# Patient Record
Sex: FEMALE | Race: BLACK OR AFRICAN AMERICAN | Employment: UNEMPLOYED | RURAL
[De-identification: names, ages, dates, MRNs, and addresses within clinical notes are randomized per-mention and may not be internally consistent; named-entity substitution may affect disease eponyms.]

---

## 2017-01-04 ENCOUNTER — TELEPHONE (OUTPATIENT)
Dept: INTERNAL MEDICINE CLINIC | Age: 57
End: 2017-01-04

## 2017-01-04 NOTE — TELEPHONE ENCOUNTER
Patient would like a referral for a orthopaedic doctor. She has an appointment on Monday January 9, 2017.

## 2017-01-09 ENCOUNTER — OFFICE VISIT (OUTPATIENT)
Dept: INTERNAL MEDICINE CLINIC | Age: 57
End: 2017-01-09

## 2017-01-09 VITALS
SYSTOLIC BLOOD PRESSURE: 112 MMHG | OXYGEN SATURATION: 98 % | DIASTOLIC BLOOD PRESSURE: 71 MMHG | TEMPERATURE: 96.1 F | BODY MASS INDEX: 32.36 KG/M2 | HEART RATE: 69 BPM | RESPIRATION RATE: 16 BRPM | WEIGHT: 150 LBS | HEIGHT: 57 IN

## 2017-01-09 DIAGNOSIS — M25.552 PAIN OF LEFT HIP JOINT: Primary | ICD-10-CM

## 2017-01-09 DIAGNOSIS — K21.9 GASTROESOPHAGEAL REFLUX DISEASE WITHOUT ESOPHAGITIS: ICD-10-CM

## 2017-01-09 DIAGNOSIS — F31.30 BIPOLAR AFFECTIVE DISORDER, CURRENT EPISODE DEPRESSED, CURRENT EPISODE SEVERITY UNSPECIFIED (HCC): ICD-10-CM

## 2017-01-09 RX ORDER — GABAPENTIN 100 MG/1
100 CAPSULE ORAL 3 TIMES DAILY
Qty: 90 CAP | Refills: 5 | Status: SHIPPED | OUTPATIENT
Start: 2017-01-09 | End: 2017-03-07 | Stop reason: SDUPTHER

## 2017-01-09 NOTE — PROGRESS NOTES
Chief Complaint   Patient presents with    Leg Pain     L/leg     I have reviewed the patient's medical history in detail and updated the computerized patient record. Health Maintenance reviewed. 1. Have you been to the ER, urgent care clinic since your last visit? Hospitalized since your last visit?no    2. Have you seen or consulted any other health care providers outside of the [address] [place] [address] since your last visit? Include any pap smears or colon screening.  no

## 2017-01-09 NOTE — PROGRESS NOTES
HISTORY OF PRESENT ILLNESS  Allison Stoner is a 55 y.o. female. Leg Pain    The history is provided by the patient. This is a new problem. Episode onset: 6 weeks ago after fall. The problem occurs constantly. The problem has not changed since onset. The pain is present in the left upper leg and left hip. The pain is at a severity of 10/10. Associated symptoms include numbness. Treatments tried: taking percs still has a few. The treatment provided mild relief. There has been a history of trauma. Went and saw a physician at [facility] who did an x-ray of her hip, said the problem was in her back and recommended she go see a back doctor. He does not deal with back issues. Current Outpatient Prescriptions   Medication Sig Dispense Refill    sertraline (ZOLOFT) 50 mg tablet Take  by mouth daily.  cyanocobalamin (VITAMIN B-12) 1,000 mcg tablet Take 1 Tab by mouth daily. Indications: PREVENTION OF VITAMIN B12 DEFICIENCY 90 Tab 0    cholecalciferol (VITAMIN D3) 1,000 unit tablet Take 2 Tabs by mouth daily. Indications: VITAMIN D DEFICIENCY 180 Tab 3    fluticasone (FLONASE) 50 mcg/actuation nasal spray 2 Sprays by Both Nostrils route daily. 1 Bottle 5    esomeprazole (NEXIUM) 40 mg capsule Take 1 Cap by mouth daily. Indications: GASTROESOPHAGEAL REFLUX 90 Cap 0     Allergies   Allergen Reactions    Tylenol [Acetaminophen] Anaphylaxis and Hives    Benadryl [Diphenhydramine Hcl] Nausea and Vomiting    Ibuprofen Nausea and Vomiting     Social History     Social History    Marital status:      Spouse name: N/A    Number of children: N/A    Years of education: N/A     Occupational History    Not on file.      Social History Main Topics    Smoking status: Never Smoker    Smokeless tobacco: Never Used    Alcohol use No    Drug use: No    Sexual activity: Not on file     Other Topics Concern    Not on file     Social History Narrative     has brain damage       Review of Systems   Constitutional: Negative for fever. Gastrointestinal: Negative for heartburn. No incontinence   Genitourinary: Negative for dysuria and hematuria. No incontinence   Musculoskeletal: Positive for joint pain. Fever in leg but cooled down   Neurological: Positive for numbness. Psychiatric/Behavioral: Negative for depression and suicidal ideas. Physical Exam  Visit Vitals    /71 (BP 1 Location: Left arm, BP Patient Position: Sitting)    Pulse 69    Temp 96.1 °F (35.6 °C) (Oral)    Resp 16    Ht 4' 9\" (1.448 m)    Wt 150 lb (68 kg)    SpO2 98%    BMI 32.46 kg/m2     WD WN female NAD overweight  Heart RRR without murmers clicks or rubs  Lungs CTA  Abdo soft nontender  Ext no edema  Skin no rash  Back was examined, grossly normal, no lesions or rash. Saddle area, sensation present. Patellar reflexes 2+ equal bilaterally. Lower leg strength 5 out of 5 bilateral.      ASSESSMENT and PLAN  Encounter Diagnoses   Name Primary?  Pain of left hip joint Yes    Bipolar affective disorder, current episode depressed, current episode severity unspecified (HCC)     Gastroesophageal reflux disease without esophagitis      Orders Placed This Encounter    REFERRAL TO ORTHOPEDICS    gabapentin (NEURONTIN) 100 mg capsule     We will switch her from her current Percocet over to gabapentin. Consider sciatica. Discussed possible side affects, precautions, and drug interactions and possible benefits of the medication(s). We discussed this pain and the usage of controlled substances for acute relief. In general these medications are indicated for the acute symptom relief and not for chronic usage, allthough they are frequently used for chronic management  After a certain period of time they should be stopped to avoid dependence and/or addiction. I warned her about the dangers of addiction and other side affects including respiratory depression and death.  Discussed how this is a controlled substance and that the prescribing of it is should be monitored very carefully. Drug usage is also monitored by our practise and the [facility], since there has been a lot of abuse and diversion of controlled substances. In general we do not refill this medication over the phone. PLease ask for enough pills to get you to your next appointment and make sure you keep your appointments. Warned not to take other medications like alcohol, other benzodiazapines marijuana or other narcotics when on this medication. Follow-up Disposition:  Return in about 4 weeks (around 2/6/2017) for routine follow up.

## 2017-01-09 NOTE — MR AVS SNAPSHOT
Visit Information Date & Time Provider Department Dept. Phone Encounter #  
 1/9/2017  2:30 PM Tracy Luque MD [address] [place] [address]  [id number] Follow-up Instructions Return in about 4 weeks (around 2/6/2017) for routine follow up. Your Appointments 1/23/2017  1:30 PM  
ROUTINE CARE with Tracy Luque MD  
[facility] ([facility]) Appt Note: f/u on leg pt will pay towards balance 12/28/16 [address] [place] [phone]  
[phone]  
  
   
 [address] Upcoming Health Maintenance Date Due FOBT Q 1 YEAR AGE 50-75 12/3/2016 BREAST CANCER SCRN MAMMOGRAM 12/10/2017 PAP AKA CERVICAL CYTOLOGY 12/3/2018 DTaP/Tdap/Td series (2 - Td) 6/23/2021 Allergies as of 1/9/2017  Review Complete On: 1/9/2017 By: Tracy Luque MD  
  
 Severity Noted Reaction Type Reactions Tylenol [Acetaminophen] High 10/02/2015    Anaphylaxis, Hives Benadryl [Diphenhydramine Hcl] Medium 12/03/2015    Nausea and Vomiting Ibuprofen  10/12/2014    Nausea and Vomiting Current Immunizations  Reviewed on 10/2/2015 Name Date DTaP 6/23/2011 Hep B Vaccine 10/16/2016, 7/20/2016 Influenza Vaccine 10/13/2016, 9/9/2015, 9/30/2014, 9/13/2013, 3/28/2013, 11/14/2011, 1/13/2011, 1/13/2011, 12/17/2009, 12/17/2009, 1/8/2009, 1/8/2009, 11/8/2007, 11/8/2007 Influenza Vaccine (Quad) PF 9/24/2014 Pneumococcal Polysaccharide (PPSV-23) 1/8/2009 Tdap 6/23/2011 Not reviewed this visit You Were Diagnosed With   
  
 Codes Comments Pain of left hip joint    -  Primary ICD-10-CM: M25.552 ICD-9-CM: 719.45 Bipolar affective disorder, current episode depressed, current episode severity unspecified ([facility]     ICD-10-CM: F31.30 ICD-9-CM: 296.50 Gastroesophageal reflux disease without esophagitis     ICD-10-CM: K21.9 ICD-9-CM: 530.81   
  
 Vitals BP Pulse Temp Resp Height(growth percentile) Weight(growth percentile) 112/71 (BP 1 Location: Left arm, BP Patient Position: Sitting) 69 96.1 °F (35.6 °C) (Oral) 16 4' 9\" (1.448 m) 150 lb (68 kg) SpO2 BMI OB Status Smoking Status 98% 32.46 kg/m2 Hysterectomy Never Smoker BMI and BSA Data Body Mass Index Body Surface Area  
 32.46 kg/m 2 1.65 m 2 Preferred Pharmacy Pharmacy Name Phone 150 [facility], [address] [facility] [address] [address] [phone] Your Updated Medication List  
  
   
This list is accurate as of: 1/9/17  2:36 PM.  Always use your most recent med list.  
  
  
  
  
 cholecalciferol 1,000 unit tablet Commonly known as:  VITAMIN D3 Take 2 Tabs by mouth daily. Indications: VITAMIN D DEFICIENCY  
  
 cyanocobalamin 1,000 mcg tablet Commonly known as:  VITAMIN B-12 Take 1 Tab by mouth daily. Indications: PREVENTION OF VITAMIN B12 DEFICIENCY  
  
 esomeprazole 40 mg capsule Commonly known as:  Neyda Jeronimo Take 1 Cap by mouth daily. Indications: GASTROESOPHAGEAL REFLUX  
  
 fluticasone 50 mcg/actuation nasal spray Commonly known as:  Rose Mary Reges 2 Sprays by Both Nostrils route daily. gabapentin 100 mg capsule Commonly known as:  NEURONTIN Take 1 Cap by mouth three (3) times daily. Start 1 tablet daily, usually in the evening, every few days increase as tolerated, for hip and back pain. ZOLOFT 50 mg tablet Generic drug:  sertraline Take  by mouth daily. Prescriptions Printed Refills  
 gabapentin (NEURONTIN) 100 mg capsule 5 Sig: Take 1 Cap by mouth three (3) times daily. Start 1 tablet daily, usually in the evening, every few days increase as tolerated, for hip and back pain. Class: Print Route: Oral  
  
We Performed the Following REFERRAL TO ORTHOPEDICS [UZS280 Custom]  Comments:  
 Bienvenido Ceron 6 weeks ago while at [facility] for pneumonia, c/o severe hip and back pain since. Saw surgeon at [facility] said pain was from her back, is severe 10/10 pain. Follow-up Instructions Return in about 4 weeks (around 2/6/2017) for routine follow up. Referral Information Referral ID Referred By Referred To  
  
 [id number] Freda SOOD [facility] [address] [place], [address] Phone: [phone] Visits Status Start Date End Date 1 New Request 1/9/17 1/9/18 If your referral has a status of pending review or denied, additional information will be sent to support the outcome of this decision. Introducing [facility]! [place] introduces Acsis patient portal. Now you can access parts of your medical record, email your doctor's office, and request medication refills online. 1. In your internet browser, go to [email or web address] 2. Click on the First Time User? Click Here link in the Sign In box. You will see the New Member Sign Up page. 3. Enter your Acsis Access Code exactly as it appears below. You will not need to use this code after youve completed the sign-up process. If you do not sign up before the expiration date, you must request a new code. · Acsis Access Code: [email or web address] Expires: 1/26/2017  1:49 PM 
 
4. Enter the last four digits of your Social Security Number (xxxx) and Date of Birth (mm/dd/yyyy) as indicated and click Submit. You will be taken to the next sign-up page. 5. Create a Catamarant ID. This will be your Acsis login ID and cannot be changed, so think of one that is secure and easy to remember. 6. Create a Catamarant password. You can change your password at any time. 7. Enter your Password Reset Question and Answer. This can be used at a later time if you forget your password. 8. Enter your e-mail address. You will receive e-mail notification when new information is available in [address] 9. Click Sign Up. You can now view and download portions of your medical record. 10. Click the Download Summary menu link to download a portable copy of your medical information. If you have questions, please visit the Frequently Asked Questions section of the oBaz website. Remember, oBaz is NOT to be used for urgent needs. For medical emergencies, dial 911. Now available from your iPhone and Android! Please provide this summary of care documentation to your next provider. Your primary care clinician is listed as Stacey Webster. If you have any questions after today's visit, please call [phone].

## 2017-01-12 DIAGNOSIS — Z98.84 HISTORY OF GASTRIC BYPASS: ICD-10-CM

## 2017-01-12 DIAGNOSIS — D50.8 OTHER IRON DEFICIENCY ANEMIA: ICD-10-CM

## 2017-01-12 NOTE — TELEPHONE ENCOUNTER
Requested Prescriptions     Pending Prescriptions Disp Refills    cyanocobalamin (VITAMIN B-12) 1,000 mcg tablet 90 Tab 0     Sig: Take 1 Tab by mouth daily.  Indications: PREVENTION OF VITAMIN B12 DEFICIENCY

## 2017-01-13 RX ORDER — LANOLIN ALCOHOL/MO/W.PET/CERES
1000 CREAM (GRAM) TOPICAL DAILY
Qty: 90 TAB | Refills: 3 | Status: SHIPPED | OUTPATIENT
Start: 2017-01-13 | End: 2017-09-06 | Stop reason: SDUPTHER

## 2017-01-13 NOTE — TELEPHONE ENCOUNTER
Requested Prescriptions     Pending Prescriptions Disp Refills    cyanocobalamin (VITAMIN B-12) 1,000 mcg tablet 90 Tab 0     Sig: Take 1 Tab by mouth daily.  Indications: PREVENTION OF VITAMIN B12 DEFICIENCY     Last Office Visit:  09 January 2017  Last Filled:  24 August 2016  Changes Made to Medication on Last Visit:  None

## 2017-02-03 ENCOUNTER — HOSPITAL ENCOUNTER (OUTPATIENT)
Dept: MRI IMAGING | Age: 57
Discharge: HOME | End: 2017-02-03
Attending: ORTHOPAEDIC SURGERY
Payer: MEDICARE

## 2017-02-03 DIAGNOSIS — M54.16 LUMBAR RADICULOPATHY: ICD-10-CM

## 2017-02-03 PROCEDURE — 72148 MRI LUMBAR SPINE W/O DYE: CPT

## 2017-02-07 ENCOUNTER — TELEPHONE (OUTPATIENT)
Dept: INTERNAL MEDICINE CLINIC | Age: 57
End: 2017-02-07

## 2017-02-07 NOTE — TELEPHONE ENCOUNTER
Patient called saying that her blood sugar has been fluctuating up and down. Please call her at [phone]. Mrs. Sherly Allen does has an appointment on Friday at 10:30am.

## 2017-02-07 NOTE — TELEPHONE ENCOUNTER
Called patient - her blood sugar readings have been ranging all over the place from as low as 56 to as high as 209 over the past 2 weeks - states that when it gets really low like 59 to 70 she feels really dizzy - all sugar readings have been fasting - appointment was changed from 2/10/17 to tomorrow (2/8/17) so that she can discuss with Dr Dread Belcher these readings  Kendrick Rubalcava LPN  9/5/4981  1:27 PM

## 2017-02-08 ENCOUNTER — OFFICE VISIT (OUTPATIENT)
Dept: INTERNAL MEDICINE CLINIC | Age: 57
End: 2017-02-08

## 2017-02-08 VITALS
OXYGEN SATURATION: 97 % | HEIGHT: 57 IN | TEMPERATURE: 98.1 F | WEIGHT: 155 LBS | RESPIRATION RATE: 16 BRPM | SYSTOLIC BLOOD PRESSURE: 111 MMHG | BODY MASS INDEX: 33.44 KG/M2 | DIASTOLIC BLOOD PRESSURE: 65 MMHG | HEART RATE: 74 BPM

## 2017-02-08 DIAGNOSIS — M54.32 BACK PAIN WITH LEFT-SIDED SCIATICA: ICD-10-CM

## 2017-02-08 DIAGNOSIS — F31.30 BIPOLAR AFFECTIVE DISORDER, CURRENT EPISODE DEPRESSED, CURRENT EPISODE SEVERITY UNSPECIFIED (HCC): ICD-10-CM

## 2017-02-08 DIAGNOSIS — R42 DIZZINESS AND GIDDINESS: Primary | ICD-10-CM

## 2017-02-08 RX ORDER — MECLIZINE HYDROCHLORIDE 25 MG/1
25 TABLET ORAL
Qty: 30 TAB | Refills: 1 | Status: SHIPPED | OUTPATIENT
Start: 2017-02-08 | End: 2017-08-16 | Stop reason: SDUPTHER

## 2017-02-08 NOTE — PROGRESS NOTES
Chief Complaint   Patient presents with    Dizziness     pt stated \"I feel funny, weakness in knees, lightheadedness\", pt's  is a diabetic and has been checking her BS and it is from 68 to 106 to 209      I have reviewed the patient's medical history in detail and updated the computerized patient record. Health Maintenance reviewed. 1. Have you been to the ER, urgent care clinic since your last visit? Hospitalized since your last visit?no    2. Have you seen or consulted any other health care providers outside of the [address] [place] [address] since your last visit? Include any pap smears or colon screening.  no

## 2017-02-08 NOTE — PROGRESS NOTES
HISTORY OF PRESENT ILLNESS  Silvestre Nicholas is a 55 y.o. female. Dizziness    The history is provided by the patient. This is a recurrent problem. The current episode started more than 2 days ago (after MRI). The problem occurs daily (1x). There was no loss of consciousness. Associated with: sugar and pain. Associated symptoms include headaches, back pain and dizziness. Pertinent negatives include no fever, no bowel incontinence, no bladder incontinence, no focal weakness, no seizures, no slurred speech and no head injury. Associated symptoms comments: nervous. Treatments tried: eating or drinking something. The treatment provided mild relief. Her past medical history does not include no CVA, no DM or no syncope. No DM meds  Has seen , but not seen him after the MRI. Had MRI, gabapentin helps a lot but the pain still pretty bad  Seeing counselor, mood pretty good. Past Medical History   Diagnosis Date    Hypoglycemia 2014    Hypotension 2014    Syncope and collapse 7/24/15     RTH     Social History     Social History    Marital status:      Spouse name: N/A    Number of children: N/A    Years of education: N/A     Occupational History    Not on file. Social History Main Topics    Smoking status: Never Smoker    Smokeless tobacco: Never Used    Alcohol use No    Drug use: No    Sexual activity: No     Other Topics Concern    Not on file     Social History Narrative     has brain damage       Review of Systems   Constitutional: Negative for fever and weight loss. Gastrointestinal: Negative for bowel incontinence. Genitourinary: Negative for bladder incontinence. Musculoskeletal: Positive for back pain. Neurological: Positive for dizziness and headaches. Negative for focal weakness, seizures and syncope. Psychiatric/Behavioral: Negative for depression.        Physical Exam  Visit Vitals    /65 (BP 1 Location: Left arm, BP Patient Position: Sitting)    Pulse 74  Temp 98.1 °F (36.7 °C) (Oral)    Resp 16    Ht 4' 9\" (1.448 m)    Wt 155 lb (70.3 kg)    SpO2 97%    BMI 33.54 kg/m2     Well developed well nourished no acute distress. Pupils equal round react to light, extraocular muscles intact. Tympanic membranes within normal limits throat unremarkable  Cranial nerves II through XII are intact. Neck unremarkable  Heart regular rate and rhythm without clicks murmurs rubs  Lungs are clear to auscultation  Abdomen soft. Extremities, motor 5 out of 5 bilaterally, reflexes 2+ equal bilaterally. ASSESSMENT and PLAN  Encounter Diagnoses   Name Primary?  Dizziness and giddiness Yes    Back pain with left-sided sciatica     Bipolar affective disorder, current episode depressed, current episode severity unspecified ([address]      Orders Placed This Encounter    REFERRAL TO ORTHOPEDICS    meclizine (ANTIVERT) 25 mg tablet   No sugar related, stop check BS  Reviewed MRI  Disk tear mild L4-5 on the left  Meds are helping no inc of gabapentin since vertigo but may inc to avoid surgery. Follow-up Disposition:  Return in about 4 weeks (around 3/8/2017).

## 2017-02-08 NOTE — MR AVS SNAPSHOT
Visit Information Date & Time Provider Department Dept. Phone Encounter #  
 2/8/2017  2:30 PM Irvin Best MD [place] [address] [phone] [id number] Follow-up Instructions Return in about 4 weeks (around 3/8/2017). Upcoming Health Maintenance Date Due FOBT Q 1 YEAR AGE 50-75 12/3/2016 BREAST CANCER SCRN MAMMOGRAM 12/10/2017 PAP AKA CERVICAL CYTOLOGY 12/3/2018 DTaP/Tdap/Td series (2 - Td) 6/23/2021 Allergies as of 2/8/2017  Review Complete On: 2/8/2017 By: Irvin Best MD  
  
 Severity Noted Reaction Type Reactions Tylenol [Acetaminophen] High 10/02/2015    Anaphylaxis, Hives Benadryl [Diphenhydramine Hcl] Medium 12/03/2015    Nausea and Vomiting Ibuprofen  10/12/2014    Nausea and Vomiting Current Immunizations  Reviewed on 10/2/2015 Name Date DTaP 6/23/2011 Hep B Vaccine 10/16/2016, 7/20/2016 Influenza Vaccine 10/13/2016, 9/9/2015, 9/30/2014, 9/13/2013, 3/28/2013, 11/14/2011, 1/13/2011, 1/13/2011, 12/17/2009, 12/17/2009, 1/8/2009, 1/8/2009, 11/8/2007, 11/8/2007 Influenza Vaccine (Quad) PF 9/24/2014 Pneumococcal Polysaccharide (PPSV-23) 1/8/2009 Tdap 6/23/2011 Not reviewed this visit You Were Diagnosed With   
  
 Codes Comments Dizziness and giddiness    -  Primary ICD-10-CM: C72 ICD-9-CM: 780.4 Back pain with left-sided sciatica     ICD-10-CM: M54.32 
ICD-9-CM: 724.3 Bipolar affective disorder, current episode depressed, current episode severity unspecified ([facility]     ICD-10-CM: F31.30 ICD-9-CM: 296.50 Vitals BP Pulse Temp Resp Height(growth percentile) Weight(growth percentile) 111/65 (BP 1 Location: Left arm, BP Patient Position: Sitting) 74 98.1 °F (36.7 °C) (Oral) 16 4' 9\" (1.448 m) 155 lb (70.3 kg) SpO2 BMI OB Status Smoking Status 97% 33.54 kg/m2 Hysterectomy Never Smoker BMI and BSA Data  Body Mass Index Body Surface Area  
 33.54 kg/m 2 1.68 m 2  
  
  
 Preferred Pharmacy Pharmacy Name Phone 150 [facility], [address] [place] [address] [address] [place] [address] [phone] Your Updated Medication List  
  
   
This list is accurate as of: 2/8/17  3:14 PM.  Always use your most recent med list.  
  
  
  
  
 cholecalciferol 1,000 unit tablet Commonly known as:  VITAMIN D3 Take 2 Tabs by mouth daily. Indications: VITAMIN D DEFICIENCY  
  
 cyanocobalamin 1,000 mcg tablet Commonly known as:  VITAMIN B-12 Take 1 Tab by mouth daily. Indications: PREVENTION OF VITAMIN B12 DEFICIENCY  
  
 esomeprazole 40 mg capsule Commonly known as:  Cevallos Gaunt Take 1 Cap by mouth daily. Indications: GASTROESOPHAGEAL REFLUX  
  
 fluticasone 50 mcg/actuation nasal spray Commonly known as:  Guerrero Blizzard 2 Sprays by Both Nostrils route daily. gabapentin 100 mg capsule Commonly known as:  NEURONTIN Take 1 Cap by mouth three (3) times daily. Start 1 tablet daily, usually in the evening, every few days increase as tolerated, for hip and back pain. meclizine 25 mg tablet Commonly known as:  ANTIVERT Take 1 Tab by mouth three (3) times daily as needed for up to 10 days. For dizziness ZOLOFT 50 mg tablet Generic drug:  sertraline Take  by mouth daily. Prescriptions Sent to Pharmacy Refills  
 meclizine (ANTIVERT) 25 mg tablet 1 Sig: Take 1 Tab by mouth three (3) times daily as needed for up to 10 days. For dizziness Class: Normal  
 Pharmacy: 150 [facility], [address]  #: [phone] Route: Oral  
  
We Performed the Following REFERRAL TO ORTHOPEDICS [EHF226 Custom] Comments:  
 F/u sciatica and recent back MRI Follow-up Instructions Return in about 4 weeks (around 3/8/2017). Referral Information Referral ID Referred By Referred To  
  
 [id number] Emma Urena   
   [phone] [facility] [address] [place] [address], [facility] Phone: [phone] Visits Status Start Date End Date 1 New Request 2/8/17 2/8/18 If your referral has a status of pending review or denied, additional information will be sent to support the outcome of this decision. Introducing [facility]! [place] introduces Wurl patient portal. Now you can access parts of your medical record, email your doctor's office, and request medication refills online. 1. In your internet browser, go to [email or web address] 2. Click on the First Time User? Click Here link in the Sign In box. You will see the New Member Sign Up page. 3. Enter your Wurl Access Code exactly as it appears below. You will not need to use this code after youve completed the sign-up process. If you do not sign up before the expiration date, you must request a new code. · Wurl Access Code: [email or web address] Expires: 5/4/2017  9:58 AM 
 
4. Enter the last four digits of your Social Security Number (xxxx) and Date of Birth (mm/dd/yyyy) as indicated and click Submit. You will be taken to the next sign-up page. 5. Create a Wurl ID. This will be your Wurl login ID and cannot be changed, so think of one that is secure and easy to remember. 6. Create a Wurl password. You can change your password at any time. 7. Enter your Password Reset Question and Answer. This can be used at a later time if you forget your password. 8. Enter your e-mail address. You will receive e-mail notification when new information is available in [address] 9. Click Sign Up. You can now view and download portions of your medical record. 10. Click the Download Summary menu link to download a portable copy of your medical information. If you have questions, please visit the Frequently Asked Questions section of the Wurl website. Remember, Wurl is NOT to be used for urgent needs. For medical emergencies, dial 911. Now available from your iPhone and Android! Please provide this summary of care documentation to your next provider. Your primary care clinician is listed as Annie [place]. If you have any questions after today's visit, please call [phone].

## 2017-02-23 DIAGNOSIS — Z98.84 HISTORY OF GASTRIC BYPASS: ICD-10-CM

## 2017-02-23 DIAGNOSIS — D50.8 OTHER IRON DEFICIENCY ANEMIA: ICD-10-CM

## 2017-02-23 RX ORDER — LANOLIN ALCOHOL/MO/W.PET/CERES
1000 CREAM (GRAM) TOPICAL DAILY
Qty: 90 TAB | Refills: 3 | Status: CANCELLED | OUTPATIENT
Start: 2017-02-23

## 2017-02-23 NOTE — TELEPHONE ENCOUNTER
Requested Prescriptions     Pending Prescriptions Disp Refills    fluticasone (FLONASE) 50 mcg/actuation nasal spray 1 Bottle 5     Si Sprays by Both Nostrils route daily.        Last Office Visit:  2017  Last Filled:  2016 with 5 refills  Changes Made to Medication on Last Visit: None

## 2017-02-23 NOTE — TELEPHONE ENCOUNTER
Requested Prescriptions     Pending Prescriptions Disp Refills    fluticasone (FLONASE) 50 mcg/actuation nasal spray 1 Bottle 5     Si Sprays by Both Nostrils route daily.  cyanocobalamin (VITAMIN B-12) 1,000 mcg tablet 90 Tab 3     Sig: Take 1 Tab by mouth daily.  Indications: PREVENTION OF VITAMIN B12 DEFICIENCY

## 2017-02-24 RX ORDER — FLUTICASONE PROPIONATE 50 MCG
2 SPRAY, SUSPENSION (ML) NASAL DAILY
Qty: 1 BOTTLE | Refills: 5 | Status: SHIPPED | OUTPATIENT
Start: 2017-02-24 | End: 2018-05-04 | Stop reason: SDUPTHER

## 2017-03-06 DIAGNOSIS — G44.229 CHRONIC TENSION-TYPE HEADACHE, NOT INTRACTABLE: ICD-10-CM

## 2017-03-06 RX ORDER — TOPIRAMATE 100 MG/1
100 TABLET, FILM COATED ORAL DAILY
Qty: 30 TAB | Refills: 11 | Status: SHIPPED | OUTPATIENT
Start: 2017-03-06 | End: 2018-04-07 | Stop reason: SDUPTHER

## 2017-03-06 NOTE — TELEPHONE ENCOUNTER
Requested Prescriptions     Pending Prescriptions Disp Refills    topiramate (TOPAMAX) 100 mg tablet 30 Tab 3     Sig: Take 1 Tab by mouth daily.  Indications: MOOD     Last office visit 2/7/17  Last filled  NOT ON PROFILE  Changes made to medication on last visit

## 2017-03-06 NOTE — TELEPHONE ENCOUNTER
Requested Prescriptions     Pending Prescriptions Disp Refills    topiramate (TOPAMAX) 100 mg tablet 30 Tab 3     Sig: Take 1 Tab by mouth daily.  Indications: MOOD

## 2017-03-07 ENCOUNTER — OFFICE VISIT (OUTPATIENT)
Dept: INTERNAL MEDICINE CLINIC | Age: 57
End: 2017-03-07

## 2017-03-07 VITALS
RESPIRATION RATE: 16 BRPM | DIASTOLIC BLOOD PRESSURE: 86 MMHG | HEART RATE: 60 BPM | SYSTOLIC BLOOD PRESSURE: 131 MMHG | WEIGHT: 160 LBS | TEMPERATURE: 97.2 F | BODY MASS INDEX: 34.52 KG/M2 | OXYGEN SATURATION: 99 % | HEIGHT: 57 IN

## 2017-03-07 DIAGNOSIS — M54.42 ACUTE MIDLINE LOW BACK PAIN WITH LEFT-SIDED SCIATICA: Primary | ICD-10-CM

## 2017-03-07 DIAGNOSIS — N39.46 MIXED INCONTINENCE: ICD-10-CM

## 2017-03-07 DIAGNOSIS — S39.012D LOW BACK STRAIN, SUBSEQUENT ENCOUNTER: ICD-10-CM

## 2017-03-07 LAB
BILIRUB UR QL STRIP: NEGATIVE
GLUCOSE UR-MCNC: NEGATIVE MG/DL
KETONES P FAST UR STRIP-MCNC: NEGATIVE MG/DL
PH UR STRIP: 5.5 [PH] (ref 4.6–8)
PROT UR QL STRIP: NEGATIVE MG/DL
SP GR UR STRIP: 1.01 (ref 1–1.03)
UA UROBILINOGEN AMB POC: NORMAL (ref 0.2–1)
URINALYSIS CLARITY POC: NORMAL
URINALYSIS COLOR POC: YELLOW
URINE BLOOD POC: NORMAL
URINE LEUKOCYTES POC: NORMAL
URINE NITRITES POC: NEGATIVE

## 2017-03-07 RX ORDER — HYDROCODONE BITARTRATE AND ACETAMINOPHEN 5; 325 MG/1; MG/1
1 TABLET ORAL
Qty: 40 TAB | Refills: 0 | Status: SHIPPED | OUTPATIENT
Start: 2017-03-07 | End: 2017-03-29 | Stop reason: ALTCHOICE

## 2017-03-07 RX ORDER — GABAPENTIN 300 MG/1
300 CAPSULE ORAL 2 TIMES DAILY
Qty: 90 CAP | Refills: 5 | Status: SHIPPED | OUTPATIENT
Start: 2017-03-07 | End: 2017-03-29 | Stop reason: ALTCHOICE

## 2017-03-07 NOTE — PROGRESS NOTES
Chief Complaint   Patient presents with    Fall     L/leg give way and pt falls frequently     I have reviewed the patient's medical history in detail and updated the computerized patient record. Health Maintenance reviewed. 1. Have you been to the ER, urgent care clinic since your last visit? Hospitalized since your last visit?no    2. Have you seen or consulted any other health care providers outside of the [address] [place] [address] since your last visit? Include any pap smears or colon screening.  no

## 2017-03-07 NOTE — MR AVS SNAPSHOT
Visit Information Date & Time Provider Department Dept. Phone Encounter #  
 3/7/2017  3:50 PM Juan Carlos Snow  Jamie Alex [id number] Follow-up Instructions Return in about 3 weeks (around 3/28/2017) for routine follow up. Upcoming Health Maintenance Date Due FOBT Q 1 YEAR AGE 50-75 12/3/2016 BREAST CANCER SCRN MAMMOGRAM 12/10/2017 PAP AKA CERVICAL CYTOLOGY 12/3/2018 DTaP/Tdap/Td series (2 - Td) 6/23/2021 Allergies as of 3/7/2017  Review Complete On: 3/7/2017 By: Damian Signs, LPN Severity Noted Reaction Type Reactions Tylenol [Acetaminophen] High 10/02/2015    Anaphylaxis, Hives Benadryl [Diphenhydramine Hcl] Medium 12/03/2015    Nausea and Vomiting Ibuprofen  10/12/2014    Nausea and Vomiting Current Immunizations  Reviewed on 10/2/2015 Name Date DTaP 6/23/2011 Hep B Vaccine 10/16/2016, 7/20/2016 Influenza Vaccine 10/13/2016, 9/9/2015, 9/30/2014, 9/13/2013, 3/28/2013, 11/14/2011, 1/13/2011, 1/13/2011, 12/17/2009, 12/17/2009, 1/8/2009, 1/8/2009, 11/8/2007, 11/8/2007 Influenza Vaccine (Quad) PF 9/24/2014 Pneumococcal Polysaccharide (PPSV-23) 1/8/2009 Tdap 6/23/2011 Not reviewed this visit You Were Diagnosed With   
  
 Codes Comments Acute midline low back pain with left-sided sciatica    -  Primary ICD-10-CM: M54.42 
ICD-9-CM: 724.2, 724.3 Mixed incontinence     ICD-10-CM: N39.46 
ICD-9-CM: 788.33 Low back strain, subsequent encounter     ICD-10-CM: S39.012D ICD-9-CM: V58.89, 847.2 Vitals BP Pulse Temp Resp Height(growth percentile) Weight(growth percentile) 131/86 (BP 1 Location: Left arm, BP Patient Position: Sitting) 60 97.2 °F (36.2 °C) (Oral) 16 4' 9\" (1.448 m) 160 lb (72.6 kg) SpO2 BMI OB Status Smoking Status 99% 34.62 kg/m2 Hysterectomy Never Smoker BMI and BSA Data  Body Mass Index Body Surface Area  
 34.62 kg/m 2 1.71 m 2  
  
  
 Preferred Pharmacy Pharmacy Name Phone 150 [facility], [address] [phone] Your Updated Medication List  
  
   
This list is accurate as of: 3/7/17  5:18 PM.  Always use your most recent med list.  
  
  
  
  
 cholecalciferol 1,000 unit tablet Commonly known as:  VITAMIN D3 Take 2 Tabs by mouth daily. Indications: VITAMIN D DEFICIENCY  
  
 cyanocobalamin 1,000 mcg tablet Commonly known as:  VITAMIN B-12 Take 1 Tab by mouth daily. Indications: PREVENTION OF VITAMIN B12 DEFICIENCY  
  
 esomeprazole 40 mg capsule Commonly known as:  Krissy Dame Take 1 Cap by mouth daily. Indications: GASTROESOPHAGEAL REFLUX  
  
 fluticasone 50 mcg/actuation nasal spray Commonly known as:  Humera Fetch 2 Sprays by Both Nostrils route daily. gabapentin 300 mg capsule Commonly known as:  NEURONTIN Take 1 Cap by mouth two (2) times a day. Start 1 tablet daily, usually in the evening, every few days increase as tolerated, for hip and back pain. HYDROcodone-acetaminophen 5-325 mg per tablet Commonly known as:  Aurelio Dolphin Take 1 Tab by mouth every six (6) hours as needed for Pain. Max Daily Amount: 4 Tabs. Take only after physical therapy sessions  
  
 topiramate 100 mg tablet Commonly known as:  TOPAMAX Take 1 Tab by mouth daily. Indications: MOOD  
  
 ZOLOFT 50 mg tablet Generic drug:  sertraline Take  by mouth daily. Prescriptions Printed Refills  
 gabapentin (NEURONTIN) 300 mg capsule 5 Sig: Take 1 Cap by mouth two (2) times a day. Start 1 tablet daily, usually in the evening, every few days increase as tolerated, for hip and back pain. Class: Print Route: Oral  
 HYDROcodone-acetaminophen (NORCO) 5-325 mg per tablet 0 Sig: Take 1 Tab by mouth every six (6) hours as needed for Pain. Max Daily Amount: 4 Tabs. Take only after physical therapy sessions Class: Print Route: Oral  
  
We Performed the Following AMB POC URINALYSIS DIP STICK AUTO W/O MICRO [31706 CPT(R)] AMB SUPPLY ORDER [[id number] Custom] Comments:  
 Back pain some left leg pain 3 mo [facility] PT Call  CULTURE, URINE [id number] CPT(R)] REFERRAL TO UROLOGY [[id number] Custom] Comments:  
 Mixed urine incontinence Follow-up Instructions Return in about 3 weeks (around 3/28/2017) for routine follow up. Referral Information Referral ID Referred By Referred To  
  
 [id number] Chandler SOOD MD   
   [facility] [address] [place], [address] [place] [address] Phone: [phone] Fax: [phone] Visits Status Start Date End Date 1 New Request 3/7/17 3/7/18 If your referral has a status of pending review or denied, additional information will be sent to support the outcome of this decision. Introducing [facility] & HEALTH SERVICES! Kayode Cronin introduces [employer] patient portal. Now you can access parts of your medical record, email your doctor's office, and request medication refills online. 1. In your internet browser, go to [email or web address] 2. Click on the First Time User? Click Here link in the Sign In box. You will see the New Member Sign Up page. 3. Enter your [employer] Access Code exactly as it appears below. You will not need to use this code after youve completed the sign-up process. If you do not sign up before the expiration date, you must request a new code. · [employer] Access Code: [email or web address] Expires: 5/4/2017  9:58 AM 
 
4. Enter the last four digits of your Social Security Number (xxxx) and Date of Birth (mm/dd/yyyy) as indicated and click Submit. You will be taken to the next sign-up page. 5. Create a IMayGout ID. This will be your [employer] login ID and cannot be changed, so think of one that is secure and easy to remember. 6. Create a [employer] password. You can change your password at any time. 7. Enter your Password Reset Question and Answer. This can be used at a later time if you forget your password. 8. Enter your e-mail address. You will receive e-mail notification when new information is available in [address] 9. Click Sign Up. You can now view and download portions of your medical record. 10. Click the Download Summary menu link to download a portable copy of your medical information. If you have questions, please visit the Frequently Asked Questions section of the [employer] website. Remember, [employer] is NOT to be used for urgent needs. For medical emergencies, dial 911. Now available from your iPhone and Android! Please provide this summary of care documentation to your next provider. Your primary care clinician is listed as Brittny Moran. If you have any questions after today's visit, please call [phone].

## 2017-03-07 NOTE — PROGRESS NOTES
HISTORY OF PRESENT ILLNESS  Reubin Epley is a 55 y.o. female. Fall   The history is provided by the patient. The accident occurred 2 days ago. The fall occurred while standing. She fell from a height of 3 - 5 ft. She landed on hard floor. The point of impact was the left hip, left shoulder and left knee. She was ambulatory at the scene. There was no entrapment after the fall. Associated symptoms include extremity weakness. Pertinent negatives include no fever, no bowel incontinence, no hematuria and no loss of consciousness. The symptoms are aggravated by activity. has some back pain issues and is being followed by orthopedics. He recommended getting a steroid shot but she does not want that. In her experience they do not seem to help much. She has had an MRI which showed some disc disease at the L3-L4 level, annular tear. Past Medical History:   Diagnosis Date    Hypoglycemia 2014    Hypotension 2014    Syncope and collapse 7/24/15    R[facility]     Social History     Social History    Marital status:      Spouse name: N/A    Number of children: N/A    Years of education: N/A     Occupational History    Not on file. Social History Main Topics    Smoking status: Never Smoker    Smokeless tobacco: Never Used    Alcohol use No    Drug use: No    Sexual activity: No     Other Topics Concern    Not on file     Social History Narrative     has brain damage         Review of Systems   Constitutional: Negative for fever and weight loss. Gastrointestinal: Negative for bowel incontinence. Genitourinary: Negative for dysuria and hematuria. Having incontinence having trouble getting to the restroom and tends to wet herself. Musculoskeletal: Positive for back pain, extremity weakness, falls and joint pain. Neurological: Negative for loss of consciousness.        Physical Exam  Visit Vitals    /86 (BP 1 Location: Left arm, BP Patient Position: Sitting)    Pulse 60    Temp 97.2 °F (36.2 °C) (Oral)    Resp 16    Ht 4' 9\" (1.448 m)    Wt 160 lb (72.6 kg)    SpO2 99%    BMI 34.62 kg/m2       WDWN NAD, uncomfortable with movements  TM clear, throat wnl  Neck no adenopathy  Heart RRR no C/M/R  Lungs CTA  Abdo soft non tender  Ext No redness swelling or edema  Back was examined, grossly normal, no lesions or rash. Saddle area, sensation present. Patellar reflexes 2+ equal bilaterally. Lower leg strength 5 out of 5 bilateral.  Straight leg raises negative    ASSESSMENT and PLAN  Encounter Diagnoses   Name Primary?  Acute midline low back pain with left-sided sciatica Yes    Mixed incontinence     Low back strain, subsequent encounter      Orders Placed This Encounter    AMB SUPPLY ORDER    CULTURE, URINE    REFERRAL TO UROLOGY    AMB POC URINALYSIS DIP STICK AUTO W/O MICRO    gabapentin (NEURONTIN) 300 mg capsule    HYDROcodone-acetaminophen (NORCO) 5-325 mg per tablet       We discussed this pain and the usage of controlled substances for back pain relief. In general these medications are indicated for the acute symptom relief and not for chronic usage, allthough they are frequently used for chronic management  After a certain period of time they should be stopped to avoid dependence and/or addiction. I warned her about the dangers of addiction and other side affects including respiratory depression and death. Discussed how this is a controlled substance and that the prescribing of it is should be monitored very carefully. Drug usage is also monitored by our practise and the [facility], since there has been a lot of abuse and diversion of controlled substances. In general we do not refill this medication over the phone. PLease ask for enough pills to get you to your next appointment and make sure you keep your appointments. Warned not to take other medications like alcohol, other benzodiazapines marijuana or other narcotics when on this medication.     Treat acute pain with narcotics, chronic pain with gabapentin. We will get her into physical therapy to make her stronger so hopefully she will not fall again. We will get her in to see urology about her incontinence. Follow-up Disposition:  Return in about 3 weeks (around 3/28/2017) for routine follow up.

## 2017-03-09 LAB
BACTERIA UR CULT: ABNORMAL
BACTERIA UR CULT: ABNORMAL

## 2017-03-10 ENCOUNTER — TELEPHONE (OUTPATIENT)
Dept: INTERNAL MEDICINE CLINIC | Age: 57
End: 2017-03-10

## 2017-03-10 RX ORDER — NITROFURANTOIN 25; 75 MG/1; MG/1
100 CAPSULE ORAL 2 TIMES DAILY
Qty: 14 CAP | Refills: 0 | Status: SHIPPED | OUTPATIENT
Start: 2017-03-10 | End: 2017-03-17

## 2017-03-23 ENCOUNTER — TELEPHONE (OUTPATIENT)
Dept: INTERNAL MEDICINE CLINIC | Age: 57
End: 2017-03-23

## 2017-03-23 NOTE — TELEPHONE ENCOUNTER
Chief Complaint   Patient presents with    Back Pain     LEG PAIN     Informed patient that an appointment is required before a narcotic Rx can be prescribed.   Appointment scheduled for Wednesday, March 29, 2017 at 3:15 pm.

## 2017-03-27 ENCOUNTER — TELEPHONE (OUTPATIENT)
Dept: INTERNAL MEDICINE CLINIC | Age: 57
End: 2017-03-27

## 2017-03-27 NOTE — TELEPHONE ENCOUNTER
Patient called in reference to needing a stronger fluid pill. She said that she has fluid from her hip to her feet. Mrs. Tra Cummins said she can barely walk. Please call her at [phone].

## 2017-03-29 ENCOUNTER — OFFICE VISIT (OUTPATIENT)
Dept: INTERNAL MEDICINE CLINIC | Age: 57
End: 2017-03-29

## 2017-03-29 VITALS
SYSTOLIC BLOOD PRESSURE: 126 MMHG | HEART RATE: 63 BPM | RESPIRATION RATE: 16 BRPM | WEIGHT: 161 LBS | DIASTOLIC BLOOD PRESSURE: 82 MMHG | BODY MASS INDEX: 34.73 KG/M2 | OXYGEN SATURATION: 98 % | TEMPERATURE: 96.5 F | HEIGHT: 57 IN

## 2017-03-29 DIAGNOSIS — Z98.84 HISTORY OF GASTRIC BYPASS: Chronic | ICD-10-CM

## 2017-03-29 DIAGNOSIS — F31.30 BIPOLAR AFFECTIVE DISORDER, CURRENT EPISODE DEPRESSED, CURRENT EPISODE SEVERITY UNSPECIFIED (HCC): ICD-10-CM

## 2017-03-29 DIAGNOSIS — R60.0 BILATERAL LEG EDEMA: Primary | ICD-10-CM

## 2017-03-29 RX ORDER — POTASSIUM CHLORIDE 750 MG/1
10 TABLET, EXTENDED RELEASE ORAL DAILY
Qty: 30 TAB | Refills: 2 | Status: SHIPPED | OUTPATIENT
Start: 2017-03-29 | End: 2017-05-09 | Stop reason: SDUPTHER

## 2017-03-29 RX ORDER — FUROSEMIDE 20 MG/1
20 TABLET ORAL DAILY
Qty: 30 TAB | Refills: 2 | Status: SHIPPED | OUTPATIENT
Start: 2017-03-29 | End: 2017-05-09 | Stop reason: SDUPTHER

## 2017-03-29 NOTE — MR AVS SNAPSHOT
Visit Information Date & Time Provider Department Dept. Phone Encounter #  
 3/29/2017  3:15 PM Lanette Aviles MD [address] [place], [place] [address]eb Alex [id number] Follow-up Instructions Return in about 4 weeks (around 4/26/2017) for routine follow up. Your Appointments 4/11/2017  2:45 PM  
ROUTINE CARE with Lanette Aviles MD  
[facility] ([facility]) Appt Note: leg/back pain f/u smc3/23/17  
 [address] [place] [address]  
[phone]  
  
   
 [address] Upcoming Health Maintenance Date Due FOBT Q 1 YEAR AGE 50-75 12/3/2016 BREAST CANCER SCRN MAMMOGRAM 12/10/2017 PAP AKA CERVICAL CYTOLOGY 12/3/2018 DTaP/Tdap/Td series (2 - Td) 6/23/2021 Allergies as of 3/29/2017  Review Complete On: 3/8/2017 By: Lanette Aviles MD  
  
 Severity Noted Reaction Type Reactions Tylenol [Acetaminophen] High 10/02/2015    Anaphylaxis, Hives Benadryl [Diphenhydramine Hcl] Medium 12/03/2015    Nausea and Vomiting Ibuprofen  10/12/2014    Nausea and Vomiting Current Immunizations  Reviewed on 10/2/2015 Name Date DTaP 6/23/2011 Hep B Vaccine 10/16/2016, 7/20/2016 Influenza Vaccine 10/13/2016, 9/9/2015, 9/30/2014, 9/13/2013, 3/28/2013, 11/14/2011, 1/13/2011, 1/13/2011, 12/17/2009, 12/17/2009, 1/8/2009, 1/8/2009, 11/8/2007, 11/8/2007 Influenza Vaccine (Quad) PF 9/24/2014 Pneumococcal Polysaccharide (PPSV-23) 1/8/2009 Tdap 6/23/2011 Not reviewed this visit You Were Diagnosed With   
  
 Codes Comments Bilateral leg edema    -  Primary ICD-10-CM: R60.0 ICD-9-CM: 782.3 History of gastric bypass     ICD-10-CM: Z98.890 ICD-9-CM: V45.86 Vitals BP Pulse Temp Resp Height(growth percentile) Weight(growth percentile)  126/82 (BP 1 Location: Left arm, BP Patient Position: Sitting) 63 96.5 °F (35.8 °C) (Oral) 16 4' 9\" (1.448 m) 161 lb (73 kg) SpO2 BMI OB Status Smoking Status 98% 34.84 kg/m2 Hysterectomy Never Smoker BMI and BSA Data Body Mass Index Body Surface Area 34.84 kg/m 2 1.71 m 2 Preferred Pharmacy Pharmacy Name Phone 150 [facility], [address] [place] [address] [phone] Your Updated Medication List  
  
   
This list is accurate as of: 3/29/17  4:16 PM.  Always use your most recent med list.  
  
  
  
  
 cholecalciferol 1,000 unit tablet Commonly known as:  VITAMIN D3 Take 2 Tabs by mouth daily. Indications: VITAMIN D DEFICIENCY  
  
 cyanocobalamin 1,000 mcg tablet Commonly known as:  VITAMIN B-12 Take 1 Tab by mouth daily. Indications: PREVENTION OF VITAMIN B12 DEFICIENCY  
  
 esomeprazole 40 mg capsule Commonly known as:  Pretty Macleod Take 1 Cap by mouth daily. Indications: GASTROESOPHAGEAL REFLUX  
  
 fluticasone 50 mcg/actuation nasal spray Commonly known as:  Edman Shaver 2 Sprays by Both Nostrils route daily. furosemide 20 mg tablet Commonly known as:  LASIX Take 1 Tab by mouth daily. As needed  
  
 gabapentin 300 mg capsule Commonly known as:  NEURONTIN Take 1 Cap by mouth two (2) times a day. Start 1 tablet daily, usually in the evening, every few days increase as tolerated, for hip and back pain. potassium chloride 10 mEq tablet Commonly known as:  K-DUR, KLOR-CON Take 1 Tab by mouth daily. If takes lasix  
  
 topiramate 100 mg tablet Commonly known as:  TOPAMAX Take 1 Tab by mouth daily. Indications: MOOD  
  
 ZOLOFT 50 mg tablet Generic drug:  sertraline Take  by mouth daily. Prescriptions Sent to Pharmacy Refills  
 furosemide (LASIX) 20 mg tablet 2 Sig: Take 1 Tab by mouth daily. As needed Class: Normal  
 Pharmacy: 150 [facility], [address] Ph #: [phone]  Route: Oral  
 potassium chloride (K-DUR, KLOR-CON) 10 mEq tablet 2 Sig: Take 1 Tab by mouth daily. If takes lasix Class: Normal  
 Pharmacy: [address] [place] [address], [address] [place] [address] #: [phone] Route: Oral  
  
Follow-up Instructions Return in about 4 weeks (around 4/26/2017) for routine follow up. Introducing [facility] & HEALTH SERVICES! [employer] introduces [employer] patient portal. Now you can access parts of your medical record, email your doctor's office, and request medication refills online. 1. In your internet browser, go to [email or web address] 2. Click on the First Time User? Click Here link in the Sign In box. You will see the New Member Sign Up page. 3. Enter your [employer] Access Code exactly as it appears below. You will not need to use this code after youve completed the sign-up process. If you do not sign up before the expiration date, you must request a new code. · [employer] Access Code: [email or web address] Expires: 5/4/2017 10:58 AM 
 
4. Enter the last four digits of your Social Security Number (xxxx) and Date of Birth (mm/dd/yyyy) as indicated and click Submit. You will be taken to the next sign-up page. 5. Create a [employer] ID. This will be your [employer] login ID and cannot be changed, so think of one that is secure and easy to remember. 6. Create a [employer] password. You can change your password at any time. 7. Enter your Password Reset Question and Answer. This can be used at a later time if you forget your password. 8. Enter your e-mail address. You will receive e-mail notification when new information is available in [address] 9. Click Sign Up. You can now view and download portions of your medical record. 10. Click the Download Summary menu link to download a portable copy of your medical information.  
 
If you have questions, please visit the Frequently Asked Questions section of the [employer]bridge. Remember, [employer] is NOT to be used for urgent needs. For medical emergencies, dial 911. Now available from your iPhone and Android! Please provide this summary of care documentation to your next provider. Your primary care clinician is listed as Doris Drake. If you have any questions after today's visit, please call [phone].

## 2017-03-29 NOTE — PROGRESS NOTES
Chief Complaint   Patient presents with    Leg Swelling     L/R leg swelling     I have reviewed the patient's medical history in detail and updated the computerized patient record. Health Maintenance reviewed. 1. Have you been to the ER, urgent care clinic since your last visit? Hospitalized since your last visit?no    2. Have you seen or consulted any other health care providers outside of the [address] [place] [address] since your last visit? Include any pap smears or colon screening. No    Do you have an [address] in place in the event that you have a healthcare crisis that could impact your decision making as it pertains to your health?no    Would you like information about the [address]    Information given. yes

## 2017-03-29 NOTE — PATIENT INSTRUCTIONS
Leg and Ankle Edema: Care Instructions  Your Care Instructions  Swelling in the legs, ankles, and feet is called edema. It is common after you sit or stand for a while. Long plane flights or car rides often cause swelling in the legs and feet. You may also have swelling if you have to stand for long periods of time at your job. Problems with the veins in the legs (varicose veins) and changes in hormones can also cause swelling. Sometimes the swelling in the ankles and feet is caused by a more serious problem, such as heart failure, infection, blood clots, or liver or kidney disease. Follow-up care is a key part of your treatment and safety. Be sure to make and go to all appointments, and call your doctor if you are having problems. Its also a good idea to know your test results and keep a list of the medicines you take. How can you care for yourself at home? · If your doctor gave you medicine, take it as prescribed. Call your doctor if you think you are having a problem with your medicine. · Whenever you are resting, raise your legs up. Try to keep the swollen area higher than the level of your heart. · Take breaks from standing or sitting in one position. ¨ Walk around to increase the blood flow in your lower legs. ¨ Move your feet and ankles often while you stand, or tighten and relax your leg muscles. · Wear support stockings. Put them on in the morning, before swelling gets worse. · Eat a balanced diet. Lose weight if you need to. · Limit the amount of salt (sodium) in your diet. Salt holds fluid in the body and may increase swelling. When should you call for help? Call 911 anytime you think you may need emergency care. For example, call if:  · You have symptoms of a blood clot in your lung (called a pulmonary embolism). These may include:  ¨ Sudden chest pain. ¨ Trouble breathing. ¨ Coughing up blood.   Call your doctor now or seek immediate medical care if:  · You have signs of a blood clot, such as:  ¨ Pain in your calf, back of the knee, thigh, or groin. ¨ Redness and swelling in your leg or groin. · You have symptoms of infection, such as:  ¨ Increased pain, swelling, warmth, or redness. ¨ Red streaks or pus. ¨ A fever. Watch closely for changes in your health, and be sure to contact your doctor if:  · Your swelling is getting worse. · You have new or worsening pain in your legs. · You do not get better as expected. Where can you learn more? Go to http://freddy-viral.info/. Enter W194 in the search box to learn more about \"Leg and Ankle Edema: Care Instructions. \"  Current as of: May 27, 2016  Content Version: 11.2  © 8537-1175 [employer]. Care instructions adapted under license by [employer] (which disclaims liability or warranty for this information). If you have questions about a medical condition or this instruction, always ask your healthcare professional. [place] [address] any warranty or liability for your use of this information.

## 2017-03-30 RX ORDER — HYDROCODONE BITARTRATE AND ACETAMINOPHEN 5; 325 MG/1; MG/1
1 TABLET ORAL
Qty: 15 TAB | Refills: 0 | Status: SHIPPED | OUTPATIENT
Start: 2017-03-30 | End: 2017-05-09 | Stop reason: ALTCHOICE

## 2017-03-30 NOTE — PROGRESS NOTES
She was looked up in the drug database. Findings show evidence of drug abuse: no, but has been using a lot. Next day after the visit she comes in walks in requesting pain medications. We discussed this pain and the usage of controlled substances for leg pain relief. In general these medications are indicated for the acute symptom relief and not for chronic usage, allthough they are frequently used for chronic management  After a certain period of time they should be stopped to avoid dependence and/or addiction. I warned her about the dangers of addiction and other side affects including respiratory depression and death. Discussed how this is a controlled substance and that the prescribing of it is should be monitored very carefully. Drug usage is also monitored by our practise and the [facility], since there has been a lot of abuse and diversion of controlled substances. In general we do not refill this medication over the phone. PLease ask for enough pills to get you to your next appointment and make sure you keep your appointments. Warned not to take other medications like alcohol, other benzodiazapines marijuana or other narcotics when on this medication. I told her that I would okay 1 more prescription for narcotics but would not continue these unless a very clear indication for them. ICD-10-CM ICD-9-CM    1. Bilateral leg edema R60.0 782.3    2. History of gastric bypass Z98.890 V45.86    3. Bipolar affective disorder, current episode depressed, current episode severity unspecified ([facility] F31.30 296.50      Orders Placed This Encounter    furosemide (LASIX) 20 mg tablet     Sig: Take 1 Tab by mouth daily. As needed     Dispense:  30 Tab     Refill:  2    potassium chloride (K-DUR, KLOR-CON) 10 mEq tablet     Sig: Take 1 Tab by mouth daily.  If takes lasix     Dispense:  30 Tab     Refill:  2    HYDROcodone-acetaminophen (NORCO) 5-325 mg per tablet     Sig: Take 1 Tab by mouth every eight (8) hours as needed for Pain. Max Daily Amount: 3 Tabs. Dispense:  15 Tab     Refill:  0     See patient instructions, went over them personally with the patient. Emphasized compliance. Questions answered. Patient states that they understand the plan of action and will call if there are any issues or misunderstandings. Subjective:    Complains of leg swelling both legs for a week or so. It is uncomfortable. No travel or trauma to the legs. It is both legs not 1. No fever or redness. Mood's been okay. Not depressed. States no issues with narcotic abuse. Her back pain has improved she had a fall earlier this month. Past Medical History:   Diagnosis Date    Hypoglycemia 2014    Hypotension 2014    Syncope and collapse 7/24/15    RTH     Social History     Social History    Marital status:      Spouse name: N/A    Number of children: N/A    Years of education: N/A     Occupational History    Not on file. Social History Main Topics    Smoking status: Never Smoker    Smokeless tobacco: Never Used    Alcohol use No    Drug use: No    Sexual activity: No     Other Topics Concern    Not on file     Social History Narrative     has brain damage     Visit Vitals    /82 (BP 1 Location: Left arm, BP Patient Position: Sitting)    Pulse 63    Temp 96.5 °F (35.8 °C) (Oral)    Resp 16    Ht 4' 9\" (1.448 m)    Wt 161 lb (73 kg)    SpO2 98%    BMI 34.84 kg/m2     WD WN female NAD  Heart RRR without murmers clicks or rubs  Lungs CTA  Abdo soft nontender  Ext  1+ bilateral edema, no cords. Both legs feet neurovascularly intact. Follow-up Disposition:  Return in about 4 weeks (around 4/26/2017) for routine follow up.

## 2017-05-09 ENCOUNTER — OFFICE VISIT (OUTPATIENT)
Dept: INTERNAL MEDICINE CLINIC | Age: 57
End: 2017-05-09

## 2017-05-09 VITALS
HEIGHT: 57 IN | WEIGHT: 160 LBS | SYSTOLIC BLOOD PRESSURE: 100 MMHG | OXYGEN SATURATION: 97 % | TEMPERATURE: 96.3 F | DIASTOLIC BLOOD PRESSURE: 70 MMHG | HEART RATE: 74 BPM | RESPIRATION RATE: 16 BRPM | BODY MASS INDEX: 34.52 KG/M2

## 2017-05-09 DIAGNOSIS — F31.30 BIPOLAR AFFECTIVE DISORDER, CURRENT EPISODE DEPRESSED, CURRENT EPISODE SEVERITY UNSPECIFIED (HCC): ICD-10-CM

## 2017-05-09 DIAGNOSIS — K21.9 GASTROESOPHAGEAL REFLUX DISEASE WITHOUT ESOPHAGITIS: ICD-10-CM

## 2017-05-09 DIAGNOSIS — R60.0 BILATERAL LEG EDEMA: ICD-10-CM

## 2017-05-09 DIAGNOSIS — Z23 ENCOUNTER FOR IMMUNIZATION: ICD-10-CM

## 2017-05-09 DIAGNOSIS — F43.21 MOURNING: ICD-10-CM

## 2017-05-09 DIAGNOSIS — Z13.6 SCREENING FOR ISCHEMIC HEART DISEASE: ICD-10-CM

## 2017-05-09 DIAGNOSIS — Z13.39 SCREENING FOR ALCOHOLISM: ICD-10-CM

## 2017-05-09 DIAGNOSIS — Z13.31 SCREENING FOR DEPRESSION: ICD-10-CM

## 2017-05-09 DIAGNOSIS — Z98.84 HISTORY OF GASTRIC BYPASS: ICD-10-CM

## 2017-05-09 DIAGNOSIS — Z00.00 ROUTINE GENERAL MEDICAL EXAMINATION AT A HEALTH CARE FACILITY: Primary | ICD-10-CM

## 2017-05-09 RX ORDER — POTASSIUM CHLORIDE 750 MG/1
10 TABLET, EXTENDED RELEASE ORAL DAILY
Qty: 30 TAB | Refills: 5 | Status: SHIPPED | OUTPATIENT
Start: 2017-05-09 | End: 2017-11-10 | Stop reason: SDUPTHER

## 2017-05-09 RX ORDER — FUROSEMIDE 20 MG/1
20 TABLET ORAL DAILY
Qty: 30 TAB | Refills: 5 | Status: SHIPPED | OUTPATIENT
Start: 2017-05-09 | End: 2017-11-10 | Stop reason: SDUPTHER

## 2017-05-09 RX ORDER — ESOMEPRAZOLE MAGNESIUM 40 MG/1
40 CAPSULE, DELAYED RELEASE ORAL DAILY
Qty: 90 CAP | Refills: 3 | Status: SHIPPED | OUTPATIENT
Start: 2017-05-09 | End: 2017-08-16 | Stop reason: ALTCHOICE

## 2017-05-09 RX ORDER — MELATONIN
2000 DAILY
Qty: 180 TAB | Refills: 3 | Status: SHIPPED | OUTPATIENT
Start: 2017-05-09 | End: 2018-04-13 | Stop reason: SDUPTHER

## 2017-05-09 RX ORDER — MECLIZINE HCL 12.5 MG 12.5 MG/1
12.5 TABLET ORAL
Qty: 90 TAB | Refills: 5 | Status: SHIPPED | OUTPATIENT
Start: 2017-05-09 | End: 2017-06-08

## 2017-05-09 NOTE — LETTER
5/10/2017 2:04 PM 
 
Ms. Mike Ruvalcaba [address] [address] Dear Mike Ruvalcaba: 
 
Please find your most recent results below. Resulted Orders LIPID PANEL Result Value Ref Range Cholesterol, total 224 (H) 100 - 199 mg/dL Triglyceride 54 0 - 149 mg/dL HDL Cholesterol 64 >39 mg/dL VLDL, calculated 11 5 - 40 mg/dL LDL, calculated 149 (H) 0 - 99 mg/dL Narrative Performed at:  [address]  [id number] : Nessa Godinez MD, Phone:  [phone] METABOLIC PANEL, BASIC Result Value Ref Range Glucose 61 (L) 65 - 99 mg/dL BUN 17 6 - 24 mg/dL Creatinine 0.78 0.57 - 1.00 mg/dL GFR est non-AA 86 >59 mL/min/1.73 GFR est AA 99 >59 mL/min/1.73  
 BUN/Creatinine ratio 22 9 - 23 Sodium 140 134 - 144 mmol/L Potassium 4.4 3.5 - 5.2 mmol/L Chloride 104 96 - 106 mmol/L  
 CO2 20 18 - 29 mmol/L Calcium 8.4 (L) 8.7 - 10.2 mg/dL Narrative Performed at:  [address]  [id number] : Nessa Godinez MD, Phone:  [phone] CBC W/O DIFF Result Value Ref Range WBC 4.3 3.4 - 10.8 x10E3/uL  
 RBC 4.52 3.77 - 5.28 x10E6/uL HGB 13.4 11.1 - 15.9 g/dL HCT 41.5 34.0 - 46.6 % MCV 92 79 - 97 fL  
 MCH 29.6 26.6 - 33.0 pg  
 MCHC 32.3 31.5 - 35.7 g/dL  
 RDW 13.9 12.3 - 15.4 % PLATELET 538 439 - 002 x10E3/uL Narrative Performed at:  [address]  [id number] : Nessa Godinez MD, Phone:  [phone] CVD REPORT Result Value Ref Range INTERPRETATION Note Comment:  
   Supplement report is available. Narrative Performed at:  [address] [address] [facility] [place]  [id number] : Radhika Wen PhD, Phone:  [phone] RECOMMENDATIONS: 
The results of your most recent lab results are normal/ stable.   Please follow up as scheduled. Please call me if you have any questions: [phone] Sincerely, Angeline Louis MD

## 2017-05-09 NOTE — PROGRESS NOTES
This is a Subsequent Medicare Annual Wellness Visit providing Personalized Prevention Plan Services (PPPS) (Performed 12 months after initial AWV and PPPS )    I have reviewed the patient's medical history in detail and updated the computerized patient record. History     Here for Medicare wellness exam.  Feels sad was close to her brother who recently passed. Some dizziness complaints they come and go. Past Medical History:   Diagnosis Date    Hypoglycemia     Hypotension     Syncope and collapse 7/24/15    RTH      Past Surgical History:   Procedure Laterality Date    HX ABDOMINAL LAPAROSCOPY      HX CARPAL TUNNEL RELEASE Bilateral more than 10 years ago    HX  SECTION  [id number]    HX COLONOSCOPY  2016    HX GASTRIC BYPASS  more 10 years ago    x2    HX HYSTERECTOMY      HX TONSIL AND ADENOIDECTOMY  more than 10 years ago     Current Outpatient Prescriptions   Medication Sig Dispense Refill    furosemide (LASIX) 20 mg tablet Take 1 Tab by mouth daily. As needed 30 Tab 2    potassium chloride (K-DUR, KLOR-CON) 10 mEq tablet Take 1 Tab by mouth daily. If takes lasix 30 Tab 2    topiramate (TOPAMAX) 100 mg tablet Take 1 Tab by mouth daily. Indications: MOOD 30 Tab 11    fluticasone (FLONASE) 50 mcg/actuation nasal spray 2 Sprays by Both Nostrils route daily. 1 Bottle 5    cyanocobalamin (VITAMIN B-12) 1,000 mcg tablet Take 1 Tab by mouth daily. Indications: PREVENTION OF VITAMIN B12 DEFICIENCY 90 Tab 3    sertraline (ZOLOFT) 50 mg tablet Take  by mouth daily.  cholecalciferol (VITAMIN D3) 1,000 unit tablet Take 2 Tabs by mouth daily. Indications: VITAMIN D DEFICIENCY 180 Tab 3    esomeprazole (NEXIUM) 40 mg capsule Take 1 Cap by mouth daily.  Indications: GASTROESOPHAGEAL REFLUX 90 Cap 0     Allergies   Allergen Reactions    Tylenol [Acetaminophen] Anaphylaxis and Hives    Benadryl [Diphenhydramine Hcl] Nausea and Vomiting    Ibuprofen Nausea and Vomiting Family History   Problem Relation Age of Onset    Stroke Mother     Cancer Father     Diabetes Brother     Cancer Maternal Aunt     Cancer Maternal Grandmother     Cancer Brother     Kidney Disease Brother      Social History   Substance Use Topics    Smoking status: Never Smoker    Smokeless tobacco: Never Used    Alcohol use No     Patient Active Problem List   Diagnosis Code    Bilateral leg edema R60.0    History of gastric bypass Z98.890    Migraine aura without headache G43. 109    Bipolar disorder with current episode depressed ([facility] F31.30    Gastroesophageal reflux disease without esophagitis K21.9       Depression Risk Factor Screening:     PHQ over the last two weeks 2/8/2017   Little interest or pleasure in doing things Several days   Feeling down, depressed or hopeless Several days   Total Score PHQ 2 2    mourns her brother  Alcohol Risk Factor Screening: On any occasion during the past 3 months, have you had more than 3 drinks containing alcohol? No    Do you average more than 7 drinks per week? No      Functional Ability and Level of Safety:     Hearing Loss   mild    Activities of Daily Living   Self-care. Requires assistance with: no ADLs    Fall Risk     Fall Risk Assessment, last 12 mths 2/8/2017   Able to walk? Yes   Fall in past 12 months? Yes   Fall with injury? Yes   Number of falls in past 12 months 1   Fall Risk Score 2     Abuse Screen   Patient is not abused    Review of Systems   Pertinent items are noted in HPI. Physical Examination     Evaluation of Cognitive Function:  Mood/affect:  sad  Appearance: age appropriate  Family member/caregiver input: NA    Visit Vitals    /70 (BP 1 Location: Left arm, BP Patient Position: Sitting)    Pulse 74    Temp 96.3 °F (35.7 °C) (Oral)    Resp 16    Ht 4' 9\" (1.448 m)    Wt 160 lb (72.6 kg)    SpO2 97%    BMI 34.62 kg/m2     Well developed well nourished no acute distress.   Pupils equal round react to light, extraocular muscles intact. Tympanic membranes within normal limits throat unremarkable  Cranial nerves II through XII are intact. Neck unremarkable  Heart regular rate and rhythm without clicks murmurs rubs  Lungs are clear to auscultation  Abdomen soft. Extremities, motor 5 out of 5 bilaterally, reflexes 2+ equal bilaterally. Patient Care Team:  Julienne Corbin MD as PCP - General (Family Practice)  Edwin Hilario MD as Physician (Cardiology)    Advice/Referrals/Counseling   Education and counseling provided:  Hepatitis B Vaccine  Cardiovascular screening blood test      Assessment/Plan     Encounter Diagnoses   Name Primary?  Routine general medical examination at a health care facility Yes    Gastroesophageal reflux disease without esophagitis     Bilateral leg edema     Mourning     Bipolar affective disorder, current episode depressed, current episode severity unspecified ([facility]     Screening for alcoholism     Screening for depression     Screening for ischemic heart disease     History of gastric bypass     Encounter for immunization      Orders Placed This Encounter    Depression Screen Annual    Hepatitis B vaccine, adult dosage, IM    Lipid Panel ([id number])    METABOLIC PANEL, BASIC    CBC W/O DIFF    CBC W/O DIFF    CVD REPORT    furosemide (LASIX) 20 mg tablet    potassium chloride (KLOR-CON) 10 mEq tablet    esomeprazole (NEXIUM) 40 mg capsule    cholecalciferol (VITAMIN D3) 1,000 unit tablet    meclizine (ANTIVERT) 12.5 mg tablet   . Probably some labyrinthitis. Follow-up Disposition:  Return in about 3 months (around 8/9/2017) for routine follow up.

## 2017-05-09 NOTE — PATIENT INSTRUCTIONS

## 2017-05-10 ENCOUNTER — TELEPHONE (OUTPATIENT)
Dept: INTERNAL MEDICINE CLINIC | Age: 57
End: 2017-05-10

## 2017-05-10 LAB
BUN SERPL-MCNC: 17 MG/DL (ref 6–24)
BUN/CREAT SERPL: 22 (ref 9–23)
CALCIUM SERPL-MCNC: 8.4 MG/DL (ref 8.7–10.2)
CHLORIDE SERPL-SCNC: 104 MMOL/L (ref 96–106)
CHOLEST SERPL-MCNC: 224 MG/DL (ref 100–199)
CO2 SERPL-SCNC: 20 MMOL/L (ref 18–29)
CREAT SERPL-MCNC: 0.78 MG/DL (ref 0.57–1)
ERYTHROCYTE [DISTWIDTH] IN BLOOD BY AUTOMATED COUNT: 13.9 % (ref 12.3–15.4)
GLUCOSE SERPL-MCNC: 61 MG/DL (ref 65–99)
HCT VFR BLD AUTO: 41.5 % (ref 34–46.6)
HDLC SERPL-MCNC: 64 MG/DL
HGB BLD-MCNC: 13.4 G/DL (ref 11.1–15.9)
INTERPRETATION, 910389: NORMAL
LDLC SERPL CALC-MCNC: 149 MG/DL (ref 0–99)
MCH RBC QN AUTO: 29.6 PG (ref 26.6–33)
MCHC RBC AUTO-ENTMCNC: 32.3 G/DL (ref 31.5–35.7)
MCV RBC AUTO: 92 FL (ref 79–97)
PLATELET # BLD AUTO: 267 X10E3/UL (ref 150–379)
POTASSIUM SERPL-SCNC: 4.4 MMOL/L (ref 3.5–5.2)
RBC # BLD AUTO: 4.52 X10E6/UL (ref 3.77–5.28)
SODIUM SERPL-SCNC: 140 MMOL/L (ref 134–144)
TRIGL SERPL-MCNC: 54 MG/DL (ref 0–149)
VLDLC SERPL CALC-MCNC: 11 MG/DL (ref 5–40)
WBC # BLD AUTO: 4.3 X10E3/UL (ref 3.4–10.8)

## 2017-05-10 NOTE — TELEPHONE ENCOUNTER
5/10/17 @ 9:24 AM PA initiated with [employer] for Esomeprazole Magnesium 40 mg capsules,PA # [id number]. Approval given effective 5/10/17 thru 12/31/17,test claim done. [address] called spoke with pharmacist Lesley Palma state claim went thru with copay $3.30

## 2017-05-10 NOTE — TELEPHONE ENCOUNTER
Received notification from [employer] of approval of drug coverage for Nexium - covered from 12/30/16 to 12/31/17 - Susana So LPN  0/69/4384  11:87 AM

## 2017-05-24 ENCOUNTER — OFFICE VISIT (OUTPATIENT)
Dept: INTERNAL MEDICINE CLINIC | Age: 57
End: 2017-05-24

## 2017-05-24 VITALS
WEIGHT: 162 LBS | HEIGHT: 57 IN | RESPIRATION RATE: 16 BRPM | SYSTOLIC BLOOD PRESSURE: 107 MMHG | TEMPERATURE: 97.5 F | BODY MASS INDEX: 34.95 KG/M2 | HEART RATE: 64 BPM | DIASTOLIC BLOOD PRESSURE: 67 MMHG | OXYGEN SATURATION: 98 %

## 2017-05-24 DIAGNOSIS — Z98.84 HISTORY OF GASTRIC BYPASS: Chronic | ICD-10-CM

## 2017-05-24 DIAGNOSIS — E16.2 HYPOGLYCEMIA: ICD-10-CM

## 2017-05-24 DIAGNOSIS — K21.00 GASTROESOPHAGEAL REFLUX DISEASE WITH ESOPHAGITIS: ICD-10-CM

## 2017-05-24 DIAGNOSIS — R13.10 DYSPHAGIA, UNSPECIFIED TYPE: Primary | ICD-10-CM

## 2017-05-24 LAB — GLUCOSE POC: 85 MG/DL

## 2017-05-24 NOTE — MR AVS SNAPSHOT
Visit Information Date & Time Provider Department Dept. Phone Encounter #  
 5/24/2017 10:45 AM Janice Richardson MD [address] [place], [place] [address]eb Alex [id number] Follow-up Instructions Return in about 4 weeks (around 6/21/2017) for routine follow up. Your Appointments 8/9/2017 11:00 AM  
ROUTINE CARE with Janice Richardson MD  
[facility] ([facility]) Appt Note: f/u on medication $0 cp ls 5/9/17  
 [address] [place] [address] Road. [address] [place] [address] [place] [id number]  
[phone]  
  
   
 [address] [place] [address] Road [address] Upcoming Health Maintenance Date Due INFLUENZA AGE 9 TO ADULT 8/1/2017 PAP AKA CERVICAL CYTOLOGY 12/3/2018 BREAST CANCER SCRN MAMMOGRAM 3/8/2019 DTaP/Tdap/Td series (2 - Td) 6/23/2021 COLONOSCOPY 8/3/2026 Allergies as of 5/24/2017  Review Complete On: 5/24/2017 By: Aaron [place] LPN Severity Noted Reaction Type Reactions Tylenol [Acetaminophen] High 10/02/2015    Anaphylaxis, Hives Benadryl [Diphenhydramine Hcl] Medium 12/03/2015    Nausea and Vomiting Ibuprofen  10/12/2014    Nausea and Vomiting Current Immunizations  Reviewed on 10/2/2015 Name Date DTaP 6/23/2011 Hep B Vaccine 10/16/2016, 7/20/2016 Hep B Vaccine (Adult) 5/9/2017 Influenza Vaccine 10/13/2016, 9/9/2015, 9/30/2014, 9/13/2013, 3/28/2013, 11/14/2011, 1/13/2011, 1/13/2011, 12/17/2009, 12/17/2009, 1/8/2009, 1/8/2009, 11/8/2007, 11/8/2007 Influenza Vaccine (Quad) PF 9/24/2014 Pneumococcal Polysaccharide (PPSV-23) 1/8/2009 Tdap 6/23/2011 Not reviewed this visit You Were Diagnosed With   
  
 Codes Comments Dysphagia, unspecified type    -  Primary ICD-10-CM: R13.10 ICD-9-CM: 787.20 Hypoglycemia     ICD-10-CM: E16.2 ICD-9-CM: 251.2 History of gastric bypass     ICD-10-CM: Z98.890 ICD-9-CM: V45.86   
 Gastroesophageal reflux disease with esophagitis     ICD-10-CM: K21.0 ICD-9-CM: 530.11 Vitals BP Pulse Temp Resp Height(growth percentile) Weight(growth percentile) 107/67 (BP 1 Location: Left arm, BP Patient Position: Sitting) 64 97.5 °F (36.4 °C) (Oral) 16 4' 9\" (1.448 m) 162 lb (73.5 kg) SpO2 BMI OB Status Smoking Status 98% 35.06 kg/m2 Hysterectomy Never Smoker BMI and BSA Data Body Mass Index Body Surface Area 35.06 kg/m 2 1.72 m 2 Preferred Pharmacy Pharmacy Name Phone [address] [facility], [address] [phone] Your Updated Medication List  
  
   
This list is accurate as of: 5/24/17 12:06 PM.  Always use your most recent med list.  
  
  
  
  
 cholecalciferol 1,000 unit tablet Commonly known as:  VITAMIN D3 Take 2 Tabs by mouth daily. Indications: VITAMIN D DEFICIENCY  
  
 cyanocobalamin 1,000 mcg tablet Commonly known as:  VITAMIN B-12 Take 1 Tab by mouth daily. Indications: PREVENTION OF VITAMIN B12 DEFICIENCY  
  
 esomeprazole 40 mg capsule Commonly known as:  Dottie Tex Take 1 Cap by mouth daily. Indications: gastroesophageal reflux disease  
  
 fluticasone 50 mcg/actuation nasal spray Commonly known as:  Marce Rad 2 Sprays by Both Nostrils route daily. furosemide 20 mg tablet Commonly known as:  LASIX Take 1 Tab by mouth daily. As needed  
  
 meclizine 12.5 mg tablet Commonly known as:  ANTIVERT Take 1 Tab by mouth three (3) times daily as needed for up to 30 days. potassium chloride 10 mEq tablet Commonly known as:  KLOR-CON Take 1 Tab by mouth daily. If takes lasix  
  
 topiramate 100 mg tablet Commonly known as:  TOPAMAX Take 1 Tab by mouth daily. Indications: MOOD  
  
 ZOLOFT 50 mg tablet Generic drug:  sertraline Take  by mouth daily. We Performed the Following AMB POC GLUCOSE BLOOD, BY GLUCOSE MONITORING DEVICE [23338 CPT(R)] [facility] SUPPLY ORDER [[id number] Custom] Comments:  
 Dr Simeon Connell [facility] Bariatric surgery, some hypoglycemia, recent dysphagia has had bariatric surgery. Call [phone] Follow-up Instructions Return in about 4 weeks (around 6/21/2017) for routine follow up. Introducing [facility]! Salome Chantal introduces [employer] patient portal. Now you can access parts of your medical record, email your doctor's office, and request medication refills online. 1. In your internet browser, go to [email or web address] 2. Click on the First Time User? Click Here link in the Sign In box. You will see the New Member Sign Up page. 3. Enter your [employer] Access Code exactly as it appears below. You will not need to use this code after youve completed the sign-up process. If you do not sign up before the expiration date, you must request a new code. · [employer] Access Code: [email or web address] Expires: 8/7/2017  2:41 PM 
 
4. Enter the last four digits of your Social Security Number (xxxx) and Date of Birth (mm/dd/yyyy) as indicated and click Submit. You will be taken to the next sign-up page. 5. Create a [employer]el ID. This will be your [employer] login ID and cannot be changed, so think of one that is secure and easy to remember. 6. Create a [employer]el password. You can change your password at any time. 7. Enter your Password Reset Question and Answer. This can be used at a later time if you forget your password. 8. Enter your e-mail address. You will receive e-mail notification when new information is available in [address] 9. Click Sign Up. You can now view and download portions of your medical record. 10. Click the Download Summary menu link to download a portable copy of your medical information. If you have questions, please visit the Frequently Asked Questions section of the [employer] website. Remember, [employer] is NOT to be used for urgent needs. For medical emergencies, dial 911. Now available from your iPhone and Android! Please provide this summary of care documentation to your next provider. Your primary care clinician is listed as Laverna Lennox. If you have any questions after today's visit, please call [phone].

## 2017-05-24 NOTE — PROGRESS NOTES
HISTORY OF PRESENT ILLNESS  Micah Henley is a 55 y.o. female. Dysphagia   The history is provided by the patient. This is a new problem. Episode onset: 1 week. The problem occurs hourly (josselyn if swallows). The problem has been gradually improving. Pertinent negatives include no chest pain and no abdominal pain. Exacerbated by: swallowing. Nothing relieves the symptoms. Treatments tried: sucarafate. The treatment provided no relief. Went to ER 2 days ago had workup was neg. They wanted her to do a barium swallow and or see surgeon. Past Surgical History:   Procedure Laterality Date    HX ABDOMINAL LAPAROSCOPY      HX CARPAL TUNNEL RELEASE Bilateral more than 10 years ago    HX  SECTION  [id number]    HX COLONOSCOPY  2016    HX GASTRIC BYPASS  1997    x2    HX HYSTERECTOMY      HX TONSIL AND ADENOIDECTOMY  more than 10 years ago       Review of Systems   Constitutional: Negative for weight loss. Cardiovascular: Negative for chest pain. Gastrointestinal: Negative for abdominal pain. Neurological: Positive for dizziness. Physical Exam  Visit Vitals    /67 (BP 1 Location: Left arm, BP Patient Position: Sitting)    Pulse 64    Temp 97.5 °F (36.4 °C) (Oral)    Resp 16    Ht 4' 9\" (1.448 m)    Wt 162 lb (73.5 kg)    SpO2 98%    BMI 35.06 kg/m2       WDWN NAD  TM clear, throat wnl  Neck no adenopathy  Heart RRR no C/M/R  Lungs CTA  Abdo soft non tender  Ext No redness swelling or edema  BS repeat 85  ASSESSMENT and PLAN  Encounter Diagnoses   Name Primary?  Dysphagia, unspecified type Yes    Hypoglycemia     History of gastric bypass     Gastroesophageal reflux disease with esophagitis      Orders Placed This Encounter    AMB SUPPLY ORDER    AMB POC GLUCOSE BLOOD, BY GLUCOSE MONITORING DEVICE     Will have her see the gastric bypass surgeons at [facility]. Follow-up Disposition:  Return in about 4 weeks (around 2017) for routine follow up.

## 2017-05-24 NOTE — PROGRESS NOTES
Chief Complaint   Patient presents with    Dysphagia     RT ER - when swallowing pain in upper chest     I have reviewed the patient's medical history in detail and updated the computerized patient record. Health Maintenance reviewed. 1. Have you been to the ER, urgent care clinic since your last visit? Hospitalized since your last visit? yes    2. Have you seen or consulted any other health care providers outside of the [address] [place] [address] since your last visit? Include any pap smears or colon screening. [facility] ER    Do you have an [address] in place in the event that you have a healthcare crisis that could impact your decision making as it pertains to your health?no    Would you like information about the [address] [place] [address] given. no

## 2017-06-21 ENCOUNTER — TELEPHONE (OUTPATIENT)
Dept: INTERNAL MEDICINE CLINIC | Age: 57
End: 2017-06-21

## 2017-06-21 NOTE — TELEPHONE ENCOUNTER
Patient missed her appt and rescheduled it for Tuesday. She would like Dr. Dread Belcher to check her medication for refills.

## 2017-06-27 ENCOUNTER — OFFICE VISIT (OUTPATIENT)
Dept: INTERNAL MEDICINE CLINIC | Age: 57
End: 2017-06-27

## 2017-06-27 VITALS
SYSTOLIC BLOOD PRESSURE: 121 MMHG | OXYGEN SATURATION: 99 % | RESPIRATION RATE: 16 BRPM | WEIGHT: 175 LBS | HEART RATE: 87 BPM | BODY MASS INDEX: 37.76 KG/M2 | DIASTOLIC BLOOD PRESSURE: 83 MMHG | HEIGHT: 57 IN | TEMPERATURE: 96.3 F

## 2017-06-27 DIAGNOSIS — N64.4 BREAST PAIN: Primary | ICD-10-CM

## 2017-06-27 DIAGNOSIS — F31.31 BIPOLAR AFFECTIVE DISORDER, CURRENTLY DEPRESSED, MILD (HCC): ICD-10-CM

## 2017-06-27 DIAGNOSIS — G89.29 UPPER BACK PAIN, CHRONIC: ICD-10-CM

## 2017-06-27 DIAGNOSIS — K21.9 GASTROESOPHAGEAL REFLUX DISEASE WITHOUT ESOPHAGITIS: ICD-10-CM

## 2017-06-27 DIAGNOSIS — M54.9 UPPER BACK PAIN, CHRONIC: ICD-10-CM

## 2017-06-27 PROBLEM — K21.00 GASTROESOPHAGEAL REFLUX DISEASE WITH ESOPHAGITIS: Status: RESOLVED | Noted: 2017-05-24 | Resolved: 2017-06-27

## 2017-06-27 RX ORDER — NAPROXEN 500 MG/1
500 TABLET ORAL 2 TIMES DAILY WITH MEALS
Qty: 60 TAB | Refills: 0 | Status: SHIPPED | OUTPATIENT
Start: 2017-06-27 | End: 2017-09-12 | Stop reason: SDUPTHER

## 2017-06-27 NOTE — MR AVS SNAPSHOT
Visit Information Date & Time Provider Department Dept. Phone Encounter #  
 6/27/2017 11:00 AM Javon Emerson MD [place] [address] [phone] [id number] Follow-up Instructions Return in about 3 months (around 9/27/2017). Your Appointments 8/9/2017 11:00 AM  
ROUTINE CARE with Javon Emerson MD  
[facility] ([facility]) Appt Note: f/u on medication $0 cp [facility] 5/9/17  
 [address] [place] [address]. [address] Maykel Cobbers [address]  
[phone]  
  
   
 [address] [place] [address] [address] Upcoming Health Maintenance Date Due INFLUENZA AGE 9 TO ADULT 8/1/2017 MEDICARE YEARLY EXAM 5/10/2018 PAP AKA CERVICAL CYTOLOGY 12/3/2018 BREAST CANCER SCRN MAMMOGRAM 3/8/2019 DTaP/Tdap/Td series (2 - Td) 6/23/2021 COLONOSCOPY 8/3/2026 Allergies as of 6/27/2017  Review Complete On: 6/27/2017 By: Keny Rubio LPN Severity Noted Reaction Type Reactions Tylenol [Acetaminophen] High 10/02/2015    Anaphylaxis, Hives Benadryl [Diphenhydramine Hcl] Medium 12/03/2015    Nausea and Vomiting Ibuprofen  10/12/2014    Nausea and Vomiting Current Immunizations  Reviewed on 10/2/2015 Name Date DTaP 6/23/2011 Hep B Vaccine 10/16/2016, 7/20/2016 Hep B Vaccine (Adult) 5/9/2017 Influenza Vaccine 10/13/2016, 9/9/2015, 9/30/2014, 9/13/2013, 3/28/2013, 11/14/2011, 1/13/2011, 1/13/2011, 12/17/2009, 12/17/2009, 1/8/2009, 1/8/2009, 11/8/2007, 11/8/2007 Influenza Vaccine (Quad) PF 9/24/2014 Pneumococcal Polysaccharide (PPSV-23) 1/8/2009 Tdap 6/23/2011 Not reviewed this visit You Were Diagnosed With   
  
 Codes Comments Breast pain    -  Primary ICD-10-CM: N64.4 ICD-9-CM: 611.71 Gastroesophageal reflux disease without esophagitis     ICD-10-CM: K21.9 ICD-9-CM: 530.81  Bipolar affective disorder, currently depressed, mild (HCC)     ICD-10-CM: F31.31 
ICD-9-CM: 296.51   
 Vitals BP Pulse Temp Resp Height(growth percentile) Weight(growth percentile) 121/83 (BP 1 Location: Left arm, BP Patient Position: Sitting) 87 96.3 °F (35.7 °C) (Oral) 16 4' 9\" (1.448 m) 175 lb (79.4 kg) SpO2 BMI OB Status Smoking Status 99% 37.87 kg/m2 Hysterectomy Never Smoker BMI and BSA Data Body Mass Index Body Surface Area  
 37.87 kg/m 2 1.79 m 2 Preferred Pharmacy Pharmacy Name Phone [address] [facility] [address], [address] [facility] [address] [facility] [address] [phone] Your Updated Medication List  
  
   
This list is accurate as of: 6/27/17 12:17 PM.  Always use your most recent med list.  
  
  
  
  
 cholecalciferol 1,000 unit tablet Commonly known as:  VITAMIN D3 Take 2 Tabs by mouth daily. Indications: VITAMIN D DEFICIENCY  
  
 cyanocobalamin 1,000 mcg tablet Commonly known as:  VITAMIN B-12 Take 1 Tab by mouth daily. Indications: PREVENTION OF VITAMIN B12 DEFICIENCY  
  
 esomeprazole 40 mg capsule Commonly known as:  Juline Aisha Take 1 Cap by mouth daily. Indications: gastroesophageal reflux disease  
  
 fluticasone 50 mcg/actuation nasal spray Commonly known as:  Carilyn Mckusick 2 Sprays by Both Nostrils route daily. furosemide 20 mg tablet Commonly known as:  LASIX Take 1 Tab by mouth daily. As needed  
  
 naproxen 500 mg tablet Commonly known as:  NAPROSYN Take 1 Tab by mouth two (2) times daily (with meals). As needed  
  
 potassium chloride 10 mEq tablet Commonly known as:  KLOR-CON Take 1 Tab by mouth daily. If takes lasix  
  
 topiramate 100 mg tablet Commonly known as:  TOPAMAX Take 1 Tab by mouth daily. Indications: MOOD  
  
 ZOLOFT 50 mg tablet Generic drug:  sertraline Take  by mouth daily. Prescriptions Sent to Pharmacy Refills  
 naproxen (NAPROSYN) 500 mg tablet 0 Sig: Take 1 Tab by mouth two (2) times daily (with meals). As needed  Class: Normal  
 Pharmacy: 150 [facility], [address] [place] [address] #: [phone] Route: Oral  
  
We Performed the Following REFERRAL TO GENERAL SURGERY [REF27 Custom] Comments:  
 Please evaluate patient for interested in breast reduction. Follow-up Instructions Return in about 3 months (around 9/27/2017). Referral Information Referral ID Referred By Referred To  
  
 [id number] Maria Ines SOOD MD   
   [address] [place] [address] [address] [place] [address], [address] Phone: [phone] Fax: [phone] Visits Status Start Date End Date 1 New Request 6/27/17 6/27/18 If your referral has a status of pending review or denied, additional information will be sent to support the outcome of this decision. Introducing [facility]! Glenny Arguelles introduces [employer] patient portal. Now you can access parts of your medical record, email your doctor's office, and request medication refills online. 1. In your internet browser, go to [email or web address] 2. Click on the First Time User? Click Here link in the Sign In box. You will see the New Member Sign Up page. 3. Enter your [employer] Access Code exactly as it appears below. You will not need to use this code after youve completed the sign-up process. If you do not sign up before the expiration date, you must request a new code. · [employer] Access Code: [email or web address] Expires: 8/7/2017  2:41 PM 
 
4. Enter the last four digits of your Social Security Number (xxxx) and Date of Birth (mm/dd/yyyy) as indicated and click Submit. You will be taken to the next sign-up page. 5. Create a [employer] ID. This will be your [employer] login ID and cannot be changed, so think of one that is secure and easy to remember. 6. Create a [employer] password. You can change your password at any time. 7. Enter your Password Reset Question and Answer.  This can be used at a later time if you forget your password. 8. Enter your e-mail address. You will receive e-mail notification when new information is available in [address] 9. Click Sign Up. You can now view and download portions of your medical record. 10. Click the Download Summary menu link to download a portable copy of your medical information. If you have questions, please visit the Frequently Asked Questions section of the [employer] website. Remember, [employer] is NOT to be used for urgent needs. For medical emergencies, dial 911. Now available from your iPhone and Android! Please provide this summary of care documentation to your next provider. Your primary care clinician is listed as Popeye Denis. If you have any questions after today's visit, please call [phone].

## 2017-06-27 NOTE — PROGRESS NOTES
HISTORY OF PRESENT ILLNESS  Nan Perez is a 55 y.o. female. Sore Throat    The history is provided by the patient. This is a new problem. The problem has been gradually improving. Associated symptoms include trouble swallowing. Pertinent negatives include no shortness of breath. Breast pain   This is a new problem. Episode onset: x 1 month. The problem occurs constantly (this week). The problem has been gradually worsening. Pertinent negatives include no abdominal pain and no shortness of breath. No injury but did fall when she was surprised by a copperhead snake. No other trauma  [facility] wants to do a upper endoscopy since had gastric bypass. Having upper back pain and is interested in getting a breast reduction. Depressed since brother passed but that's getting better. No sicidal.  No masses or DC on breast.    Social History     Social History    Marital status:      Spouse name: N/A    Number of children: 2    Years of education: N/A     Occupational History    retired      Social History Main Topics    Smoking status: Never Smoker    Smokeless tobacco: Never Used    Alcohol use No    Drug use: No    Sexual activity: No     Other Topics Concern    Not on file     Social History Narrative     has brain damage, lives with him         Review of Systems   HENT: Positive for sore throat and trouble swallowing. Respiratory: Negative for shortness of breath. Gastrointestinal: Positive for heartburn. Negative for abdominal pain and blood in stool. Genitourinary:        No masses   Musculoskeletal: Positive for back pain. Psychiatric/Behavioral: Positive for depression.        Physical Exam  Visit Vitals    /83 (BP 1 Location: Left arm, BP Patient Position: Sitting)    Pulse 87    Temp 96.3 °F (35.7 °C) (Oral)    Resp 16    Ht 4' 9\" (1.448 m)    Wt 175 lb (79.4 kg)    SpO2 99%    BMI 37.87 kg/m2     WD WN female NAD  Heart RRR without murmers clicks or rubs  Lungs CTA  Abdo soft nontender  Ext no edema  Breast large but otherwise nl, no adenopathy    ASSESSMENT and PLAN  Encounter Diagnoses   Name Primary?  Breast pain Yes    Gastroesophageal reflux disease without esophagitis     Bipolar affective disorder, currently depressed, mild (HCC)     Upper back pain, chronic      Orders Placed This Encounter    REFERRAL TO GENERAL SURGERY    naproxen (NAPROSYN) 500 mg tablet   ? May benefit from breast reduction  Disc self limited nature of breast pain. Discussed possible side affects, precautions, and drug interactions and possible benefits of the medication(s). Follow-up Disposition:  Return in about 3 months (around 9/27/2017).

## 2017-06-27 NOTE — PROGRESS NOTES
Chief Complaint   Patient presents with    Sore Throat     follow up    Breast pain     R/breast pain     I have reviewed the patient's medical history in detail and updated the computerized patient record. Health Maintenance reviewed. 1. Have you been to the ER, urgent care clinic since your last visit? Hospitalized since your last visit?no    2. Have you seen or consulted any other health care providers outside of the [address] [place] [address] since your last visit? Include any pap smears or colon screening. No    Do you have an [address] in place in the event that you have a healthcare crisis that could impact your decision making as it pertains to your health?no    Would you like information about the [address] [place] [address] given. no

## 2017-08-16 ENCOUNTER — DOCUMENTATION ONLY (OUTPATIENT)
Dept: INTERNAL MEDICINE CLINIC | Age: 57
End: 2017-08-16

## 2017-08-16 RX ORDER — MECLIZINE HYDROCHLORIDE 25 MG/1
25 TABLET ORAL
Qty: 30 TAB | Refills: 1 | Status: SHIPPED | OUTPATIENT
Start: 2017-08-16 | End: 2017-10-10 | Stop reason: SDUPTHER

## 2017-08-16 RX ORDER — OMEPRAZOLE 40 MG/1
40 CAPSULE, DELAYED RELEASE ORAL DAILY
Qty: 30 CAP | Refills: 5 | Status: SHIPPED | OUTPATIENT
Start: 2017-08-16 | End: 2017-11-10 | Stop reason: SDUPTHER

## 2017-08-16 NOTE — PROGRESS NOTES
Received a letter from [address] [place] [address] regarding Drug Therapy change - patient currently takes Esomeprazole (Nexium) 40mg - they are requesting an alternative to this of either [id number] daily or Pantoprazole 40mg daily #30 X 5 refills to replace the Nexium D/T [employer] preferred drug list  Read ERICA Fry  4/58/1982  2:14 PM  [address] [place] [address] phone [phone] and Fax [phone]  Read ERICA Fry  5/19/0450  3:90 PM

## 2017-08-16 NOTE — TELEPHONE ENCOUNTER
Requested Prescriptions     Pending Prescriptions Disp Refills    meclizine (ANTIVERT) 25 mg tablet 30 Tab 1     Sig: Take 1 Tab by mouth three (3) times daily as needed for up to 10 days.  For dizziness

## 2017-08-22 ENCOUNTER — TELEPHONE (OUTPATIENT)
Dept: INTERNAL MEDICINE CLINIC | Age: 57
End: 2017-08-22

## 2017-08-22 NOTE — TELEPHONE ENCOUNTER
----- Message from [employer] sent at 8/22/2017 12:50 PM EDT -----  Regarding: /Telephone  Pt lost her appointment information for her appointment scheduled for 8/28/17 at another practice , and is requesting to get the appointment information. Best Contact number [phone].

## 2017-09-06 DIAGNOSIS — Z98.84 HISTORY OF GASTRIC BYPASS: ICD-10-CM

## 2017-09-06 NOTE — TELEPHONE ENCOUNTER
Last office visit:  6/27/17  Last filled:  Vit B12 1/13/17 #90 X 3 refills  No changes  Follow up in 2 weeks with Dr Karen Sanders

## 2017-09-07 RX ORDER — ZINC GLUCONATE 50 MG
TABLET ORAL
Qty: 90 TAB | Refills: 1 | Status: SHIPPED | OUTPATIENT
Start: 2017-09-07 | End: 2017-11-10 | Stop reason: SDUPTHER

## 2017-09-12 ENCOUNTER — OFFICE VISIT (OUTPATIENT)
Dept: INTERNAL MEDICINE CLINIC | Age: 57
End: 2017-09-12

## 2017-09-12 VITALS
HEIGHT: 57 IN | HEART RATE: 80 BPM | WEIGHT: 180 LBS | RESPIRATION RATE: 20 BRPM | DIASTOLIC BLOOD PRESSURE: 85 MMHG | OXYGEN SATURATION: 99 % | TEMPERATURE: 100 F | BODY MASS INDEX: 38.83 KG/M2 | SYSTOLIC BLOOD PRESSURE: 109 MMHG

## 2017-09-12 DIAGNOSIS — J45.901 ASTHMA EXACERBATION: ICD-10-CM

## 2017-09-12 DIAGNOSIS — J18.9 PNEUMONIA, UNSPECIFIED ORGANISM: Primary | ICD-10-CM

## 2017-09-12 DIAGNOSIS — G44.229 CHRONIC TENSION-TYPE HEADACHE, NOT INTRACTABLE: ICD-10-CM

## 2017-09-12 RX ORDER — PREDNISONE 10 MG/1
TABLET ORAL
Qty: 20 TAB | Refills: 0 | Status: SHIPPED | OUTPATIENT
Start: 2017-09-12 | End: 2017-11-10 | Stop reason: ALTCHOICE

## 2017-09-12 RX ORDER — CEFUROXIME AXETIL 500 MG/1
500 TABLET ORAL 2 TIMES DAILY
Qty: 20 TAB | Refills: 0 | Status: SHIPPED | OUTPATIENT
Start: 2017-09-12 | End: 2017-09-22

## 2017-09-12 RX ORDER — NAPROXEN 500 MG/1
500 TABLET ORAL 2 TIMES DAILY WITH MEALS
Qty: 60 TAB | Refills: 0 | Status: SHIPPED | OUTPATIENT
Start: 2017-09-12 | End: 2017-11-10 | Stop reason: ALTCHOICE

## 2017-09-12 RX ORDER — CODEINE PHOSPHATE AND GUAIFENESIN 10; 100 MG/5ML; MG/5ML
5 SOLUTION ORAL
Qty: 120 ML | Refills: 0 | Status: SHIPPED | OUTPATIENT
Start: 2017-09-12 | End: 2017-09-17

## 2017-09-12 NOTE — PROGRESS NOTES
HISTORY OF PRESENT ILLNESS  Simi Salazar is a 55 y.o. female. Cold Symptoms   The history is provided by the patient. This is a new problem. The current episode started 2 days ago. The problem occurs constantly. The problem has not changed since onset. The cough is non-productive. There has been no fever. Associated symptoms include chest pain, chills, headaches, rhinorrhea, sore throat, myalgias, shortness of breath and wheezing. Pertinent negatives include no nausea and no vomiting. She has tried nothing for the symptoms. She is not a smoker. Her past medical history is significant for asthma. Her past medical history does not include COPD or CHF. Current Outpatient Prescriptions   Medication Sig Dispense Refill    VITAMIN B-12 1,000 mcg tablet TAKE 1 TABLET BY MOUTH DAILY FOR VITAMIN B12 DEFICIENCY 90 Tab 1    omeprazole (PRILOSEC) 40 mg capsule Take 1 Cap by mouth daily. 30 Cap 5    naproxen (NAPROSYN) 500 mg tablet Take 1 Tab by mouth two (2) times daily (with meals). As needed 60 Tab 0    furosemide (LASIX) 20 mg tablet Take 1 Tab by mouth daily. As needed 30 Tab 5    potassium chloride (KLOR-CON) 10 mEq tablet Take 1 Tab by mouth daily. If takes lasix 30 Tab 5    cholecalciferol (VITAMIN D3) 1,000 unit tablet Take 2 Tabs by mouth daily. Indications: VITAMIN D DEFICIENCY 180 Tab 3    topiramate (TOPAMAX) 100 mg tablet Take 1 Tab by mouth daily. Indications: MOOD 30 Tab 11    fluticasone (FLONASE) 50 mcg/actuation nasal spray 2 Sprays by Both Nostrils route daily. 1 Bottle 5    sertraline (ZOLOFT) 50 mg tablet Take  by mouth daily.        Allergies   Allergen Reactions    Tylenol [Acetaminophen] Anaphylaxis and Hives    Benadryl [Diphenhydramine Hcl] Nausea and Vomiting    Ibuprofen Nausea and Vomiting     Social History     Social History    Marital status:      Spouse name: N/A    Number of children: 2    Years of education: N/A     Occupational History    retired      Social History Main Topics    Smoking status: Never Smoker    Smokeless tobacco: Never Used    Alcohol use No    Drug use: No    Sexual activity: No     Other Topics Concern    Not on file     Social History Narrative     has brain damage, lives with him       Review of Systems   Constitutional: Positive for chills. HENT: Positive for rhinorrhea and sore throat. Respiratory: Positive for shortness of breath and wheezing. Cardiovascular: Positive for chest pain. Gastrointestinal: Negative for nausea and vomiting. Musculoskeletal: Positive for myalgias. Neurological: Positive for headaches. Physical Exam  Visit Vitals    /85 (BP 1 Location: Left arm, BP Patient Position: Sitting)    Pulse 80    Temp 100 °F (37.8 °C) (Oral)    Resp 20    Ht 4' 9\" (1.448 m)    Wt 180 lb (81.6 kg)    SpO2 99%    BMI 38.95 kg/m2       WDWN crying in pain mod discomfort shivering crying  TM clear, throat wnl  Neck no adenopathy  Heart RRR no C/M/R  Lungs Bi wheezes and rhonchi  Abdo soft non tender  Ext No redness swelling or edema    ASSESSMENT and PLAN  Encounter Diagnoses   Name Primary?  Pneumonia, unspecified organism Yes    Asthma exacerbation     Chronic tension-type headache, not intractable      Orders Placed This Encounter    predniSONE (DELTASONE) 10 mg tablet    cefUROXime (CEFTIN) 500 mg tablet    guaiFENesin-codeine (ROBITUSSIN AC) 100-10 mg/5 mL solution    naproxen (NAPROSYN) 500 mg tablet     Acute Diagnoses Addressed Today     Pneumonia, unspecified organism    -  Primary    Asthma exacerbation            Relevant Medications        predniSONE (DELTASONE) 10 mg tablet    Chronic tension-type headache, not intractable            Follow-up Disposition:  Return in about 3 days (around 9/15/2017) for routine follow up.

## 2017-09-12 NOTE — MR AVS SNAPSHOT
Visit Information Date & Time Provider Department Dept. Phone Encounter #  
 9/12/2017  2:30 PM Fabiano Crespo MD [place] [address] [phone] [id number] Follow-up Instructions Return in about 3 days (around 9/15/2017) for routine follow up. Your Appointments 9/25/2017 11:00 AM  
ROUTINE CARE with Fabiano Crespo MD  
[facility] ([facility]) Appt Note: f/u on medication $0 cp lsh 5/9/17; f/u on medication $0 cp lsh 5/9/17  
 [address] [place] [address] Road. [address] [place]iv [place] [address]  
[phone]  
  
   
 [address] Upcoming Health Maintenance Date Due INFLUENZA AGE 9 TO ADULT 8/1/2017 MEDICARE YEARLY EXAM 5/10/2018 PAP AKA CERVICAL CYTOLOGY 12/3/2018 BREAST CANCER SCRN MAMMOGRAM 3/8/2019 DTaP/Tdap/Td series (2 - Td) 6/23/2021 COLONOSCOPY 8/3/2026 Allergies as of 9/12/2017  Review Complete On: 9/12/2017 By: Debra Mosquera LPN Severity Noted Reaction Type Reactions Tylenol [Acetaminophen] High 10/02/2015    Anaphylaxis, Hives Benadryl [Diphenhydramine Hcl] Medium 12/03/2015    Nausea and Vomiting Ibuprofen  10/12/2014    Nausea and Vomiting Current Immunizations  Reviewed on 10/2/2015 Name Date DTaP 6/23/2011 Hep B Vaccine 10/16/2016, 7/20/2016 Hep B Vaccine (Adult) 5/9/2017 Influenza Vaccine 10/13/2016, 9/9/2015, 9/30/2014, 9/13/2013, 3/28/2013, 11/14/2011, 1/13/2011, 1/13/2011, 12/17/2009, 12/17/2009, 1/8/2009, 1/8/2009, 11/8/2007, 11/8/2007 Influenza Vaccine (Quad) PF 9/24/2014 Pneumococcal Polysaccharide (PPSV-23) 1/8/2009 Tdap 6/23/2011 Not reviewed this visit You Were Diagnosed With   
  
 Codes Comments Pneumonia, unspecified organism    -  Primary ICD-10-CM: J18.9 ICD-9-CM: 457 Asthma exacerbation     ICD-10-CM: Q42.241 ICD-9-CM: 407.25   
 Chronic tension-type headache, not intractable     ICD-10-CM: N30.305 ICD-9-CM: 339.12 Vitals BP Pulse Temp Resp Height(growth percentile) Weight(growth percentile) 109/85 (BP 1 Location: Left arm, BP Patient Position: Sitting) 80 100 °F (37.8 °C) (Oral) 20 4' 9\" (1.448 m) 180 lb (81.6 kg) SpO2 BMI OB Status Smoking Status 99% 38.95 kg/m2 Hysterectomy Never Smoker BMI and BSA Data Body Mass Index Body Surface Area 38.95 kg/m 2 1.81 m 2 Preferred Pharmacy Pharmacy Name Phone [facility], [address] [facility] [address] [phone] Your Updated Medication List  
  
   
This list is accurate as of: 9/12/17  2:30 PM.  Always use your most recent med list.  
  
  
  
  
 cefUROXime 500 mg tablet Commonly known as:  CEFTIN Take 1 Tab by mouth two (2) times a day for 10 days. cholecalciferol 1,000 unit tablet Commonly known as:  VITAMIN D3 Take 2 Tabs by mouth daily. Indications: VITAMIN D DEFICIENCY  
  
 fluticasone 50 mcg/actuation nasal spray Commonly known as:  Monrtell Karissa 2 Sprays by Both Nostrils route daily. furosemide 20 mg tablet Commonly known as:  LASIX Take 1 Tab by mouth daily. As needed  
  
 guaiFENesin-codeine 100-10 mg/5 mL solution Commonly known as:  ROBITUSSIN AC Take 5 mL by mouth three (3) times daily as needed for Cough for up to 5 days. Max Daily Amount: 15 mL.  
  
 naproxen 500 mg tablet Commonly known as:  NAPROSYN Take 1 Tab by mouth two (2) times daily (with meals). As needed  
  
 omeprazole 40 mg capsule Commonly known as:  PRILOSEC Take 1 Cap by mouth daily. potassium chloride 10 mEq tablet Commonly known as:  KLOR-CON Take 1 Tab by mouth daily. If takes lasix  
  
 predniSONE 10 mg tablet Commonly known as:  DELTASONE  
4 tabs for 2 day, 3 tabs for 2 days, 2 tabs for 2 days, 1tab for 2 days  
  
 topiramate 100 mg tablet Commonly known as:  TOPAMAX Take 1 Tab by mouth daily. Indications: MOOD  
  
 VITAMIN B-12 1,000 mcg tablet Generic drug:  cyanocobalamin TAKE 1 TABLET BY MOUTH DAILY FOR VITAMIN B12 DEFICIENCY  
  
 ZOLOFT 50 mg tablet Generic drug:  sertraline Take  by mouth daily. Prescriptions Printed Refills  
 guaiFENesin-codeine (ROBITUSSIN AC) 100-10 mg/5 mL solution 0 Sig: Take 5 mL by mouth three (3) times daily as needed for Cough for up to 5 days. Max Daily Amount: 15 mL. Class: Print Route: Oral  
  
Prescriptions Sent to Pharmacy Refills  
 predniSONE (DELTASONE) 10 mg tablet 0 Si tabs for 2 day, 3 tabs for 2 days, 2 tabs for 2 days, 1tab for 2 days Class: Normal  
 Pharmacy: [address] [place] [address] #: [phone]  
 cefUROXime (CEFTIN) 500 mg tablet 0 Sig: Take 1 Tab by mouth two (2) times a day for 10 days. Class: Normal  
 Pharmacy: [address] [place] [address] #: [phone] Route: Oral  
 naproxen (NAPROSYN) 500 mg tablet 0 Sig: Take 1 Tab by mouth two (2) times daily (with meals). As needed Class: Normal  
 Pharmacy: [address] [place] [address] #: [phone] Route: Oral  
  
Follow-up Instructions Return in about 3 days (around 9/15/2017) for routine follow up. Introducing [facility]! [facility] introduces [employer] patient portal. Now you can access parts of your medical record, email your doctor's office, and request medication refills online. 1. In your internet browser, go to [email or web address] 2. Click on the First Time User? Click Here link in the Sign In box. You will see the New Member Sign Up page. 3. Enter your [employer] Access Code exactly as it appears below. You will not need to use this code after youve completed the sign-up process. If you do not sign up before the expiration date, you must request a new code. · [employer] Access Code: [email or web address] Expires: 12/11/2017  1:58 PM 
 
4. Enter the last four digits of your Social Security Number (xxxx) and Date of Birth (mm/dd/yyyy) as indicated and click Submit. You will be taken to the next sign-up page. 5. Create a [employer] ID. This will be your [employer] login ID and cannot be changed, so think of one that is secure and easy to remember. 6. Create a [employer] password. You can change your password at any time. 7. Enter your Password Reset Question and Answer. This can be used at a later time if you forget your password. 8. Enter your e-mail address. You will receive e-mail notification when new information is available in [address] 9. Click Sign Up. You can now view and download portions of your medical record. 10. Click the Download Summary menu link to download a portable copy of your medical information. If you have questions, please visit the Frequently Asked Questions section of the [employer] website. Remember, [employer] is NOT to be used for urgent needs. For medical emergencies, dial 911. Now available from your iPhone and Android! Please provide this summary of care documentation to your next provider. Your primary care clinician is listed as Donis Magaña. If you have any questions after today's visit, please call [phone].

## 2017-09-12 NOTE — PROGRESS NOTES
Chief Complaint   Patient presents with    Cold Symptoms     wheezing, prod cough thick sputum, chills, throat sore, headache, crying, weak, nose running     I have reviewed the patient's medical history in detail and updated the computerized patient record. Health Maintenance reviewed. 1. Have you been to the ER, urgent care clinic since your last visit? Hospitalized since your last visit?no    2. Have you seen or consulted any other health care providers outside of the [address] [place] [address] since your last visit? Include any pap smears or colon screening.  No    Encouraged pt to discuss pt's wishes with spouse/partner/family and bring them in the next appt to follow thru with the Advanced Directive

## 2017-09-15 ENCOUNTER — OFFICE VISIT (OUTPATIENT)
Dept: INTERNAL MEDICINE CLINIC | Age: 57
End: 2017-09-15

## 2017-09-15 ENCOUNTER — TELEPHONE (OUTPATIENT)
Dept: INTERNAL MEDICINE CLINIC | Age: 57
End: 2017-09-15

## 2017-09-15 VITALS
SYSTOLIC BLOOD PRESSURE: 105 MMHG | TEMPERATURE: 95.7 F | WEIGHT: 180 LBS | RESPIRATION RATE: 18 BRPM | HEIGHT: 57 IN | HEART RATE: 77 BPM | OXYGEN SATURATION: 97 % | DIASTOLIC BLOOD PRESSURE: 71 MMHG | BODY MASS INDEX: 38.83 KG/M2

## 2017-09-15 DIAGNOSIS — J45.901 ASTHMA EXACERBATION: ICD-10-CM

## 2017-09-15 DIAGNOSIS — J18.9 PNEUMONIA, UNSPECIFIED ORGANISM: Primary | ICD-10-CM

## 2017-09-15 RX ORDER — NEBULIZER AND COMPRESSOR
EACH MISCELLANEOUS
Qty: 1 EACH | Refills: 0 | Status: SHIPPED | OUTPATIENT
Start: 2017-09-15

## 2017-09-15 RX ORDER — ALBUTEROL SULFATE 0.83 MG/ML
2.5 SOLUTION RESPIRATORY (INHALATION)
Qty: 100 EACH | Refills: 1 | Status: SHIPPED | OUTPATIENT
Start: 2017-09-15 | End: 2020-02-25 | Stop reason: SDUPTHER

## 2017-09-15 NOTE — MR AVS SNAPSHOT
Visit Information Date & Time Provider Department Dept. Phone Encounter #  
 9/15/2017  9:15 AM Ana Chang MD [address] [place] [address]  [id number] Follow-up Instructions Return in about 4 weeks (around 10/13/2017) for routine follow up. Your Appointments 9/25/2017 11:00 AM  
ROUTINE CARE with Ana Chang MD  
[facility] ([facility]) Appt Note: f/u on medication $0 cp lsh 5/9/17; f/u on medication $0 cp ls 5/9/17  
 [address] [place] [id number]  
[phone]  
  
   
 [address] Upcoming Health Maintenance Date Due INFLUENZA AGE 9 TO ADULT 8/1/2017 MEDICARE YEARLY EXAM 5/10/2018 PAP AKA CERVICAL CYTOLOGY 12/3/2018 BREAST CANCER SCRN MAMMOGRAM 3/8/2019 DTaP/Tdap/Td series (2 - Td) 6/23/2021 COLONOSCOPY 8/3/2026 Allergies as of 9/15/2017  Review Complete On: 9/12/2017 By: Trang Squires LPN Severity Noted Reaction Type Reactions Tylenol [Acetaminophen] High 10/02/2015    Anaphylaxis, Hives Benadryl [Diphenhydramine Hcl] Medium 12/03/2015    Nausea and Vomiting Ibuprofen  10/12/2014    Nausea and Vomiting Current Immunizations  Reviewed on 10/2/2015 Name Date DTaP 6/23/2011 Hep B Vaccine 10/16/2016, 7/20/2016 Hep B Vaccine (Adult) 5/9/2017 Influenza Vaccine 10/13/2016, 9/9/2015, 9/30/2014, 9/13/2013, 3/28/2013, 11/14/2011, 1/13/2011, 1/13/2011, 12/17/2009, 12/17/2009, 1/8/2009, 1/8/2009, 11/8/2007, 11/8/2007 Influenza Vaccine (Quad) PF 9/24/2014 Pneumococcal Polysaccharide (PPSV-23) 1/8/2009 Tdap 6/23/2011 Not reviewed this visit You Were Diagnosed With   
  
 Codes Comments Pneumonia, unspecified organism    -  Primary ICD-10-CM: J18.9 ICD-9-CM: 731 Asthma exacerbation     ICD-10-CM: U25.182 ICD-9-CM: 312.36 Vitals BP Pulse Temp Resp Height(growth percentile) Weight(growth percentile) 105/71 (BP 1 Location: Left arm, BP Patient Position: Sitting) 77 95.7 °F (35.4 °C) (Oral) 18 4' 9\" (1.448 m) 180 lb (81.6 kg) SpO2 BMI OB Status Smoking Status 97% 38.95 kg/m2 Hysterectomy Never Smoker BMI and BSA Data Body Mass Index Body Surface Area 38.95 kg/m 2 1.81 m 2 Preferred Pharmacy Pharmacy Name Phone 150 [facility], [address] 15[address] [facility] [phone] Your Updated Medication List  
  
   
This list is accurate as of: 9/15/17  9:42 AM.  Always use your most recent med list.  
  
  
  
  
 albuterol 2.5 mg /3 mL (0.083 %) nebulizer solution Commonly known as:  PROVENTIL VENTOLIN  
3 mL by Nebulization route every four (4) hours as needed for Wheezing. cefUROXime 500 mg tablet Commonly known as:  CEFTIN Take 1 Tab by mouth two (2) times a day for 10 days. cholecalciferol 1,000 unit tablet Commonly known as:  VITAMIN D3 Take 2 Tabs by mouth daily. Indications: VITAMIN D DEFICIENCY  
  
 fluticasone 50 mcg/actuation nasal spray Commonly known as:  [place] 2 Sprays by Both Nostrils route daily. furosemide 20 mg tablet Commonly known as:  LASIX Take 1 Tab by mouth daily. As needed  
  
 guaiFENesin-codeine 100-10 mg/5 mL solution Commonly known as:  ROBITUSSIN AC Take 5 mL by mouth three (3) times daily as needed for Cough for up to 5 days. Max Daily Amount: 15 mL.  
  
 naproxen 500 mg tablet Commonly known as:  NAPROSYN Take 1 Tab by mouth two (2) times daily (with meals). As needed Nebulizer & Compressor machine Use as directed  
  
 omeprazole 40 mg capsule Commonly known as:  PRILOSEC Take 1 Cap by mouth daily. potassium chloride 10 mEq tablet Commonly known as:  KLOR-CON Take 1 Tab by mouth daily. If takes lasix  
  
 predniSONE 10 mg tablet Commonly known as:  Ethan Hanson  
 4 tabs for 2 day, 3 tabs for 2 days, 2 tabs for 2 days, 1tab for 2 days  
  
 topiramate 100 mg tablet Commonly known as:  TOPAMAX Take 1 Tab by mouth daily. Indications: MOOD  
  
 VITAMIN B-12 1,000 mcg tablet Generic drug:  cyanocobalamin TAKE 1 TABLET BY MOUTH DAILY FOR VITAMIN B12 DEFICIENCY  
  
 ZOLOFT 50 mg tablet Generic drug:  sertraline Take  by mouth daily. Prescriptions Printed Refills Nebulizer & Compressor machine 0 Sig: Use as directed Class: Print Prescriptions Sent to Pharmacy Refills  
 albuterol (PROVENTIL VENTOLIN) 2.5 mg /3 mL (0.083 %) nebulizer solution 1 Sig: 3 mL by Nebulization route every four (4) hours as needed for Wheezing. Class: Normal  
 Pharmacy: [address] [place] [address], [address] [place] [address] #: [phone] Route: Nebulization Follow-up Instructions Return in about 4 weeks (around 10/13/2017) for routine follow up. Introducing [facility]! [employer] introduces DeliverCareRx patient portal. Now you can access parts of your medical record, email your doctor's office, and request medication refills online. 1. In your internet browser, go to [email or web address] 2. Click on the First Time User? Click Here link in the Sign In box. You will see the New Member Sign Up page. 3. Enter your DeliverCareRx Access Code exactly as it appears below. You will not need to use this code after youve completed the sign-up process. If you do not sign up before the expiration date, you must request a new code. · Del[employer] Access Code: [email or web address] Expires: 12/11/2017  1:58 PM 
 
4. Enter the last four digits of your Social Security Number (xxxx) and Date of Birth (mm/dd/yyyy) as indicated and click Submit. You will be taken to the next sign-up page. 5. Create a DeliverCareRx ID. This will be your DeliverCareRx login ID and cannot be changed, so think of one that is secure and easy to remember. 6. Create a RingTu password. You can change your password at any time. 7. Enter your Password Reset Question and Answer. This can be used at a later time if you forget your password. 8. Enter your e-mail address. You will receive e-mail notification when new information is available in [address] 9. Click Sign Up. You can now view and download portions of your medical record. 10. Click the Download Summary menu link to download a portable copy of your medical information. If you have questions, please visit the Frequently Asked Questions section of the RingTu website. Remember, RingTu is NOT to be used for urgent needs. For medical emergencies, dial 911. Now available from your iPhone and Android! Please provide this summary of care documentation to your next provider. Your primary care clinician is listed as Gaby Sanabria. If you have any questions after today's visit, please call [phone].

## 2017-09-15 NOTE — PROGRESS NOTES
Chief Complaint   Patient presents with    Cough     follow up     I have reviewed the patient's medical history in detail and updated the computerized patient record. Health Maintenance reviewed. 1. Have you been to the ER, urgent care clinic since your last visit? Hospitalized since your last visit?no    2. Have you seen or consulted any other health care providers outside of the [address] [place] [address] since your last visit? Include any pap smears or colon screening.  No    Encouraged pt to discuss pt's wishes with spouse/partner/family and bring them in the next appt to follow thru with the Advanced Directive

## 2017-09-15 NOTE — PROGRESS NOTES
Subjective:   Ray Bullard is a 55 y.o. female who complains of congestion, dry cough, headache, fever and chills for 7 days days, gradually improving since that time. She denies a history of vomiting. Evaluation to date: 3 days ago Dx with pneumonia. Treatment to date: antibiotics -ceftin. Began taking 3 days ago. .  Patient does not smoke cigarettes. Relevant PMH: Asthma. Still whezzing quite a lot and she wants Neb machine. Allergies   Allergen Reactions    Tylenol [Acetaminophen] Anaphylaxis and Hives    Benadryl [Diphenhydramine Hcl] Nausea and Vomiting    Ibuprofen Nausea and Vomiting         Patient Active Problem List    Diagnosis Date Noted    Gastroesophageal reflux disease without esophagitis 08/24/2016    Bipolar disorder with current episode depressed ([facility] [address] 02/01/2016    Migraine aura without headache 10/24/2014    Bilateral leg edema 10/12/2014    History of gastric bypass 10/12/2014        Review of Systems  Pertinent items are noted in HPI. Objective:     Visit Vitals    /71 (BP 1 Location: Left arm, BP Patient Position: Sitting)    Pulse 77    Temp 95.7 °F (35.4 °C) (Oral)    Resp 18    Ht 4' 9\" (1.448 m)    Wt 180 lb (81.6 kg)    SpO2 97%    BMI 38.95 kg/m2     General:  fatigued, cooperative, no distress   Eyes: conjunctivae/corneas clear. PERRL, EOM's intact. Fundi benign   Ears: normal TM's and external ear canals AU   Sinuses: Normal paranasal sinuses without tenderness   Mouth:  Lips, mucosa, and tongue normal. Teeth and gums normal   Neck: supple, symmetrical, trachea midline and no adenopathy. Heart: S1 and S2 normal, no murmurs noted. Lungs: wheezes R anterior, L anterior   Abdomen: soft, non-tender. Bowel sounds normal. No masses,  no organomegaly      Assessment/Plan:   asthma and pneumonia  Antibiotics per orders. Follow up with PCP in 1 month or PRN.    Finish steroids and cough med  OK machine and albuterol    Encounter Diagnoses   Name Primary?  Pneumonia, unspecified organism Yes    Asthma exacerbation      Orders Placed This Encounter    Nebulizer & Compressor machine    albuterol (PROVENTIL VENTOLIN) 2.5 mg /3 mL (0.083 %) nebulizer solution   . ICD-10-CM ICD-9-CM    1. Pneumonia, unspecified organism J18.9 486    2. Asthma exacerbation J45.901 493.92      Orders Placed This Encounter    Nebulizer & Compressor machine     Sig: Use as directed     Dispense:  1 Each     Refill:  0     J45.901 asthma    albuterol (PROVENTIL VENTOLIN) 2.5 mg /3 mL (0.083 %) nebulizer solution     Sig: 3 mL by Nebulization route every four (4) hours as needed for Wheezing.      Dispense:  100 Each     Refill:  1     Follow-up Disposition: Not on File

## 2017-10-10 RX ORDER — MECLIZINE HCL 25 MG/1
TABLET, CHEWABLE ORAL
Qty: 30 TAB | Refills: 3 | Status: SHIPPED | OUTPATIENT
Start: 2017-10-10 | End: 2017-11-10 | Stop reason: SDUPTHER

## 2017-10-10 NOTE — TELEPHONE ENCOUNTER
Last office visit:  9/15/17  Last filled :  Meclizine 8/16/07, stopped on 8/26/17 #90 X 1 refill  No changes  Follow up in 6 days

## 2017-11-10 ENCOUNTER — OFFICE VISIT (OUTPATIENT)
Dept: INTERNAL MEDICINE CLINIC | Age: 57
End: 2017-11-10

## 2017-11-10 VITALS
DIASTOLIC BLOOD PRESSURE: 57 MMHG | WEIGHT: 180 LBS | BODY MASS INDEX: 38.83 KG/M2 | TEMPERATURE: 97.8 F | HEIGHT: 57 IN | SYSTOLIC BLOOD PRESSURE: 105 MMHG | OXYGEN SATURATION: 98 % | RESPIRATION RATE: 18 BRPM | HEART RATE: 94 BPM

## 2017-11-10 DIAGNOSIS — F31.30 BIPOLAR AFFECTIVE DISORDER, CURRENT EPISODE DEPRESSED, CURRENT EPISODE SEVERITY UNSPECIFIED (HCC): ICD-10-CM

## 2017-11-10 DIAGNOSIS — Z98.84 HISTORY OF GASTRIC BYPASS: ICD-10-CM

## 2017-11-10 DIAGNOSIS — R10.9 RIGHT FLANK PAIN: Primary | ICD-10-CM

## 2017-11-10 DIAGNOSIS — M19.041 ARTHRITIS OF RIGHT HAND: ICD-10-CM

## 2017-11-10 LAB
BILIRUB UR QL STRIP: NEGATIVE
GLUCOSE UR-MCNC: NEGATIVE MG/DL
KETONES P FAST UR STRIP-MCNC: NEGATIVE MG/DL
PH UR STRIP: 6 [PH] (ref 4.6–8)
PROT UR QL STRIP: NEGATIVE
SP GR UR STRIP: 1.02 (ref 1–1.03)
UA UROBILINOGEN AMB POC: NORMAL (ref 0.2–1)
URINALYSIS CLARITY POC: NORMAL
URINALYSIS COLOR POC: NORMAL
URINE BLOOD POC: NEGATIVE
URINE LEUKOCYTES POC: NORMAL
URINE NITRITES POC: NEGATIVE

## 2017-11-10 RX ORDER — MECLIZINE HCL 25MG 25 MG/1
TABLET, CHEWABLE ORAL
Qty: 30 TAB | Refills: 3 | Status: SHIPPED | OUTPATIENT
Start: 2017-11-10 | End: 2017-12-22 | Stop reason: SDUPTHER

## 2017-11-10 RX ORDER — POTASSIUM CHLORIDE 750 MG/1
10 TABLET, EXTENDED RELEASE ORAL DAILY
Qty: 90 TAB | Refills: 3 | Status: SHIPPED | OUTPATIENT
Start: 2017-11-10 | End: 2018-12-26 | Stop reason: SDUPTHER

## 2017-11-10 RX ORDER — FUROSEMIDE 20 MG/1
20 TABLET ORAL DAILY
Qty: 90 TAB | Refills: 3 | Status: SHIPPED | OUTPATIENT
Start: 2017-11-10 | End: 2018-09-13 | Stop reason: SDUPTHER

## 2017-11-10 RX ORDER — OMEPRAZOLE 40 MG/1
40 CAPSULE, DELAYED RELEASE ORAL DAILY
Qty: 90 CAP | Refills: 3 | Status: SHIPPED | OUTPATIENT
Start: 2017-11-10 | End: 2018-08-03 | Stop reason: ALTCHOICE

## 2017-11-10 RX ORDER — TRAMADOL HYDROCHLORIDE 50 MG/1
50 TABLET ORAL
Qty: 12 TAB | Refills: 0 | Status: SHIPPED | OUTPATIENT
Start: 2017-11-10 | End: 2017-12-22 | Stop reason: SINTOL

## 2017-11-10 RX ORDER — LANOLIN ALCOHOL/MO/W.PET/CERES
CREAM (GRAM) TOPICAL
Qty: 90 TAB | Refills: 1 | Status: SHIPPED | OUTPATIENT
Start: 2017-11-10 | End: 2018-04-13 | Stop reason: SDUPTHER

## 2017-11-10 NOTE — PROGRESS NOTES
HISTORY OF PRESENT ILLNESS  Zuri Thrasher is a 55 y.o. female. Hand Pain    The history is provided by the patient. This is a new problem. The current episode started more than 1 week ago. The problem occurs hourly. The problem has not changed since onset. The pain is present in the right hand. The quality of the pain is described as aching. The pain is moderate. Pertinent negatives include no numbness and no tingling. She has tried nothing for the symptoms. There has been no history of extremity trauma. Side Pain   This is a new problem. Episode onset: 1 week. Pertinent negatives include no abdominal pain. Associated symptoms comments: Right side pain into the hip. Min HB watches diet    Allergies   Allergen Reactions    Tylenol [Acetaminophen] Anaphylaxis and Hives    Benadryl [Diphenhydramine Hcl] Nausea and Vomiting    Ibuprofen Nausea and Vomiting     Social History     Social History    Marital status:      Spouse name: N/A    Number of children: 2    Years of education: N/A     Occupational History    retired      Social History Main Topics    Smoking status: Never Smoker    Smokeless tobacco: Never Used    Alcohol use No    Drug use: No    Sexual activity: No     Other Topics Concern    Not on file     Social History Narrative     has brain damage, lives with him         Review of Systems   Constitutional: Negative for fever and weight loss. Respiratory: Positive for cough. Not bad   Gastrointestinal: Negative for abdominal pain, nausea and vomiting. Genitourinary: Negative for dysuria and hematuria. Musculoskeletal: Negative for falls. Neurological: Negative for tingling and numbness. Psychiatric/Behavioral: Positive for depression. Negative for suicidal ideas.         Sees her psych       Physical Exam  Visit Vitals    /57 (BP 1 Location: Left arm, BP Patient Position: Sitting)    Pulse 94    Temp 97.8 °F (36.6 °C) (Oral)    Resp 18    Ht 4' 9\" (1.448 m)    Wt 180 lb (81.6 kg)    SpO2 98%    BMI 38.95 kg/m2     WD WN female NAD  Heart RRR without murmers clicks or rubs  Lungs CTA  Abdo soft nontender  Ext no edema, right hand mildly tender now swelling or redness    ASSESSMENT and PLAN  Encounter Diagnoses   Name Primary?  Right flank pain Yes    Arthritis of right hand     Bipolar affective disorder, current episode depressed, current episode severity unspecified ([facility] [address]     History of gastric bypass      Orders Placed This Encounter    AMB SUPPLY ORDER    AMB POC URINALYSIS DIP STICK AUTO W/O MICRO    traMADol (ULTRAM) 50 mg tablet    cyanocobalamin (VITAMIN B-12) 1,000 mcg tablet    furosemide (LASIX) 20 mg tablet    potassium chloride (KLOR-CON) 10 mEq tablet    meclizine (TRAVEL SICKNESS, MECLIZINE,) 25 mg chewable tablet    omeprazole (PRILOSEC) 40 mg capsule   splint for hand. Current Outpatient Prescriptions   Medication Sig Dispense Refill    traMADol (ULTRAM) 50 mg tablet Take 1 Tab by mouth every six (6) hours as needed for Pain. Max Daily Amount: 200 mg. 12 Tab 0    cyanocobalamin (VITAMIN B-12) 1,000 mcg tablet TAKE 1 TABLET BY MOUTH DAILY FOR VITAMIN B12 DEFICIENCY 90 Tab 1    furosemide (LASIX) 20 mg tablet Take 1 Tab by mouth daily. As needed 90 Tab 3    potassium chloride (KLOR-CON) 10 mEq tablet Take 1 Tab by mouth daily. If takes lasix 90 Tab 3    meclizine (TRAVEL SICKNESS, MECLIZINE,) 25 mg chewable tablet TAKE 1 TABLET BY MOUTH THREE TIMES DAILY AS NEEDED FOR UP TO 10 DAYS FOR DIZZINESS 30 Tab 3    omeprazole (PRILOSEC) 40 mg capsule Take 1 Cap by mouth daily. 90 Cap 3    Nebulizer & Compressor machine Use as directed 1 Each 0    albuterol (PROVENTIL VENTOLIN) 2.5 mg /3 mL (0.083 %) nebulizer solution 3 mL by Nebulization route every four (4) hours as needed for Wheezing. 100 Each 1    cholecalciferol (VITAMIN D3) 1,000 unit tablet Take 2 Tabs by mouth daily.  Indications: VITAMIN D DEFICIENCY 180 Tab 3    topiramate (TOPAMAX) 100 mg tablet Take 1 Tab by mouth daily. Indications: MOOD 30 Tab 11    fluticasone (FLONASE) 50 mcg/actuation nasal spray 2 Sprays by Both Nostrils route daily. 1 Bottle 5    sertraline (ZOLOFT) 50 mg tablet Take  by mouth daily. We discussed this pain and the usage of controlled substances for arthritis relief. In general these medications are indicated for the acute symptom relief and not for chronic usage, allthough they are frequently used for chronic management  After a certain period of time they should be stopped to avoid dependence and/or addiction. I warned her about the dangers of addiction and other side affects including respiratory depression and death. Discussed how this is a controlled substance and that the prescribing of it is should be monitored very carefully. Drug usage is also monitored by our practise and the [facility], since there has been a lot of abuse and diversion of controlled substances. In general we do not refill this medication over the phone. PLease ask for enough pills to get you to your next appointment and make sure you keep your appointments. Warned not to take other medications like alcohol, other benzodiazapines marijuana or other narcotics when on this medication. Follow-up Disposition:  Return in about 6 weeks (around 12/22/2017) for routine follow up. Chronic Conditions Addressed Today     1. History of gastric bypass     Relevant Medications     cyanocobalamin (VITAMIN B-12) 1,000 mcg tablet    2.  Bipolar disorder with current episode depressed (HCC)     Relevant Medications     traMADol (ULTRAM) 50 mg tablet      Acute Diagnoses Addressed Today     Right flank pain    -  Primary        Relevant Orders        AMB POC URINALYSIS DIP STICK AUTO W/O MICRO (Completed)    Arthritis of right hand            Relevant Orders        AMB SUPPLY ORDER

## 2017-11-10 NOTE — PROGRESS NOTES
Chief Complaint   Patient presents with    Hand Pain     R/hand pain    Side Pain     R/side pain     I have reviewed the patient's medical history in detail and updated the computerized patient record. Health Maintenance reviewed. 1. Have you been to the ER, urgent care clinic since your last visit? Hospitalized since your last visit?no    2. Have you seen or consulted any other health care providers outside of the [address] [place] [address] since your last visit? Include any pap smears or colon screening.  No    Encouraged pt to discuss pt's wishes with spouse/partner/family and bring them in the next appt to follow thru with the Advanced Directive

## 2017-11-10 NOTE — MR AVS SNAPSHOT
Visit Information Date & Time Provider Department Dept. Phone Encounter #  
 11/10/2017  1:15 PM Vinay Finley MD [address] [facility] [phone] Follow-up Instructions Return in about 6 weeks (around 12/22/2017) for routine follow up. Upcoming Health Maintenance Date Due Influenza Age 5 to Adult 8/1/2017 MEDICARE YEARLY EXAM 5/10/2018 PAP AKA CERVICAL CYTOLOGY 12/3/2018 BREAST CANCER SCRN MAMMOGRAM 3/8/2019 DTaP/Tdap/Td series (2 - Td) 6/23/2021 COLONOSCOPY 8/3/2026 Allergies as of 11/10/2017  Review Complete On: 11/10/2017 By: Chavez Lanza LPN Severity Noted Reaction Type Reactions Tylenol [Acetaminophen] High 10/02/2015    Anaphylaxis, Hives Benadryl [Diphenhydramine Hcl] Medium 12/03/2015    Nausea and Vomiting Ibuprofen  10/12/2014    Nausea and Vomiting Current Immunizations  Reviewed on 10/2/2015 Name Date DTaP 6/23/2011 Hep B Vaccine 10/16/2016, 7/20/2016 Hep B Vaccine (Adult) 5/9/2017 Influenza Vaccine 10/13/2016, 9/9/2015, 9/30/2014, 9/13/2013, 3/28/2013, 11/14/2011, 1/13/2011, 1/13/2011, 12/17/2009, 12/17/2009, 1/8/2009, 1/8/2009, 11/8/2007, 11/8/2007 Influenza Vaccine (Quad) PF 9/24/2014 Pneumococcal Polysaccharide (PPSV-23) 1/8/2009 Tdap 6/23/2011 Not reviewed this visit You Were Diagnosed With   
  
 Codes Comments Right flank pain    -  Primary ICD-10-CM: R10.9 ICD-9-CM: 789.09 Arthritis of right hand     ICD-10-CM: M19.041 ICD-9-CM: 716.94 Bipolar affective disorder, current episode depressed, [facility] episode severity unspecified [facility]     ICD-10-CM: F31.30 ICD-9-CM: 296.50 History of gastric bypass     ICD-10-CM: Z98.890 ICD-9-CM: V45.86 Vitals BP Pulse Temp Resp Height(growth percentile) Weight(growth percentile) 105/57 (BP 1 Location: Left arm, BP Patient Position: Sitting) 94 97.8 °F (36.6 °C) (Oral) 18 4' 9\" (1.448 m) 180 lb (81.6 kg) SpO2 BMI OB Status Smoking Status 98% 38.95 kg/m2 Hysterectomy Never Smoker BMI and BSA Data Body Mass Index Body Surface Area 38.95 kg/m 2 1.81 m 2 Preferred Pharmacy Pharmacy Name Phone [address], [address] [phone] Your Updated Medication List  
  
   
This list is accurate as of: 11/10/17  1:44 PM.  Always use your most recent med list.  
  
  
  
  
 albuterol 2.5 mg /3 mL (0.083 %) nebulizer solution Commonly known as:  PROVENTIL VENTOLIN  
3 mL by Nebulization route every four (4) hours as needed for Wheezing. cholecalciferol 1,000 unit tablet Commonly known as:  VITAMIN D3 Take 2 Tabs by mouth daily. Indications: VITAMIN D DEFICIENCY  
  
 cyanocobalamin 1,000 mcg tablet Commonly known as:  VITAMIN B-12 TAKE 1 TABLET BY MOUTH DAILY FOR VITAMIN B12 DEFICIENCY  
  
 fluticasone 50 mcg/actuation nasal spray Commonly known as:  Cesar Sacks 2 Sprays by Both Nostrils route daily. furosemide 20 mg tablet Commonly known as:  LASIX Take 1 Tab by mouth daily. As needed  
  
 meclizine 25 mg chewable tablet Commonly known as:  TRAVEL SICKNESS (MECLIZINE) TAKE 1 TABLET BY MOUTH THREE TIMES DAILY AS NEEDED FOR UP TO 10 DAYS FOR DIZZINESS Nebulizer & Compressor machine Use as directed  
  
 omeprazole 40 mg capsule Commonly known as:  PRILOSEC Take 1 Cap by mouth daily. potassium chloride 10 mEq tablet Commonly known as:  KLOR-CON Take 1 Tab by mouth daily. If takes lasix  
  
 topiramate 100 mg tablet Commonly known as:  TOPAMAX Take 1 Tab by mouth daily. Indications: MOOD  
  
 traMADol 50 mg tablet Commonly known as:  ULTRAM  
Take 1 Tab by mouth every six (6) hours as needed for Pain. Max Daily Amount: 200 mg. ZOLOFT 50 mg tablet Generic drug:  sertraline Take  by mouth daily. Prescriptions Printed  Refills  
 traMADol (ULTRAM) 50 mg tablet 0  
 Sig: Take 1 Tab by mouth every six (6) hours as needed for Pain. Max Daily Amount: 200 mg. Class: Print Route: Oral  
  
Prescriptions Sent to Pharmacy Refills  
 cyanocobalamin (VITAMIN B-12) 1,000 mcg tablet 1 Sig: TAKE 1 TABLET BY MOUTH DAILY FOR VITAMIN B12 DEFICIENCY Class: Normal  
 Pharmacy: [address] [place] [address] Ph #: [phone]  
 furosemide (LASIX) 20 mg tablet 3 Sig: Take 1 Tab by mouth daily. As needed Class: Normal  
 Pharmacy: [address] [place] [address] Ph #: [phone] Route: Oral  
 potassium chloride (KLOR-CON) 10 mEq tablet 3 Sig: Take 1 Tab by mouth daily. If takes lasix Class: Normal  
 Pharmacy: [address] [place] [address] Ph #: [phone] Route: Oral  
 meclizine (TRAVEL SICKNESS, MECLIZINE,) 25 mg chewable tablet 3 Sig: TAKE 1 TABLET BY MOUTH THREE TIMES DAILY AS NEEDED FOR UP TO 10 DAYS FOR DIZZINESS Class: Normal  
 Pharmacy: [address] [place] [address] Ph #: [phone]  
 omeprazole (PRILOSEC) 40 mg capsule 3 Sig: Take 1 Cap by mouth daily. Class: Normal  
 Pharmacy: [address] [place] [address] Ph #: [phone] Route: Oral  
  
We Performed the Following AMB POC URINALYSIS DIP STICK AUTO W/O MICRO [56699 CPT(R)] AMB SUPPLY ORDER [[id number] Custom] Comments:  
 Right ulnar gutter hand wrist splint Follow-up Instructions Return in about 6 weeks (around 12/22/2017) for routine follow up. Introducing [facility]! [employer] introduces [employer] patient portal. Now you can access parts of your medical record, email your doctor's office, and request medication refills online. 1. In your internet browser, go to [email or web address]. [email or web address] 2. Click on the First Time User? Click Here link in the Sign In box.  You will see the New Member Sign Up page. 3. Enter your Starvine Access Code exactly as it appears below. You will not need to use this code after youve completed the sign-up process. If you do not sign up before the expiration date, you must request a new code. · Starvine Access Code: [email or web address] Expires: 12/11/2017 12:58 PM 
 
4. Enter the last four digits of your Social Security Number (xxxx) and Date of Birth (mm/dd/yyyy) as indicated and click Submit. You will be taken to the next sign-up page. 5. Create a Starvine ID. This will be your Starvine login ID and cannot be changed, so think of one that is secure and easy to remember. 6. Create a Starvine password. You can change your password at any time. 7. Enter your Password Reset Question and Answer. This can be used at a later time if you forget your password. 8. Enter your e-mail address. You will receive e-mail notification when new information is available in [address] 9. Click Sign Up. You can now view and download portions of your medical record. 10. Click the Download Summary menu link to download a portable copy of your medical information. If you have questions, please visit the Frequently Asked Questions section of the Starvine website. Remember, Starvine is NOT to be used for urgent needs. For medical emergencies, dial 911. Now available from your iPhone and Android! Please provide this summary of care documentation to your next provider. Your primary care clinician is listed as Halle Stewart. If you have any questions after today's visit, please call [phone].

## 2017-12-22 ENCOUNTER — OFFICE VISIT (OUTPATIENT)
Dept: INTERNAL MEDICINE CLINIC | Age: 57
End: 2017-12-22

## 2017-12-22 VITALS
WEIGHT: 183 LBS | HEIGHT: 57 IN | DIASTOLIC BLOOD PRESSURE: 76 MMHG | SYSTOLIC BLOOD PRESSURE: 133 MMHG | RESPIRATION RATE: 16 BRPM | BODY MASS INDEX: 39.48 KG/M2 | HEART RATE: 50 BPM | TEMPERATURE: 96 F | OXYGEN SATURATION: 98 %

## 2017-12-22 DIAGNOSIS — F31.9 BIPOLAR 1 DISORDER (HCC): ICD-10-CM

## 2017-12-22 DIAGNOSIS — R42 DIZZINESS: ICD-10-CM

## 2017-12-22 DIAGNOSIS — Z23 ENCOUNTER FOR IMMUNIZATION: ICD-10-CM

## 2017-12-22 DIAGNOSIS — H60.333 ACUTE SWIMMER'S EAR OF BOTH SIDES: Primary | ICD-10-CM

## 2017-12-22 RX ORDER — TRAZODONE HYDROCHLORIDE 50 MG/1
50 TABLET ORAL
COMMUNITY
End: 2022-02-18 | Stop reason: SDUPTHER

## 2017-12-22 RX ORDER — MECLIZINE HCL 25MG 25 MG/1
TABLET, CHEWABLE ORAL
Qty: 30 TAB | Refills: 3 | Status: SHIPPED | OUTPATIENT
Start: 2017-12-22 | End: 2018-04-09 | Stop reason: SDUPTHER

## 2017-12-22 RX ORDER — HYDROCORTISONE AND ACETIC ACID 20.75; 10.375 MG/ML; MG/ML
1 SOLUTION AURICULAR (OTIC) 3 TIMES DAILY
Qty: 1 BOTTLE | Refills: 0 | Status: SHIPPED | OUTPATIENT
Start: 2017-12-22 | End: 2017-12-29

## 2017-12-22 NOTE — PROGRESS NOTES
HISTORY OF PRESENT ILLNESS  Jelena Barrera is a 55 y.o. female. Ear Pain   The history is provided by the patient. This is a new problem. Episode onset: 3 days. The problem occurs hourly. The problem has not changed since onset. Pertinent negatives include no shortness of breath. Associated symptoms comments: Right ear worse left one hurts as well. Treatments tried: ear drops. The treatment provided no relief. Her psychiatrist put her on trazodone to help with sleep, recently her mood has not been so good. Not suicidal.  No water exposure no URI Sx or fever. Wants meclizine for her dizziness. Has the usu back pain issues. Past Surgical History:   Procedure Laterality Date    HX ABDOMINAL LAPAROSCOPY      HX CARPAL TUNNEL RELEASE Bilateral more than 10 years ago    HX  SECTION  3980,7526    HX COLONOSCOPY  2016    HX GASTRIC BYPASS  1997    x2    HX HYSTERECTOMY  1985    HX TONSIL AND ADENOIDECTOMY  more than 10 years ago       Allergies   Allergen Reactions    Tylenol [Acetaminophen] Anaphylaxis and Hives    Benadryl [Diphenhydramine Hcl] Nausea and Vomiting    Ibuprofen Nausea and Vomiting     Social History     Social History    Marital status:      Spouse name: N/A    Number of children: 2    Years of education: N/A     Occupational History    retired      Social History Main Topics    Smoking status: Never Smoker    Smokeless tobacco: Never Used    Alcohol use No    Drug use: No    Sexual activity: No     Other Topics Concern    Not on file     Social History Narrative     has brain damage, lives with him         Review of Systems   Constitutional: Negative for fever. Respiratory: Negative for cough and shortness of breath. Musculoskeletal: Positive for back pain. Negative for falls. Neurological: Positive for dizziness. Negative for focal weakness and loss of consciousness.        Physical Exam  Visit Vitals    /76 (BP 1 Location: Left arm, BP Patient Position: Sitting)    Pulse (!) 50    Temp 96 °F (35.6 °C) (Tympanic)    Resp 16    Ht 4' 9\" (1.448 m)    Wt 183 lb (83 kg)    SpO2 98%    BMI 39.6 kg/m2       WDWN NAD  TM clear, external pinna painful to palp, canal looks OK  throat wnl  Neck no adenopathy  Heart RRR no C/M/R  Lungs CTA  Abdo soft non tender  Ext No redness swelling or edema    ASSESSMENT and PLAN  Encounter Diagnoses   Name Primary?  Acute swimmer's ear of both sides Yes    Dizziness     Encounter for immunization     Bipolar 1 disorder ([facility] [address]      Orders Placed This Encounter    Influenza virus vaccine (QUADRIVALENT PRES FREE SYRINGE) IM (80199)    traZODone (DESYREL) 50 mg tablet    meclizine (TRAVEL SICKNESS, MECLIZINE,) 25 mg chewable tablet    hydrocortisone-acetic acid (VOSOL-HC) otic solution     offerred chiropractor referral, she'll think about it. Poss trigeminal neuralgia? OK meclizine for dizziness. Follow-up Disposition:  Return in about 3 months (around 3/22/2018) for routine follow up.   Or prn

## 2017-12-22 NOTE — PATIENT INSTRUCTIONS

## 2017-12-22 NOTE — MR AVS SNAPSHOT
Visit Information Date & Time Provider Department Dept. Phone Encounter #  
 12/22/2017  1:00 PM [place] [address] [phone] [id number] Follow-up Instructions Return in about 3 months (around 3/22/2018) for routine follow up. Upcoming Health Maintenance Date Due Influenza Age 5 to Adult 8/1/2017 MEDICARE YEARLY EXAM 5/10/2018 PAP AKA CERVICAL CYTOLOGY 12/3/2018 DTaP/Tdap/Td series (2 - Tdap) 6/23/2021 COLONOSCOPY 8/3/2026 Allergies as of 12/22/2017  Review Complete On: 11/10/2017 By: Yousif Drake LPN Severity Noted Reaction Type Reactions Tylenol [Acetaminophen] High 10/02/2015    Anaphylaxis, Hives Benadryl [Diphenhydramine Hcl] Medium 12/03/2015    Nausea and Vomiting Ibuprofen  10/12/2014    Nausea and Vomiting Current Immunizations  Reviewed on 10/2/2015 Name Date DTaP 6/23/2011 Hep B Vaccine 10/16/2016, 7/20/2016 Hep B Vaccine (Adult) 5/9/2017 Influenza Vaccine 10/13/2016, 9/9/2015, 9/30/2014, 9/13/2013, 3/28/2013, 11/14/2011, 1/13/2011, 1/13/2011, 12/17/2009, 12/17/2009, 1/8/2009, 1/8/2009, 11/8/2007, 11/8/2007 Influenza Vaccine (Quad) PF  Incomplete, 9/24/2014 Pneumococcal Polysaccharide (PPSV-23) 1/8/2009 Tdap 6/23/2011 Not reviewed this visit You Were Diagnosed With   
  
 Codes Comments Acute swimmer's ear of both sides    -  Primary ICD-10-CM: K22.237 ICD-9-CM: 380.12 Dizziness     ICD-10-CM: B91 ICD-9-CM: 780.4 Encounter for immunization     ICD-10-CM: R46 ICD-9-CM: V03.89 Bipolar 1 disorder (HCC)     ICD-10-CM: F31.9 ICD-9-CM: 296.7 Vitals BP Pulse Temp Resp Height(growth percentile) Weight(growth percentile) 133/76 (BP 1 Location: Left arm, BP Patient Position: Sitting) (!) 50 96 °F (35.6 °C) (Tympanic) 16 4' 9\" (1.448 m) 183 lb (83 kg) SpO2 BMI OB Status Smoking Status 98% 39.6 kg/m2 Hysterectomy Never Smoker BMI and BSA Data Body Mass Index Body Surface Area  
 39.6 kg/m 2 1.83 m 2 Preferred Pharmacy Pharmacy Name Phone 150 [facility], [address] [phone] Your Updated Medication List  
  
   
This list is accurate as of: 12/22/17  1:24 PM.  Always use your most recent med list.  
  
  
  
  
 albuterol 2.5 mg /3 mL (0.083 %) nebulizer solution Commonly known as:  PROVENTIL VENTOLIN  
3 mL by Nebulization route every four (4) hours as needed for Wheezing. cholecalciferol 1,000 unit tablet Commonly known as:  VITAMIN D3 Take 2 Tabs by mouth daily. Indications: VITAMIN D DEFICIENCY  
  
 cyanocobalamin 1,000 mcg tablet Commonly known as:  VITAMIN B-12 TAKE 1 TABLET BY MOUTH DAILY FOR VITAMIN B12 DEFICIENCY  
  
 fluticasone 50 mcg/actuation nasal spray Commonly known as:  Snyder Peel 2 Sprays by Both Nostrils route daily. furosemide 20 mg tablet Commonly known as:  LASIX Take 1 Tab by mouth daily. As needed  
  
 hydrocortisone-acetic acid otic solution Commonly known as:  VOSOL-HC Administer 1 Drop into each ear three (3) times daily for 7 days. meclizine 25 mg chewable tablet Commonly known as:  TRAVEL SICKNESS (MECLIZINE) TAKE 1 TABLET BY MOUTH THREE TIMES DAILY AS NEEDED FOR UP TO 10 DAYS FOR DIZZINESS Nebulizer & Compressor machine Use as directed  
  
 omeprazole 40 mg capsule Commonly known as:  PRILOSEC Take 1 Cap by mouth daily. potassium chloride 10 mEq tablet Commonly known as:  KLOR-CON Take 1 Tab by mouth daily. If takes lasix  
  
 topiramate 100 mg tablet Commonly known as:  TOPAMAX Take 1 Tab by mouth daily. Indications: MOOD  
  
 traZODone 50 mg tablet Commonly known as:  Dany Awkward Take  by mouth nightly. ZOLOFT 50 mg tablet Generic drug:  sertraline Take  by mouth daily. Prescriptions Sent to Pharmacy Refills meclizine (TRAVEL SICKNESS, MECLIZINE,) 25 mg chewable tablet 3 Sig: TAKE 1 TABLET BY MOUTH THREE TIMES DAILY AS NEEDED FOR UP TO 10 DAYS FOR DIZZINESS Class: Normal  
 Pharmacy: [address] [place] [address] Ph #: [phone]  
 hydrocortisone-acetic acid (VOSOL-HC) otic solution 0 Sig: Administer 1 Drop into each ear three (3) times daily for 7 days. Class: Normal  
 Pharmacy: [address] [place] [address], [address] [facility] [address] Ph #: [phone] Route: Both Ears We Performed the Following INFLUENZA VIRUS VAC QUAD,SPLIT,PRESV FREE SYRINGE IM [id number] CPT(R)] Follow-up Instructions Return in about 3 months (around 3/22/2018) for routine follow up. Introducing [facility]! Geri Leos introduces [employer] patient portal. Now you can access parts of your medical record, email your doctor's office, and request medication refills online. 1. In your internet browser, go to [email or web address] 2. Click on the First Time User? Click Here link in the Sign In box. You will see the New Member Sign Up page. 3. Enter your [employer] Access Code exactly as it appears below. You will not need to use this code after youve completed the sign-up process. If you do not sign up before the expiration date, you must request a new code. · [employer] Access Code: [email or web address] Expires: 3/22/2018 12:32 PM 
 
4. Enter the last four digits of your Social Security Number (xxxx) and Date of Birth (mm/dd/yyyy) as indicated and click Submit. You will be taken to the next sign-up page. 5. Create a lensgent ID. This will be your [employer] login ID and cannot be changed, so think of one that is secure and easy to remember. 6. Create a [employer] password. You can change your password at any time. 7. Enter your Password Reset Question and Answer. This can be used at a later time if you forget your password. 8. Enter your e-mail address. You will receive e-mail notification when new information is available in [address] 9. Click Sign Up. You can now view and download portions of your medical record. 10. Click the Download Summary menu link to download a portable copy of your medical information. If you have questions, please visit the Frequently Asked Questions section of the [employer] website. Remember, [employer] is NOT to be used for urgent needs. For medical emergencies, dial 911. Now available from your iPhone and Android! Please provide this summary of care documentation to your next provider. Your primary care clinician is listed as Nitish Tirado. If you have any questions after today's visit, please call [phone].

## 2018-01-17 ENCOUNTER — OFFICE VISIT (OUTPATIENT)
Dept: INTERNAL MEDICINE CLINIC | Age: 58
End: 2018-01-17

## 2018-01-17 VITALS
BODY MASS INDEX: 39.48 KG/M2 | HEIGHT: 57 IN | TEMPERATURE: 98 F | SYSTOLIC BLOOD PRESSURE: 121 MMHG | WEIGHT: 183 LBS | RESPIRATION RATE: 16 BRPM | DIASTOLIC BLOOD PRESSURE: 66 MMHG | OXYGEN SATURATION: 98 % | HEART RATE: 54 BPM

## 2018-01-17 DIAGNOSIS — K21.9 GASTROESOPHAGEAL REFLUX DISEASE WITHOUT ESOPHAGITIS: ICD-10-CM

## 2018-01-17 DIAGNOSIS — Z12.31 SCREENING MAMMOGRAM, ENCOUNTER FOR: ICD-10-CM

## 2018-01-17 DIAGNOSIS — M54.5 BILATERAL LOW BACK PAIN, UNSPECIFIED CHRONICITY, WITH SCIATICA PRESENCE UNSPECIFIED: Primary | ICD-10-CM

## 2018-01-17 RX ORDER — CYCLOBENZAPRINE HCL 5 MG
5 TABLET ORAL
Qty: 60 TAB | Refills: 0 | Status: SHIPPED | OUTPATIENT
Start: 2018-01-17 | End: 2018-08-13 | Stop reason: ALTCHOICE

## 2018-01-17 NOTE — PROGRESS NOTES
Chief Complaint   Patient presents with   [address]ley Lozano [address]     fell two weeks ago     I have reviewed the patient's medical history in detail and updated the computerized patient record. Health Maintenance reviewed. 1. Have you been to the ER, urgent care clinic since your last visit? Hospitalized since your last visit? no    2. Have you seen or consulted any other health care providers outside of the [address] [place] [address] [place] since your last visit? Include any pap smears or colon screening. No    Encouraged pt to discuss pt's wishes with spouse/partner/family and bring them in the next appt to follow thru with the Advanced Directive    Fall Risk Assessment, last 12 mths 1/17/2018   Able to walk? Yes   Fall in past 12 months? Yes   Fall with injury? Yes   Number of falls in past 12 months 4   Fall Risk Score 5       PHQ over the last two weeks 1/17/2018   Little interest or pleasure in doing things Several days   Feeling down, depressed or hopeless Several days   Total Score PHQ 2 2       Abuse Screening Questionnaire 1/17/2018   Do you ever feel afraid of your partner? N   Are you in a relationship with someone who physically or mentally threatens you? N   Is it safe for you to go home?  Y       ADL Assessment 1/17/2018   Feeding yourself No Help Needed   Getting from bed to chair No Help Needed   Getting dressed No Help Needed   Bathing or showering No Help Needed   Walk across the room (includes cane/walker) No Help Needed   Using the telphone No Help Needed   Taking your medications No Help Needed   Preparing meals No Help Needed   Managing money (expenses/bills) No Help Needed   Moderately strenuous housework (laundry) No Help Needed   Shopping for personal items (toiletries/medicines) No Help Needed   Shopping for groceries No Help Needed   Driving No Help Needed   Climbing a flight of stairs No Help Needed   Getting to places beyond walking distances No Help Needed

## 2018-01-17 NOTE — PROGRESS NOTES
HISTORY OF PRESENT Santhosh Yoon is a 65 y.o. female. LOW BACK PAIN   The history is provided by the patient. This is a new problem. Episode onset: 4 months. The problem occurs daily. The problem has been gradually worsening. Pertinent negatives include no chest pain, no abdominal pain and no shortness of breath. Exacerbated by: slipped and fall last week. Complains of some right breast pain. No trauma no nodules palpated. She wants a mammogram.  Heartburn doing well, no bleeding or tarry black stools. Patient Active Problem List   Diagnosis Code    Bilateral leg edema R60.0    History of gastric bypass Z98.890    Migraine aura without headache G43. 109    [facility] episode [facility] F31.30    Gastroesophageal reflux disease without esophagitis K21.9     Social History     Social History    Marital status:      Spouse name: N/A    Number of children: 2    Years of education: N/A     Occupational History    retired      Social History Main Topics    Smoking status: Never Smoker    Smokeless tobacco: Never Used    Alcohol use No    Drug use: No    Sexual activity: No     Other Topics Concern    Not on file     Social History Narrative     has brain damage, lives with him         Review of Systems   Constitutional: Negative for fever and weight loss. Respiratory: Negative for shortness of breath. Cardiovascular: Negative for chest pain. Gastrointestinal: Negative for abdominal pain. Genitourinary:        No incontinence       Physical Exam  Visit Vitals    /66 (BP 1 Location: Left arm, BP Patient Position: Sitting)    Pulse (!) 54    Temp 98 °F (36.7 °C) (Oral)    Resp 16    Ht 4' 9\" (1.448 m)    Wt 183 lb (83 kg)    SpO2 98%    BMI 39.6 kg/m2     WD WN female NAD  Heart RRR without murmers clicks or rubs  Lungs CTA  Abdo soft nontender  Ext no edema  Back was examined, grossly normal, no lesions or rash.   Saddle area, sensation present. Patellar reflexes 2+ equal bilaterally. Lower leg strength 5 out of 5 bilateral.  Straight leg raises negative      ASSESSMENT and PLAN  Encounter Diagnoses   Name Primary?  Screening mammogram, encounter for Yes    Bilateral low back pain, unspecified chronicity, with sciatica presence unspecified     Gastroesophageal reflux disease without esophagitis      Orders Placed This Encounter    DONTRELL MAMMO BI SCREENING INCL CAD    XR SPINE LUMB 2 OR 3 V    cyclobenzaprine (FLEXERIL) 5 mg tablet     We discussed a screening exam mammogram and the  the pros and cons of having the test done. The patient made a decision to do the test: yes, ordered. Discussed the nature of back pain. Discussed how this is in general a 'red flag' diagnosis and the general limited and temporary nature of this problem as well as its re-occurrence. Discussed further work-up and treatment options. Discused narcotics and back pain: yes and not indicated. Current Outpatient Prescriptions   Medication Sig Dispense Refill    cyclobenzaprine (FLEXERIL) 5 mg tablet Take 1 Tab by mouth three (3) times daily as needed for Muscle Spasm(s). 60 Tab 0    traZODone (DESYREL) 50 mg tablet Take  by mouth nightly.  meclizine (TRAVEL SICKNESS, MECLIZINE,) 25 mg chewable tablet TAKE 1 TABLET BY MOUTH THREE TIMES DAILY AS NEEDED FOR UP TO 10 DAYS FOR DIZZINESS 30 Tab 3    cyanocobalamin (VITAMIN B-12) 1,000 mcg tablet TAKE 1 TABLET BY MOUTH DAILY FOR VITAMIN B12 DEFICIENCY 90 Tab 1    furosemide (LASIX) 20 mg tablet Take 1 Tab by mouth daily. As needed 90 Tab 3    potassium chloride (KLOR-CON) 10 mEq tablet Take 1 Tab by mouth daily. If takes lasix 90 Tab 3    omeprazole (PRILOSEC) 40 mg capsule Take 1 Cap by mouth daily.  90 Cap 3    Nebulizer & Compressor machine Use as directed 1 Each 0    albuterol (PROVENTIL VENTOLIN) 2.5 mg /3 mL (0.083 %) nebulizer solution 3 mL by Nebulization route every four (4) hours as needed for Wheezing. 100 Each 1    cholecalciferol (VITAMIN D3) 1,000 unit tablet Take 2 Tabs by mouth daily. Indications: VITAMIN D DEFICIENCY 180 Tab 3    topiramate (TOPAMAX) 100 mg tablet Take 1 Tab by mouth daily. Indications: MOOD 30 Tab 11    fluticasone (FLONASE) 50 mcg/actuation nasal spray 2 Sprays by Both Nostrils route daily. 1 Bottle 5    sertraline (ZOLOFT) 50 mg tablet Take  by mouth daily. GERD stable, depression stable    Follow-up Disposition:  Return in about 2 months (around 3/17/2018) for routine follow up. Chronic Conditions Addressed Today     1.  Gastroesophageal reflux disease without esophagitis      Acute Diagnoses Addressed Today     Bilateral low back pain, unspecified chronicity, with sciatica presence unspecified    -  Primary        Relevant Orders        XR SPINE LUMB 2 OR 3 V    Screening mammogram, encounter for            Relevant Orders        DONTRELL MAMMO BI SCREENING INCL CAD

## 2018-01-17 NOTE — MR AVS SNAPSHOT
[address] [place] [address]. [address] [facility] 
[phone] Patient: Leena Shaver MRN: [id number] :1960 Visit Information Date & Time Provider Department Dept. Phone Encounter #  
 2018  1:30 PM Gerson Carrillo MD [facility] [phone] [id number] Follow-up Instructions Return in about 2 months (around 3/17/2018) for routine follow up. Upcoming Health Maintenance Date Due  
 MEDICARE YEARLY EXAM 5/10/2018 PAP AKA CERVICAL CYTOLOGY 12/3/2018 BREAST CANCER SCRN MAMMOGRAM 3/8/2019 DTaP/Tdap/Td series (2 - Tdap) 2021 COLONOSCOPY 8/3/2026 Allergies as of 2018  Review Complete On: 2018 By: Gerson Carrillo MD  
  
 Severity Noted Reaction Type Reactions Tylenol [Acetaminophen] High 10/02/2015    Anaphylaxis, Hives Benadryl [Diphenhydramine Hcl] Medium 2015    Nausea and Vomiting Ibuprofen  10/12/2014    Nausea and Vomiting Tramadol  2018    Anxiety Current Immunizations  Reviewed on 10/2/2015 Name Date DTaP 2011 Hep B Vaccine 10/16/2016, 2016 Hep B Vaccine (Adult) 2017 Influenza Vaccine 10/13/2016, 2015, 2014, 2013, 3/28/2013, 2011, 2011, 2011, 2009, 2009, 2009, 2009, 2007, 2007 Influenza Vaccine (Quad) PF 2017, 2014 Pneumococcal Polysaccharide (PPSV-23) 2009 Tdap 2011 Not reviewed this visit You Were Diagnosed With   
  
 Codes Comments Screening mammogram, encounter for    -  Primary ICD-10-CM: Z12.31 
ICD-9-CM: V76.12 Bilateral low back pain, unspecified chronicity, with sciatica presence unspecified     ICD-10-CM: M54.5 ICD-9-CM: 724.2 Gastroesophageal reflux disease without esophagitis     ICD-10-CM: K21.9 ICD-9-CM: 530.81 Vitals BP Pulse Temp Resp Height(growth percentile) Weight(growth percentile) 121/66 (BP 1 Location: Left arm, BP Patient Position: Sitting) (!) 54 98 °F (36.7 °C) (Oral) 16 4' 9\" (1.448 m) 183 lb (83 kg) SpO2 BMI OB Status Smoking Status 98% 39.6 kg/m2 Hysterectomy Never Smoker BMI and BSA Data Body Mass Index Body Surface Area  
 39.6 kg/m 2 1.83 m 2 Preferred Pharmacy Pharmacy Name Phone 150 [facility], [address] [facility] [address] [facility] [phone] Your Updated Medication List  
  
   
This list is accurate as of: 1/17/18  2:42 PM.  Always use your most recent med list.  
  
  
  
  
 albuterol 2.5 mg /3 mL (0.083 %) nebulizer solution Commonly known as:  PROVENTIL VENTOLIN  
3 mL by Nebulization route every four (4) hours as needed for Wheezing. cholecalciferol 1,000 unit tablet Commonly known as:  VITAMIN D3 Take 2 Tabs by mouth daily. Indications: VITAMIN D DEFICIENCY  
  
 cyanocobalamin 1,000 mcg tablet Commonly known as:  VITAMIN B-12 TAKE 1 TABLET BY MOUTH DAILY FOR VITAMIN B12 DEFICIENCY  
  
 cyclobenzaprine 5 mg tablet Commonly known as:  FLEXERIL Take 1 Tab by mouth three (3) times daily as needed for Muscle Spasm(s). fluticasone 50 mcg/actuation nasal spray Commonly known as:  Marsa Albe 2 Sprays by Both Nostrils route daily. furosemide 20 mg tablet Commonly known as:  LASIX Take 1 Tab by mouth daily. As needed  
  
 meclizine 25 mg chewable tablet Commonly known as:  TRAVEL SICKNESS (MECLIZINE) TAKE 1 TABLET BY MOUTH THREE TIMES DAILY AS NEEDED FOR UP TO 10 DAYS FOR DIZZINESS Nebulizer & Compressor machine Use as directed  
  
 omeprazole 40 mg capsule Commonly known as:  PRILOSEC Take 1 Cap by mouth daily. potassium chloride 10 mEq tablet Commonly known as:  KLOR-CON Take 1 Tab by mouth daily. If takes lasix  
  
 topiramate 100 mg tablet Commonly known as:  TOPAMAX Take 1 Tab by mouth daily. Indications: MOOD  
  
 traZODone 50 mg tablet Commonly known as:  Arun [place] Take  by mouth nightly. ZOLOFT 50 mg tablet Generic drug:  sertraline Take  by mouth daily. Prescriptions Sent to Pharmacy Refills  
 cyclobenzaprine (FLEXERIL) 5 mg tablet 0 Sig: Take 1 Tab by mouth three (3) times daily as needed for Muscle Spasm(s). Class: Normal  
 Pharmacy: 150 [facility], [address] [place] [address] #: [phone] Route: Oral  
  
Follow-up Instructions Return in about 2 months (around 3/17/2018) for routine follow up. To-Do List   
 01/17/2018 Imaging:  DONTRELL MAMMO BI SCREENING INCL CAD   
  
 01/17/2018 Imaging:  XR SPINE LUMB 2 OR 3 V Introducing [facility]! [facility] introduces [employer] patient portal. Now you can access parts of your medical record, email your doctor's office, and request medication refills online. 1. In your internet browser, go to [email or web address] 2. Click on the First Time User? Click Here link in the Sign In box. You will see the New Member Sign Up page. 3. Enter your [employer] Access Code exactly as it appears below. You will not need to use this code after youve completed the sign-up process. If you do not sign up before the expiration date, you must request a new code. · [employer] Access Code: [email or web address] Expires: 3/22/2018 12:32 PM 
 
4. Enter the last four digits of your Social Security Number (xxxx) and Date of Birth (mm/dd/yyyy) as indicated and click Submit. You will be taken to the next sign-up page. 5. Create a [employer] ID. This will be your [employer] login ID and cannot be changed, so think of one that is secure and easy to remember. 6. Create a [employer] password. You can change your password at any time. 7. Enter your Password Reset Question and Answer. This can be used at a later time if you forget your password. 8. Enter your e-mail address. You will receive e-mail notification when new information is available in [address] 9. Click Sign Up. You can now view and download portions of your medical record. 10. Click the Download Summary menu link to download a portable copy of your medical information. If you have questions, please visit the Frequently Asked Questions section of the [employer] website. Remember, [employer] is NOT to be used for urgent needs. For medical emergencies, dial 911. Now available from your iPhone and Android! Please provide this summary of care documentation to your next provider. Your primary care clinician is listed as Sharyle Fujisawa. If you have any questions after today's visit, please call [phone].

## 2018-02-06 ENCOUNTER — OFFICE VISIT (OUTPATIENT)
Dept: INTERNAL MEDICINE CLINIC | Age: 58
End: 2018-02-06

## 2018-02-06 VITALS
TEMPERATURE: 98.9 F | WEIGHT: 185 LBS | OXYGEN SATURATION: 99 % | RESPIRATION RATE: 20 BRPM | HEART RATE: 67 BPM | DIASTOLIC BLOOD PRESSURE: 85 MMHG | SYSTOLIC BLOOD PRESSURE: 125 MMHG | BODY MASS INDEX: 39.91 KG/M2 | HEIGHT: 57 IN

## 2018-02-06 DIAGNOSIS — M54.5 BILATERAL LOW BACK PAIN, UNSPECIFIED CHRONICITY, WITH SCIATICA PRESENCE UNSPECIFIED: ICD-10-CM

## 2018-02-06 DIAGNOSIS — J45.41 MODERATE PERSISTENT ASTHMA WITH ACUTE EXACERBATION: ICD-10-CM

## 2018-02-06 DIAGNOSIS — J11.1 INFLUENZA: Primary | ICD-10-CM

## 2018-02-06 PROBLEM — E66.01 OBESITY, MORBID (HCC): Status: ACTIVE | Noted: 2018-02-06

## 2018-02-06 RX ORDER — PREDNISONE 20 MG/1
40 TABLET ORAL
Qty: 10 TAB | Refills: 0 | Status: SHIPPED | OUTPATIENT
Start: 2018-02-06 | End: 2018-02-11

## 2018-02-06 RX ORDER — CODEINE PHOSPHATE AND GUAIFENESIN 10; 100 MG/5ML; MG/5ML
5 SOLUTION ORAL
Qty: 120 ML | Refills: 0 | Status: SHIPPED | OUTPATIENT
Start: 2018-02-06 | End: 2018-02-11

## 2018-02-06 NOTE — MR AVS SNAPSHOT
[address] 3673 [facility] 
[phone] Patient: Ambar Fischer MRN: [id number] :1960 Visit Information Date & Time Provider Department Dept. Phone Encounter #  
 2018  1:45 PM Yolanda Kelly MD [facility] [phone] Follow-up Instructions Return in about 3 days (around 2018). Your Appointments 2018 11:15 AM  
[facility] [address] [place] [address]) Appt Note: f/u on test $0 cp $0 pb [id number] [facility]. [address] [place] [id number]  
[phone]  
  
   
 [address] [place] [address] [address] Upcoming Health Maintenance Date Due  
 MEDICARE YEARLY EXAM 5/10/2018 PAP AKA CERVICAL CYTOLOGY 12/3/2018 BREAST CANCER SCRN MAMMOGRAM 3/8/2019 DTaP/Tdap/Td series (2 - Tdap) 2021 COLONOSCOPY 8/3/2026 Allergies as of 2018  Review Complete On: 2018 By: Yolanda Kelly MD  
  
 Severity Noted Reaction Type Reactions Tylenol [Acetaminophen] High 10/02/2015    Anaphylaxis, Hives Benadryl [Diphenhydramine Hcl] Medium 2015    Nausea and Vomiting Ibuprofen  10/12/2014    Nausea and Vomiting Tramadol  2018    Anxiety Current Immunizations  Reviewed on 10/2/2015 Name Date DTaP 2011 Hep B Vaccine 10/16/2016, 2016 Hep B Vaccine (Adult) 2017 Influenza Vaccine 10/13/2016, 2015, 2014, 2013, 3/28/2013, 2011, 2011, 2011, 2009, 2009, 2009, 2009, 2007, 2007 Influenza Vaccine (Quad) PF 2017, 2014 Pneumococcal Polysaccharide (PPSV-23) 2009 Tdap 2011 Not reviewed this visit You Were Diagnosed With   
  
 Codes Comments Influenza    -  Primary ICD-10-CM: J11.1 ICD-9-CM: 060.4 Moderate persistent asthma with acute exacerbation     ICD-10-CM: J45.41 
ICD-9-CM: 493.92 Bilateral low back pain, unspecified chronicity, with sciatica presence unspecified     ICD-10-CM: M54.5 ICD-9-CM: 724.2 Vitals BP Pulse Temp Resp Height(growth percentile) Weight(growth percentile) 125/85 (BP 1 Location: Left arm, BP Patient Position: Sitting) 67 98.9 °F (37.2 °C) (Oral) 20 4' 9\" (1.448 m) 185 lb (83.9 kg) SpO2 BMI OB Status Smoking Status 99% 40.03 kg/m2 Hysterectomy Never Smoker Vitals History BMI and BSA Data Body Mass Index Body Surface Area 40.03 kg/m 2 1.84 m 2 Preferred Pharmacy Pharmacy Name Phone 150 [facility], [address] [place] [address] Road [phone] Your Updated Medication List  
  
   
This list is accurate as of: 2/6/18  2:44 PM.  Always use your most recent med list.  
  
  
  
  
 albuterol 2.5 mg /3 mL (0.083 %) nebulizer solution Commonly known as:  PROVENTIL VENTOLIN  
3 mL by Nebulization route every four (4) hours as needed for Wheezing. cholecalciferol 1,000 unit tablet Commonly known as:  VITAMIN D3 Take 2 Tabs by mouth daily. Indications: VITAMIN D DEFICIENCY  
  
 cyanocobalamin 1,000 mcg tablet Commonly known as:  VITAMIN B-12 TAKE 1 TABLET BY MOUTH DAILY FOR VITAMIN B12 DEFICIENCY  
  
 cyclobenzaprine 5 mg tablet Commonly known as:  FLEXERIL Take 1 Tab by mouth three (3) times daily as needed for Muscle Spasm(s). fluticasone 50 mcg/actuation nasal spray Commonly known as:  Marlaine Romance 2 Sprays by Both Nostrils route daily. furosemide 20 mg tablet Commonly known as:  LASIX Take 1 Tab by mouth daily. As needed  
  
 guaiFENesin-codeine 100-10 mg/5 mL solution Commonly known as:  ROBITUSSIN AC Take 5 mL by mouth three (3) times daily as needed for Cough for up to 5 days. Max Daily Amount: 15 mL. meclizine 25 mg chewable tablet Commonly known as:  TRAVEL SICKNESS (MECLIZINE) TAKE 1 TABLET BY MOUTH THREE TIMES DAILY AS NEEDED FOR UP TO 10 DAYS FOR DIZZINESS Nebulizer & Compressor machine Use as directed  
  
 omeprazole 40 mg capsule Commonly known as:  PRILOSEC Take 1 Cap by mouth daily. potassium chloride 10 mEq tablet Commonly known as:  KLOR-CON Take 1 Tab by mouth daily. If takes lasix  
  
 predniSONE 20 mg tablet Commonly known as:  Amanda Delaney Take 2 Tabs by mouth daily (with breakfast) for 5 days. topiramate 100 mg tablet Commonly known as:  TOPAMAX Take 1 Tab by mouth daily. Indications: MOOD  
  
 traZODone 50 mg tablet Commonly known as:  Curly Bristle Take  by mouth nightly. ZOLOFT 50 mg tablet Generic drug:  sertraline Take  by mouth daily. Prescriptions Printed Refills  
 guaiFENesin-codeine (ROBITUSSIN AC) 100-10 mg/5 mL solution 0 Sig: Take 5 mL by mouth three (3) times daily as needed for Cough for up to 5 days. Max Daily Amount: 15 mL. Class: Print Route: Oral  
  
Prescriptions Sent to Pharmacy Refills  
 predniSONE (DELTASONE) 20 mg tablet 0 Sig: Take 2 Tabs by mouth daily (with breakfast) for 5 days. Class: Normal  
 Pharmacy: [address] [place] [address], [address] [place] [address] #: [phone] Route: Oral  
  
Follow-up Instructions Return in about 3 days (around 2/9/2018). To-Do List   
 03/14/2018 12:30 PM  
  Appointment with [address] [place] [address] at [facility] ([phone]) EXAM INSTRUCTIONS Do not wear deodorant, lotion, or powders to your appointment. GENERAL INSTRUCTIONS - Bring a list of all medications you are currently taking, including over the counter medications. - Only patients will be allowed in the exam room. This includes children. - Children under the age of 15 may not be left unattended.  - Nursing Home Facility patients must have an armband and be accompanied by a caregiver or family member. - If you have a hand-written prescription for this exam, you must bring it with you on the day of your exam. - Bring all relevant prior images from any facility outside of [facility] with you on the day of your exam.  Failure to provide images may delay reading by Radiologist.  If you have questions or need to reschedule or cancel your appointment, call [phone]. Patient Instructions Honey or agave nectar is known to help a cough, 1 teaspoon every 4 hours as needed for cough. Medicines like Mucinex Theraflu or Coricidin may help. Cold symptoms get better on there own without antibiotics. Over the counter cold medications should not be used for children. Acetaminophen (Tylenol) can help with the pain. You can take 2 every 8 hours or up to 6 extra strength (500mg)  Tylenol per day. Aleve or Advil can also be tried. Introducing \[facility]\"" & HEALTH SERVICES! [facility] introduces [employer] patient portal. Now you can access parts of your medical record, email your doctor's office, and request medication refills online. 1. In your internet browser, go to [email or web address] 2. Click on the First Time User? Click Here link in the Sign In box. You will see the New Member Sign Up page. 3. Enter your [employer] Access Code exactly as it appears below. You will not need to use this code after youve completed the sign-up process. If you do not sign up before the expiration date, you must request a new code. · [employer] Access Code: [email or web address] Expires: 3/22/2018 12:32 PM 
 
4. Enter the last four digits of your Social Security Number (xxxx) and Date of Birth (mm/dd/yyyy) as indicated and click Submit. You will be taken to the next sign-up page. 5. Create a [employer] ID. This will be your [employer] login ID and cannot be changed, so think of one that is secure and easy to remember. 6. Create a V[employer]Angel password. You can change your password at any time. 7. Enter your Password Reset Question and Answer. This can be used at a later time if you forget your password. 8. Enter your e-mail address. You will receive e-mail notification when new information is available in [address] 9. Click Sign Up. You can now view and download portions of your medical record. 10. Click the Download Summary menu link to download a portable copy of your medical information. If you have questions, please visit the Frequently Asked Questions section of the [employer] website. Remember, [employer] is NOT to be used for urgent needs. For medical emergencies, dial 911. Now available from your iPhone and Android! Please provide this summary of care documentation to your next provider. Your primary care clinician is listed as Dorota Coleman. If you have any questions after today's visit, please call [phone].

## 2018-02-06 NOTE — PROGRESS NOTES
Subjective:   Verona Herbert is a 65 y.o. female who complains of congestion, sore throat, cough described as dry, myalgias, headache, bilateral sinus pain, fever and chills for 3 days, gradually worsening since that time. She denies a history of chest pain. Seeing surgeon for poss breast reduction they did a taping but she broke out. Evaluation to date: none. Treatment to date: none. Patient does not smoke cigarettes. Relevant PMH: Asthma. Allergies   Allergen Reactions    Tylenol [Acetaminophen] Anaphylaxis and Hives    Benadryl [Diphenhydramine Hcl] Nausea and Vomiting    Ibuprofen Nausea and Vomiting    Tramadol Anxiety         Patient Active Problem List    Diagnosis Date Noted    Obesity, morbid ([facility] 02/06/2018    Gastroesophageal reflux disease without esophagitis 08/24/2016    Bipolar disorder with current episode depressed ([facility] 02/01/2016    Migraine aura without headache 10/24/2014    Bilateral leg edema 10/12/2014    History of gastric bypass 10/12/2014     Current Outpatient Prescriptions   Medication Sig Dispense Refill    cyclobenzaprine (FLEXERIL) 5 mg tablet Take 1 Tab by mouth three (3) times daily as needed for Muscle Spasm(s). 60 Tab 0    traZODone (DESYREL) 50 mg tablet Take  by mouth nightly.  meclizine (TRAVEL SICKNESS, MECLIZINE,) 25 mg chewable tablet TAKE 1 TABLET BY MOUTH THREE TIMES DAILY AS NEEDED FOR UP TO 10 DAYS FOR DIZZINESS 30 Tab 3    cyanocobalamin (VITAMIN B-12) 1,000 mcg tablet TAKE 1 TABLET BY MOUTH DAILY FOR VITAMIN B12 DEFICIENCY 90 Tab 1    furosemide (LASIX) 20 mg tablet Take 1 Tab by mouth daily. As needed 90 Tab 3    potassium chloride (KLOR-CON) 10 mEq tablet Take 1 Tab by mouth daily. If takes lasix 90 Tab 3    omeprazole (PRILOSEC) 40 mg capsule Take 1 Cap by mouth daily.  90 Cap 3    Nebulizer & Compressor machine Use as directed 1 Each 0    albuterol (PROVENTIL VENTOLIN) 2.5 mg /3 mL (0.083 %) nebulizer solution 3 mL by Nebulization route every four (4) hours as needed for Wheezing. 100 Each 1    cholecalciferol (VITAMIN D3) 1,000 unit tablet Take 2 Tabs by mouth daily. Indications: VITAMIN D DEFICIENCY 180 Tab 3    topiramate (TOPAMAX) 100 mg tablet Take 1 Tab by mouth daily. Indications: MOOD 30 Tab 11    fluticasone (FLONASE) 50 mcg/actuation nasal spray 2 Sprays by Both Nostrils route daily. 1 Bottle 5    sertraline (ZOLOFT) 50 mg tablet Take  by mouth daily. Allergies   Allergen Reactions    Tylenol [Acetaminophen] Anaphylaxis and Hives    Benadryl [Diphenhydramine Hcl] Nausea and Vomiting    Ibuprofen Nausea and Vomiting    Tramadol Anxiety        Review of Systems  Pertinent items are noted in HPI. Objective:     Visit Vitals    /85 (BP 1 Location: Left arm, BP Patient Position: Sitting)    Pulse 67    Temp 98.9 °F (37.2 °C) (Oral)    Resp 20    Ht 4' 9\" (1.448 m)    Wt 185 lb (83.9 kg)    SpO2 99%    BMI 40.03 kg/m2     General:  fatigued, cooperative, mild distress, moaning   Eyes: negative   Ears: normal TM's and external ear canals AU   Sinuses: Normal paranasal sinuses without tenderness   Mouth:  Lips, mucosa, and tongue normal. Teeth and gums normal   Neck: supple, symmetrical, trachea midline and no adenopathy. Heart: S1 and S2 normal, no murmurs noted. Lungs: bilaterally, wheezes R anterior, L anterior   Abdomen: soft, non-tender. Bowel sounds normal. No masses,  no organomegaly      Assessment/Plan:   influenza and asthma  Suggested symptomatic OTC remedies. RTC in 3 days or PRN. Discussed diagnosis and treatment of viral URIs. Discussed the importance of avoiding unnecessary antibiotic therapy. Encounter Diagnoses   Name Primary?     Influenza Yes    Moderate persistent asthma with acute exacerbation      Orders Placed This Encounter    guaiFENesin-codeine (ROBITUSSIN AC) 100-10 mg/5 mL solution     Sig: Take 5 mL by mouth three (3) times daily as needed for Cough for up to 5 days. Max Daily Amount: 15 mL. Dispense:  120 mL     Refill:  0    predniSONE (DELTASONE) 20 mg tablet     Sig: Take 2 Tabs by mouth daily (with breakfast) for 5 days. Dispense:  10 Tab     Refill:  0       See patient instructions, went over them personally with the patient. Emphasized compliance. Questions answered. Patient states that they understand the plan of action and will call if there are any issues or misunderstandings. Follow-up Disposition:  Return in about 3 days (around 2/9/2018).

## 2018-02-06 NOTE — PROGRESS NOTES
Chief Complaint   Patient presents with    Cold Symptoms     nonprod cough, fever, chills, throat sore, nose running and stuffy, ear pain, eyes watering,      I have reviewed the patient's medical history in detail and updated the computerized patient record. Health Maintenance reviewed. 1. Have you been to the ER, urgent care clinic since your last visit? Hospitalized since your last visit?no    2. Have you seen or consulted any other health care providers outside of the [address] [place] [address] [facility] since your last visit? Include any pap smears or colon screening. No    Encouraged pt to discuss pt's wishes with spouse/partner/family and bring them in the next appt to follow thru with the Advanced Directive    Fall Risk Assessment, last 12 mths 1/17/2018   Able to walk? Yes   Fall in past 12 months? Yes   Fall with injury? Yes   Number of falls in past 12 months 4   Fall Risk Score 5       PHQ over the last two weeks 1/17/2018   Little interest or pleasure in doing things Several days   Feeling down, depressed or hopeless Several days   Total Score PHQ 2 2       Abuse Screening Questionnaire 1/17/2018   Do you ever feel afraid of your partner? N   Are you in a relationship with someone who physically or mentally threatens you? N   Is it safe for you to go home?  Y       ADL Assessment 1/17/2018   Feeding yourself No Help Needed   Getting from bed to chair No Help Needed   Getting dressed No Help Needed   Bathing or showering No Help Needed   Walk across the room (includes cane/walker) No Help Needed   Using the telphone No Help Needed   Taking your medications No Help Needed   Preparing meals No Help Needed   Managing money (expenses/bills) No Help Needed   Moderately strenuous housework (laundry) No Help Needed   Shopping for personal items (toiletries/medicines) No Help Needed   Shopping for groceries No Help Needed   Driving No Help Needed   Climbing a flight of stairs No Help Needed   Getting to places beyond walking distances No Help Needed

## 2018-02-09 ENCOUNTER — OFFICE VISIT (OUTPATIENT)
Dept: INTERNAL MEDICINE CLINIC | Age: 58
End: 2018-02-09

## 2018-02-09 VITALS
SYSTOLIC BLOOD PRESSURE: 132 MMHG | OXYGEN SATURATION: 98 % | DIASTOLIC BLOOD PRESSURE: 88 MMHG | WEIGHT: 188 LBS | RESPIRATION RATE: 24 BRPM | TEMPERATURE: 97.9 F | BODY MASS INDEX: 40.56 KG/M2 | HEIGHT: 57 IN | HEART RATE: 60 BPM

## 2018-02-09 DIAGNOSIS — J45.21 EXACERBATION OF INTERMITTENT ASTHMA, UNSPECIFIED ASTHMA SEVERITY: ICD-10-CM

## 2018-02-09 DIAGNOSIS — J09.X2 INFLUENZA A (H5N1): Primary | ICD-10-CM

## 2018-02-09 NOTE — PROGRESS NOTES
Chief Complaint   Patient presents with    Wheezing     nonprod cough, wheezing, SOB, nose stuffy, R/ear pain     I have reviewed the patient's medical history in detail and updated the computerized patient record. Health Maintenance reviewed. 1. Have you been to the ER, urgent care clinic since your last visit? Hospitalized since your last visit?no    2. Have you seen or consulted any other health care providers outside of the [address] [place] [address] [facility] since your last visit? Include any pap smears or colon screening. No    Encouraged pt to discuss pt's wishes with spouse/partner/family and bring them in the next appt to follow thru with the Advanced Directive    Fall Risk Assessment, last 12 mths 1/17/2018   Able to walk? Yes   Fall in past 12 months? Yes   Fall with injury? Yes   Number of falls in past 12 months 4   Fall Risk Score 5       PHQ over the last two weeks 1/17/2018   Little interest or pleasure in doing things Several days   Feeling down, depressed or hopeless Several days   Total Score PHQ 2 2       Abuse Screening Questionnaire 1/17/2018   Do you ever feel afraid of your partner? N   Are you in a relationship with someone who physically or mentally threatens you? N   Is it safe for you to go home?  Y       ADL Assessment 1/17/2018   Feeding yourself No Help Needed   Getting from bed to chair No Help Needed   Getting dressed No Help Needed   Bathing or showering No Help Needed   Walk across the room (includes cane/walker) No Help Needed   Using the telphone No Help Needed   Taking your medications No Help Needed   Preparing meals No Help Needed   Managing money (expenses/bills) No Help Needed   Moderately strenuous housework (laundry) No Help Needed   Shopping for personal items (toiletries/medicines) No Help Needed   Shopping for groceries No Help Needed   Driving No Help Needed   Climbing a flight of stairs No Help Needed   Getting to places beyond walking distances No Help Needed

## 2018-02-09 NOTE — PROGRESS NOTES
Subjective:   Deepak Henderson is a 65 y.o. female who complains of congestion, dry cough, myalgias and fever for 7 days, gradually improving since that time. She denies a history of chest pain and vomiting. Evaluation to date: seen previously and thought to have a viral URI  Went to [place] ER 2 days ago. Gave her an IV and Rx for steroids. Lab tests there showed influenza A  Treatment to date: steroids inhaler. Patient does not smoke cigarettes. Relevant PMH: Asthma. Allergies   Allergen Reactions    Tylenol [Acetaminophen] Anaphylaxis and Hives    Benadryl [Diphenhydramine Hcl] Nausea and Vomiting    Ibuprofen Nausea and Vomiting    Tramadol Anxiety         Patient Active Problem List    Diagnosis Date Noted    Obesity, morbid ([facility] 02/06/2018    Gastroesophageal reflux disease without esophagitis 08/24/2016    Bipolar disorder with current episode depressed ([facility] 02/01/2016    Migraine aura without headache 10/24/2014    Bilateral leg edema 10/12/2014    History of gastric bypass 10/12/2014     Current Outpatient Prescriptions   Medication Sig Dispense Refill    guaiFENesin-codeine (ROBITUSSIN AC) 100-10 mg/5 mL solution Take 5 mL by mouth three (3) times daily as needed for Cough for up to 5 days. Max Daily Amount: 15 mL. 120 mL 0    predniSONE (DELTASONE) 20 mg tablet Take 2 Tabs by mouth daily (with breakfast) for 5 days. 10 Tab 0    cyclobenzaprine (FLEXERIL) 5 mg tablet Take 1 Tab by mouth three (3) times daily as needed for Muscle Spasm(s). 60 Tab 0    traZODone (DESYREL) 50 mg tablet Take  by mouth nightly.  meclizine (TRAVEL SICKNESS, MECLIZINE,) 25 mg chewable tablet TAKE 1 TABLET BY MOUTH THREE TIMES DAILY AS NEEDED FOR UP TO 10 DAYS FOR DIZZINESS 30 Tab 3    cyanocobalamin (VITAMIN B-12) 1,000 mcg tablet TAKE 1 TABLET BY MOUTH DAILY FOR VITAMIN B12 DEFICIENCY 90 Tab 1    furosemide (LASIX) 20 mg tablet Take 1 Tab by mouth daily.  As needed 90 Tab 3    potassium chloride (KLOR-CON) 10 mEq tablet Take 1 Tab by mouth daily. If takes lasix 90 Tab 3    omeprazole (PRILOSEC) 40 mg capsule Take 1 Cap by mouth daily. 90 Cap 3    Nebulizer & Compressor machine Use as directed 1 Each 0    albuterol (PROVENTIL VENTOLIN) 2.5 mg /3 mL (0.083 %) nebulizer solution 3 mL by Nebulization route every four (4) hours as needed for Wheezing. 100 Each 1    cholecalciferol (VITAMIN D3) 1,000 unit tablet Take 2 Tabs by mouth daily. Indications: VITAMIN D DEFICIENCY 180 Tab 3    topiramate (TOPAMAX) 100 mg tablet Take 1 Tab by mouth daily. Indications: MOOD 30 Tab 11    fluticasone (FLONASE) 50 mcg/actuation nasal spray 2 Sprays by Both Nostrils route daily. 1 Bottle 5    sertraline (ZOLOFT) 50 mg tablet Take  by mouth daily. Allergies   Allergen Reactions    Tylenol [Acetaminophen] Anaphylaxis and Hives    Benadryl [Diphenhydramine Hcl] Nausea and Vomiting    Ibuprofen Nausea and Vomiting    Tramadol Anxiety        Review of Systems  Pertinent items are noted in HPI. Objective:     Visit Vitals    /88 (BP 1 Location: Left arm, BP Patient Position: Sitting)    Pulse 60    Temp 97.9 °F (36.6 °C) (Oral)    Resp 24    Ht 4' 9\" (1.448 m)    Wt 188 lb (85.3 kg)    SpO2 98%    BMI 40.68 kg/m2     General:  alert, cooperative, no distress, looking better than Mon. Eyes: negative   Ears: normal TM's and external ear canals AU   Sinuses: tenderness over bilateral maxillary   Mouth:  Lips, mucosa, and tongue normal. Teeth and gums normal   Neck: supple, symmetrical, trachea midline and no adenopathy. Heart: S1 and S2 normal, no murmurs noted. Lungs: wheezes R anterior, L anterior   Abdomen: soft, non-tender. Bowel sounds normal. No masses,  no organomegaly      Assessment/Plan:   influenza  Suggested symptomatic OTC remedies. RTC prn. Discussed diagnosis and treatment of viral URIs. Discussed the importance of avoiding unnecessary antibiotic therapy.       ICD-10-CM ICD-9-CM    1. Influenza A (H5N1) J09. X2 488.02    2. Exacerbation of intermittent asthma, unspecified asthma severity J45.21 493.92      Continue current meds. Follow-up Disposition:  Return if symptoms worsen or fail to improve.

## 2018-02-09 NOTE — MR AVS SNAPSHOT
[address] 1432 [facility] 
[phone] Patient: Maryam Smith MRN: [id number] :1960 Visit Information Date & Time Provider Department Dept. Phone Encounter #  
 2018  8:45 AM Bladimir Garrido MD [facility] Jamie Alex [id number] Follow-up Instructions Return if symptoms worsen or fail to improve. Your Appointments 2018 11:15 AM  
ACUTE CARE with MD Lisa Guerrerolatoni [address] [place] [address]) Appt Note: f/u on test $0 cp $0 pb 18 [address] [place] [phone]  
[phone]  
  
   
 [address] [place] [address] [address] Upcoming Health Maintenance Date Due  
 MEDICARE YEARLY EXAM 5/10/2018 PAP AKA CERVICAL CYTOLOGY 12/3/2018 BREAST CANCER SCRN MAMMOGRAM 3/8/2019 DTaP/Tdap/Td series (2 - Tdap) 2021 COLONOSCOPY 8/3/2026 Allergies as of 2018  Review Complete On: 2018 By: Alana Velez LPN Severity Noted Reaction Type Reactions Tylenol [Acetaminophen] High 10/02/2015    Anaphylaxis, Hives Benadryl [Diphenhydramine Hcl] Medium 2015    Nausea and Vomiting Ibuprofen  10/12/2014    Nausea and Vomiting Tramadol  2018    Anxiety Current Immunizations  Reviewed on 10/2/2015 Name Date DTaP 2011 Hep B Vaccine 10/16/2016, 2016 Hep B Vaccine (Adult) 2017 Influenza Vaccine 10/13/2016, 2015, 2014, 2013, 3/28/2013, 2011, 2011, 2011, 2009, 2009, 2009, 2009, 2007, 2007 Influenza Vaccine (Quad) PF 2017, 2014 Pneumococcal Polysaccharide (PPSV-23) 2009 Tdap 2011 Not reviewed this visit You Were Diagnosed With   
  
 Codes Comments Influenza A (H5N1)    -  Primary ICD-10-CM: J09. X2 
ICD-9-CM: 488.02   
 Exacerbation of intermittent asthma, unspecified asthma severity     ICD-10-CM: J45.21 ICD-9-CM: 072.03 Vitals BP Pulse Temp Resp Height(growth percentile) Weight(growth percentile) 132/88 (BP 1 Location: Left arm, BP Patient Position: Sitting) 60 97.9 °F (36.6 °C) (Oral) 24 4' 9\" (1.448 m) 188 lb (85.3 kg) SpO2 BMI OB Status Smoking Status 98% 40.68 kg/m2 Hysterectomy Never Smoker Vitals History BMI and BSA Data Body Mass Index Body Surface Area  
 40.68 kg/m 2 1.85 m 2 Preferred Pharmacy Pharmacy Name Phone [address], [address] [phone] Your Updated Medication List  
  
   
This list is accurate as of: 2/9/18  9:52 AM.  Always use your most recent med list.  
  
  
  
  
 albuterol 2.5 mg /3 mL (0.083 %) nebulizer solution Commonly known as:  PROVENTIL VENTOLIN  
3 mL by Nebulization route every four (4) hours as needed for Wheezing. cholecalciferol 1,000 unit tablet Commonly known as:  VITAMIN D3 Take 2 Tabs by mouth daily. Indications: VITAMIN D DEFICIENCY  
  
 cyanocobalamin 1,000 mcg tablet Commonly known as:  VITAMIN B-12 TAKE 1 TABLET BY MOUTH DAILY FOR VITAMIN B12 DEFICIENCY  
  
 cyclobenzaprine 5 mg tablet Commonly known as:  FLEXERIL Take 1 Tab by mouth three (3) times daily as needed for Muscle Spasm(s). fluticasone 50 mcg/actuation nasal spray Commonly known as:  Jeanette Medin 2 Sprays by Both Nostrils route daily. furosemide 20 mg tablet Commonly known as:  LASIX Take 1 Tab by mouth daily. As needed  
  
 guaiFENesin-codeine 100-10 mg/5 mL solution Commonly known as:  ROBITUSSIN AC Take 5 mL by mouth three (3) times daily as needed for Cough for up to 5 days. Max Daily Amount: 15 mL. meclizine 25 mg chewable tablet Commonly known as:  TRAVEL SICKNESS (MECLIZINE) TAKE 1 TABLET BY MOUTH THREE TIMES DAILY AS NEEDED FOR UP TO 10 DAYS FOR DIZZINESS Nebulizer & Compressor machine Use as directed  
  
 omeprazole 40 mg capsule Commonly known as:  PRILOSEC Take 1 Cap by mouth daily. potassium chloride 10 mEq tablet Commonly known as:  KLOR-CON Take 1 Tab by mouth daily. If takes lasix  
  
 predniSONE 20 mg tablet Commonly known as:  Amanda Delaney Take 2 Tabs by mouth daily (with breakfast) for 5 days. topiramate 100 mg tablet Commonly known as:  TOPAMAX Take 1 Tab by mouth daily. Indications: MOOD  
  
 traZODone 50 mg tablet Commonly known as:  Curly Bristle Take  by mouth nightly. ZOLOFT 50 mg tablet Generic drug:  sertraline Take  by mouth daily. Follow-up Instructions Return if symptoms worsen or fail to improve. To-Do List   
 03/14/2018 12:30 PM  
  Appointment with [address] [place] [address] at [facility] ([phone]) EXAM INSTRUCTIONS Do not wear deodorant, lotion, or powders to your appointment. GENERAL INSTRUCTIONS - Bring a list of all medications you are currently taking, including over the counter medications. - Only patients will be allowed in the exam room. This includes children. - Children under the age of 15 may not be left unattended. - [address] [place] [address] patients must have an armband and be accompanied by a caregiver or family member. - If you have a hand-written prescription for this exam, you must bring it with you on the day of your exam. - Bring all relevant prior images from any facility outside of [facility] with you on the day of your exam.  Failure to provide images may delay reading by Radiologist.  If you have questions or need to reschedule or cancel your appointment, call [phone]. Introducing [facility]! [facility] introduces [employer] patient portal. Now you can access parts of your medical record, email your doctor's office, and request medication refills online.    
 
1. In your internet browser, go to [email or web address] 2. Click on the First Time User? Click Here link in the Sign In box. You will see the New Member Sign Up page. 3. Enter your [employer] Access Code exactly as it appears below. You will not need to use this code after youve completed the sign-up process. If you do not sign up before the expiration date, you must request a new code. · [employer] Access Code: [email or web address] Expires: 3/22/2018 12:32 PM 
 
4. Enter the last four digits of your Social Security Number (xxxx) and Date of Birth (mm/dd/yyyy) as indicated and click Submit. You will be taken to the next sign-up page. 5. Create a [employer] ID. This will be your [employer] login ID and cannot be changed, so think of one that is secure and easy to remember. 6. Create a [employer] password. You can change your password at any time. 7. Enter your Password Reset Question and Answer. This can be used at a later time if you forget your password. 8. Enter your e-mail address. You will receive e-mail notification when new information is available in [address] 9. Click Sign Up. You can now view and download portions of your medical record. 10. Click the Download Summary menu link to download a portable copy of your medical information. If you have questions, please visit the Frequently Asked Questions section of the [employer] website. Remember, [employer] is NOT to be used for urgent needs. For medical emergencies, dial 911. Now available from your iPhone and Android! Please provide this summary of care documentation to your next provider. Your primary care clinician is listed as Yadira Walker. If you have any questions after today's visit, please call [phone].

## 2018-02-16 ENCOUNTER — OFFICE VISIT (OUTPATIENT)
Dept: INTERNAL MEDICINE CLINIC | Age: 58
End: 2018-02-16

## 2018-02-16 VITALS
TEMPERATURE: 98.3 F | HEART RATE: 89 BPM | DIASTOLIC BLOOD PRESSURE: 86 MMHG | BODY MASS INDEX: 40.34 KG/M2 | WEIGHT: 187 LBS | RESPIRATION RATE: 16 BRPM | HEIGHT: 57 IN | OXYGEN SATURATION: 99 % | SYSTOLIC BLOOD PRESSURE: 132 MMHG

## 2018-02-16 DIAGNOSIS — J01.00 ACUTE MAXILLARY SINUSITIS, RECURRENCE NOT SPECIFIED: Primary | ICD-10-CM

## 2018-02-16 DIAGNOSIS — M17.11 ARTHRITIS OF KNEE, RIGHT: ICD-10-CM

## 2018-02-16 DIAGNOSIS — K21.9 GASTROESOPHAGEAL REFLUX DISEASE WITHOUT ESOPHAGITIS: ICD-10-CM

## 2018-02-16 RX ORDER — AMOXICILLIN 500 MG/1
500 TABLET, FILM COATED ORAL 3 TIMES DAILY
Qty: 21 TAB | Refills: 0 | Status: SHIPPED | OUTPATIENT
Start: 2018-02-16 | End: 2018-02-23

## 2018-02-16 RX ORDER — DICLOFENAC SODIUM 10 MG/G
GEL TOPICAL 4 TIMES DAILY
Qty: 1 EACH | Refills: 0 | Status: SHIPPED | OUTPATIENT
Start: 2018-02-16 | End: 2018-08-13 | Stop reason: ALTCHOICE

## 2018-02-16 NOTE — PROGRESS NOTES
Chief Complaint   Patient presents with    Cold Symptoms     nonprod cough, SOB, nose stuffy    Knee Pain     R/knee pain     I have reviewed the patient's medical history in detail and updated the computerized patient record. Health Maintenance reviewed. 1. Have you been to the ER, urgent care clinic since your last visit? Hospitalized since your last visit?no    2. Have you seen or consulted any other health care providers outside of the [address] [place] [address] [facility] since your last visit? Include any pap smears or colon screening. No    Encouraged pt to discuss pt's wishes with spouse/partner/family and bring them in the next appt to follow thru with the Advanced Directive    Fall Risk Assessment, last 12 mths 1/17/2018   Able to walk? Yes   Fall in past 12 months? Yes   Fall with injury? Yes   Number of falls in past 12 months 4   Fall Risk Score 5       PHQ over the last two weeks 1/17/2018   Little interest or pleasure in doing things Several days   Feeling down, depressed or hopeless Several days   Total Score PHQ 2 2       Abuse Screening Questionnaire 1/17/2018   Do you ever feel afraid of your partner? N   Are you in a relationship with someone who physically or mentally threatens you? N   Is it safe for you to go home?  Y       ADL Assessment 1/17/2018   Feeding yourself No Help Needed   Getting from bed to chair No Help Needed   Getting dressed No Help Needed   Bathing or showering No Help Needed   Walk across the room (includes cane/walker) No Help Needed   Using the telphone No Help Needed   Taking your medications No Help Needed   Preparing meals No Help Needed   Managing money (expenses/bills) No Help Needed   Moderately strenuous housework (laundry) No Help Needed   Shopping for personal items (toiletries/medicines) No Help Needed   Shopping for groceries No Help Needed   Driving No Help Needed   Climbing a flight of stairs No Help Needed   Getting to places beyond walking distances No Help Needed

## 2018-02-16 NOTE — MR AVS SNAPSHOT
[address] [place] [address] 
[phone] Patient: Irene Robbins MRN: [id number] :1960 Visit Information Date & Time Provider Department Dept. Phone Encounter #  
 2018  3:00 PM MD Carolina Dumas  Follow-up Instructions Return in about 6 weeks (around 3/30/2018) for routine follow up. Your Appointments 2018 11:15 AM  
ACUTE CARE with MD Carolina Dumas [address] [facility] Appt Note: f/u on test $0 cp $0 pb 18 [address] [place] [id number]  
[phone]  
  
   
 [address] [place] [address] [address] Upcoming Health Maintenance Date Due  
 MEDICARE YEARLY EXAM 5/10/2018 PAP AKA CERVICAL CYTOLOGY 12/3/2018 BREAST CANCER SCRN MAMMOGRAM 3/8/2019 DTaP/Tdap/Td series (2 - Tdap) 2021 COLONOSCOPY 8/3/2026 Allergies as of 2018  Review Complete On: 2018 By: Donna Aviles LPN Severity Noted Reaction Type Reactions Tylenol [Acetaminophen] High 10/02/2015    Anaphylaxis, Hives Benadryl [Diphenhydramine Hcl] Medium 2015    Nausea and Vomiting Ibuprofen  10/12/2014    Nausea and Vomiting Tramadol  2018    Anxiety Current Immunizations  Reviewed on 10/2/2015 Name Date DTaP 2011 Hep B Vaccine 10/16/2016, 2016 Hep B Vaccine (Adult) 2017 Influenza Vaccine 10/13/2016, 2015, 2014, 2013, 3/28/2013, 2011, 2011, 2011, 2009, 2009, 2009, 2009, 2007, 2007 Influenza Vaccine (Quad) PF 2017, 2014 Pneumococcal Polysaccharide (PPSV-23) 2009 Tdap 2011 Not reviewed this visit You Were Diagnosed With   
  
 Codes Comments Acute maxillary sinusitis, recurrence not specified    -  Primary ICD-10-CM: J01.00 ICD-9-CM: 461.0 Arthritis of knee, right     ICD-10-CM: M17.11 ICD-9-CM: 716.96 Gastroesophageal reflux disease without esophagitis     ICD-10-CM: K21.9 ICD-9-CM: 530.81 Vitals BP Pulse Temp Resp Height(growth percentile) Weight(growth percentile) 132/86 (BP 1 Location: Left arm, BP Patient Position: Sitting) 89 98.3 °F (36.8 °C) (Oral) 16 4' 9\" (1.448 m) 187 lb (84.8 kg) SpO2 BMI OB Status Smoking Status 99% 40.47 kg/m2 Hysterectomy Never Smoker BMI and BSA Data Body Mass Index Body Surface Area 40.47 kg/m 2 1.85 m 2 Preferred Pharmacy Pharmacy Name Phone 150 [facility], [address] [phone] Your Updated Medication List  
  
   
This list is accurate as of: 2/16/18  3:07 PM.  Always use your most recent med list.  
  
  
  
  
 albuterol 2.5 mg /3 mL (0.083 %) nebulizer solution Commonly known as:  PROVENTIL VENTOLIN  
3 mL by Nebulization route every four (4) hours as needed for Wheezing. amoxicillin 500 mg Tab Take 500 mg by mouth three (3) times daily for 7 days. cholecalciferol 1,000 unit tablet Commonly known as:  VITAMIN D3 Take 2 Tabs by mouth daily. Indications: VITAMIN D DEFICIENCY  
  
 cyanocobalamin 1,000 mcg tablet Commonly known as:  VITAMIN B-12 TAKE 1 TABLET BY MOUTH DAILY FOR VITAMIN B12 DEFICIENCY  
  
 cyclobenzaprine 5 mg tablet Commonly known as:  FLEXERIL Take 1 Tab by mouth three (3) times daily as needed for Muscle Spasm(s). diclofenac 1 % Gel Commonly known as:  VOLTAREN Apply  to affected area four (4) times daily. fluticasone 50 mcg/actuation nasal spray Commonly known as:  Memory Lust 2 Sprays by Both Nostrils route daily. furosemide 20 mg tablet Commonly known as:  LASIX Take 1 Tab by mouth daily. As needed meclizine 25 mg chewable tablet Commonly known as:  TRAVEL SICKNESS (MECLIZINE) TAKE 1 TABLET BY MOUTH THREE TIMES DAILY AS NEEDED FOR UP TO 10 DAYS FOR DIZZINESS Nebulizer & Compressor machine Use as directed  
  
 omeprazole 40 mg capsule Commonly known as:  PRILOSEC Take 1 Cap by mouth daily. potassium chloride 10 mEq tablet Commonly known as:  KLOR-CON Take 1 Tab by mouth daily. If takes lasix  
  
 topiramate 100 mg tablet Commonly known as:  TOPAMAX Take 1 Tab by mouth daily. Indications: MOOD  
  
 traZODone 50 mg tablet Commonly known as:  Lunette Grate Take  by mouth nightly. ZOLOFT 50 mg tablet Generic drug:  sertraline Take  by mouth daily. Prescriptions Sent to Pharmacy Refills  
 amoxicillin 500 mg tab 0 Sig: Take 500 mg by mouth three (3) times daily for 7 days. Class: Normal  
 Pharmacy: [address] [place] [address] #: [phone] Route: Oral  
 diclofenac (VOLTAREN) 1 % gel 0 Sig: Apply  to affected area four (4) times daily. Class: Normal  
 Pharmacy: [address] [place] [address] #: [phone] Route: Topical  
  
Follow-up Instructions Return in about 6 weeks (around 3/30/2018) for routine follow up. To-Do List   
 03/14/2018 12:30 PM  
  Appointment with [address] [place] [address] at [address] [place] [address] ([phone]) EXAM INSTRUCTIONS Do not wear deodorant, lotion, or powders to your appointment. GENERAL INSTRUCTIONS - Bring a list of all medications you are currently taking, including over the counter medications. - Only patients will be allowed in the exam room. This includes children. - Children under the age of 15 may not be left unattended. - [facility] patients must have an armband and be accompanied by a caregiver or family member.  - If you have a hand-written prescription for this exam, you must bring it with you on the day of your exam. - Bring all relevant prior images from any facility outside of [facility] with you on the day of your exam.  Failure to provide images may delay reading by Radiologist.  If you have questions or need to reschedule or cancel your appointment, call [phone]. Introducing [address]! [facility] introduces [employer] patient portal. Now you can access parts of your medical record, email your doctor's office, and request medication refills online. 1. In your internet browser, go to [email or web address] 2. Click on the First Time User? Click Here link in the Sign In box. You will see the New Member Sign Up page. 3. Enter your [employer] Access Code exactly as it appears below. You will not need to use this code after youve completed the sign-up process. If you do not sign up before the expiration date, you must request a new code. · [employer] Access Code: [email or web address] Expires: 3/22/2018 12:32 PM 
 
4. Enter the last four digits of your Social Security Number (xxxx) and Date of Birth (mm/dd/yyyy) as indicated and click Submit. You will be taken to the next sign-up page. 5. Create a [employer] ID. This will be your [employer] login ID and cannot be changed, so think of one that is secure and easy to remember. 6. Create a [employer] password. You can change your password at any time. 7. Enter your Password Reset Question and Answer. This can be used at a later time if you forget your password. 8. Enter your e-mail address. You will receive e-mail notification when new information is available in [address] 9. Click Sign Up. You can now view and download portions of your medical record. 10. Click the Download Summary menu link to download a portable copy of your medical information. If you have questions, please visit the Frequently Asked Questions section of the [employer] website.  Remember, [employer] is NOT to be used for urgent needs. For medical emergencies, dial 911. Now available from your iPhone and Android! Please provide this summary of care documentation to your next provider. Your primary care clinician is listed as Joyce Martinez. If you have any questions after today's visit, please call [phone].

## 2018-04-09 ENCOUNTER — OFFICE VISIT (OUTPATIENT)
Dept: INTERNAL MEDICINE CLINIC | Age: 58
End: 2018-04-09

## 2018-04-09 VITALS
SYSTOLIC BLOOD PRESSURE: 133 MMHG | HEIGHT: 57 IN | OXYGEN SATURATION: 97 % | WEIGHT: 185 LBS | TEMPERATURE: 98.3 F | BODY MASS INDEX: 39.91 KG/M2 | RESPIRATION RATE: 16 BRPM | HEART RATE: 68 BPM | DIASTOLIC BLOOD PRESSURE: 81 MMHG

## 2018-04-09 DIAGNOSIS — K59.00 CONSTIPATION, UNSPECIFIED CONSTIPATION TYPE: ICD-10-CM

## 2018-04-09 DIAGNOSIS — R42 DIZZINESS: ICD-10-CM

## 2018-04-09 DIAGNOSIS — R10.84 GENERALIZED ABDOMINAL PAIN: ICD-10-CM

## 2018-04-09 DIAGNOSIS — J30.1 SEASONAL ALLERGIC RHINITIS DUE TO POLLEN: Primary | ICD-10-CM

## 2018-04-09 RX ORDER — POLYETHYLENE GLYCOL 3350 17 G/17G
17 POWDER, FOR SOLUTION ORAL DAILY
Qty: 50 PACKET | Refills: 5 | Status: SHIPPED | OUTPATIENT
Start: 2018-04-09 | End: 2019-05-18 | Stop reason: SDUPTHER

## 2018-04-09 RX ORDER — MECLIZINE HCL 25MG 25 MG/1
TABLET, CHEWABLE ORAL
Qty: 60 TAB | Refills: 2 | Status: SHIPPED | OUTPATIENT
Start: 2018-04-09 | End: 2018-06-09 | Stop reason: SDUPTHER

## 2018-04-09 NOTE — PROGRESS NOTES
Subjective:   Jarad Green is a 65 y.o. female who complains of sneezing, myalgias and headache for 1 -2 days, stable since that time. She denies a history of fevers, shortness of breath and wheezing. Reports some constipation lately and requests a refill of her MiraLAX which helps greatly. Lately has been to the emergency room, studies there seem to show partial small bowel obstruction. She is off gastroenterology referred her to the bypass surgeons. They have her set her up for some sort of definitive test find out what is going on with her abdominal pain and constipation. Evaluation to date: Been to the emergency room. Treatment to date: none. Patient does not smoke cigarettes. Relevant PMH: gastric bypass  . Labs from today were reviewed  no, labs done previously were reviewed  yes, Labs done in ER were reviewed  yes, Additional labs are ordered  no,      Allergies   Allergen Reactions    Tylenol [Acetaminophen] Anaphylaxis and Hives    Benadryl [Diphenhydramine Hcl] Nausea and Vomiting    Ibuprofen Nausea and Vomiting    Tramadol Anxiety         Current Outpatient Prescriptions   Medication Sig Dispense Refill    polyethylene glycol (MIRALAX) 17 gram packet Take 1 Packet by mouth daily. 50 Packet 5    meclizine (TRAVEL SICKNESS, MECLIZINE,) 25 mg chewable tablet TAKE 1 TABLET BY MOUTH THREE TIMES DAILY AS NEEDED FOR UP TO 10 DAYS FOR DIZZINESS 60 Tab 2    topiramate (TOPAMAX) 100 mg tablet TAKE 1 TABLET BY MOUTH DAILY. INDICATIONS: MOOD 30 Tab 5    diclofenac (VOLTAREN) 1 % gel Apply  to affected area four (4) times daily. 1 Each 0    cyclobenzaprine (FLEXERIL) 5 mg tablet Take 1 Tab by mouth three (3) times daily as needed for Muscle Spasm(s). 60 Tab 0    traZODone (DESYREL) 50 mg tablet Take  by mouth nightly.       cyanocobalamin (VITAMIN B-12) 1,000 mcg tablet TAKE 1 TABLET BY MOUTH DAILY FOR VITAMIN B12 DEFICIENCY 90 Tab 1    furosemide (LASIX) 20 mg tablet Take 1 Tab by mouth daily. As needed 90 Tab 3    potassium chloride (KLOR-CON) 10 mEq tablet Take 1 Tab by mouth daily. If takes lasix 90 Tab 3    omeprazole (PRILOSEC) 40 mg capsule Take 1 Cap by mouth daily. 90 Cap 3    Nebulizer & Compressor machine Use as directed 1 Each 0    albuterol (PROVENTIL VENTOLIN) 2.5 mg /3 mL (0.083 %) nebulizer solution 3 mL by Nebulization route every four (4) hours as needed for Wheezing. 100 Each 1    cholecalciferol (VITAMIN D3) 1,000 unit tablet Take 2 Tabs by mouth daily. Indications: VITAMIN D DEFICIENCY 180 Tab 3    fluticasone (FLONASE) 50 mcg/actuation nasal spray 2 Sprays by Both Nostrils route daily. 1 Bottle 5    sertraline (ZOLOFT) 50 mg tablet Take  by mouth daily. Allergies   Allergen Reactions    Tylenol [Acetaminophen] Anaphylaxis and Hives    Benadryl [Diphenhydramine Hcl] Nausea and Vomiting    Ibuprofen Nausea and Vomiting    Tramadol Anxiety     Social History   Substance Use Topics    Smoking status: Never Smoker    Smokeless tobacco: Never Used    Alcohol use No        Review of Systems  Pertinent items are noted in HPI. Objective:     Visit Vitals    /81 (BP 1 Location: Left arm, BP Patient Position: Sitting)    Pulse 68    Temp 98.3 °F (36.8 °C) (Oral)    Resp 16    Ht 4' 9\" (1.448 m)    Wt 185 lb (83.9 kg)    SpO2 97%    BMI 40.03 kg/m2     General:  alert, cooperative, no distress   Eyes: negative   Ears: normal TM's and external ear canals AU   Sinuses: Normal paranasal sinuses without tenderness   Mouth:  Lips, mucosa, and tongue normal. Teeth and gums normal   Neck: supple, symmetrical, trachea midline and no adenopathy. Heart: S1 and S2 normal, no murmurs noted. Lungs: clear to auscultation bilaterally   Abdomen: soft, non-tender. Bowel sounds normal. No masses,  no organomegaly      Assessment/Plan:   viral upper respiratory illness and allergic rhinitis  Suggested symptomatic OTC remedies. RTC prn.   Discussed diagnosis and treatment of viral URIs. Discussed the importance of avoiding unnecessary antibiotic therapy. Encounter Diagnoses   Name Primary?  Seasonal allergic rhinitis due to pollen Yes    Constipation, unspecified constipation type     Dizziness     Generalized abdominal pain      Orders Placed This Encounter    polyethylene glycol (MIRALAX) 17 gram packet    meclizine (TRAVEL SICKNESS, MECLIZINE,) 25 mg chewable tablet   . Discussed possible side affects, precautions, and drug interactions and possible benefits of the medication(s). She will follow-up with the surgeons to figure out what is going on with her stomach. Follow-up Disposition:  Return in about 3 months (around 7/9/2018) for routine follow up.

## 2018-04-09 NOTE — MR AVS SNAPSHOT
[address] 2223 [facility] 
[phone] Patient: Ritika Watt MRN: [id number] :[id number] Visit Information Date & Time Provider Department Dept. Phone Encounter #  
 2018  1:45 PM Ivette Carnes MD [facility] [id number] Follow-up Instructions Return in about 3 months (around 2018) for routine follow up. Your Appointments 2018 11:15 AM  
ACUTE CARE with MD Carolina Delacruz [address] [place] [address]) Appt Note: f/u on test $0 cp $0 pb 18 [address] Box [facility] [place] [address]  
[phone]  
  
   
 [address] [place] [address] [address] Upcoming Health Maintenance Date Due  
 MEDICARE YEARLY EXAM 5/10/2018 PAP AKA CERVICAL CYTOLOGY 12/3/2018 BREAST CANCER SCRN MAMMOGRAM 3/8/2019 DTaP/Tdap/Td series (2 - Tdap) 2021 COLONOSCOPY 8/3/2026 Allergies as of 2018  Review Complete On: 2018 By: Gillian Hendricks LPN Severity Noted Reaction Type Reactions Tylenol [Acetaminophen] High 10/02/2015    Anaphylaxis, Hives Benadryl [Diphenhydramine Hcl] Medium 2015    Nausea and Vomiting Ibuprofen  10/12/2014    Nausea and Vomiting Tramadol  2018    Anxiety Current Immunizations  Reviewed on 10/2/2015 Name Date DTaP 2011 Hep B Vaccine 10/16/2016, 2016 Hep B Vaccine (Adult) 2017 Influenza Vaccine 10/13/2016, 2015, 2014, 2013, 3/28/2013, 2011, 2011, 2011, 2009, 2009, 2009, 2009, 2007, 2007 Influenza Vaccine (Quad) PF 2017, 2014 Pneumococcal Polysaccharide (PPSV-23) 2009 Tdap 2011 Not reviewed this visit You Were Diagnosed With   
  
 Codes Comments Seasonal allergic rhinitis due to pollen    -  Primary ICD-10-CM: J30.1 ICD-9-CM: 477.0 Constipation, unspecified constipation type     ICD-10-CM: K59.00 ICD-9-CM: 564.00 Dizziness     ICD-10-CM: H17 ICD-9-CM: 780.4 Generalized abdominal pain     ICD-10-CM: R10.84 ICD-9-CM: 789.07 Vitals BP Pulse Temp Resp Height(growth percentile) Weight(growth percentile) 133/81 (BP 1 Location: Left arm, BP Patient Position: Sitting) 68 98.3 °F (36.8 °C) (Oral) 16 4' 9\" (1.448 m) 185 lb (83.9 kg) SpO2 BMI OB Status Smoking Status 97% 40.03 kg/m2 Hysterectomy Never Smoker BMI and BSA Data Body Mass Index Body Surface Area 40.03 kg/m 2 1.84 m 2 Preferred Pharmacy Pharmacy Name Phone [address], [address] [facility] [address] [facility] [phone] Your Updated Medication List  
  
   
This list is accurate as of 4/9/18  2:34 PM.  Always use your most recent med list.  
  
  
  
  
 albuterol 2.5 mg /3 mL (0.083 %) nebulizer solution Commonly known as:  PROVENTIL VENTOLIN  
3 mL by Nebulization route every four (4) hours as needed for Wheezing. cholecalciferol 1,000 unit tablet Commonly known as:  VITAMIN D3 Take 2 Tabs by mouth daily. Indications: VITAMIN D DEFICIENCY  
  
 cyanocobalamin 1,000 mcg tablet Commonly known as:  VITAMIN B-12 TAKE 1 TABLET BY MOUTH DAILY FOR VITAMIN B12 DEFICIENCY  
  
 cyclobenzaprine 5 mg tablet Commonly known as:  FLEXERIL Take 1 Tab by mouth three (3) times daily as needed for Muscle Spasm(s). diclofenac 1 % Gel Commonly known as:  VOLTAREN Apply  to affected area four (4) times daily. fluticasone 50 mcg/actuation nasal spray Commonly known as:  Curtistine Paci 2 Sprays by Both Nostrils route daily. furosemide 20 mg tablet Commonly known as:  LASIX Take 1 Tab by mouth daily. As needed  
  
 meclizine 25 mg chewable tablet Commonly known as:  TRAVEL SICKNESS (MECLIZINE) TAKE 1 TABLET BY MOUTH THREE TIMES DAILY AS NEEDED FOR UP TO 10 DAYS FOR DIZZINESS Nebulizer & Compressor machine Use as directed  
  
 omeprazole 40 mg capsule Commonly known as:  PRILOSEC Take 1 Cap by mouth daily. polyethylene glycol 17 gram packet Commonly known as:  Marcellina Dull Take 1 Packet by mouth daily. potassium chloride 10 mEq tablet Commonly known as:  KLOR-CON Take 1 Tab by mouth daily. If takes lasix  
  
 topiramate 100 mg tablet Commonly known as:  TOPAMAX TAKE 1 TABLET BY MOUTH DAILY. INDICATIONS: MOOD  
  
 traZODone 50 mg tablet Commonly known as:  Kiko Ort Take  by mouth nightly. ZOLOFT 50 mg tablet Generic drug:  sertraline Take  by mouth daily. Prescriptions Sent to Pharmacy Refills  
 polyethylene glycol (MIRALAX) 17 gram packet 5 Sig: Take 1 Packet by mouth daily. Class: Normal  
 Pharmacy: [address] [place] [address] #: [phone] Route: Oral  
 meclizine (TRAVEL SICKNESS, MECLIZINE,) 25 mg chewable tablet 2 Sig: TAKE 1 TABLET BY MOUTH THREE TIMES DAILY AS NEEDED FOR UP TO 10 DAYS FOR DIZZINESS Class: Normal  
 Pharmacy: [address] [place] [address] #: [phone] Follow-up Instructions Return in about 3 months (around 7/9/2018) for routine follow up. To-Do List   
 04/23/2018 2:30 PM  
  Appointment with [address] [place] [address] 1 at [address] [place] [address] ([phone]) EXAM INSTRUCTIONS Do not wear deodorant, lotion, or powders to your appointment. GENERAL INSTRUCTIONS - Bring a list of all medications you are currently taking, including over the counter medications. - Only patients will be allowed in the exam room. This includes children. - Children under the age of 15 may not be left unattended.  - [facility] patients must have an armband and be accompanied by a caregiver or family member. - If you have a hand-written prescription for this exam, you must bring it with you on the day of your exam. - Bring all relevant prior images from any facility outside of [employer] with you on the day of your exam.  Failure to provide images may delay reading by Radiologist.  If you have questions or need to reschedule or cancel your appointment, call [phone]. Introducing [facility] SERVICES! [employer] introduces [employer] patient portal. Now you can access parts of your medical record, email your doctor's office, and request medication refills online. 1. In your internet browser, go to https://[employer]. [employer] 2. Click on the First Time User? Click Here link in the Sign In box. You will see the New Member Sign Up page. 3. Enter your [employer] Access Code exactly as it appears below. You will not need to use this code after youve completed the sign-up process. If you do not sign up before the expiration date, you must request a new code. · [employer] Access Code: [email or web address] Expires: 7/8/2018 12:52 PM 
 
4. Enter the last four digits of your Social Security Number (xxxx) and Date of Birth (mm/dd/yyyy) as indicated and click Submit. You will be taken to the next sign-up page. 5. Create a [employer] ID. This will be your [employer] login ID and cannot be changed, so think of one that is secure and easy to remember. 6. Create a [employer] password. You can change your password at any time. 7. Enter your Password Reset Question and Answer. This can be used at a later time if you forget your password. 8. Enter your e-mail address. You will receive e-mail notification when new information is available in [address] 9. Click Sign Up. You can now view and download portions of your medical record. 10. Click the Download Summary menu link to download a portable copy of your medical information.  
 
If you have questions, please visit the Frequently Asked Questions section of the [employer]. Remember, [employer] is NOT to be used for urgent needs. For medical emergencies, dial 911. Now available from your iPhone and Android! Please provide this summary of care documentation to your next provider. Your primary care clinician is listed as Clarence Perera. If you have any questions after today's visit, please call [phone].

## 2018-04-09 NOTE — PROGRESS NOTES
Chief Complaint   Patient presents with    Cold Symptoms     sneezing, nose running, throat sore, headaches, chills     I have reviewed the patient's medical history in detail and updated the computerized patient record. Health Maintenance reviewed    1. Have you been to the ER, urgent care clinic since your last visit? Hospitalized since your last visit?no    2. Have you seen or consulted any other health care providers outside of the [facility] since your last visit? Include any pap smears or colon screening. No    Encouraged pt to discuss pt's wishes with spouse/partner/family and bring them in the next appt to follow thru with the Advanced Directive    Fall Risk Assessment, last 12 mths 1/17/2018   Able to walk? Yes   Fall in past 12 months? Yes   Fall with injury? Yes   Number of falls in past 12 months 4   Fall Risk Score 5       PHQ over the last two weeks 1/17/2018   Little interest or pleasure in doing things Several days   Feeling down, depressed or hopeless Several days   Total Score PHQ 2 2       Abuse Screening Questionnaire 1/17/2018   Do you ever feel afraid of your partner? N   Are you in a relationship with someone who physically or mentally threatens you? N   Is it safe for you to go home?  Y       ADL Assessment 1/17/2018   Feeding yourself No Help Needed   Getting from bed to chair No Help Needed   Getting dressed No Help Needed   Bathing or showering No Help Needed   Walk across the room (includes cane/walker) No Help Needed   Using the telphone No Help Needed   Taking your medications No Help Needed   Preparing meals No Help Needed   Managing money (expenses/bills) No Help Needed   Moderately strenuous housework (laundry) No Help Needed   Shopping for personal items (toiletries/medicines) No Help Needed   Shopping for groceries No Help Needed   Driving No Help Needed   Climbing a flight of stairs No Help Needed   Getting to places beyond walking distances No Help Needed

## 2018-04-10 ENCOUNTER — TELEPHONE (OUTPATIENT)
Dept: INTERNAL MEDICINE CLINIC | Age: 58
End: 2018-04-10

## 2018-04-10 NOTE — TELEPHONE ENCOUNTER
Tried to call patient, but received recording which states that the number has been reached in error and that the number may be changed, disconnected, or no longer in service.

## 2018-04-10 NOTE — TELEPHONE ENCOUNTER
Pt sttd that there was an issue with her medication stated that she needed a call back b/c her vitamin needed to be increased and the pharmacy is not ffilling it bc it hasn't been called in and that she also needs her multi vitamins,citrus vitamin and b12.the patient stated that medicare is also requiring a code in order to approve her for her breast reduction. .she stated that she would like call back asap

## 2018-04-13 ENCOUNTER — TELEPHONE (OUTPATIENT)
Dept: INTERNAL MEDICINE CLINIC | Age: 58
End: 2018-04-13

## 2018-04-13 DIAGNOSIS — N62 BREAST PROMINENCE: ICD-10-CM

## 2018-04-13 DIAGNOSIS — M54.9 CHRONIC UPPER BACK PAIN: Primary | ICD-10-CM

## 2018-04-13 DIAGNOSIS — G89.29 CHRONIC UPPER BACK PAIN: Primary | ICD-10-CM

## 2018-04-13 DIAGNOSIS — Z98.84 HISTORY OF GASTRIC BYPASS: ICD-10-CM

## 2018-04-13 NOTE — TELEPHONE ENCOUNTER
Pt was calling back in reference to her surgery for her breast reduction. She said that they told her that the doctors office should be handling and not her. She also mentioned needing her vitamin d and b12. A code is needed for her breast reduction. Please call her at [phone].

## 2018-04-13 NOTE — TELEPHONE ENCOUNTER
Requested Prescriptions     Pending Prescriptions Disp Refills    cholecalciferol (VITAMIN D3) 1,000 unit tablet 180 Tab 3     Sig: Take 1 Tab by mouth daily. Indications: Vitamin D Deficiency    cyanocobalamin (VITAMIN B-12) 1,000 mcg tablet 90 Tab 1     Sig: TAKE 1 TABLET BY MOUTH DAILY FOR VITAMIN B12 DEFICIENCY     Future Appointments:  6/11/2018  10:15 AM   Jana Garcia MD                Last Appointment With Me:  4/9/2018   Last Filled:    Changes Made to Medication on Last Visit:    Chief Complaint   Patient presents with    Breast Problem     BREAST REDUCTION SURGERY     Patient is requesting a breast reduction surgery and she states that her insurance is requiring a ICD code. Patient has an upcoming appointment for a mammogram on April 23, 2018.

## 2018-04-16 ENCOUNTER — TELEPHONE (OUTPATIENT)
Dept: INTERNAL MEDICINE CLINIC | Age: 58
End: 2018-04-16

## 2018-04-16 RX ORDER — LANOLIN ALCOHOL/MO/W.PET/CERES
CREAM (GRAM) TOPICAL
Qty: 90 TAB | Refills: 1 | Status: SHIPPED | OUTPATIENT
Start: 2018-04-16 | End: 2019-05-22 | Stop reason: SDUPTHER

## 2018-04-16 RX ORDER — MELATONIN
1000 DAILY
Qty: 180 TAB | Refills: 1 | Status: SHIPPED | OUTPATIENT
Start: 2018-04-16 | End: 2019-04-24 | Stop reason: SDUPTHER

## 2018-04-16 NOTE — TELEPHONE ENCOUNTER
Patient called in c/o that her blood sugar dropped very low yesterday, down to 46, and she passed out - stated that she hasnt been eating very well lately bc of decreased appetite - checked blood sugar today and its back up to 89 - no dizziness or \"passing out\" today - patient was  Unable to get into office today - appt was given for tomorrow 4/17/18 for patient to be evaluated by Dr Aleksey Denis, [facility]  9/74/5724  3:13 PM

## 2018-04-17 ENCOUNTER — OFFICE VISIT (OUTPATIENT)
Dept: INTERNAL MEDICINE CLINIC | Age: 58
End: 2018-04-17

## 2018-04-17 VITALS
RESPIRATION RATE: 16 BRPM | SYSTOLIC BLOOD PRESSURE: 109 MMHG | HEART RATE: 61 BPM | OXYGEN SATURATION: 98 % | DIASTOLIC BLOOD PRESSURE: 67 MMHG | HEIGHT: 57 IN | WEIGHT: 180 LBS | TEMPERATURE: 96.4 F | BODY MASS INDEX: 38.83 KG/M2

## 2018-04-17 DIAGNOSIS — R51.9 ACUTE NONINTRACTABLE HEADACHE, UNSPECIFIED HEADACHE TYPE: ICD-10-CM

## 2018-04-17 DIAGNOSIS — R55 SYNCOPE, UNSPECIFIED SYNCOPE TYPE: Primary | ICD-10-CM

## 2018-04-17 DIAGNOSIS — E16.2 HYPOGLYCEMIA: ICD-10-CM

## 2018-04-17 DIAGNOSIS — Z98.84 HISTORY OF GASTRIC BYPASS: Chronic | ICD-10-CM

## 2018-04-17 LAB — GLUCOSE POC: 63 MG/DL

## 2018-04-17 RX ORDER — MAGNESIUM SULFATE 100 %
15 CRYSTALS MISCELLANEOUS AS NEEDED
Qty: 30 TAB | Refills: 5 | Status: SHIPPED | OUTPATIENT
Start: 2018-04-17 | End: 2019-07-29 | Stop reason: ALTCHOICE

## 2018-04-17 RX ORDER — BUTALBITAL, ACETAMINOPHEN AND CAFFEINE 300; 40; 50 MG/1; MG/1; MG/1
1 CAPSULE ORAL
Qty: 10 CAP | Refills: 0 | Status: SHIPPED | OUTPATIENT
Start: 2018-04-17 | End: 2018-07-06 | Stop reason: SDUPTHER

## 2018-04-17 NOTE — PROGRESS NOTES
Chief Complaint   Patient presents with    Low Blood Sugar     BS down to 46 the past Sunday and pt passed out     I have reviewed the patient's medical history in detail and updated the computerized patient record. Health Maintenance reviewed. 1. Have you been to the ER, urgent care clinic since your last visit? Hospitalized since your last visit?no    2. Have you seen or consulted any other health care providers outside of the [address] [place] [address] [facility] since your last visit? Include any pap smears or colon screening. No    Encouraged pt to discuss pt's wishes with spouse/partner/family and bring them in the next appt to follow thru with the Advanced Directive    Fall Risk Assessment, last 12 mths 1/17/2018   Able to walk? Yes   Fall in past 12 months? Yes   Fall with injury? Yes   Number of falls in past 12 months 4   Fall Risk Score 5       PHQ over the last two weeks 1/17/2018   Little interest or pleasure in doing things Several days   Feeling down, depressed or hopeless Several days   Total Score PHQ 2 2       Abuse Screening Questionnaire 1/17/2018   Do you ever feel afraid of your partner? N   Are you in a relationship with someone who physically or mentally threatens you? N   Is it safe for you to go home?  Y       ADL Assessment 1/17/2018   Feeding yourself No Help Needed   Getting from bed to chair No Help Needed   Getting dressed No Help Needed   Bathing or showering No Help Needed   Walk across the room (includes cane/walker) No Help Needed   Using the telphone No Help Needed   Taking your medications No Help Needed   Preparing meals No Help Needed   Managing money (expenses/bills) No Help Needed   Moderately strenuous housework (laundry) No Help Needed   Shopping for personal items (toiletries/medicines) No Help Needed   Shopping for groceries No Help Needed   Driving No Help Needed   Climbing a flight of stairs No Help Needed   Getting to places beyond walking distances No Help Needed

## 2018-04-17 NOTE — PROGRESS NOTES
HISTORY OF PRESENT Yaritza Dial is a 65 y.o. female. Loss of Consciousness    The history is provided by the patient. This is a recurrent problem. Episode onset: 2 months. Episode frequency: occa. She lost consciousness for a period of less than one minute. Associated symptoms include visual change, confusion, headaches and light-headedness. Pertinent negatives include no abdominal pain and no anal bleeding. Treatments tried: called rescue squal checked her BS was 46 she was given glucose pills and jelly. The treatment provided mild (came up to 59 then went home and ate some more.) relief. Seeing surgeons for her stomach issues having procedure fri. Was just sitting in [place] when she passed out. She was given sweet soda and rescue squad called. Not on Dm meds. Did not go to the ER. Ate something sweet and felt better. Waiting for her breast redux referral  Having HA from the episode, wants relief. No focal weakness.     Labs from today were reviewed  yes, labs done previously were reviewed  yes, Labs done in ER were reviewed  no, Additional labs are ordered  no,  Low BS     Past Surgical History:   Procedure Laterality Date    HX ABDOMINAL LAPAROSCOPY      HX CARPAL TUNNEL RELEASE Bilateral more than 10 years ago    HX  SECTION  8218,1074    HX COLONOSCOPY  2016    HX GASTRIC BYPASS  1997    x2    HX HYSTERECTOMY  1985    HX TONSIL AND ADENOIDECTOMY  more than 10 years ago     Social History     Social History    Marital status:      Spouse name: N/A    Number of children: 2    Years of education: N/A     Occupational History    retired      Social History Main Topics    Smoking status: Never Smoker    Smokeless tobacco: Never Used    Alcohol use No    Drug use: No    Sexual activity: No     Other Topics Concern    Not on file     Social History Narrative     has brain damage, lives with him         Review of Systems   Gastrointestinal: Negative for abdominal pain and anal bleeding. Neurological: Positive for loss of consciousness, light-headedness and headaches. Psychiatric/Behavioral: Positive for confusion. Physical Exam  Visit Vitals    /67 (BP 1 Location: Left arm, BP Patient Position: Sitting)    Pulse 61    Temp 96.4 °F (35.8 °C) (Oral)    Resp 16    Ht 4' 9\" (1.448 m)    Wt 180 lb (81.6 kg)    SpO2 98%    BMI 38.95 kg/m2     Well developed well nourished no acute distress. Overweight  Pupils equal round react to light, extraocular muscles intact. Tympanic membranes within normal limits throat unremarkable  Cranial nerves II through XII are intact. Neck unremarkable  Heart regular rate and rhythm without clicks murmurs rubs  Lungs are clear to auscultation  Abdomen soft. Extremities, motor 5 out of 5 bilaterally, reflexes 2+ equal bilaterally. ASSESSMENT and PLAN  Encounter Diagnoses   Name Primary?  Syncope, unspecified syncope type Yes    Hypoglycemia     History of gastric bypass     Acute nonintractable headache, unspecified headache type      Orders Placed This Encounter    [address] Endocrinology ref [facility]    AMB POC GLUCOSE BLOOD, BY GLUCOSE MONITORING DEVICE    butalbital-acetaminophen-caff (FIORICET) -40 mg per capsule    glucose 4 gram chewable tablet     Pt given glucose tabs to take if it occurs again, likely complication from her surgery, will send to endocrine for further eval.  Acerbose said to be affective for this. Will let endocrine decide what to do or if further eval required. Discussed possible side affects, precautions, and drug interactions and possible benefits of the medication(s). Temp fioricet. Nurse working on breast redux referral.  Follow-up Disposition:  Return in about 5 weeks (around 5/22/2018) for routine follow up.

## 2018-04-17 NOTE — MR AVS SNAPSHOT
[address] 6895 [facility] 
[phone] Patient: Jigar Cooper MRN: [id number] :1960 Visit Information Date & Time Provider Department Dept. Phone Encounter #  
 2018  4:00 PM MD Kasi Brown Dr [id number] Follow-up Instructions Return in about 6 weeks (around 2018) for routine follow up. Your Appointments 2018 10:15 AM  
ACUTE CARE with MD Carolina Brown [address] [place] [address]) Appt Note: f/u on test $0 cp $0 pb 18 lsh; f/u on test $0 cp $0 pb 18 lsh  
 117 [facility]. [address] [place] [facility] [address]  
[phone]  
  
   
 [address] [place] [address] [address] Upcoming Health Maintenance Date Due  
 MEDICARE YEARLY EXAM 5/10/2018 PAP AKA CERVICAL CYTOLOGY 12/3/2018 BREAST CANCER SCRN MAMMOGRAM 3/8/2019 DTaP/Tdap/Td series (2 - Tdap) 2021 COLONOSCOPY 8/3/2026 Allergies as of 2018  Review Complete On: 2018 By: Judi Fox LPN Severity Noted Reaction Type Reactions Tylenol [Acetaminophen] High 10/02/2015    Anaphylaxis, Hives Benadryl [Diphenhydramine Hcl] Medium 2015    Nausea and Vomiting Ibuprofen  10/12/2014    Nausea and Vomiting Tramadol  2018    Anxiety Current Immunizations  Reviewed on 10/2/2015 Name Date DTaP 2011 Hep B Vaccine 10/16/2016, 2016 Hep B Vaccine (Adult) 2017 Influenza Vaccine 10/13/2016, 2015, 2014, 2013, 3/28/2013, 2011, 2011, 2011, 2009, 2009, 2009, 2009, 2007, 2007 Influenza Vaccine (Quad) PF 2017, 2014 Pneumococcal Polysaccharide (PPSV-23) 2009 Tdap 2011 Not reviewed this visit You Were Diagnosed With   
  
 Codes Comments Syncope, unspecified syncope type    -  Primary ICD-10-CM: R55 
ICD-9-CM: 780.2 Hypoglycemia     ICD-10-CM: E16.2 ICD-9-CM: 251.2 History of gastric bypass     ICD-10-CM: Z98.84 ICD-9-CM: V45.86 Acute nonintractable headache, unspecified headache type     ICD-10-CM: R51 ICD-9-CM: 372. 0 Vitals BP Pulse Temp Resp Height(growth percentile) Weight(growth percentile) 109/67 (BP 1 Location: Left arm, BP Patient Position: Sitting) 61 96.4 °F (35.8 °C) (Oral) 16 4' 9\" (1.448 m) 180 lb (81.6 kg) SpO2 BMI OB Status Smoking Status 98% 38.95 kg/m2 Hysterectomy Never Smoker BMI and BSA Data Body Mass Index Body Surface Area 38.95 kg/m 2 1.81 m 2 Preferred Pharmacy Pharmacy Name Phone [address] [facility], [address] [phone] Your Updated Medication List  
  
   
This list is accurate as of 4/17/18  4:26 PM.  Always use your most recent med list.  
  
  
  
  
 albuterol 2.5 mg /3 mL (0.083 %) nebulizer solution Commonly known as:  PROVENTIL VENTOLIN  
3 mL by Nebulization route every four (4) hours as needed for Wheezing. butalbital-acetaminophen-caff -40 mg per capsule Commonly known as:  [employer] Take 1 Cap by mouth every four (4) hours as needed for Pain. cholecalciferol 1,000 unit tablet Commonly known as:  VITAMIN D3 Take 1 Tab by mouth daily. Indications: Vitamin D Deficiency  
  
 cyanocobalamin 1,000 mcg tablet Commonly known as:  VITAMIN B-12 TAKE 1 TABLET BY MOUTH DAILY FOR VITAMIN B12 DEFICIENCY  
  
 cyclobenzaprine 5 mg tablet Commonly known as:  FLEXERIL Take 1 Tab by mouth three (3) times daily as needed for Muscle Spasm(s). diclofenac 1 % Gel Commonly known as:  VOLTAREN Apply  to affected area four (4) times daily. fluticasone 50 mcg/actuation nasal spray Commonly known as:  Genie Narciso 2 Sprays by Both Nostrils route daily. furosemide 20 mg tablet Commonly known as:  LASIX Take 1 Tab by mouth daily. As needed  
  
 glucose 4 gram chewable tablet Take 4 Tabs by mouth as needed. meclizine 25 mg chewable tablet Commonly known as:  TRAVEL SICKNESS (MECLIZINE) TAKE 1 TABLET BY MOUTH THREE TIMES DAILY AS NEEDED FOR UP TO 10 DAYS FOR DIZZINESS Nebulizer & Compressor machine Use as directed  
  
 omeprazole 40 mg capsule Commonly known as:  PRILOSEC Take 1 Cap by mouth daily. polyethylene glycol 17 gram packet Commonly known as:  Errol Spaniel Take 1 Packet by mouth daily. potassium chloride 10 mEq tablet Commonly known as:  KLOR-CON Take 1 Tab by mouth daily. If takes lasix  
  
 topiramate 100 mg tablet Commonly known as:  TOPAMAX TAKE 1 TABLET BY MOUTH DAILY. INDICATIONS: MOOD  
  
 traZODone 50 mg tablet Commonly known as:  Wilton Venkata Take  by mouth nightly. ZOLOFT 50 mg tablet Generic drug:  sertraline Take  by mouth daily. Prescriptions Sent to Pharmacy Refills  
 butalbital-acetaminophen-caff (FIORICET) -40 mg per capsule 0 Sig: Take 1 Cap by mouth every four (4) hours as needed for Pain. Class: Normal  
 Pharmacy: [address] [place] [address] Ph #: [phone] Route: Oral  
 glucose 4 gram chewable tablet 5 Sig: Take 4 Tabs by mouth as needed. Class: Normal  
 Pharmacy: [address] [place] [address] Ph #: [phone] Route: Oral  
  
We Performed the Following AMB POC GLUCOSE BLOOD, BY GLUCOSE MONITORING DEVICE [26781 CPT(R)] REFERRAL TO ENDOCRINOLOGY [RSH19 Custom] Follow-up Instructions Return in about 6 weeks (around 5/29/2018) for routine follow up. To-Do List   
 04/23/2018 2:30 PM  
  Appointment with [address] [place] [address] 1 at 7 [facility] ([phone]) EXAM INSTRUCTIONS Do not wear deodorant, lotion, or powders to your appointment. GENERAL INSTRUCTIONS - Bring a list of all medications you are currently taking, including over the counter medications. - Only patients will be allowed in the exam room. This includes children. - Children under the age of 15 may not be left unattended. - [address] [place] [address] patients must have an armband and be accompanied by a caregiver or family member. - If you have a hand-written prescription for this exam, you must bring it with you on the day of your exam. - Bring all relevant prior images from any facility outside of [facility] with you on the day of your exam.  Failure to provide images may delay reading by Radiologist.  If you have questions or need to reschedule or cancel your appointment, call [phone]. Referral Information Referral ID Referred By Referred To  
  
 [id number] Kathy SOOD MD   
   [facility] Hortencia Gould Phone: [phone] Fax: [phone] Visits Status Start Date End Date 1 New Request 4/17/18 4/17/19 If your referral has a status of pending review or denied, additional information will be sent to support the outcome of this decision. Introducing [facility] & HE[facility]! [facility] introduces [employer] patient portal. Now you can access parts of your medical record, email your doctor's office, and request medication refills online. 1. In your internet browser, go to [email or web address] 2. Click on the First Time User? Click Here link in the Sign In box. You will see the New Member Sign Up page. 3. Enter your [employer] Access Code exactly as it appears below. You will not need to use this code after youve completed the sign-up process. If you do not sign up before the expiration date, you must request a new code. · [employer] Access Code: [email or web address] Expires: 7/8/2018 12:52 PM 
 
 4. Enter the last four digits of your Social Security Number (xxxx) and Date of Birth (mm/dd/yyyy) as indicated and click Submit. You will be taken to the next sign-up page. 5. Create a Nouvola ID. This will be your [employer] login ID and cannot be changed, so think of one that is secure and easy to remember. 6. Create a N[employer] password. You can change your password at any time. 7. Enter your Password Reset Question and Answer. This can be used at a later time if you forget your password. 8. Enter your e-mail address. You will receive e-mail notification when new information is available in [address] 9. Click Sign Up. You can now view and download portions of your medical record. 10. Click the Download Summary menu link to download a portable copy of your medical information. If you have questions, please visit the Frequently Asked Questions section of the [employer] website. Remember, [employer] is NOT to be used for urgent needs. For medical emergencies, dial 911. Now available from your iPhone and Android! Please provide this summary of care documentation to your next provider. Your primary care clinician is listed as Kathy Menendez. If you have any questions after today's visit, please call [phone].

## 2018-04-23 ENCOUNTER — HOSPITAL ENCOUNTER (OUTPATIENT)
Dept: MAMMOGRAPHY | Age: 58
Discharge: HOME | End: 2018-04-23
Payer: MEDICARE

## 2018-04-23 DIAGNOSIS — Z12.31 SCREENING MAMMOGRAM, ENCOUNTER FOR: ICD-10-CM

## 2018-04-23 PROCEDURE — 77067 SCR MAMMO BI INCL CAD: CPT

## 2018-04-27 ENCOUNTER — TELEPHONE (OUTPATIENT)
Dept: INTERNAL MEDICINE CLINIC | Age: 58
End: 2018-04-27

## 2018-04-27 NOTE — TELEPHONE ENCOUNTER
Pt would like a call back in ref to her vitamin d stated that she is supposed to taker 5000 instead of 1000.  She wants a call back as soon as possible b/c her script needs to be re written

## 2018-05-04 ENCOUNTER — OFFICE VISIT (OUTPATIENT)
Dept: INTERNAL MEDICINE CLINIC | Age: 58
End: 2018-05-04

## 2018-05-04 VITALS
DIASTOLIC BLOOD PRESSURE: 76 MMHG | BODY MASS INDEX: 37.97 KG/M2 | HEART RATE: 48 BPM | HEIGHT: 57 IN | OXYGEN SATURATION: 99 % | RESPIRATION RATE: 16 BRPM | TEMPERATURE: 96.9 F | SYSTOLIC BLOOD PRESSURE: 113 MMHG | WEIGHT: 176 LBS

## 2018-05-04 DIAGNOSIS — J30.1 SEASONAL ALLERGIC RHINITIS DUE TO POLLEN: ICD-10-CM

## 2018-05-04 DIAGNOSIS — Z98.84 HISTORY OF GASTRIC BYPASS: Chronic | ICD-10-CM

## 2018-05-04 DIAGNOSIS — E16.2 HYPOGLYCEMIA: Primary | ICD-10-CM

## 2018-05-04 RX ORDER — FLUTICASONE PROPIONATE 50 MCG
2 SPRAY, SUSPENSION (ML) NASAL DAILY
Qty: 1 BOTTLE | Refills: 5 | Status: SHIPPED | OUTPATIENT
Start: 2018-05-04

## 2018-05-04 NOTE — PROGRESS NOTES
Chief Complaint   Patient presents with    Diabetes     follow up     Health Maintenance reviewed. I have reviewed the patient's medical history in detail and updated the computerized patient record. 1. Have you been to the ER, urgent care clinic since your last visit? Hospitalized since your last visit?no    2. Have you seen or consulted any other health care providers outside of the [facility] since your last visit? Include any pap smears or colon screening. No    Encouraged pt to discuss pt's wishes with spouse/partner/family and bring them in the next appt to follow thru with the Advanced Directive    Fall Risk Assessment, last 12 mths 1/17/2018   Able to walk? Yes   Fall in past 12 months? Yes   Fall with injury? Yes   Number of falls in past 12 months 4   Fall Risk Score 5       PHQ over the last two weeks 1/17/2018   Little interest or pleasure in doing things Several days   Feeling down, depressed or hopeless Several days   Total Score PHQ 2 2       Abuse Screening Questionnaire 1/17/2018   Do you ever feel afraid of your partner? N   Are you in a relationship with someone who physically or mentally threatens you? N   Is it safe for you to go home?  Y       ADL Assessment 1/17/2018   Feeding yourself No Help Needed   Getting from bed to chair No Help Needed   Getting dressed No Help Needed   Bathing or showering No Help Needed   Walk across the room (includes cane/walker) No Help Needed   Using the telphone No Help Needed   Taking your medications No Help Needed   Preparing meals No Help Needed   Managing money (expenses/bills) No Help Needed   Moderately strenuous housework (laundry) No Help Needed   Shopping for personal items (toiletries/medicines) No Help Needed   Shopping for groceries No Help Needed   Driving No Help Needed   Climbing a flight of stairs No Help Needed   Getting to places beyond walking distances No Help Needed

## 2018-05-04 NOTE — PROGRESS NOTES
HISTORY OF PRESENT Sheridan Modi is a 65 y.o. female. Dizziness    The history is provided by the patient. This is a recurrent problem. The current episode started more than 1 week ago (2-3 weeks aago). The problem has been gradually improving (no further re-occurence ). She lost consciousness for a period of less than one minute. The problem is associated with nothing. Associated symptoms include dizziness. Pertinent negatives include no chest pain and no focal weakness. Associated symptoms comments: Can feel sugar drop last 1 was 10 days ago. Treatments tried: took a sugar pill. The treatment provided moderate relief. Has Appt to see endocrine June 16th      Review of Systems   Cardiovascular: Negative for chest pain. Neurological: Positive for dizziness. Negative for focal weakness and loss of consciousness. Physical Exam  Visit Vitals    /76 (BP 1 Location: Left arm, BP Patient Position: Sitting)    Pulse (!) 48    Temp 96.9 °F (36.1 °C) (Oral)    Resp 16    Ht 4' 9\" (1.448 m)    Wt 176 lb (79.8 kg)    SpO2 99%    BMI 38.09 kg/m2     WD WN female NAD has the sniffles  Heart RRR without murmers clicks or rubs  Lungs CTA  Abdo soft nontender  Ext no edema    ASSESSMENT and PLAN  Encounter Diagnoses   Name Primary?  Hypoglycemia Yes    Seasonal allergic rhinitis due to pollen     History of gastric bypass      Orders Placed This Encounter    fluticasone (FLONASE) 50 mcg/actuation nasal spray     Cont suagr pill when needed, f/u with endocrine. Follow-up Disposition:  Return in about 2 months (around 7/4/2018) for routine follow up.

## 2018-05-04 NOTE — MR AVS SNAPSHOT
[address] 6337 [facility] 
[phone] Patient: Jigar Cooper MRN: [id number] :1960 Visit Information Date & Time Provider Department Dept. Phone Encounter #  
 2018  1:15 PM Latosha Gutierrez  [facility] [phone] Follow-up Instructions Return in about 2 months (around 2018) for routine follow up. Your Appointments 2018 10:15 AM  
ACUTE CARE with MD Carolina Brown [address] [place] [address]) Appt Note: f/u on test $0 cp $0 pb 18 ls; f/u on test $0 cp $0 pb 18 [facility]  
 117 [facility]. [address] [place] [address]  
[phone]  
  
   
 [address] [place] [address] [address]  
  
    
 6/15/2018  3:00 PM  
New Patient with Tammy Shelby MD  
[facility] Endocr[facility]) Appt Note: New Patient, \"Low Sugar Reading\" cl18; Referred By: [phone] \"Low Sugar Readings\" pt scheduled; r/s cl18  
 3660 [facility] [address]  
[phone]  
  
   
 [address] [address] Upcoming Health Maintenance Date Due  
 MEDICARE YEARLY EXAM 5/10/2018 Influenza Age 5 to Adult 2018 PAP AKA CERVICAL CYTOLOGY 12/3/2018 BREAST CANCER SCRN MAMMOGRAM 3/8/2019 DTaP/Tdap/Td series (2 - Tdap) 2021 COLONOSCOPY 8/3/2026 Allergies as of 2018  Review Complete On: 2018 By: Latosha Gutierrez MD  
  
 Severity Noted Reaction Type Reactions Tylenol [Acetaminophen] High 10/02/2015    Anaphylaxis, Hives Benadryl [Diphenhydramine Hcl] Medium 2015    Nausea and Vomiting Ibuprofen  10/12/2014    Nausea and Vomiting Tramadol  2018    Anxiety Current Immunizations  Reviewed on 10/2/2015 Name Date DTaP 2011 Hep B Vaccine 10/16/2016, 2016 Hep B Vaccine (Adult) 5/9/2017 Influenza Vaccine 10/13/2016, 9/9/2015, 9/30/2014, 9/13/2013, 3/28/2013, 11/14/2011, 1/13/2011, 1/13/2011, 12/17/2009, 12/17/2009, 1/8/2009, 1/8/2009, 11/8/2007, 11/8/2007 Influenza Vaccine (Quad) PF 12/22/2017, 9/24/2014 Pneumococcal Polysaccharide (PPSV-23) 1/8/2009 Tdap 6/23/2011 Not reviewed this visit You Were Diagnosed With   
  
 Codes Comments Hypoglycemia    -  Primary ICD-10-CM: E16.2 ICD-9-CM: 251.2 Seasonal allergic rhinitis due to pollen     ICD-10-CM: J30.1 ICD-9-CM: 477.0 History of gastric bypass     ICD-10-CM: Z98.84 ICD-9-CM: V45.86 Vitals BP Pulse Temp Resp Height(growth percentile) Weight(growth percentile) 113/76 (BP 1 Location: Left arm, BP Patient Position: Sitting) (!) 48 96.9 °F (36.1 °C) (Oral) 16 4' 9\" (1.448 m) 176 lb (79.8 kg) SpO2 BMI OB Status Smoking Status 99% 38.09 kg/m2 Hysterectomy Never Smoker BMI and BSA Data Body Mass Index Body Surface Area 38.09 kg/m 2 1.79 m 2 Preferred Pharmacy Pharmacy Name Phone 150 [facility], [address] [place] [address] Road [phone] Your Updated Medication List  
  
   
This list is accurate as of 5/4/18  2:09 PM.  Always use your most recent med list.  
  
  
  
  
 albuterol 2.5 mg /3 mL (0.083 %) nebulizer solution Commonly known as:  PROVENTIL VENTOLIN  
3 mL by Nebulization route every four (4) hours as needed for Wheezing. butalbital-acetaminophen-caff -40 mg per capsule Commonly known as:  [employer] Take 1 Cap by mouth every four (4) hours as needed for Pain. cholecalciferol 1,000 unit tablet Commonly known as:  VITAMIN D3 Take 1 Tab by mouth daily. Indications: Vitamin D Deficiency  
  
 cyanocobalamin 1,000 mcg tablet Commonly known as:  VITAMIN B-12 TAKE 1 TABLET BY MOUTH DAILY FOR VITAMIN B12 DEFICIENCY  
  
 cyclobenzaprine 5 mg tablet Commonly known as:  FLEXERIL  
 Take 1 Tab by mouth three (3) times daily as needed for Muscle Spasm(s). diclofenac 1 % Gel Commonly known as:  VOLTAREN Apply  to affected area four (4) times daily. fluticasone 50 mcg/actuation nasal spray Commonly known as:  Brian El 2 Sprays by Both Nostrils route daily. furosemide 20 mg tablet Commonly known as:  LASIX Take 1 Tab by mouth daily. As needed  
  
 glucose 4 gram chewable tablet Take 4 Tabs by mouth as needed. meclizine 25 mg chewable tablet Commonly known as:  TRAVEL SICKNESS (MECLIZINE) TAKE 1 TABLET BY MOUTH THREE TIMES DAILY AS NEEDED FOR UP TO 10 DAYS FOR DIZZINESS Nebulizer & Compressor machine Use as directed  
  
 omeprazole 40 mg capsule Commonly known as:  PRILOSEC Take 1 Cap by mouth daily. polyethylene glycol 17 gram packet Commonly known as:  Marlane Blinks Take 1 Packet by mouth daily. potassium chloride 10 mEq tablet Commonly known as:  KLOR-CON Take 1 Tab by mouth daily. If takes lasix  
  
 topiramate 100 mg tablet Commonly known as:  TOPAMAX TAKE 1 TABLET BY MOUTH DAILY. INDICATIONS: MOOD  
  
 traZODone 50 mg tablet Commonly known as:  Darreld Wapella Take  by mouth nightly. ZOLOFT 50 mg tablet Generic drug:  sertraline Take  by mouth daily. Prescriptions Sent to Pharmacy Refills  
 fluticasone (FLONASE) 50 mcg/actuation nasal spray 5 Si Sprays by Both Nostrils route daily. Class: Normal  
 Pharmacy: [address] [place] [address] #: [phone] Route: Both Nostrils Follow-up Instructions Return in about 2 months (around 2018) for routine follow up. Introducing [facility]! [employer] introduces [employer] patient portal. Now you can access parts of your medical record, email your doctor's office, and request medication refills online. 1. In your internet browser, go to [email or web address] 2. Click on the First Time User? Click Here link in the Sign In box. You will see the New Member Sign Up page. 3. Enter your [employer] Access Code exactly as it appears below. You will not need to use this code after youve completed the sign-up process. If you do not sign up before the expiration date, you must request a new code. · [employer]Now Access Code: [email or web address] Expires: 7/8/2018 12:52 PM 
 
4. Enter the last four digits of your Social Security Number (xxxx) and Date of Birth (mm/dd/yyyy) as indicated and click Submit. You will be taken to the next sign-up page. 5. Create a [employer]Now ID. This will be your [employer]Now login ID and cannot be changed, so think of one that is secure and easy to remember. 6. Create a [employer]Now password. You can change your password at any time. 7. Enter your Password Reset Question and Answer. This can be used at a later time if you forget your password. 8. Enter your e-mail address. You will receive e-mail notification when new information is available in [address] 9. Click Sign Up. You can now view and download portions of your medical record. 10. Click the Download Summary menu link to download a portable copy of your medical information. If you have questions, please visit the Frequently Asked Questions section of the [employer] website. Remember, [employer]Now is NOT to be used for urgent needs. For medical emergencies, dial 911. Now available from your iPhone and Android! Please provide this summary of care documentation to your next provider. Your primary care clinician is listed as Kentrell Perkins. If you have any questions after today's visit, please call [phone].

## 2018-05-17 ENCOUNTER — TELEPHONE (OUTPATIENT)
Dept: INTERNAL MEDICINE CLINIC | Age: 58
End: 2018-05-17

## 2018-05-17 NOTE — TELEPHONE ENCOUNTER
Pt would like to speak with Jefferson Tomas. She said Jefferson Tomas helped her out the last time she was here. Please call her at [phone].

## 2018-05-18 NOTE — TELEPHONE ENCOUNTER
Spoke with Tasha Louis (Dr. Clifford Lacey) and she informed me that the patient was seen 10.16.2017 in regards to breast reduction surgery. It was explained that there is no authorization to get with her insurance for breast reduction and also the patient has a balance due. Tried to explain to the patient that the success of her getting her breast reduction surgery is like rolling dice with her insurance. Tried to explain to her if the criteria is met; it will be paid and if there is one minor thing, the cost will have to be covered out of pocket. Patient states that she will try to call her insurance company.

## 2018-05-21 ENCOUNTER — TELEPHONE (OUTPATIENT)
Dept: INTERNAL MEDICINE CLINIC | Age: 58
End: 2018-05-21

## 2018-05-21 NOTE — TELEPHONE ENCOUNTER
----- Message from Lamberto Cordial sent at 5/18/2018 10:45 AM EDT -----  Regarding: Dr. Pablo Weldon   Pt is requesting a call back regarding her Rx and pt was initially returning a missed call.     Best contact number [phone]

## 2018-05-23 NOTE — TELEPHONE ENCOUNTER
Returned pt's call and her Vit D should be 5000 units instead of 1000. She has an appt soon with Dr. Divine Almanza and will talk to him about this.

## 2018-06-10 RX ORDER — MECLIZINE HCL 25 MG/1
TABLET, CHEWABLE ORAL
Qty: 60 TAB | Refills: 2 | Status: SHIPPED | OUTPATIENT
Start: 2018-06-10 | End: 2018-08-13 | Stop reason: SDUPTHER

## 2018-07-06 ENCOUNTER — OFFICE VISIT (OUTPATIENT)
Dept: INTERNAL MEDICINE CLINIC | Age: 58
End: 2018-07-06

## 2018-07-06 VITALS
OXYGEN SATURATION: 96 % | RESPIRATION RATE: 18 BRPM | SYSTOLIC BLOOD PRESSURE: 105 MMHG | HEART RATE: 60 BPM | TEMPERATURE: 98.1 F | DIASTOLIC BLOOD PRESSURE: 71 MMHG | WEIGHT: 175 LBS | HEIGHT: 57 IN | BODY MASS INDEX: 37.76 KG/M2

## 2018-07-06 DIAGNOSIS — F31.31 BIPOLAR AFFECTIVE DISORDER, CURRENTLY DEPRESSED, MILD (HCC): ICD-10-CM

## 2018-07-06 DIAGNOSIS — H60.312 CHRONIC DIFFUSE OTITIS EXTERNA OF LEFT EAR: ICD-10-CM

## 2018-07-06 DIAGNOSIS — E16.2 HYPOGLYCEMIA: ICD-10-CM

## 2018-07-06 DIAGNOSIS — R10.9 SIDE PAIN: ICD-10-CM

## 2018-07-06 DIAGNOSIS — G43.109 MIGRAINE AURA WITHOUT HEADACHE: ICD-10-CM

## 2018-07-06 DIAGNOSIS — Z13.39 SCREENING FOR ALCOHOLISM: ICD-10-CM

## 2018-07-06 DIAGNOSIS — Z00.00 MEDICARE ANNUAL WELLNESS VISIT, SUBSEQUENT: Primary | ICD-10-CM

## 2018-07-06 LAB
BILIRUB UR QL STRIP: NORMAL
GLUCOSE UR-MCNC: NEGATIVE MG/DL
KETONES P FAST UR STRIP-MCNC: NORMAL MG/DL
PH UR STRIP: 5.5 [PH] (ref 4.6–8)
PROT UR QL STRIP: NORMAL
SP GR UR STRIP: 1.02 (ref 1–1.03)
UA UROBILINOGEN AMB POC: NORMAL (ref 0.2–1)
URINALYSIS CLARITY POC: NORMAL
URINALYSIS COLOR POC: NORMAL
URINE BLOOD POC: NEGATIVE
URINE LEUKOCYTES POC: NORMAL
URINE NITRITES POC: NEGATIVE

## 2018-07-06 RX ORDER — ACETIC ACID 20.65 MG/ML
4 SOLUTION AURICULAR (OTIC) 3 TIMES DAILY
Qty: 15 ML | Refills: 1 | Status: SHIPPED | OUTPATIENT
Start: 2018-07-06 | End: 2018-07-13

## 2018-07-06 RX ORDER — BUTALBITAL, ACETAMINOPHEN AND CAFFEINE 300; 40; 50 MG/1; MG/1; MG/1
1 CAPSULE ORAL
Qty: 10 CAP | Refills: 1 | Status: SHIPPED | OUTPATIENT
Start: 2018-07-06 | End: 2018-09-19 | Stop reason: SDUPTHER

## 2018-07-06 NOTE — PROGRESS NOTES
Chief Complaint   Patient presents with    Abdominal Pain     X 1 WEEK; RIGHT SIDE WITH RADIATION TO LOWER RIGHT BACK     1. Have you been to the ER, urgent care clinic since your last visit? Hospitalized since your last visit? No    2. Have you seen or consulted any other health care providers outside of the [address] [place] [address] since your last visit? Include any pap smears or colon screening. No     Health Maintenance Due   Topic Date Due    MEDICARE YEARLY EXAM  05/10/2018     Do you have an [address] in place in the event that you have a healthcare crisis that could impact your decision making as it pertains to your health? NO    Would you like information about Advance Care Planning? NO    Information given.  NO

## 2018-07-06 NOTE — PATIENT INSTRUCTIONS
Medicare Wellness Visit, Female    The best way to live healthy is to have a lifestyle where you eat a well-balanced diet, exercise regularly, limit alcohol use, and quit all forms of tobacco/nicotine, if applicable. Regular preventive services are another way to keep healthy. Preventive services (vaccines, screening tests, monitoring & exams) can help personalize your care plan, which helps you manage your own care. Screening tests can find health problems at the earliest stages, when they are easiest to treat. [address] [facility] follows the current, evidence-based guidelines published by the [facility] Keegan Harrison ([facility]) when recommending preventive services for our patients. Because we follow these guidelines, sometimes recommendations change over time as research supports it. (For example, mammograms used to be recommended annually. Even though Medicare will still pay for an annual mammogram, the newer guidelines recommend a mammogram every two years for women of average risk.)    Of course, you and your provider may decide to screen more often for some diseases, based on your risk and co-morbidities (chronic disease you are already diagnosed with). Preventive services for you include:    - Medicare offers their members a free annual wellness visit, which is time for you and your primary care provider to discuss and plan for your preventive service needs. Take advantage of this benefit every year!    -All people over age 72 should receive the recommended pneumonia vaccines. Current USPSTF guidelines recommend a series of two vaccines for the best pneumonia protection.     -All adults should have a yearly flu vaccine and a tetanus vaccine every 10 years. All adults age 61 years should receive a shingles vaccine once in their lifetime.      -A bone mass density test is recommended when a woman turns 65 to screen for osteoporosis.  This test is only recommended once as a screening. Some providers will use this same test as a disease monitoring tool if you already have osteoporosis. -All adults age 38-68 years who are overweight should have a diabetes screening test once every three years.     -Other screening tests & preventive services for persons with diabetes include: an eye exam to screen for diabetic retinopathy, a kidney function test, a foot exam, and stricter control over your cholesterol.     -Cardiovascular screening for adults with routine risk involves an electrocardiogram (ECG) at intervals determined by the provider.     -Colorectal cancer screenings should be done for adults age 54-65 years with normal risk. There are a number of acceptable methods of screening for this type of cancer. Each test has its own benefits and drawbacks. Discuss with your provider what is most appropriate for you during your annual wellness visit. The different tests include: colonoscopy (considered the best screening method), a fecal occult blood test, a fecal DNA test, and sigmoidoscopy. -Breast cancer screenings are recommended every other year for women of normal risk age 54-69 years.     -Cervical cancer screenings for women over age 72 are only recommended with certain risk factors.     -All adults born between [facility] should be screened once for Hepatitis C.      Here is a list of your current Health Maintenance items (your personalized list of preventive services) with a due date:  Health Maintenance Due   Topic Date Due    Annual Well Visit  05/10/2018

## 2018-07-06 NOTE — PROGRESS NOTES
HISTORY OF PRESENT Rosey Petaluma is a 65 y.o. female. Abdominal Pain   The history is provided by the patient. This is a new problem. Episode onset: 5 days ago. The problem occurs daily. The problem has been gradually improving. Associated symptoms include abdominal pain. Pertinent negatives include no chest pain. She has tried rest for the symptoms. Not seen endocrine yet about her hypos, she had to reschdule, dinks juice and uses sugar pill if drops. No recent LOC. Past Surgical History:   Procedure Laterality Date    HX ABDOMINAL LAPAROSCOPY      HX CARPAL TUNNEL RELEASE Bilateral more than 10 years ago    HX  SECTION  5763,7065    HX COLONOSCOPY  2016    HX GASTRIC BYPASS  1997    x2    HX HYSTERECTOMY  1985    HX TONSIL AND ADENOIDECTOMY  more than 10 years ago       Current Outpatient Prescriptions   Medication Sig Dispense Refill    TRAVEL SICKNESS, MECLIZINE, 25 mg chewable tablet TAKE 1 TABLET BY MOUTH THREE TIMES DAILY AS NEEDED FOR UP TO 10 DAYS FOR DIZZINESS 60 Tab 2    fluticasone (FLONASE) 50 mcg/actuation nasal spray 2 Sprays by Both Nostrils route daily. 1 Bottle 5    glucose 4 gram chewable tablet Take 4 Tabs by mouth as needed. 30 Tab 5    cholecalciferol (VITAMIN D3) 1,000 unit tablet Take 1 Tab by mouth daily. Indications: Vitamin D Deficiency 180 Tab 1    cyanocobalamin (VITAMIN B-12) 1,000 mcg tablet TAKE 1 TABLET BY MOUTH DAILY FOR VITAMIN B12 DEFICIENCY 90 Tab 1    polyethylene glycol (MIRALAX) 17 gram packet Take 1 Packet by mouth daily. 50 Packet 5    topiramate (TOPAMAX) 100 mg tablet TAKE 1 TABLET BY MOUTH DAILY. INDICATIONS: MOOD 30 Tab 5    diclofenac (VOLTAREN) 1 % gel Apply  to affected area four (4) times daily. 1 Each 0    cyclobenzaprine (FLEXERIL) 5 mg tablet Take 1 Tab by mouth three (3) times daily as needed for Muscle Spasm(s). 60 Tab 0    traZODone (DESYREL) 50 mg tablet Take  by mouth nightly.       furosemide (LASIX) 20 mg tablet Take 1 Tab by mouth daily. As needed 90 Tab 3    omeprazole (PRILOSEC) 40 mg capsule Take 1 Cap by mouth daily. 90 Cap 3    Nebulizer & Compressor machine Use as directed 1 Each 0    albuterol (PROVENTIL VENTOLIN) 2.5 mg /3 mL (0.083 %) nebulizer solution 3 mL by Nebulization route every four (4) hours as needed for Wheezing. 100 Each 1    sertraline (ZOLOFT) 50 mg tablet Take  by mouth daily.  butalbital-acetaminophen-caff (FIORICET) -40 mg per capsule Take 1 Cap by mouth every four (4) hours as needed for Pain. 10 Cap 0    potassium chloride (KLOR-CON) 10 mEq tablet Take 1 Tab by mouth daily. If takes lasix 90 Tab 3     Allergies   Allergen Reactions    Tylenol [Acetaminophen] Anaphylaxis and Hives    Benadryl [Diphenhydramine Hcl] Nausea and Vomiting    Ibuprofen Nausea and Vomiting    Sulfamethoxazole-Trimethoprim Nausea Only    Tramadol Anxiety       Review of Systems   Cardiovascular: Negative for chest pain. Gastrointestinal: Positive for abdominal pain. Negative for blood in stool and constipation. Genitourinary: Negative for dysuria. L ear bothers her uses drops, needs rf of HA med    Physical Exam  Visit Vitals    /71 (BP 1 Location: Right arm, BP Patient Position: Sitting)    Pulse 60    Temp 98.1 °F (36.7 °C) (Oral)    Resp 18    Ht 4' 9\" (1.448 m)    Wt 175 lb (79.4 kg)    SpO2 96%    BMI 37.87 kg/m2     WD WN female NAD  Heart RRR without murmers clicks or rubs  Lungs CTA  Abdo soft nontender  Ext no edema      ASSESSMENT and PLAN  Encounter Diagnoses   Name Primary?  Side pain Yes    Medicare annual wellness visit, subsequent     Screening for alcoholism      Orders Placed This Encounter    AMB POC URINALYSIS DIP STICK AUTO W/O MICRO   No cause for flank pain found it's improving, call me if returns.       This is the Subsequent Medicare Annual Wellness Exam, performed 12 months or more after the Initial AWV or the last Subsequent AWV    I have reviewed the patient's medical history in detail and updated the computerized patient record. History     Past Medical History:   Diagnosis Date    Hypoglycemia     Hypotension     Syncope and collapse 7/24/15    [facility]TH      Past Surgical History:   Procedure Laterality Date    HX ABDOMINAL LAPAROSCOPY      HX CARPAL TUNNEL RELEASE Bilateral more than 10 years ago    HX  SECTION  9583,0066    HX COLONOSCOPY  2016    HX GASTRIC BYPASS  1997    x2    HX HYSTERECTOMY  1985    HX TONSIL AND ADENOIDECTOMY  more than 10 years ago     Current Outpatient Prescriptions   Medication Sig Dispense Refill    TRAVEL SICKNESS, MECLIZINE, 25 mg chewable tablet TAKE 1 TABLET BY MOUTH THREE TIMES DAILY AS NEEDED FOR UP TO 10 DAYS FOR DIZZINESS 60 Tab 2    fluticasone (FLONASE) 50 mcg/actuation nasal spray 2 Sprays by Both Nostrils route daily. 1 Bottle 5    glucose 4 gram chewable tablet Take 4 Tabs by mouth as needed. 30 Tab 5    cholecalciferol (VITAMIN D3) 1,000 unit tablet Take 1 Tab by mouth daily. Indications: Vitamin D Deficiency 180 Tab 1    cyanocobalamin (VITAMIN B-12) 1,000 mcg tablet TAKE 1 TABLET BY MOUTH DAILY FOR VITAMIN B12 DEFICIENCY 90 Tab 1    polyethylene glycol (MIRALAX) 17 gram packet Take 1 Packet by mouth daily. 50 Packet 5    topiramate (TOPAMAX) 100 mg tablet TAKE 1 TABLET BY MOUTH DAILY. INDICATIONS: MOOD 30 Tab 5    diclofenac (VOLTAREN) 1 % gel Apply  to affected area four (4) times daily. 1 Each 0    cyclobenzaprine (FLEXERIL) 5 mg tablet Take 1 Tab by mouth three (3) times daily as needed for Muscle Spasm(s). 60 Tab 0    traZODone (DESYREL) 50 mg tablet Take  by mouth nightly.  furosemide (LASIX) 20 mg tablet Take 1 Tab by mouth daily. As needed 90 Tab 3    omeprazole (PRILOSEC) 40 mg capsule Take 1 Cap by mouth daily.  90 Cap 3    Nebulizer & Compressor machine Use as directed 1 Each 0    albuterol (PROVENTIL VENTOLIN) 2.5 mg /3 mL (0.083 %) nebulizer solution 3 mL by Nebulization route every four (4) hours as needed for Wheezing. 100 Each 1    sertraline (ZOLOFT) 50 mg tablet Take  by mouth daily.  butalbital-acetaminophen-caff (FIORICET) -40 mg per capsule Take 1 Cap by mouth every four (4) hours as needed for Pain. 10 Cap 0    potassium chloride (KLOR-CON) 10 mEq tablet Take 1 Tab by mouth daily. If takes lasix 90 Tab 3     Allergies   Allergen Reactions    Tylenol [Acetaminophen] Anaphylaxis and Hives    Benadryl [Diphenhydramine Hcl] Nausea and Vomiting    Ibuprofen Nausea and Vomiting    Sulfamethoxazole-Trimethoprim Nausea Only    Tramadol Anxiety     Family History   Problem Relation Age of Onset    Stroke Mother     Cancer Father     Diabetes Brother     Cancer Maternal Aunt     Cancer Maternal Grandmother     Cancer Brother     Kidney Disease Brother      Social History   Substance Use Topics    Smoking status: Never Smoker    Smokeless tobacco: Never Used    Alcohol use No     Patient Active Problem List   Diagnosis Code    Bilateral leg edema R60.0    History of gastric bypass Z98.84    Migraine aura without headache G43. 109    Bipolar disorder with current episode depressed ([facility] [address] F31.30    Gastroesophageal reflux disease without esophagitis K21.9    Obesity, morbid ([facility] [address] E66.01       Depression Risk Factor Screening:     PHQ over the last two weeks 1/17/2018   Little interest or pleasure in doing things Several days   Feeling down, depressed or hopeless Several days   Total Score PHQ 2 2     Alcohol Risk Factor Screening: You do not drink alcohol or very rarely. Functional Ability and Level of Safety:   Hearing Loss  Hearing is good. Activities of Daily Living  The home contains: no safety equipment. Patient does total self care    Fall Risk  Fall Risk Assessment, last 12 mths 1/17/2018   Able to walk? Yes   Fall in past 12 months? Yes   Fall with injury?  Yes   Number of falls in past 12 months 4   Fall Risk Score 5 Abuse Screen  Patient is not abused    Cognitive Screening   Evaluation of Cognitive Function:  Has your family/caregiver stated any concerns about your memory: no  Normal    Patient Care Team   Patient Care Team:  Elizabeth Wyatt MD as PCP - General (Family Practice)  Kari Keyes MD as Physician (Cardiology)   Pam Ceja psych    Assessment/Plan   Education and counseling provided:  Are appropriate based on today's review and evaluation      ICD-10-CM ICD-9-CM    1. Medicare annual wellness visit, subsequent Z00.00 V70.0 AMB POC URINALYSIS DIP STICK AUTO W/O MICRO   2. Screening for alcoholism Z13.89 V79.1 [place] ANNUAL ALCOHOL SCREEN 15 MIN      AMB POC URINALYSIS DIP STICK AUTO W/O MICRO   3. Side pain R10.9 789.00 AMB POC URINALYSIS DIP STICK AUTO W/O MICRO   4. Migraine aura without headache G43.109 346.00    5. Chronic diffuse otitis externa of left ear H60.312 380.23    6. Hypoglycemia E16.2 251.2    7. Bipolar affective disorder, currently depressed, mild ([facility] [address] F31.31 296.51          Health Maintenance Due   Topic Date Due    MEDICARE YEARLY EXAM  05/10/2018     Follow-up Disposition:  Return in about 4 months (around 11/6/2018) for routine follow up. We discussed a screening exam STI testing and the  the pros and cons of having the test done. The patient made a decision to do the test: yes and declines    Follow-up Disposition:  Return in about 4 months (around 11/6/2018) for routine follow up.

## 2018-07-06 NOTE — MR AVS SNAPSHOT
[address] [place] [address]. [address] 150[address] [facility] 
[phone] Patient: Reubin Epley MRN: [id number] :1960 Visit Information Date & Time Provider Department Dept. Phone Encounter #  
 2018  1:15 PM Minna Blizzard,  Jamie Alex [id number] Follow-up Instructions Return in about 4 months (around 2018) for routine follow up. Your Appointments 8/15/2018  3:00 PM  
New Patient with Selvin Alfred MD  
[facility]) Appt Note: New Patient, \"Low Sugar Reading\" cl18; Referred By: [phone] \"Low Sugar Readings\" pt scheduled; r/s cl18; [employer] Cancellation [address] [place] [address] [address] [place] [address]  
[phone]  
  
   
 [address] [place] [id number] Upcoming Health Maintenance Date Due  
 MEDICARE YEARLY EXAM 5/10/2018 Influenza Age 5 to Adult 2018 PAP AKA CERVICAL CYTOLOGY 12/3/2018 BREAST CANCER SCRN MAMMOGRAM 2020 DTaP/Tdap/Td series (2 - Tdap) 2021 COLONOSCOPY 8/3/2026 Allergies as of 2018  Review Complete On: 2018 By: Minna Blizzard, MD  
  
 Severity Noted Reaction Type Reactions Tylenol [Acetaminophen] High 10/02/2015    Anaphylaxis, Hives Benadryl [Diphenhydramine Hcl] Medium 2015    Nausea and Vomiting Ibuprofen  10/12/2014    Nausea and Vomiting  
 Sulfamethoxazole-trimethoprim    Nausea Only Tramadol  2018    Anxiety Current Immunizations  Reviewed on 10/2/2015 Name Date DTaP 2011 Hep B Vaccine 10/16/2016, 2016 Hep B Vaccine (Adult) 2017 Influenza Vaccine 10/13/2016, 2015, 2014, 2013, 3/28/2013, 2011, 2011, 2011, 2009, 2009, 2009, 2009, 2007, 2007 Influenza Vaccine (Quad) PF 2017, 2014 Pneumococcal Polysaccharide (PPSV-23) 1/8/2009 Tdap 6/23/2011 Not reviewed this visit You Were Diagnosed With   
  
 Codes Comments Side pain    -  Primary ICD-10-CM: R10.9 ICD-9-CM: 789.00 Medicare annual wellness visit, subsequent     ICD-10-CM: Z00.00 ICD-9-CM: V70.0 Screening for alcoholism     ICD-10-CM: Z13.89 ICD-9-CM: V79.1 Migraine aura without headache     ICD-10-CM: G43.109 ICD-9-CM: 346.00 Chronic diffuse otitis externa of left ear     ICD-10-CM: H60.312 ICD-9-CM: 380.23 Hypoglycemia     ICD-10-CM: E16.2 ICD-9-CM: 251.2 Bipolar affective disorder, currently depressed, mild (HCC)     ICD-10-CM: F31.31 
ICD-9-CM: 296.51 Vitals BP Pulse Temp Resp Height(growth percentile) Weight(growth percentile) 105/71 (BP 1 Location: Right arm, BP Patient Position: Sitting) 60 98.1 °F (36.7 °C) (Oral) 18 4' 9\" (1.448 m) 175 lb (79.4 kg) SpO2 BMI OB Status Smoking Status 96% 37.87 kg/m2 Hysterectomy Never Smoker Vitals History BMI and BSA Data Body Mass Index Body Surface Area  
 37.87 kg/m 2 1.79 m 2 Preferred Pharmacy Pharmacy Name Phone 150 [facility], [address] [place] [address] [phone] Your Updated Medication List  
  
   
This list is accurate as of 7/6/18  1:50 PM.  Always use your most recent med list.  
  
  
  
  
 acetic acid 2 % otic solution Commonly known as:  Morales Likes Administer 4 Drops in left ear three (3) times daily for 7 days. albuterol 2.5 mg /3 mL (0.083 %) nebulizer solution Commonly known as:  PROVENTIL VENTOLIN  
3 mL by Nebulization route every four (4) hours as needed for Wheezing. butalbital-acetaminophen-caff -40 mg per capsule Commonly known as:  [employer] Take 1 Cap by mouth every four (4) hours as needed for Pain. cholecalciferol 1,000 unit tablet Commonly known as:  VITAMIN D3  
 Take 1 Tab by mouth daily. Indications: Vitamin D Deficiency  
  
 cyanocobalamin 1,000 mcg tablet Commonly known as:  VITAMIN B-12 TAKE 1 TABLET BY MOUTH DAILY FOR VITAMIN B12 DEFICIENCY  
  
 cyclobenzaprine 5 mg tablet Commonly known as:  FLEXERIL Take 1 Tab by mouth three (3) times daily as needed for Muscle Spasm(s). diclofenac 1 % Gel Commonly known as:  VOLTAREN Apply  to affected area four (4) times daily. fluticasone 50 mcg/actuation nasal spray Commonly known as:  Joseph Merles 2 Sprays by Both Nostrils route daily. furosemide 20 mg tablet Commonly known as:  LASIX Take 1 Tab by mouth daily. As needed  
  
 glucose 4 gram chewable tablet Take 4 Tabs by mouth as needed. Nebulizer & Compressor machine Use as directed  
  
 omeprazole 40 mg capsule Commonly known as:  PRILOSEC Take 1 Cap by mouth daily. polyethylene glycol 17 gram packet Commonly known as:  Tonna Paty Take 1 Packet by mouth daily. potassium chloride 10 mEq tablet Commonly known as:  KLOR-CON Take 1 Tab by mouth daily. If takes lasix  
  
 topiramate 100 mg tablet Commonly known as:  TOPAMAX TAKE 1 TABLET BY MOUTH DAILY. INDICATIONS: MOOD  
  
 TRAVEL SICKNESS (MECLIZINE) 25 mg chewable tablet Generic drug:  meclizine TAKE 1 TABLET BY MOUTH THREE TIMES DAILY AS NEEDED FOR UP TO 10 DAYS FOR DIZZINESS  
  
 traZODone 50 mg tablet Commonly known as:  Cornelious Haymaker Take  by mouth nightly. ZOLOFT 50 mg tablet Generic drug:  sertraline Take  by mouth daily. Prescriptions Sent to Pharmacy Refills  
 butalbital-acetaminophen-caff (FIORICET) -40 mg per capsule 1 Sig: Take 1 Cap by mouth every four (4) hours as needed for Pain. Class: Normal  
 Pharmacy: 150 [facility], 100 [facility] #: [phone]  Route: Oral  
 acetic acid (VOSOL) 2 % otic solution 1  
 Sig: Administer 4 Drops in left ear three (3) times daily for 7 days. Class: Normal  
 Pharmacy: 150 [facility], 100 [facility] #: [phone] Route: Left Ear We Performed the Following AMB POC URINALYSIS DIP STICK AUTO W/O MICRO [40244 CPT(R)] [place] ANNUAL ALCOHOL SCREEN 15 MIN [id number] [facility]] Follow-up Instructions Return in about 4 months (around 11/6/2018) for routine follow up. Patient Instructions Medicare Wellness Visit, Female The best way to live healthy is to have a lifestyle where you eat a well-balanced diet, exercise regularly, limit alcohol use, and quit all forms of tobacco/nicotine, if applicable. Regular preventive services are another way to keep healthy. Preventive services (vaccines, screening tests, monitoring & exams) can help personalize your care plan, which helps you manage your own care. Screening tests can find health problems at the earliest stages, when they are easiest to treat. [facility] follows the current, evidence-based guidelines published by the [facility] Keegan Terry ([facility]) when recommending preventive services for our patients. Because we follow these guidelines, sometimes recommendations change over time as research supports it. (For example, mammograms used to be recommended annually. Even though Medicare will still pay for an annual mammogram, the newer guidelines recommend a mammogram every two years for women of average risk.) Of course, you and your provider may decide to screen more often for some diseases, based on your risk and co-morbidities (chronic disease you are already diagnosed with). Preventive services for you include: - Medicare offers their members a free annual wellness visit, which is time for you and your primary care provider to discuss and plan for your preventive service needs. Take advantage of this benefit every year! -All people over age 72 should receive the recommended pneumonia vaccines. Current USPSTF guidelines recommend a series of two vaccines for the best pneumonia protection.  
 
-All adults should have a yearly flu vaccine and a tetanus vaccine every 10 years. All adults age 61 years should receive a shingles vaccine once in their lifetime.   
 
-A bone mass density test is recommended when a woman turns 65 to screen for osteoporosis. This test is only recommended once as a screening. Some providers will use this same test as a disease monitoring tool if you already have osteoporosis. -All adults age 38-68 years who are overweight should have a diabetes screening test once every three years.  
 
-Other screening tests & preventive services for persons with diabetes include: an eye exam to screen for diabetic retinopathy, a kidney function test, a foot exam, and stricter control over your cholesterol.  
 
-Cardiovascular screening for adults with routine risk involves an electrocardiogram (ECG) at intervals determined by the provider.  
 
-Colorectal cancer screenings should be done for adults age 54-65 years with normal risk. There are a number of acceptable methods of screening for this type of cancer. Each test has its own benefits and drawbacks. Discuss with your provider what is most appropriate for you during your annual wellness visit. The different tests include: colonoscopy (considered the best screening method), a fecal occult blood test, a fecal DNA test, and sigmoidoscopy. -Breast cancer screenings are recommended every other year for women of normal risk age 54-69 years.  
 
-Cervical cancer screenings for women over age 72 are only recommended with certain risk factors.  
 
-All adults born between [facility] should be screened once for Hepatitis C.   
 
Here is a list of your current Health Maintenance items (your personalized list of preventive services) with a due date: 
Health Maintenance Due  
 Topic Date Due  
 Annual Well Visit  05/10/2018 Introducing [facility]! Estrella Sanabria introduces hoohbe patient portal. Now you can access parts of your medical record, email your doctor's office, and request medication refills online. 1. In your internet browser, go to [email or web address] 2. Click on the First Time User? Click Here link in the Sign In box. You will see the New Member Sign Up page. 3. Enter your hoohbe Access Code exactly as it appears below. You will not need to use this code after youve completed the sign-up process. If you do not sign up before the expiration date, you must request a new code. · hoohbe Access Code: [email or web address] Expires: 7/8/2018 12:52 PM 
 
4. Enter the last four digits of your Social Security Number (xxxx) and Date of Birth (mm/dd/yyyy) as indicated and click Submit. You will be taken to the next sign-up page. 5. Create a hoohbe ID. This will be your hoohbe login ID and cannot be changed, so think of one that is secure and easy to remember. 6. Create a hoohbe password. You can change your password at any time. 7. Enter your Password Reset Question and Answer. This can be used at a later time if you forget your password. 8. Enter your e-mail address. You will receive e-mail notification when new information is available in [address] 9. Click Sign Up. You can now view and download portions of your medical record. 10. Click the Download Summary menu link to download a portable copy of your medical information. If you have questions, please visit the Frequently Asked Questions section of the hoohbe website. Remember, hoohbe is NOT to be used for urgent needs. For medical emergencies, dial 911. Now available from your iPhone and Android! Please provide this summary of care documentation to your next provider. Your primary care clinician is listed as Genevieve Coppola.  If you have any questions after today's visit, please call [phone].

## 2018-07-31 ENCOUNTER — PATIENT OUTREACH (OUTPATIENT)
Dept: INTERNAL MEDICINE CLINIC | Age: 58
End: 2018-07-31

## 2018-07-31 RX ORDER — SUCRALFATE 1 G/10ML
SUSPENSION ORAL 4 TIMES DAILY
COMMUNITY
End: 2018-08-13 | Stop reason: ALTCHOICE

## 2018-07-31 RX ORDER — PANTOPRAZOLE SODIUM 40 MG/1
40 TABLET, DELAYED RELEASE ORAL DAILY
COMMUNITY
End: 2018-08-03 | Stop reason: ALTCHOICE

## 2018-07-31 NOTE — PROGRESS NOTES
Hospital Discharge Follow-Up      Date/Time:  2018 9:44 AM    Patient was admitted to [facility] on 18 and discharged on 18 for Dx. ABD Pain. The physician discharge summary was available at the time of outreach. Patient was contacted within 2 business days of discharge. C/o abd pain N-V, @ [facility]BC 18, CT ? Internal hernia    Top Challenges reviewed with the provider   ABD Pain ? Ulcers  Hypoglycemia episodes (Pt.does not have a monitoring device)     Method of communication with provider :chart routing    Inpatient RRAT score: 5  Was this a readmission? no   Patient stated reason for the readmission: n/a    Nurse Navigator (NN) contacted the patient by telephone to perform post hospital discharge assessment. Verified name and  with patient as identifiers. Provided introduction to self, and explanation of the Nurse Navigator role. Reviewed discharge instructions and red flags with patient who verbalized understanding. Patient given an opportunity to ask questions and does not have any further questions or concerns at this time. The patient agrees to contact the PCP office for questions related to their healthcare. NN provided contact information for future reference. Disease Specific:   N/A    Summary of patient's top problems:  1. ABD Pain, c/o pain after eating, abd pain with movement, pt reports this is long on going problems for years. 2. Hypoglycemia, pt.had several episodes, pt.reports it comes on without warning, sometime she get very sleepy, and drowsy, reports FBS drops low 46 check by rescue squad and was provided tx. 3. PCP relationship, pt.has canceled several appts with PCP and specialist over the years, pt.do not drive depends on her  for transportation.     Home Health orders at discharge: [address]: n/a  Date of initial visit: [facility] ordered/company: none  Durable Medical Equipment received: cane, walker, nebulizer machine    Barriers to care? depression, PCP relationship, transportation    Advance Care Planning:   Does patient have an Advance Directive:  will discuss next outreach     Medication(s):   New Medications at Discharge:   Pantoprazole 40 mg by mouth 2 time daily   Carafate 3 times a day and at bedtime    Medication reconciliation was performed with patient, who verbalizes understanding of administration of home medications. There were no barriers to obtaining medications identified at this time. Referral to Pharm D needed: no     Current Outpatient Prescriptions   Medication Sig    butalbital-acetaminophen-caff (FIORICET) -40 mg per capsule Take 1 Cap by mouth every four (4) hours as needed for Pain.  TRAVEL SICKNESS, MECLIZINE, 25 mg chewable tablet TAKE 1 TABLET BY MOUTH THREE TIMES DAILY AS NEEDED FOR UP TO 10 DAYS FOR DIZZINESS    fluticasone (FLONASE) 50 mcg/actuation nasal spray 2 Sprays by Both Nostrils route daily.  glucose 4 gram chewable tablet Take 4 Tabs by mouth as needed.  cholecalciferol (VITAMIN D3) 1,000 unit tablet Take 1 Tab by mouth daily. Indications: Vitamin D Deficiency    cyanocobalamin (VITAMIN B-12) 1,000 mcg tablet TAKE 1 TABLET BY MOUTH DAILY FOR VITAMIN B12 DEFICIENCY    polyethylene glycol (MIRALAX) 17 gram packet Take 1 Packet by mouth daily.  topiramate (TOPAMAX) 100 mg tablet TAKE 1 TABLET BY MOUTH DAILY. INDICATIONS: MOOD    diclofenac (VOLTAREN) 1 % gel Apply  to affected area four (4) times daily.  cyclobenzaprine (FLEXERIL) 5 mg tablet Take 1 Tab by mouth three (3) times daily as needed for Muscle Spasm(s).  traZODone (DESYREL) 50 mg tablet Take  by mouth nightly.  furosemide (LASIX) 20 mg tablet Take 1 Tab by mouth daily. As needed    potassium chloride (KLOR-CON) 10 mEq tablet Take 1 Tab by mouth daily. If takes lasix    omeprazole (PRILOSEC) 40 mg capsule Take 1 Cap by mouth daily.     Nebulizer & Compressor machine Use as directed   Bellatrix.Andra albuterol (PROVENTIL VENTOLIN) 2.5 mg /3 mL (0.083 %) nebulizer solution 3 mL by Nebulization route every four (4) hours as needed for Wheezing.  sertraline (ZOLOFT) 50 mg tablet Take  by mouth daily. No current facility-administered medications for this visit. There are no discontinued medications. [facility] follow up appointment(s):  (PCP) Dr. Davidson Charles 8/3/18 @ 2 pm    Non-[facility] follow up appointment(s): FOLLOW UP DO PATIENCE  IN 2-4 WEEKS, OFFICE WILL CALL YOU WITH AN APPT. [facility]:  out of service area (pt.reports she would use the service if it was available in this area.)    Goals      Establish PCP relationships and regularly scheduled appointments. 7/31 NN reminded pt.,follow-up care is a key part of your treatment and safety. Be sure to make and go to all appointments, and call your doctor if you are having problems. It's also good to keep an updated  list of your  medicines you take, to bring to f/u appointments. Pt. Verbalizes understanding, reports she will bring in updated list.NN will f/u with pt.in 1 week. -[address]           Knowledge and adherence of prescribed medication (ie. action, side effects, missed dose, etc.).            7/31 Pt.reports she have not filled her prescription for carafate solution, reports she has the Protonix and is taking them as prescribed. Pt.reports she is feeling better, no c/o abd pain, N-V this am.NN reviewed medications list with pt.reports taking all other medications as directed. NN encouraged to call with any questions or concerns. NN will f/u with pt.in 1 week. -              Supportive resources in place to maintain patient in the community (ie. Home Health, DME equipment, refer to, medication assistant plan, etc.)            7/31 Patient reports her  provides transportation to her appointments, NN reminded pt.of several missed and no show appointment in the past. NN offered to provide pt. information on public transportation service, pt.declined; reports she is not interested. -    Pt.reports FBS;  having several episodes of low blood sugar readings, has referral to see (Diabetes and Endocrinologist), [phone] Cris Cm MD, reports miss appt. June, reports next scheduled appt. 8/15/18 @ 3 pm.Pt.reports she will attend scheduled appt. NN encourage pt.to monitor FBS at least daily and record readings to take to her appt. with specialist or PCP. NN reviewed s/s Hypoglycemia, pt reports she has her glucose chewable tablets to take as needed. Pt.reports she do not have glucose monitoring device at home. NN will provide pt.with information on Relion Glucose Monitor at [employer] 9.00, Test strips 5.00, Lancets 1.48. NN attempt to call pt.back, unable to reach. NN will f/u with pt. in 3 days. -[address]

## 2018-08-03 ENCOUNTER — OFFICE VISIT (OUTPATIENT)
Dept: INTERNAL MEDICINE CLINIC | Age: 58
End: 2018-08-03

## 2018-08-03 VITALS
RESPIRATION RATE: 20 BRPM | BODY MASS INDEX: 38.4 KG/M2 | SYSTOLIC BLOOD PRESSURE: 113 MMHG | HEIGHT: 57 IN | HEART RATE: 55 BPM | DIASTOLIC BLOOD PRESSURE: 73 MMHG | TEMPERATURE: 97.5 F | OXYGEN SATURATION: 96 % | WEIGHT: 178 LBS

## 2018-08-03 DIAGNOSIS — K21.9 GASTROESOPHAGEAL REFLUX DISEASE WITHOUT ESOPHAGITIS: ICD-10-CM

## 2018-08-03 DIAGNOSIS — R10.33 PERIUMBILICAL ABDOMINAL PAIN: Primary | ICD-10-CM

## 2018-08-03 DIAGNOSIS — F31.31 BIPOLAR AFFECTIVE DISORDER, CURRENTLY DEPRESSED, MILD (HCC): ICD-10-CM

## 2018-08-03 RX ORDER — PANTOPRAZOLE SODIUM 40 MG/1
40 TABLET, DELAYED RELEASE ORAL DAILY
COMMUNITY
End: 2018-10-24 | Stop reason: ALTCHOICE

## 2018-08-03 NOTE — PROGRESS NOTES
HISTORY OF PRESENT Antonio Diaz is a 65 y.o. female. Abdominal Pain   The history is provided by the patient. This is a new problem. The current episode started more than 1 week ago. The problem occurs constantly. The problem has been gradually improving. Associated symptoms include abdominal pain. Associated symptoms comments: genralized lower and paraumbilical. Treatments tried: went to [place] then Tx to [facility]. Improvement on treatment: admitted at [facility] spent 6 days there, DC earlier this week. There was some concern about her hernia, possibly related to her previous gastric bypass surgery. [facility] is going to do endoscopy, upper. Past Surgical History:   Procedure Laterality Date    HX ABDOMINAL LAPAROSCOPY      HX CARPAL TUNNEL RELEASE Bilateral more than 10 years ago    HX  SECTION  [id number]    HX COLONOSCOPY  2016    HX GASTRIC BYPASS  1997    x2    HX HYSTERECTOMY      HX TONSIL AND ADENOIDECTOMY  more than 10 years ago       Review of Systems   Constitutional: Positive for fever. Gastrointestinal: Positive for abdominal pain, nausea and vomiting. Negative for blood in stool, diarrhea and melena. Genitourinary: Negative for dysuria and hematuria. Psychiatric/Behavioral: Negative for depression. Current Outpatient Prescriptions   Medication Sig Dispense Refill    pantoprazole (PROTONIX) 40 mg tablet Take 40 mg by mouth daily.  sucralfate (CARAFATE) 100 mg/mL suspension Take  by mouth four (4) times daily.  butalbital-acetaminophen-caff (FIORICET) -40 mg per capsule Take 1 Cap by mouth every four (4) hours as needed for Pain. 10 Cap 1    TRAVEL SICKNESS, MECLIZINE, 25 mg chewable tablet TAKE 1 TABLET BY MOUTH THREE TIMES DAILY AS NEEDED FOR UP TO 10 DAYS FOR DIZZINESS 60 Tab 2    fluticasone (FLONASE) 50 mcg/actuation nasal spray 2 Sprays by Both Nostrils route daily. 1 Bottle 5    glucose 4 gram chewable tablet Take 4 Tabs by mouth as needed. 30 Tab 5    cholecalciferol (VITAMIN D3) 1,000 unit tablet Take 1 Tab by mouth daily. Indications: Vitamin D Deficiency 180 Tab 1    cyanocobalamin (VITAMIN B-12) 1,000 mcg tablet TAKE 1 TABLET BY MOUTH DAILY FOR VITAMIN B12 DEFICIENCY 90 Tab 1    polyethylene glycol (MIRALAX) 17 gram packet Take 1 Packet by mouth daily. 50 Packet 5    topiramate (TOPAMAX) 100 mg tablet TAKE 1 TABLET BY MOUTH DAILY. INDICATIONS: MOOD 30 Tab 5    diclofenac (VOLTAREN) 1 % gel Apply  to affected area four (4) times daily. 1 Each 0    cyclobenzaprine (FLEXERIL) 5 mg tablet Take 1 Tab by mouth three (3) times daily as needed for Muscle Spasm(s). 60 Tab 0    traZODone (DESYREL) 50 mg tablet Take  by mouth nightly.  furosemide (LASIX) 20 mg tablet Take 1 Tab by mouth daily. As needed 90 Tab 3    potassium chloride (KLOR-CON) 10 mEq tablet Take 1 Tab by mouth daily. If takes lasix 90 Tab 3    Nebulizer & Compressor machine Use as directed 1 Each 0    albuterol (PROVENTIL VENTOLIN) 2.5 mg /3 mL (0.083 %) nebulizer solution 3 mL by Nebulization route every four (4) hours as needed for Wheezing. 100 Each 1    sertraline (ZOLOFT) 50 mg tablet Take  by mouth daily.        Social History     Social History    Marital status:      Spouse name: N/A    Number of children: 2    Years of education: N/A     Occupational History    retired      Social History Main Topics    Smoking status: Never Smoker    Smokeless tobacco: Never Used    Alcohol use No    Drug use: No    Sexual activity: No     Other Topics Concern    Not on file     Social History Narrative     has brain damage, lives with him       Physical Exam  Visit Vitals    /73 (BP 1 Location: Right arm, BP Patient Position: Sitting)    Pulse (!) 55    Temp 97.5 °F (36.4 °C) (Oral)    Resp 20    Ht 4' 9\" (1.448 m)    Wt 178 lb (80.7 kg)    SpO2 96%    BMI 38.52 kg/m2     WD WN female NAD  Heart RRR without murmers clicks or rubs  Lungs CTA  Abdo soft nontender, no guarding no rebound, bowel sounds present. Ext no edema    ASSESSMENT and PLAN  Encounter Diagnoses   Name Primary?  Periumbilical abdominal pain Yes    Gastroesophageal reflux disease without esophagitis     Bipolar affective disorder, currently depressed, mild (HCC)      Orders Placed This Encounter    pantoprazole (PROTONIX) 40 mg tablet     Follow-up hospitalization, unclear cause of her pain. Follow-up with gastroenterology. Follow-up Disposition:  Return if symptoms worsen or fail to improve.

## 2018-08-03 NOTE — PROGRESS NOTES
Chief Complaint   Patient presents with    Transitions Of Care     1. Have you been to the ER, urgent care clinic since your last visit? Hospitalized since your last visit? Yes Where: [facility] Reason for visit: Abdominal Pain    2. Have you seen or consulted any other health care providers outside of the [facility] since your last visit? Include any pap smears or colon screening.  No     Health Maintenance Due   Topic Date Due    Influenza Age 5 to Adult  08/01/2018

## 2018-08-03 NOTE — MR AVS SNAPSHOT
[address] 1433 [facility] 
[phone] Patient: Gela Durham MRN: [id number] :1960 Visit Information Date & Time Provider Department Dept. Phone Encounter #  
 8/3/2018  2:00 PM MD Carolina Francisco [phone] Follow-up Instructions Return if symptoms worsen or fail to improve. Your Appointments 8/15/2018  3:00 PM  
New Patient with Felix Romero MD  
Care Diabetes & Endocrinology [facility] Appt Note: New Patient, \"Low Sugar Reading\" cl18; Referred By: [phone] \"Low Sugar Readings\" pt scheduled; r/s cl18; Phytel Cancellation [address] [place] [address] [address] [place] [id number]  
[phone]  
  
   
 [address] [place] [address]  
  
    
 10/5/2018  1:45 PM  
ACUTE CARE with MD Carolina Francisco 380 [facility] Appt Note: f/u on medication $0 cp ls 18  
 [address] [place] [address]. [address] Rian Skiff [address]  
[phone]  
  
   
 [address] [place] [address] [address] Upcoming Health Maintenance Date Due Influenza Age 5 to Adult 2018 PAP AKA CERVICAL CYTOLOGY 12/3/2018 MEDICARE YEARLY EXAM 2019 BREAST CANCER SCRN MAMMOGRAM 2020 DTaP/Tdap/Td series (2 - Tdap) 2021 COLONOSCOPY 8/3/2026 Allergies as of 8/3/2018  Review Complete On: 8/3/2018 By: Brianna Owusu MD  
  
 Severity Noted Reaction Type Reactions Tylenol [Acetaminophen] High 10/02/2015    Anaphylaxis, Hives Benadryl [Diphenhydramine Hcl] Medium 2015    Nausea and Vomiting Ibuprofen  10/12/2014    Nausea and Vomiting  
 Sulfamethoxazole-trimethoprim    Nausea Only Tramadol  2018    Anxiety Current Immunizations  Reviewed on 10/2/2015 Name Date DTaP 2011 Hep B Vaccine 10/16/2016, 7/20/2016 Hep B Vaccine (Adult) 5/9/2017 Influenza Vaccine 10/13/2016, 9/9/2015, 9/30/2014, 9/13/2013, 3/28/2013, 11/14/2011, 1/13/2011, 1/13/2011, 12/17/2009, 12/17/2009, 1/8/2009, 1/8/2009, 11/8/2007, 11/8/2007 Influenza Vaccine (Quad) PF 12/22/2017, 9/24/2014 Pneumococcal Polysaccharide (PPSV-23) 1/8/2009 Tdap 6/23/2011 Not reviewed this visit You Were Diagnosed With   
  
 Codes Comments Periumbilical abdominal pain    -  Primary ICD-10-CM: R10.33 ICD-9-CM: 789.05 Gastroesophageal reflux disease without esophagitis     ICD-10-CM: K21.9 ICD-9-CM: 530.81 Bipolar affective disorder, currently depressed, mild (HCC)     ICD-10-CM: F31.31 
ICD-9-CM: 296.51 Vitals BP Pulse Temp Resp Height(growth percentile) Weight(growth percentile) 113/73 (BP 1 Location: Right arm, BP Patient Position: Sitting) (!) 55 97.5 °F (36.4 °C) (Oral) 20 4' 9\" (1.448 m) 178 lb (80.7 kg) SpO2 BMI OB Status Smoking Status 96% 38.52 kg/m2 Hysterectomy Never Smoker BMI and BSA Data Body Mass Index Body Surface Area 38.52 kg/m 2 1.8 m 2 Preferred Pharmacy Pharmacy Name Phone 150 [facility], [address] [place], [place] [address] [address] [phone] Your Updated Medication List  
  
   
This list is accurate as of 8/3/18  2:42 PM.  Always use your most recent med list.  
  
  
  
  
 albuterol 2.5 mg /3 mL (0.083 %) nebulizer solution Commonly known as:  PROVENTIL VENTOLIN  
3 mL by Nebulization route every four (4) hours as needed for Wheezing. butalbital-acetaminophen-caff -40 mg per capsule Commonly known as:  [employer] Take 1 Cap by mouth every four (4) hours as needed for Pain. CARAFATE 100 mg/mL suspension Generic drug:  sucralfate Take  by mouth four (4) times daily. cholecalciferol 1,000 unit tablet Commonly known as:  VITAMIN D3  
 Take 1 Tab by mouth daily. Indications: Vitamin D Deficiency  
  
 cyanocobalamin 1,000 mcg tablet Commonly known as:  VITAMIN B-12 TAKE 1 TABLET BY MOUTH DAILY FOR VITAMIN B12 DEFICIENCY  
  
 cyclobenzaprine 5 mg tablet Commonly known as:  FLEXERIL Take 1 Tab by mouth three (3) times daily as needed for Muscle Spasm(s). diclofenac 1 % Gel Commonly known as:  VOLTAREN Apply  to affected area four (4) times daily. fluticasone 50 mcg/actuation nasal spray Commonly known as:  Ami Jumper 2 Sprays by Both Nostrils route daily. furosemide 20 mg tablet Commonly known as:  LASIX Take 1 Tab by mouth daily. As needed  
  
 glucose 4 gram chewable tablet Take 4 Tabs by mouth as needed. Nebulizer & Compressor machine Use as directed  
  
 omeprazole 40 mg capsule Commonly known as:  PRILOSEC Take 1 Cap by mouth daily. polyethylene glycol 17 gram packet Commonly known as:  Sheria Bud Take 1 Packet by mouth daily. potassium chloride 10 mEq tablet Commonly known as:  KLOR-CON Take 1 Tab by mouth daily. If takes lasix PROTONIX 40 mg tablet Generic drug:  pantoprazole Take 40 mg by mouth daily. topiramate 100 mg tablet Commonly known as:  TOPAMAX TAKE 1 TABLET BY MOUTH DAILY. INDICATIONS: MOOD  
  
 TRAVEL SICKNESS (MECLIZINE) 25 mg chewable tablet Generic drug:  meclizine TAKE 1 TABLET BY MOUTH THREE TIMES DAILY AS NEEDED FOR UP TO 10 DAYS FOR DIZZINESS  
  
 traZODone 50 mg tablet Commonly known as:  Kathie Kar Take  by mouth nightly. ZOLOFT 50 mg tablet Generic drug:  sertraline Take  by mouth daily. Follow-up Instructions Return if symptoms worsen or fail to improve. Introducing [facility]! Sidney Morales introduces [employer] patient portal. Now you can access parts of your medical record, email your doctor's office, and request medication refills online.    
 
1. In your internet browser, go to [email or web address] 2. Click on the First Time User? Click Here link in the Sign In box. You will see the New Member Sign Up page. 3. Enter your [employer] Access Code exactly as it appears below. You will not need to use this code after youve completed the sign-up process. If you do not sign up before the expiration date, you must request a new code. · [employer] Access Code: [email or web address] Expires: 11/1/2018  2:10 PM 
 
4. Enter the last four digits of your Social Security Number (xxxx) and Date of Birth (mm/dd/yyyy) as indicated and click Submit. You will be taken to the next sign-up page. 5. Create a s0ckett ID. This will be your [employer] login ID and cannot be changed, so think of one that is secure and easy to remember. 6. Create a [employer] password. You can change your password at any time. 7. Enter your Password Reset Question and Answer. This can be used at a later time if you forget your password. 8. Enter your e-mail address. You will receive e-mail notification when new information is available in [address] 9. Click Sign Up. You can now view and download portions of your medical record. 10. Click the Download Summary menu link to download a portable copy of your medical information. If you have questions, please visit the Frequently Asked Questions section of the [employer] website. Remember, [employer] is NOT to be used for urgent needs. For medical emergencies, dial 911. Now available from your iPhone and Android! Please provide this summary of care documentation to your next provider. Your primary care clinician is listed as Suzette Mustafa. If you have any questions after today's visit, please call [phone].

## 2018-08-12 ENCOUNTER — TELEPHONE (OUTPATIENT)
Dept: INTERNAL MEDICINE CLINIC | Age: 58
End: 2018-08-12

## 2018-08-13 ENCOUNTER — PATIENT OUTREACH (OUTPATIENT)
Dept: INTERNAL MEDICINE CLINIC | Age: 58
End: 2018-08-13

## 2018-08-13 ENCOUNTER — OFFICE VISIT (OUTPATIENT)
Dept: INTERNAL MEDICINE CLINIC | Age: 58
End: 2018-08-13

## 2018-08-13 VITALS
SYSTOLIC BLOOD PRESSURE: 118 MMHG | HEART RATE: 60 BPM | BODY MASS INDEX: 37.97 KG/M2 | OXYGEN SATURATION: 98 % | RESPIRATION RATE: 16 BRPM | DIASTOLIC BLOOD PRESSURE: 65 MMHG | WEIGHT: 176 LBS | TEMPERATURE: 96.9 F | HEIGHT: 57 IN

## 2018-08-13 DIAGNOSIS — G43.109 MIGRAINE AURA WITHOUT HEADACHE: ICD-10-CM

## 2018-08-13 DIAGNOSIS — K21.9 GASTROESOPHAGEAL REFLUX DISEASE WITHOUT ESOPHAGITIS: ICD-10-CM

## 2018-08-13 DIAGNOSIS — H52.10 MYOPIA, UNSPECIFIED LATERALITY: ICD-10-CM

## 2018-08-13 DIAGNOSIS — F31.32 BIPOLAR AFFECTIVE DISORDER, CURRENTLY DEPRESSED, MODERATE (HCC): ICD-10-CM

## 2018-08-13 DIAGNOSIS — M17.11 ARTHRITIS OF RIGHT KNEE: Primary | ICD-10-CM

## 2018-08-13 DIAGNOSIS — E66.01 OBESITY, MORBID (HCC): ICD-10-CM

## 2018-08-13 RX ORDER — MECLIZINE HCL 25MG 25 MG/1
TABLET, CHEWABLE ORAL
Qty: 60 TAB | Refills: 2 | Status: SHIPPED | OUTPATIENT
Start: 2018-08-13 | End: 2018-11-26 | Stop reason: SDUPTHER

## 2018-08-13 RX ORDER — DICLOFENAC SODIUM 50 MG/1
50 TABLET, DELAYED RELEASE ORAL 2 TIMES DAILY
Qty: 60 TAB | Refills: 5 | Status: SHIPPED | OUTPATIENT
Start: 2018-08-13 | End: 2018-10-19 | Stop reason: ALTCHOICE

## 2018-08-13 NOTE — MR AVS SNAPSHOT
[address] 1438 [facility] 
[phone] Patient: Herlinda Bhat MRN: [id number] :[id number] Visit Information Date & Time Provider Department Dept. Phone Encounter #  
 2018  3:00 PM Amador Driscoll MD [facility]  Follow-up Instructions Return in about 4 weeks (around 9/10/2018) for routine follow up. Your Appointments 8/15/2018  3:00 PM  
New Patient with Rin Bae MD  
[facility]) Appt Note: New Patient, \"Low Sugar Reading\" cl18; Referred By: [phone] \"Low Sugar Readings\" pt scheduled; r/s cl18; Phytel Cancellation [address] [place] [address]  
[phone]  
  
   
 [address] [place] [address]  
  
    
 10/5/2018  1:45 PM  
ACUTE CARE with Amador Driscoll MD  
[place] [address] [address] [place] [address]) Appt Note: f/u on medication $0 cp ls 18  
 [address] [place] [address]. [address] [place] [id number]  
[phone]  
  
   
 [address] [place] [address] [address] Upcoming Health Maintenance Date Due Influenza Age 5 to Adult 2018 PAP AKA CERVICAL CYTOLOGY 12/3/2018 MEDICARE YEARLY EXAM 2019 BREAST CANCER SCRN MAMMOGRAM 2020 DTaP/Tdap/Td series (2 - Tdap) 2021 COLONOSCOPY 8/3/2026 Allergies as of 2018  Review Complete On: 2018 By: Amador Driscoll MD  
  
 Severity Noted Reaction Type Reactions Tylenol [Acetaminophen] High 10/02/2015    Anaphylaxis, Hives Benadryl [Diphenhydramine Hcl] Medium 2015    Nausea and Vomiting Ibuprofen  10/12/2014    Nausea and Vomiting  
 Sulfamethoxazole-trimethoprim    Nausea Only Tramadol  2018    Anxiety Current Immunizations  Reviewed on 10/2/2015 Name Date DTaP 2011 Hep B Vaccine 10/16/2016, 7/20/2016 Hep B Vaccine (Adult) 5/9/2017 Influenza Vaccine 10/13/2016, 9/9/2015, 9/30/2014, 9/13/2013, 3/28/2013, 11/14/2011, 1/13/2011, 1/13/2011, 12/17/2009, 12/17/2009, 1/8/2009, 1/8/2009, 11/8/2007, 11/8/2007 Influenza Vaccine (Quad) PF 12/22/2017, 9/24/2014 Pneumococcal Polysaccharide (PPSV-23) 1/8/2009 Tdap 6/23/2011 Not reviewed this visit You Were Diagnosed With   
  
 Codes Comments Arthritis of right knee    -  Primary ICD-10-CM: M17.11 ICD-9-CM: 716.96 Bipolar affective disorder, currently depressed, moderate ([facility]     ICD-10-CM: F31.32 
ICD-9-CM: 296.52 Obesity, morbid ([facility]     ICD-10-CM: E66.01 
ICD-9-CM: 278.01 Migraine aura without headache     ICD-10-CM: G43.109 ICD-9-CM: 346.00 Gastroesophageal reflux disease without esophagitis     ICD-10-CM: K21.9 ICD-9-CM: 530.81 Myopia, unspecified laterality     ICD-10-CM: H52.10 ICD-9-CM: 367.1 Vitals BP Pulse Temp Resp Height(growth percentile) Weight(growth percentile)  
 118/65 (BP 1 Location: Left arm, BP Patient Position: Sitting) 60 96.9 °F (36.1 °C) (Oral) 16 4' 9\" (1.448 m) 176 lb (79.8 kg) SpO2 BMI OB Status Smoking Status 98% 38.09 kg/m2 Hysterectomy Never Smoker Vitals History BMI and BSA Data Body Mass Index Body Surface Area 38.09 kg/m 2 1.79 m 2 Preferred Pharmacy Pharmacy Name Phone [facility], [address] [facility] [address] [phone] Your Updated Medication List  
  
   
This list is accurate as of 8/13/18  4:23 PM.  Always use your most recent med list.  
  
  
  
  
 albuterol 2.5 mg /3 mL (0.083 %) nebulizer solution Commonly known as:  PROVENTIL VENTOLIN  
3 mL by Nebulization route every four (4) hours as needed for Wheezing. butalbital-acetaminophen-caff -40 mg per capsule Commonly known as:  [employer] Take 1 Cap by mouth every four (4) hours as needed for Pain. cholecalciferol 1,000 unit tablet Commonly known as:  VITAMIN D3 Take 1 Tab by mouth daily. Indications: Vitamin D Deficiency  
  
 cyanocobalamin 1,000 mcg tablet Commonly known as:  VITAMIN B-12 TAKE 1 TABLET BY MOUTH DAILY FOR VITAMIN B12 DEFICIENCY  
  
 diclofenac EC 50 mg EC tablet Commonly known as:  VOLTAREN Take 1 Tab by mouth two (2) times a day. fluticasone 50 mcg/actuation nasal spray Commonly known as:  Caffie Euler 2 Sprays by Both Nostrils route daily. furosemide 20 mg tablet Commonly known as:  LASIX Take 1 Tab by mouth daily. As needed  
  
 glucose 4 gram chewable tablet Take 4 Tabs by mouth as needed. meclizine 25 mg chewable tablet Commonly known as:  TRAVEL SICKNESS (MECLIZINE) TAKE 1 TABLET BY MOUTH THREE TIMES DAILY AS NEEDED FOR UP TO 10 DAYS FOR DIZZINESS Nebulizer & Compressor machine Use as directed  
  
 polyethylene glycol 17 gram packet Commonly known as:  Ronne Stephanie Take 1 Packet by mouth daily. potassium chloride 10 mEq tablet Commonly known as:  KLOR-CON Take 1 Tab by mouth daily. If takes lasix PROTONIX 40 mg tablet Generic drug:  pantoprazole Take 40 mg by mouth daily. topiramate 100 mg tablet Commonly known as:  TOPAMAX TAKE 1 TABLET BY MOUTH DAILY. INDICATIONS: MOOD  
  
 traZODone 50 mg tablet Commonly known as:  Eli Baker Take  by mouth nightly. ZOLOFT 50 mg tablet Generic drug:  sertraline Take  by mouth daily. Prescriptions Sent to Pharmacy Refills  
 meclizine (TRAVEL SICKNESS, MECLIZINE,) 25 mg chewable tablet 2 Sig: TAKE 1 TABLET BY MOUTH THREE TIMES DAILY AS NEEDED FOR UP TO 10 DAYS FOR DIZZINESS Class: Normal  
 Pharmacy: [address] [place] [address]  Ph #: [phone]  
 diclofenac EC (VOLTAREN) 50 mg EC tablet 5 Sig: Take 1 Tab by mouth two (2) times a day.   
 Class: Normal  
 Pharmacy: [address] [place] [address], [address] [place] [address] #: [phone] Route: Oral  
  
We Performed the Following REFERRAL TO OPTOMETRY [id number] Custom] Follow-up Instructions Return in about 4 weeks (around 9/10/2018) for routine follow up. Referral Information Referral ID Referred By Referred To  
  
 [id number] Kurt SOOD, [address] [facility], [address] [place] [address] Dr Phone: [phone] Fax: [phone] Visits Status Start Date End Date 1 New Request 8/13/18 8/13/19 If your referral has a status of pending review or denied, additional information will be sent to support the outcome of this decision. Patient Instructions Stop voltaren cream use other cream below. Stop diclofenac pills for N/V or abdo pain. For knee arthritis or nerve pains can try OTC capcasin cream (Zostrix). It's made out of chili peppers and is hot. It may burn your skin at first. You must use 3 -4 times a day to help. If it causes too much skin irritation stop it. Wash hands after using. DO NOT CONTACT EYES. Introducing [facility]! [employer] introduces [email or web address] patient portal. Now you can access parts of your medical record, email your doctor's office, and request medication refills online. 1. In your internet browser, go to [email or web address] 2. Click on the First Time User? Click Here link in the Sign In box. You will see the New Member Sign Up page. 3. Enter your [email or web address] Access Code exactly as it appears below. You will not need to use this code after youve completed the sign-up process. If you do not sign up before the expiration date, you must request a new code. · [email or web address] Access Code: [email or web address] Expires: 11/1/2018  2:10 PM 
 
4. Enter the last four digits of your Social Security Number (xxxx) and Date of Birth (mm/dd/yyyy) as indicated and click Submit.  You will be taken to the next sign-up page. 5. Create a MPV ID. This will be your MPV login ID and cannot be changed, so think of one that is secure and easy to remember. 6. Create a MPV password. You can change your password at any time. 7. Enter your Password Reset Question and Answer. This can be used at a later time if you forget your password. 8. Enter your e-mail address. You will receive e-mail notification when new information is available in [address]. 9. Click Sign Up. You can now view and download portions of your medical record. 10. Click the Download Summary menu link to download a portable copy of your medical information. If you have questions, please visit the Frequently Asked Questions section of the MPV website. Remember, MPV is NOT to be used for urgent needs. For medical emergencies, dial 911. Now available from your iPhone and Android! Please provide this summary of care documentation to your next provider. Your primary care clinician is listed as Krista De La Cruz. If you have any questions after today's visit, please call [phone].

## 2018-08-13 NOTE — PROGRESS NOTES
Goals      Establish PCP relationships and regularly scheduled appointments. 7/31 NN reminded pt.,follow-up care is a key part of your treatment and safety. Be sure to make and go to all appointments, and call your doctor if you are having problems. It's also good to keep an updated  list of your  medicines you take, to bring to f/u appointments. Pt. Verbalizes understanding, reports she will bring in updated list.NN will f/u with pt.in 1 week. -[address]     8/13 Pt.seen in office today, reports upcoming appt. (Diabetes and Endocrinologist), [phone] Shayna Jimenez MD, appt. 8/15/18 @ 3 pm. Pt reports she will attend upcoming appt. NN will f/u with pt.in 1 week. -[address]           Knowledge and adherence of prescribed medication (ie. action, side effects, missed dose, etc.).            7/31 Pt.reports she have not filled her prescription for carafate solution, reports she has the Protonix and is taking them as prescribed. Pt.reports she is feeling better, no c/o abd pain, N-V this am.NN reviewed medications list with pt.reports taking all other medications as directed. NN encouraged to call with any questions or concerns. NN will f/u with pt.in 1 week. -[address]     8/13 Pt.reports she taking all medications as directed, will call NN with any questions or concerns. -             Supportive resources in place to maintain patient in the community (ie. Home Health, DME equipment, refer to, medication assistant plan, etc.)            7/31 Patient reports her  provides transportation to her appointments, NN reminded pt.of several missed and no show appointment in the past. NN offered to provide pt. information on public transportation service, pt.declined; reports she is not interested. -    Pt.reports FBS;  having several episodes of low blood sugar readings, has referral to see (Diabetes and Endocrinologist), [phone] Shayna Jimenez MD, reports miss appt. June, reports next scheduled appt. 8/15/18 @ 3 pm.Pt.reports she will attend scheduled appt. NN encourage pt.to monitor FBS at least daily and record readings to take to her appt. with specialist or PCP. NN reviewed s/s Hypoglycemia, pt reports she has her glucose chewable tablets to take as needed. Pt.reports she do not have glucose monitoring device at home. NN will provide pt.with information on Relion Glucose Monitor at [employer] 9.00, Test strips 5.00, Lancets 1.48. NN attempt to call pt.back, unable to reach. NN will f/u with pt. in 3 days. -[address]    8/13 Pt seen in office today c/o R knee pain, Tx. Capcasin (Zostrix), referral to Optometry, Dr. Fadi Bailey, NN spoke with pt.ref.f/u with Maureen Thomson DO (general surg.) [phone] or [phone], f/u ulcers. Pt.reports she has not schedule f/u, reports she has called but have not received response from the office. Pt reports upcoming appt. (Diabetes and Endocrinologist), [phone] Sandi Heredia MD, appt. 8/15/18 @ 3 pm. Pt reports she will attend upcoming appt. NN will f/u with JERROD Roach DO office for f/u appt. NN will f/u pt. in one week. -[address]

## 2018-08-13 NOTE — PATIENT INSTRUCTIONS
Stop voltaren cream use other cream below. Stop diclofenac pills for N/V or abdo pain. For knee arthritis or nerve pains can try OTC capcasin cream (Zostrix). It's made out of chili peppers and is hot. It may burn your skin at first. You must use 3 -4 times a day to help. If it causes too much skin irritation stop it. Wash hands after using. DO NOT CONTACT EYES.

## 2018-08-13 NOTE — PROGRESS NOTES
Chief Complaint   Patient presents with    Leg Pain     R/lower leg pain     I have reviewed the patient's medical history in detail and updated the computerized patient record. Health Maintenance reviewed. 1. Have you been to the ER, urgent care clinic since your last visit? Hospitalized since your last visit?no    2. Have you seen or consulted any other health care providers outside of the [address] [place] [address] since your last visit? Include any pap smears or colon screening. No    Encouraged pt to discuss pt's wishes with spouse/partner/family and bring them in the next appt to follow thru with the Advanced Directive    Fall Risk Assessment, last 12 mths 1/17/2018   Able to walk? Yes   Fall in past 12 months? Yes   Fall with injury? Yes   Number of falls in past 12 months 4   Fall Risk Score 5       PHQ over the last two weeks 1/17/2018   Little interest or pleasure in doing things Several days   Feeling down, depressed, irritable, or hopeless Several days   Total Score PHQ 2 2       Abuse Screening Questionnaire 1/17/2018   Do you ever feel afraid of your partner? N   Are you in a relationship with someone who physically or mentally threatens you? N   Is it safe for you to go home?  Y       ADL Assessment 1/17/2018   Feeding yourself No Help Needed   Getting from bed to chair No Help Needed   Getting dressed No Help Needed   Bathing or showering No Help Needed   Walk across the room (includes cane/walker) No Help Needed   Using the telphone No Help Needed   Taking your medications No Help Needed   Preparing meals No Help Needed   Managing money (expenses/bills) No Help Needed   Moderately strenuous housework (laundry) No Help Needed   Shopping for personal items (toiletries/medicines) No Help Needed   Shopping for groceries No Help Needed   Driving No Help Needed   Climbing a flight of stairs No Help Needed   Getting to places beyond walking distances No Help Needed

## 2018-08-14 NOTE — PROGRESS NOTES
HISTORY OF PRESENT Indu Morgan is a 65 y.o. female. Leg Pain    The history is provided by the patient. This is a chronic problem. The problem occurs constantly. The problem has been gradually worsening. The pain is present in the right knee. The quality of the pain is described as sharp and aching. The pain is severe. Associated symptoms include stiffness. The symptoms are aggravated by activity. Treatments tried: voltaren gel. The treatment provided no relief. There has been no history of extremity trauma. Went to the ER last month, x-rays there showed some arthritis. Her abdominal pain is pretty much resolved. Complains of some difficulties with her vision this is been for months as well both near and far vision. Patient Active Problem List   Diagnosis Code    Bilateral leg edema R60.0    History of gastric bypass Z98.84    Migraine aura without headache G43. 109    Bipolar disorder with current episode depressed ([facility] F31.30    Gastroesophageal reflux disease without esophagitis K21.9    Obesity, morbid ([address] E66.01     Past Surgical History:   Procedure Laterality Date    HX ABDOMINAL LAPAROSCOPY      HX CARPAL TUNNEL RELEASE Bilateral more than 10 years ago    HX  SECTION  0926,0178    HX COLONOSCOPY  2016    HX GASTRIC BYPASS  1997    x2    HX HYSTERECTOMY  1985    HX TONSIL AND ADENOIDECTOMY  more than 10 years ago       Review of Systems   Constitutional: Negative for fever. Eyes: Positive for blurred vision. Negative for pain. Gastrointestinal: Negative for abdominal pain, nausea and vomiting. Genitourinary: Negative for dysuria. Musculoskeletal: Positive for joint pain and stiffness. Negative for falls.        Physical Exam  Visit Vitals    /65 (BP 1 Location: Left arm, BP Patient Position: Sitting)    Pulse 60    Temp 96.9 °F (36.1 °C) (Oral)    Resp 16    Ht 4' 9\" (1.448 m)    Wt 176 lb (79.8 kg)    SpO2 98%    BMI 38.09 kg/m2     WD WN female NAD  PERRL EOMI  Heart RRR without murmers clicks or rubs  Lungs CTA  Abdo soft nontender  Ext mild  Edema, sl dec rom no obvious joint effusion or redness      ASSESSMENT and PLAN  Encounter Diagnoses   Name Primary?  Arthritis of right knee Yes    Bipolar affective disorder, currently depressed, moderate (HCC)     Obesity, morbid (HCC)     Migraine aura without headache     Gastroesophageal reflux disease without esophagitis     Myopia, unspecified laterality      Orders Placed This Encounter    REFERRAL TO OPTOMETRY    meclizine (TRAVEL SICKNESS, MECLIZINE,) 25 mg chewable tablet    diclofenac EC (VOLTAREN) 50 mg EC tablet     Has a f/u with ortho DrWInd. Colin Espinosa will keep that  To see opt for her vision issues. Follow-up Disposition:  Return in about 4 weeks (around 9/10/2018) for routine follow up.

## 2018-08-15 ENCOUNTER — OFFICE VISIT (OUTPATIENT)
Dept: ENDOCRINOLOGY | Age: 58
End: 2018-08-15

## 2018-08-15 VITALS
WEIGHT: 182.8 LBS | OXYGEN SATURATION: 98 % | HEART RATE: 46 BPM | SYSTOLIC BLOOD PRESSURE: 122 MMHG | HEIGHT: 57 IN | DIASTOLIC BLOOD PRESSURE: 68 MMHG | TEMPERATURE: 95.7 F | BODY MASS INDEX: 39.44 KG/M2 | RESPIRATION RATE: 14 BRPM

## 2018-08-15 DIAGNOSIS — E16.2 HYPOGLYCEMIA: Primary | ICD-10-CM

## 2018-08-15 DIAGNOSIS — E16.9 NESIDIOBLASTOSIS: ICD-10-CM

## 2018-08-15 RX ORDER — LANCETS 33 GAUGE
EACH MISCELLANEOUS
Qty: 100 LANCET | Refills: 11 | Status: SHIPPED | OUTPATIENT
Start: 2018-08-15 | End: 2018-08-30 | Stop reason: SDUPTHER

## 2018-08-15 NOTE — PROGRESS NOTES
[facility]               Ewelina Cummings MD        [address] [id number] TY:[phone] Fax [phone]       Patient Information  Date:8/15/2018  Name : Sebastian Aden 65 y.o.     YOB: 1960         Referred by: Ayo Rae MD       Chief Complaint   Patient presents with   Phyllis Kale New Patient     referred by Dr. Brian Wu for Hypoglycemia       History of Present Illness: Sebastian Aden is a 65 y.o. female   She reports long-standing history of hypoglycemia, blood glucose ranges from 30-64, uses her 's meter to check. No hospitalizations no syncope related to hypoglycemia. History of gastric bypass several years ago, sometimes skips meals, sometimes on  [place] most of the day  Most of the hypoglycemia happens during the daytime     has diabetes, denies mixing up of the medications with 's medicines  No weight loss,  No prior history of diabetes, no prior history of insulin use      No chest pain, shortness of breath, diarrhea, nausea, vomiting  Past Medical History:   Diagnosis Date    Hypoglycemia     Hypotension     Syncope and collapse 7/24/15    RTH     Past Surgical History:   Procedure Laterality Date    HX ABDOMINAL LAPAROSCOPY      HX CARPAL TUNNEL RELEASE Bilateral more than 10 years ago    HX  SECTION  3758,3359    HX COLONOSCOPY  2016    HX GASTRIC BYPASS  1997    x2    HX HYSTERECTOMY      HX TONSIL AND ADENOIDECTOMY  more than 10 years ago     Current Outpatient Prescriptions   Medication Sig    meclizine (TRAVEL SICKNESS, MECLIZINE,) 25 mg chewable tablet TAKE 1 TABLET BY MOUTH THREE TIMES DAILY AS NEEDED FOR UP TO 10 DAYS FOR DIZZINESS    diclofenac EC (VOLTAREN) 50 mg EC tablet Take 1 Tab by mouth two (2) times a day.  pantoprazole (PROTONIX) 40 mg tablet Take 40 mg by mouth daily.     butalbital-acetaminophen-caff (FIORICET) -40 mg per capsule Take 1 Cap by mouth every four (4) hours as needed for Pain.  fluticasone (FLONASE) 50 mcg/actuation nasal spray 2 Sprays by Both Nostrils route daily.  glucose 4 gram chewable tablet Take 4 Tabs by mouth as needed.  cholecalciferol (VITAMIN D3) 1,000 unit tablet Take 1 Tab by mouth daily. Indications: Vitamin D Deficiency    cyanocobalamin (VITAMIN B-12) 1,000 mcg tablet TAKE 1 TABLET BY MOUTH DAILY FOR VITAMIN B12 DEFICIENCY    polyethylene glycol (MIRALAX) 17 gram packet Take 1 Packet by mouth daily.  topiramate (TOPAMAX) 100 mg tablet TAKE 1 TABLET BY MOUTH DAILY. INDICATIONS: MOOD    traZODone (DESYREL) 50 mg tablet Take 50 mg by mouth nightly.  furosemide (LASIX) 20 mg tablet Take 1 Tab by mouth daily. As needed    potassium chloride (KLOR-CON) 10 mEq tablet Take 1 Tab by mouth daily. If takes lasix    Nebulizer & Compressor machine Use as directed    albuterol (PROVENTIL VENTOLIN) 2.5 mg /3 mL (0.083 %) nebulizer solution 3 mL by Nebulization route every four (4) hours as needed for Wheezing.  sertraline (ZOLOFT) 50 mg tablet Take 50 mg by mouth daily.  glucose blood VI test strips (ONETOUCH VERIO) strip Test 2 -3 times daily Dx Code: E16.2    lancets (ONE TOUCH DELICA) 33 gauge misc Test 2-3 times daily Dx Code: E16.2     No current facility-administered medications for this visit. Allergies   Allergen Reactions    Tylenol [Acetaminophen] Anaphylaxis and Hives    Benadryl [Diphenhydramine Hcl] Nausea and Vomiting    Ibuprofen Nausea and Vomiting    Sulfamethoxazole-Trimethoprim Nausea Only    Tramadol Anxiety         Review of Systems:  All 10 systems reviewed and are negative other than mentioned in HPI    Physical Examination:  Blood pressure 122/68, pulse (!) 46, temperature 95.7 °F (35.4 °C), temperature source Oral, resp. rate 14, height 4' 9\" (1.448 m), weight 182 lb 12.8 oz (82.9 kg), SpO2 98 %. Body mass index is 39.56 kg/(m^2).   - General: pleasant, no distress, good eye contact  - HEENT: no exopthalmos, no periorbital edema, no scleral/conjunctival injection, EOMI, no lid lag or stare  - Neck: supple, no thyromegaly, lymph nodes, or carotid bruits, no supraclavicular or dorsocervical fat pads  - Cardiovascular: regular, normal rate, normal S1 and S2, no murmurs  - Respiratory: clear to auscultation bilaterally  - Gastrointestinal: soft, nontender, nondistended,BS +  - Musculoskeletal: no proximal muscle weakness in upper or lower extremities  - Integumentary:  no rashes, no edema  - Neurological: Alert and oriented, no tremor  - Psychiatric: normal mood and affect    Data Reviewed:     Assessment/Plan:     1. Hypoglycemia    2. 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 cortisol, insulin antibodies, insulin, proinsulin, BMP, C-peptide  Discussed about changes in the diet, increasing protein  Dispensed meter, check blood glucose before breakfast and 2 hours after meal for the next 2 weeks    Written instructions given for diet  We will call her with the lab results. If no improvement acarbose      Thank you for allowing me to participate in the care of this patient. Rehan Gasca MD      Follow-up Disposition:  Return if symptoms worsen or fail to improve. Patient /caregiver verbalized understanding . Voice-recognition software was used to generate this report, which may result in some phonetic-based errors in the grammar and contents. Even though attempts were made to correct all the mistakes, some may have been missed and remained in the body of the report.

## 2018-08-15 NOTE — MR AVS SNAPSHOT
[address] [place] [address] 
[phone] Patient: Gela Durham MRN: [id number] :[id number] Visit Information Date & Time Provider Department Dept. Phone Encounter #  
 8/15/2018  3:00 PM Felix Romero MD [facility] Diabetes & Endocrinology [phone] [id number] Follow-up Instructions Return if symptoms worsen or fail to improve. Your Appointments 10/5/2018  1:45 PM  
ACUTE CARE with Brianna Owusu MD  
[facility] [address] [place] [address]) Appt Note: f/u on medication $0 cp lsh 18  
 [address] [place] [address]. [address] Rian Skiff [address]  
[phone]  
  
   
 [facility] [address] Upcoming Health Maintenance Date Due Influenza Age 5 to Adult 2018 PAP AKA CERVICAL CYTOLOGY 12/3/2018 MEDICARE YEARLY EXAM 2019 BREAST CANCER SCRN MAMMOGRAM 2020 DTaP/Tdap/Td series (2 - Tdap) 2021 COLONOSCOPY 8/3/2026 Allergies as of 8/15/2018  Review Complete On: 8/15/2018 By: Felix Romero MD  
  
 Severity Noted Reaction Type Reactions Tylenol [Acetaminophen] High 10/02/2015    Anaphylaxis, Hives Benadryl [Diphenhydramine Hcl] Medium 2015    Nausea and Vomiting Ibuprofen  10/12/2014    Nausea and Vomiting  
 Sulfamethoxazole-trimethoprim    Nausea Only Tramadol  2018    Anxiety Current Immunizations  Reviewed on 10/2/2015 Name Date DTaP 2011 Hep B Vaccine 10/16/2016, 2016 Hep B Vaccine (Adult) 2017 Influenza Vaccine 10/13/2016, 2015, 2014, 2013, 3/28/2013, 2011, 2011, 2011, 2009, 2009, 2009, 2009, 2007, 2007 Influenza Vaccine (Quad) PF 2017, 2014 Pneumococcal Polysaccharide (PPSV-23) 2009 Tdap 2011 Not reviewed this visit You Were Diagnosed With   
  
 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  
 39.56 kg/m 2 1.83 m 2 Preferred Pharmacy Pharmacy Name Phone [address], [address] [facility] [address] [phone] Your Updated Medication List  
  
   
This list is accurate as of 8/15/18  3:48 PM.  Always use your most recent med list.  
  
  
  
  
 albuterol 2.5 mg /3 mL (0.083 %) nebulizer solution Commonly known as:  PROVENTIL VENTOLIN  
3 mL by Nebulization route every four (4) hours as needed for Wheezing. butalbital-acetaminophen-caff -40 mg per capsule Commonly known as:  [employer] Take 1 Cap by mouth every four (4) hours as needed for Pain. cholecalciferol 1,000 unit tablet Commonly known as:  VITAMIN D3 Take 1 Tab by mouth daily. Indications: Vitamin D Deficiency  
  
 cyanocobalamin 1,000 mcg tablet Commonly known as:  VITAMIN B-12 TAKE 1 TABLET BY MOUTH DAILY FOR VITAMIN B12 DEFICIENCY  
  
 diclofenac EC 50 mg EC tablet Commonly known as:  VOLTAREN Take 1 Tab by mouth two (2) times a day. fluticasone 50 mcg/actuation nasal spray Commonly known as:  [place] 2 Sprays by Both Nostrils route daily. furosemide 20 mg tablet Commonly known as:  LASIX Take 1 Tab by mouth daily. As needed  
  
 glucose 4 gram chewable tablet Take 4 Tabs by mouth as needed. meclizine 25 mg chewable tablet Commonly known as:  TRAVEL SICKNESS (MECLIZINE) TAKE 1 TABLET BY MOUTH THREE TIMES DAILY AS NEEDED FOR UP TO 10 DAYS FOR DIZZINESS Nebulizer & Compressor machine Use as directed  
  
 polyethylene glycol 17 gram packet Commonly known as:  [place] Salt Take 1 Packet by mouth daily. potassium chloride 10 mEq tablet Commonly known as:  KLOR-CON Take 1 Tab by mouth daily. If takes lasix PROTONIX 40 mg tablet Generic drug:  pantoprazole Take 40 mg by mouth daily. topiramate 100 mg tablet Commonly known as:  TOPAMAX TAKE 1 TABLET BY MOUTH DAILY. INDICATIONS: MOOD  
  
 traZODone 50 mg tablet Commonly known as:  [place] Take 50 mg by mouth nightly. ZOLOFT 50 mg tablet Generic drug:  sertraline Take 50 mg by mouth daily. Follow-up Instructions Return if symptoms worsen or fail to improve. Patient Instructions Basic Dietary Guidelines 1. Eat smaller, more frequent meals - 4 small meals a day. 2. Eat fewer simple sugars. Foods high in simple sugar should be avoided as it can cause quick rise and fall in sugars. Avoid or limit high sugary foods and beverages including the following: Adan-Aid, fruit juices/drinks, soda,cakes, pies, candy, doughnuts, cookies. 3.  Eat more foods high in soluble fiber to prevent sugars from being absorbed too quickly. The following foods are high in soluble fiber: apples, beets, Brussel sprouts, carrots, oats, spinach, pears. 4.Increase the protein in your diet. Eat a protein containing food with each meal. High protein foods include : Eggs, meat, poultry, fish, milk, yogurt, cottage cheese, cheese, peanut butter. 5. If milk causes distress, try lactose-free milk. Milk and milk products are often not tolerated;avoid if this it true for you. It will be important to ensure that adequate calcium and vitamin D are eaten in the diet. Check the sugars before breakfast and 2 hours breakfast  
 
 
 
 
  
Introducing MYCHART! Terrance Eddy introduces [employer] patient portal. Now you can access parts of your medical record, email your doctor's office, and request medication refills online. 1. In your internet browser, go to [email or web address]. [employer]/"[employer] 2. Click on the First Time User? Click Here link in the Sign In box. You will see the New Member Sign Up page. 3. Enter your [employer] Access Code exactly as it appears below. You will not need to use this code after youve completed the sign-up process. If you do not sign up before the expiration date, you must request a new code. · [employer] Access Code: [email or web address] Expires: 11/1/2018  2:10 PM 
 
4. Enter the last four digits of your Social Security Number (xxxx) and Date of Birth (mm/dd/yyyy) as indicated and click Submit. You will be taken to the next sign-up page. 5. Create a [employer] ID. This will be your [employer] login ID and cannot be changed, so think of one that is secure and easy to remember. 6. Create a [employer] password. You can change your password at any time. 7. Enter your Password Reset Question and Answer. This can be used at a later time if you forget your password. 8. Enter your e-mail address. You will receive e-mail notification when new information is available in [address] 9. Click Sign Up. You can now view and download portions of your medical record. 10. Click the Download Summary menu link to download a portable copy of your medical information. If you have questions, please visit the Frequently Asked Questions section of the [employer] website. Remember, [employer] is NOT to be used for urgent needs. For medical emergencies, dial 911. Now available from your iPhone and Android! Please provide this summary of care documentation to your next provider. Your primary care clinician is listed as Walker Arroyo. If you have any questions after today's visit, please call [phone].

## 2018-08-15 NOTE — PROGRESS NOTES
Reubin Epley is a 65 y.o. female here for   Chief Complaint   Patient presents with    New Patient     referred by Dr. Kirt Moseley for Hypoglycemia       1. Have you been to the ER, urgent care clinic since your last visit? Hospitalized since your last visit? -n/a    2. Have you seen or consulted any other health care providers outside of the [address] [place] [address] since your last visit?   Include any pap smears or colon screening.-n/a

## 2018-08-15 NOTE — PATIENT INSTRUCTIONS
Basic Dietary Guidelines      1. Eat smaller, more frequent meals - 4 small meals a day. 2. Eat fewer simple sugars. Foods high in simple sugar should be avoided as it can cause quick rise and fall in sugars. Avoid or limit high sugary foods and beverages including the following: Adan-Aid, fruit juices/drinks, soda,cakes, pies, candy, doughnuts, cookies. 3.  Eat more foods high in soluble fiber to prevent sugars from being absorbed too quickly. The following foods are high in soluble fiber: apples, beets, Brussel sprouts, carrots, oats, spinach, pears. 4.Increase the protein in your diet. Eat a protein containing food with each meal. High protein foods include : Eggs, meat, poultry, fish, milk, yogurt, cottage cheese, cheese, peanut butter. 5. If milk causes distress, try lactose-free milk. Milk and milk products are often not tolerated;avoid if this it true for you. It will be important to ensure that adequate calcium and vitamin D are eaten in the diet.          Check the sugars before breakfast and 2 hours breakfast

## 2018-08-22 ENCOUNTER — HOSPITAL ENCOUNTER (OUTPATIENT)
Dept: LAB | Age: 58
Discharge: HOME | End: 2018-08-22
Payer: MEDICARE

## 2018-08-22 PROCEDURE — 82024 ASSAY OF ACTH: CPT

## 2018-08-22 PROCEDURE — 86337 INSULIN ANTIBODIES: CPT

## 2018-08-22 PROCEDURE — 84681 ASSAY OF C-PEPTIDE: CPT

## 2018-08-22 PROCEDURE — 82533 TOTAL CORTISOL: CPT

## 2018-08-22 PROCEDURE — 83525 ASSAY OF INSULIN: CPT

## 2018-08-22 PROCEDURE — 84206 ASSAY OF PROINSULIN: CPT

## 2018-08-22 PROCEDURE — 80053 COMPREHEN METABOLIC PANEL: CPT

## 2018-08-22 PROCEDURE — 36415 COLL VENOUS BLD VENIPUNCTURE: CPT

## 2018-08-27 ENCOUNTER — PATIENT OUTREACH (OUTPATIENT)
Dept: INTERNAL MEDICINE CLINIC | Age: 58
End: 2018-08-27

## 2018-08-28 NOTE — PROGRESS NOTES
Goals      Establish PCP relationships and regularly scheduled appointments. 7/31 NN reminded pt.,follow-up care is a key part of your treatment and safety. Be sure to make and go to all appointments, and call your doctor if you are having problems. It's also good to keep an updated  list of your  medicines you take, to bring to f/u appointments. Pt. Verbalizes understanding, reports she will bring in updated list.NN will f/u with pt.in 1 week. -[address]     8/13 Pt.seen in office today, reports upcoming appt. (Diabetes and Endocrinologist), [phone] Elva Lopez MD, appt. 8/15/18 @ 3 pm. Pt reports she will attend upcoming appt. NN will f/u with pt.in 1 week. -[address]    8/27 NN reminded pt. upcoming appt. with PCP () scheduled 10/5/18 @ 1:45 pm. Pt.reports she will attend upcoming appt. NN will f/u in 1 week. -[address]           Knowledge and adherence of prescribed medication (ie. action, side effects, missed dose, etc.).            7/31 Pt.reports she have not filled her prescription for carafate solution, reports she has the Protonix and is taking them as prescribed. Pt.reports she is feeling better, no c/o abd pain, N-V this am.NN reviewed medications list with pt.reports taking all other medications as directed. NN encouraged to call with any questions or concerns. NN will f/u with pt.in 1 week. -[address]     8/13 Pt.reports she taking all medications as directed, will call NN with any questions or concerns. -[address]    8/27 Pt reports she taking her medications as directed. NN will f/u in 1 week. -             Supportive resources in place to maintain patient in the community (ie. Home Health, DME equipment, refer to, medication assistant plan, etc.)            7/31 Patient reports her  provides transportation to her appointments, NN reminded pt.of several missed and no show appointment in the past. NN offered to provide pt. information on public transportation service, pt.declined; reports she is not interested. -    Pt.reports FBS;  having several episodes of low blood sugar readings, has referral to see (Diabetes and Endocrinologist), [phone] Elva Lopez MD, reports miss appt. June, reports next scheduled appt. 8/15/18 @ 3 pm.Pt.reports she will attend scheduled appt. NN encourage pt.to monitor FBS at least daily and record readings to take to her appt. with specialist or PCP. NN reviewed s/s Hypoglycemia, pt reports she has her glucose chewable tablets to take as needed. Pt.reports she do not have glucose monitoring device at home. NN will provide pt.with information on Relion Glucose Monitor at [employer] 9.00, Test strips 5.00, Lancets 1.48. NN attempt to call pt.back, unable to reach. NN will f/u with pt. in 3 days. -[address]    8/13 Pt seen in office today c/o R knee pain, Tx. [place] (Zostrix), referral to Optometry, Dr. Georgina Mccoy, NN spoke with pt.ref.f/u with Prudence Condon. DO Berto (general surg.) [phone] or [phone], f/u ulcers. Pt.reports she has not schedule f/u, reports she has called but have not received response from the office. Pt reports upcoming appt. (Diabetes and Endocrinologist), [phone] Elva Lopez MD, appt. 8/15/18 @ 3 pm. Pt reports she will attend upcoming appt. NN will f/u with JERROD Roach DO office for f/u appt. NN will f/u pt. in one week. -[address]    8/27 NN contact pt., patient reports she has something to discuss face to face with NN, request come in to see NN. NN agreed with pt.she come in today or tomorrow, whichever if more convenient. Pt reports she will be in today. -[address]

## 2018-08-30 ENCOUNTER — TELEPHONE (OUTPATIENT)
Dept: ENDOCRINOLOGY | Age: 58
End: 2018-08-30

## 2018-08-30 ENCOUNTER — PATIENT OUTREACH (OUTPATIENT)
Dept: INTERNAL MEDICINE CLINIC | Age: 58
End: 2018-08-30

## 2018-08-30 DIAGNOSIS — E16.2 HYPOGLYCEMIA: ICD-10-CM

## 2018-08-30 RX ORDER — LANCETS 33 GAUGE
EACH MISCELLANEOUS
Qty: 100 LANCET | Refills: 11 | Status: SHIPPED | OUTPATIENT
Start: 2018-08-30 | End: 2019-05-13 | Stop reason: SDUPTHER

## 2018-08-30 NOTE — PROGRESS NOTES
Goals      Establish PCP relationships and regularly scheduled appointments. 7/31 NN reminded pt.,follow-up care is a key part of your treatment and safety. Be sure to make and go to all appointments, and call your doctor if you are having problems. It's also good to keep an updated  list of your  medicines you take, to bring to f/u appointments. Pt. Verbalizes understanding, reports she will bring in updated list.NN will f/u with pt.in 1 week. -[address]     8/13 Pt.seen in office today, reports upcoming appt. (Diabetes and Endocrinologist), [phone] Amol Estrada MD, appt. 8/15/18 @ 3 pm. Pt reports she will attend upcoming appt. NN will f/u with pt.in 1 week. -[address]    8/27 NN reminded pt. upcoming appt. with PCP () scheduled 10/5/18 @ 1:45 pm. Pt.reports she will attend upcoming appt. NN will f/u in 1 week. -[address]    8/30 NN reminded pt. upcoming appt. with PCP () scheduled 10/5/18 @ 1:45 pm. Pt.reports she will attend upcoming appt. NN will f/u in 1 week. -[address]             Knowledge and adherence of prescribed medication (ie. action, side effects, missed dose, etc.).            7/31 Pt.reports she have not filled her prescription for carafate solution, reports she has the Protonix and is taking them as prescribed. Pt.reports she is feeling better, no c/o abd pain, N-V this am.NN reviewed medications list with pt.reports taking all other medications as directed. NN encouraged to call with any questions or concerns. NN will f/u with pt.in 1 week. -[address]     8/13 Pt.reports she taking all medications as directed, will call NN with any questions or concerns. -[address]    8/27 Pt reports she taking her medications as directed. NN will f/u in 1 week. -[address]    8/30 Pt reports she taking her medications as directed. NN will f/u in 1 week. -             Supportive resources in place to maintain patient in the community (ie.  Home Health, DME equipment, refer to, medication assistant plan, etc.)            7/31 Patient reports her  provides transportation to her appointments, NN reminded pt.of several missed and no show appointment in the past. NN offered to provide pt. information on public transportation service, pt.declined; reports she is not interested. -    Pt.reports FBS;  having several episodes of low blood sugar readings, has referral to see (Diabetes and Endocrinologist), [phone] Pearline Severs, MD, reports miss appt. June, reports next scheduled appt. 8/15/18 @ 3 pm.Pt.reports she will attend scheduled appt. NN encourage pt.to monitor FBS at least daily and record readings to take to her appt. with specialist or PCP. NN reviewed s/s Hypoglycemia, pt reports she has her glucose chewable tablets to take as needed. Pt.reports she do not have glucose monitoring device at home. NN will provide pt.with information on Relion Glucose Monitor at [employer] 9.00, Test strips 5.00, Lancets 1.48. NN attempt to call pt.back, unable to reach. NN will f/u with pt. in 3 days. -[address]    8/13 Pt seen in office today c/o R knee pain, Tx. [place] (Zostrix), referral to Optometry, Dr. Allison Mckeon, NN spoke with pt.ref.f/u with Misael Thomson DO (general surg.) [phone] or [phone], f/u ulcers. Pt.reports she has not schedule f/u, reports she has called but have not received response from the office. Pt reports upcoming appt. (Diabetes and Endocrinologist), [phone] Pearline Severs, MD, appt. 8/15/18 @ 3 pm. Pt reports she will attend upcoming appt. NN will f/u with JERROD Roach DO office for f/u appt. NN will f/u pt. in one week. -[address]    8/27 NN contact pt., patient reports she has something to discuss face to face with NN, request come in to see NN. NN agreed with pt.she come in today or tomorrow, whichever if more convenient. Pt reports she will be in today. -[address]    8/30 Pt in to see NN, reports she attend appt. with  (Diabetes and Endocrinologist), [phone] Pearline Severs, MD, appt. 8/15/18, received order for OneTouch Verio, test strips, and lancets,as was given a Glucometer. [facility] reports insurance not cover supplies. NN called Dr. Cary Check office spoke with Eden Alvarado  requested (with pt.permission), to have the prescriptions sent to [facility] DR MARIELOS GARIBAY (fax) [phone], (phone) [phone]; Eden Alvarado report can take up to 24 hrs. Pt.is aware, reports her  has glucometer and supplies that she can use to check her FBS until she receive her own supplies. NN will f/u with pt.in 1 week. -[address]

## 2018-08-30 NOTE — TELEPHONE ENCOUNTER
Spoke with Hue Tang and informed her will send prescription to [address] in [facility] [place], [place]. She verbalized understanding.

## 2018-08-31 ENCOUNTER — PATIENT OUTREACH (OUTPATIENT)
Dept: INTERNAL MEDICINE CLINIC | Age: 58
End: 2018-08-31

## 2018-08-31 NOTE — PROGRESS NOTES
Goals      Establish PCP relationships and regularly scheduled appointments. 7/31 NN reminded pt.,follow-up care is a key part of your treatment and safety. Be sure to make and go to all appointments, and call your doctor if you are having problems. It's also good to keep an updated  list of your  medicines you take, to bring to f/u appointments. Pt. Verbalizes understanding, reports she will bring in updated list.NN will f/u with pt.in 1 week. -[address]     8/13 Pt.seen in office today, reports upcoming appt. (Diabetes and Endocrinologist), [phone] Reshma Cabrera MD, appt. 8/15/18 @ 3 pm. Pt reports she will attend upcoming appt. NN will f/u with pt.in 1 week. -[address]    8/27 NN reminded pt. upcoming appt. with PCP () scheduled 10/5/18 @ 1:45 pm. Pt.reports she will attend upcoming appt. NN will f/u in 1 week. -[address]    8/30 NN reminded pt. upcoming appt. with PCP () scheduled 10/5/18 @ 1:45 pm. Pt.reports she will attend upcoming appt. NN will f/u in 1 week. -[address]             Knowledge and adherence of prescribed medication (ie. action, side effects, missed dose, etc.).            7/31 Pt.reports she have not filled her prescription for carafate solution, reports she has the Protonix and is taking them as prescribed. Pt.reports she is feeling better, no c/o abd pain, N-V this am.NN reviewed medications list with pt.reports taking all other medications as directed. NN encouraged to call with any questions or concerns. NN will f/u with pt.in 1 week. -[address]     8/13 Pt.reports she taking all medications as directed, will call NN with any questions or concerns. -[address]    8/27 Pt reports she taking her medications as directed. NN will f/u in 1 week. -[address]    8/30 Pt reports she taking her medications as directed. NN will f/u in 1 week. -                 Supportive resources in place to maintain patient in the community (ie.  Home Health, DME equipment, refer to, medication assistant plan, etc.)            7/31 Patient reports her  provides transportation to her appointments, NN reminded pt.of several missed and no show appointment in the past. NN offered to provide pt. information on public transportation service, pt.declined; reports she is not interested. -    Pt.reports FBS;  having several episodes of low blood sugar readings, has referral to see (Diabetes and Endocrinologist), [phone] Valeria Thrasher MD, reports miss appt. June, reports next scheduled appt. 8/15/18 @ 3 pm.Pt.reports she will attend scheduled appt. NN encourage pt.to monitor FBS at least daily and record readings to take to her appt. with specialist or PCP. NN reviewed s/s Hypoglycemia, pt reports she has her glucose chewable tablets to take as needed. Pt.reports she do not have glucose monitoring device at home. NN will provide pt.with information on Relion Glucose Monitor at [employer] 9.00, Test strips 5.00, Lancets 1.48. NN attempt to call pt.back, unable to reach. NN will f/u with pt. in 3 days. -[address]    8/13 Pt seen in office today c/o R knee pain, Tx. [place] (Zostrix), referral to Optometry, Dr. Marquita Asif, NN spoke with pt.ref.f/u with Dick Baker. DO Berto (general surg.) [phone] or [phone], f/u ulcers. Pt.reports she has not schedule f/u, reports she has called but have not received response from the office. Pt reports upcoming appt. (Diabetes and Endocrinologist), [phone] Valeria Thrasher MD, appt. 8/15/18 @ 3 pm. Pt reports she will attend upcoming appt. NN will f/u with JERROD Roach DO office for f/u appt. NN will f/u pt. in one week. -[address]    8/27 NN contact pt., patient reports she has something to discuss face to face with NN, request come in to see NN. NN agreed with pt.she come in today or tomorrow, whichever if more convenient. Pt reports she will be in today. -[address]    8/30 Pt in to see NN, reports she attend appt. with  (Diabetes and Endocrinologist), [phone] Valeria Thrasher MD, appt. 8/15/18, received order for OneTouch Verio, test strips, and lancets,as was given a Glucometer. [facility] reports insurance not cover supplies. NN called Dr. Yanick Stephen office spoke with Marlin Staff  requested (with pt.permission), to have the prescriptions sent to [address] [address] (fax) [phone], (phone) [phone]; Marlin Staff report can take up to 24 hrs. Pt.is aware, reports her  has glucometer and supplies that she can use to check her FBS until she receive her own supplies. NN will f/u with pt.in 1 week. -[address]    8/31 NN f/u pt.prescription ready to be picked up at [facility], NN contacted pt.reports strips 78.00, lancets 11.78? Pt. Reports she cannot afford that. Pt.reports her  has extra supplies at home that is not being used and just so happens to be the type of supplies she needs for her glucometer. NN provided pt.with information on [employer] brand  Relion Glucose Monitor at [facility] 9.00, Test strips 5.00, Lancets 1.48. Pt. Reports that's more affordable, and she will look into it before her supplies are diminished. NN will f/u with pt. In 1 week. -[address]

## 2018-09-01 LAB
ACTH PLAS-MCNC: 35.1 PG/ML (ref 7.2–63.3)
ALBUMIN SERPL-MCNC: 3.5 G/DL (ref 3.5–5.5)
ALBUMIN/GLOB SERPL: 1.9 {RATIO} (ref 1.2–2.2)
ALP SERPL-CCNC: 81 IU/L (ref 39–117)
ALT SERPL-CCNC: 7 IU/L (ref 0–32)
AST SERPL-CCNC: 10 IU/L (ref 0–40)
BILIRUB SERPL-MCNC: <0.2 MG/DL (ref 0–1.2)
BUN SERPL-MCNC: 17 MG/DL (ref 6–24)
BUN/CREAT SERPL: 27 (ref 9–23)
C PEPTIDE SERPL-MCNC: 2.2 NG/ML (ref 1.1–4.4)
CALCIUM SERPL-MCNC: 8.9 MG/DL (ref 8.7–10.2)
CHLORIDE SERPL-SCNC: 111 MMOL/L (ref 96–106)
CO2 SERPL-SCNC: 20 MMOL/L (ref 20–29)
CORTIS AM PEAK SERPL-MCNC: 13.2 UG/DL (ref 6.2–19.4)
CREAT SERPL-MCNC: 0.62 MG/DL (ref 0.57–1)
GLOBULIN SER CALC-MCNC: 1.8 G/DL (ref 1.5–4.5)
GLUCOSE SERPL-MCNC: 86 MG/DL (ref 65–99)
INSULIN AB SER-ACNC: <5 UU/ML
INSULIN SERPL-ACNC: 9.5 UIU/ML (ref 2.6–24.9)
POTASSIUM SERPL-SCNC: 3.9 MMOL/L (ref 3.5–5.2)
PROINSULIN SERPL-SCNC: 1.5 PMOL/L (ref 0–10)
PROT SERPL-MCNC: 5.3 G/DL (ref 6–8.5)
SODIUM SERPL-SCNC: 143 MMOL/L (ref 134–144)

## 2018-09-03 NOTE — PROGRESS NOTES
Tests unremarkable  Has she been checking the blood glucose, has she had any glucose less than 70, if so she has to come back for the follow-up.   If she does not have any low blood glucose she can follow low-carb diet

## 2018-09-04 ENCOUNTER — TELEPHONE (OUTPATIENT)
Dept: ENDOCRINOLOGY | Age: 58
End: 2018-09-04

## 2018-09-04 NOTE — TELEPHONE ENCOUNTER
Per Dr. Mainor Cordero, informed pt of result note, as noted above. Pt verbalized understanding and stated she has had some lows. Transferred pt to the  to schedule appt soon. No further questions or concerns at this time.

## 2018-09-04 NOTE — TELEPHONE ENCOUNTER
----- Message from Edmar Hills MD sent at 9/2/2018 10:35 PM EDT -----  Tests unremarkable  Has she been checking the blood glucose, has she had any glucose less than 70, if so she has to come back for the follow-up.   If she does not have any low blood glucose she can follow low-carb diet

## 2018-09-10 ENCOUNTER — PATIENT OUTREACH (OUTPATIENT)
Dept: INTERNAL MEDICINE CLINIC | Age: 58
End: 2018-09-10

## 2018-09-11 ENCOUNTER — PATIENT OUTREACH (OUTPATIENT)
Dept: INTERNAL MEDICINE CLINIC | Age: 58
End: 2018-09-11

## 2018-09-11 NOTE — PROGRESS NOTES
Hospital Discharge Follow-Up      Date/Time:  2018 11:28 AM    Patient was admitted to [facility] on  and discharged on  for DX. Intractable Lower ABD pain. The physician discharge summary was available at the time of outreach. Patient was contacted within 3 business days of discharge. Presents with c/o N, V, fever and ABD pain    Top Challenges reviewed with the provider   Lower ABD pain -Unclear etiology   Heartrate decreased to 45s (Cardiology) f/u     Method of communication with provider :chart routing    Inpatient RRAT score: 5  Was this a readmission? no   Patient stated reason for the readmission: n/a    Nurse Navigator (NN) contacted the patient by telephone to perform post hospital discharge assessment. Verified name and  with patient as identifiers. Provided introduction to self, and explanation of the Nurse Navigator role. Reviewed discharge instructions and red flags with patient who verbalized understanding. Patient given an opportunity to ask questions and does not have any further questions or concerns at this time. The patient agrees to contact the PCP office for questions related to their healthcare. NN provided contact information for future reference. Disease Specific:   N/A    Summary of patient's top problems:  1. Severe ABD pain unclear etiology  2. Low HR  40's (f/u with cardiology)  3.  Low Blood glucose    Home Health orders at discharge: Ana 224: n/a  Date of initial visit: n/a    Durable Medical Equipment ordered/company: none  Durable Medical Equipment received: walker at home    Barriers to care? none    Advance Care Planning:   Does patient have an Advance Directive:  will discuss next outreach     Medication(s):   New Medications at Discharge:   Changed Medications at Discharge:  Discontinued Medications at Discharge:     Medication reconciliation was performed with patient, who verbalizes understanding of administration of home medications. There were no barriers to obtaining medications identified at this time. Referral to Pharm D needed: no     Current Outpatient Prescriptions   Medication Sig    glucose blood VI test strips (ONETOUCH VERIO) strip Test 2 -3 times daily Dx Code: E16.2    lancets (ONE TOUCH DELICA) 33 gauge misc Test 2-3 times daily Dx Code: E16.2    meclizine (TRAVEL SICKNESS, MECLIZINE,) 25 mg chewable tablet TAKE 1 TABLET BY MOUTH THREE TIMES DAILY AS NEEDED FOR UP TO 10 DAYS FOR DIZZINESS    diclofenac EC (VOLTAREN) 50 mg EC tablet Take 1 Tab by mouth two (2) times a day.  pantoprazole (PROTONIX) 40 mg tablet Take 40 mg by mouth daily.  butalbital-acetaminophen-caff (FIORICET) -40 mg per capsule Take 1 Cap by mouth every four (4) hours as needed for Pain.  fluticasone (FLONASE) 50 mcg/actuation nasal spray 2 Sprays by Both Nostrils route daily.  glucose 4 gram chewable tablet Take 4 Tabs by mouth as needed.  cholecalciferol (VITAMIN D3) 1,000 unit tablet Take 1 Tab by mouth daily. Indications: Vitamin D Deficiency    cyanocobalamin (VITAMIN B-12) 1,000 mcg tablet TAKE 1 TABLET BY MOUTH DAILY FOR VITAMIN B12 DEFICIENCY    polyethylene glycol (MIRALAX) 17 gram packet Take 1 Packet by mouth daily.  topiramate (TOPAMAX) 100 mg tablet TAKE 1 TABLET BY MOUTH DAILY. INDICATIONS: MOOD    traZODone (DESYREL) 50 mg tablet Take 50 mg by mouth nightly.  furosemide (LASIX) 20 mg tablet Take 1 Tab by mouth daily. As needed    potassium chloride (KLOR-CON) 10 mEq tablet Take 1 Tab by mouth daily. If takes lasix    Nebulizer & Compressor machine Use as directed    albuterol (PROVENTIL VENTOLIN) 2.5 mg /3 mL (0.083 %) nebulizer solution 3 mL by Nebulization route every four (4) hours as needed for Wheezing.  sertraline (ZOLOFT) 50 mg tablet Take 50 mg by mouth daily. No current facility-administered medications for this visit.       [facility] follow up appointment(s):   Future Appointments  Date Time Provider Iris Gannon   9/12/2018 11:00 AM Dannielle South MD [facility] AUDRE[facility] SCHED   10/3/2018 9:30 AM Amol Estrada MD [facility]   10/5/2018 1:45 PM Dannielle South MD [facility] AUDREY SCHED      Non-[facility] follow up appointment(s): Dr. John Pinon (Cardiology)     [facility]:  out of service area     Goals      Knowledge and adherence of prescribed medication (ie. action, side effects, missed dose, etc.).            9/11  Patient will understand general knowledge of medications, s/e and take meds as prescribed. NN reviewed medications list with pt.reports taking medication as directed. Reports he is able to fill pill boxes, and familiar with medications and why she is taking them. NN encouraged pt. to call with any questions or concerns, NN contact information provided. NN will f/u in 1 week. -              Supportive resources in place to maintain patient in the community (ie. Home Health, DME equipment, refer to, medication assistant plan, etc.)            9/11 Pt.reports she will continue f/u's with(Diabetes and Endocrinologist), [phone] Amol Estrada MD.(monitoring FBS) Pt.reports she has upcoming appointment 10/3/18 @ 9:30 a.m., pt. reports appt. with PCP Dr. Jocelyn Penaloza 9/12/18 @ 11 am., (Cardiologist) Dr. John Pinon 9/28/18 . Pt.reports her  very active and supportive in her care, reports he provides transportation to all scheduled appointments. Pt.reports she will attend upcoming appts. NN will f/u with pt.in 1 week. -       Understands red flags post discharge. 9/11 NN encourage pt.to monitor symptoms Bradycardia, notify PCP office if symptomatic. Report lightheadedness or dizziness, confusion or hard time concentrating, fainting, SOB with or without chest pain, you may also find that you tire out easily with even just a little activity.  Pt reports she will notify PCP office of symptoms listed above, Pt.reports she has scheduled f/u with (Cardiologist) Dr. John Pinon 9/28/18. NN reminded pt.the importance attending f/u with specialist and PCP, pt.reports she will attend upcoming appts. NN will f/u with pt.in 1 week. -[address]

## 2018-09-12 ENCOUNTER — OFFICE VISIT (OUTPATIENT)
Dept: INTERNAL MEDICINE CLINIC | Age: 58
End: 2018-09-12

## 2018-09-12 VITALS
BODY MASS INDEX: 38.19 KG/M2 | SYSTOLIC BLOOD PRESSURE: 136 MMHG | WEIGHT: 177 LBS | OXYGEN SATURATION: 99 % | TEMPERATURE: 97.1 F | RESPIRATION RATE: 16 BRPM | HEIGHT: 57 IN | DIASTOLIC BLOOD PRESSURE: 77 MMHG | HEART RATE: 49 BPM

## 2018-09-12 DIAGNOSIS — F31.31 BIPOLAR AFFECTIVE DISORDER, CURRENTLY DEPRESSED, MILD (HCC): ICD-10-CM

## 2018-09-12 DIAGNOSIS — K21.9 GASTROESOPHAGEAL REFLUX DISEASE WITHOUT ESOPHAGITIS: ICD-10-CM

## 2018-09-12 DIAGNOSIS — R10.13 EPIGASTRIC PAIN: Primary | ICD-10-CM

## 2018-09-12 DIAGNOSIS — R00.1 BRADYCARDIA: ICD-10-CM

## 2018-09-12 NOTE — PROGRESS NOTES
Chief Complaint   Patient presents with    Heart Problem     [facility] ADM - low heart rate follow up - appt with Dr. Odell Code 9/28th      I have reviewed the patient's medical history in detail and updated the computerized patient record. Health Maintenance reviewed. 1. Have you been to the ER, urgent care clinic since your last visit? Hospitalized since your last visit? yes    2. Have you seen or consulted any other health care providers outside of the [address] [place] [address] [place] since your last visit? Include any pap smears or colon screening. [facility] ADM     Encouraged pt to discuss pt's wishes with spouse/partner/family and bring them in the next appt to follow thru with the Advanced Directive    Fall Risk Assessment, last 12 mths 1/17/2018   Able to walk? Yes   Fall in past 12 months? Yes   Fall with injury? Yes   Number of falls in past 12 months 4   Fall Risk Score 5       PHQ over the last two weeks 1/17/2018   Little interest or pleasure in doing things Several days   Feeling down, depressed, irritable, or hopeless Several days   Total Score PHQ 2 2       Abuse Screening Questionnaire 1/17/2018   Do you ever feel afraid of your partner? N   Are you in a relationship with someone who physically or mentally threatens you? N   Is it safe for you to go home?  Y       ADL Assessment 1/17/2018   Feeding yourself No Help Needed   Getting from bed to chair No Help Needed   Getting dressed No Help Needed   Bathing or showering No Help Needed   Walk across the room (includes cane/walker) No Help Needed   Using the telphone No Help Needed   Taking your medications No Help Needed   Preparing meals No Help Needed   Managing money (expenses/bills) No Help Needed   Moderately strenuous housework (laundry) No Help Needed   Shopping for personal items (toiletries/medicines) No Help Needed   Shopping for groceries No Help Needed   Driving No Help Needed   Climbing a flight of stairs No Help Needed   Getting to places beyond walking distances No Help Needed

## 2018-09-12 NOTE — PROGRESS NOTES
HISTORY OF PRESENT Russel Hawkins is a 65 y.o. female. Heart Problem    The history is provided by the patient. This is a recurrent problem. The current episode started more than 1 week ago. The problem has not changed since onset. The problem is associated with an unknown factor. Associated symptoms include chest pressure, near-syncope, abdominal pain and dizziness. Pertinent negatives include no fever, no syncope, no nausea and no vomiting. Her past medical history does not include heart disease or hypertension. Abdominal Pain   This is a recurrent problem. The problem has been resolved. Associated symptoms include abdominal pain. Review of Systems   Constitutional: Negative for fever. Cardiovascular: Positive for near-syncope. Negative for syncope. Gastrointestinal: Positive for abdominal pain. Negative for blood in stool, diarrhea, nausea and vomiting. Genitourinary: Negative for dysuria. Neurological: Positive for dizziness. Negative for loss of consciousness. Not much   Psychiatric/Behavioral: Negative for depression. Current Outpatient Prescriptions   Medication Sig Dispense Refill    glucose blood VI test strips (ONETOUCH VERIO) strip Test 2 -3 times daily Dx Code: E16.2 100 Strip 11    lancets (ONE TOUCH DELICA) 33 gauge misc Test 2-3 times daily Dx Code: E16.2 100 Lancet 11    meclizine (TRAVEL SICKNESS, MECLIZINE,) 25 mg chewable tablet TAKE 1 TABLET BY MOUTH THREE TIMES DAILY AS NEEDED FOR UP TO 10 DAYS FOR DIZZINESS 60 Tab 2    diclofenac EC (VOLTAREN) 50 mg EC tablet Take 1 Tab by mouth two (2) times a day. 60 Tab 5    pantoprazole (PROTONIX) 40 mg tablet Take 40 mg by mouth daily.  butalbital-acetaminophen-caff (FIORICET) -40 mg per capsule Take 1 Cap by mouth every four (4) hours as needed for Pain. 10 Cap 1    fluticasone (FLONASE) 50 mcg/actuation nasal spray 2 Sprays by Both Nostrils route daily.  1 Bottle 5    glucose 4 gram chewable tablet Take 4 Tabs by mouth as needed. 30 Tab 5    cholecalciferol (VITAMIN D3) 1,000 unit tablet Take 1 Tab by mouth daily. Indications: Vitamin D Deficiency 180 Tab 1    cyanocobalamin (VITAMIN B-12) 1,000 mcg tablet TAKE 1 TABLET BY MOUTH DAILY FOR VITAMIN B12 DEFICIENCY 90 Tab 1    polyethylene glycol (MIRALAX) 17 gram packet Take 1 Packet by mouth daily. 50 Packet 5    topiramate (TOPAMAX) 100 mg tablet TAKE 1 TABLET BY MOUTH DAILY. INDICATIONS: MOOD 30 Tab 5    traZODone (DESYREL) 50 mg tablet Take 50 mg by mouth nightly.  potassium chloride (KLOR-CON) 10 mEq tablet Take 1 Tab by mouth daily. If takes lasix 90 Tab 3    Nebulizer & Compressor machine Use as directed 1 Each 0    albuterol (PROVENTIL VENTOLIN) 2.5 mg /3 mL (0.083 %) nebulizer solution 3 mL by Nebulization route every four (4) hours as needed for Wheezing. 100 Each 1    sertraline (ZOLOFT) 50 mg tablet Take 50 mg by mouth daily.  furosemide (LASIX) 20 mg tablet TAKE 1 TABLET BY MOUTH DAILY AS NEEDED 90 Tab 3    omeprazole (PRILOSEC) 40 mg capsule TAKE 1 CAPSULE BY MOUTH DAILY.  90 Cap 3     Allergies   Allergen Reactions    Tylenol [Acetaminophen] Anaphylaxis and Hives    Benadryl [Diphenhydramine Hcl] Nausea and Vomiting    Ibuprofen Nausea and Vomiting    Sulfamethoxazole-Trimethoprim Nausea Only    Tramadol Anxiety     Social History     Social History    Marital status:      Spouse name: N/A    Number of children: 2    Years of education: N/A     Occupational History    retired      Social History Main Topics    Smoking status: Never Smoker    Smokeless tobacco: Never Used    Alcohol use No    Drug use: No    Sexual activity: No     Other Topics Concern    Not on file     Social History Narrative     has brain damage, lives with him       Physical Exam  Visit Vitals    /77 (BP 1 Location: Left arm, BP Patient Position: Sitting)    Pulse (!) 49    Temp 97.1 °F (36.2 °C) (Oral)    Resp 16    Ht 4' 9\" (1.448 m)    Wt 177 lb (80.3 kg)    SpO2 99%    BMI 38.3 kg/m2     WD WN female NAD OW  Heart RRR without murmers clicks or rubs  Lungs CTA  Abdo soft nontender  Ext no edema    ASSESSMENT and PLAN  Encounter Diagnoses   Name Primary?  Epigastric pain Yes    Bradycardia     Bipolar affective disorder, currently depressed, mild (HCC)     Gastroesophageal reflux disease without esophagitis      Orders Placed This Encounter    REFERRAL TO CARDIOLOGY    AMB POC EKG ROUTINE W/ 12 LEADS, INTER & REP     Pain resolved likely adhesions  Will have ards eval her bradycardia  GERD stable  Bipolar stable.   Follow-up Disposition:  Return in about 4 weeks (around 10/10/2018) for routine follow up.  ]

## 2018-09-12 NOTE — MR AVS SNAPSHOT
[address] [address] [facility] 
[phone] Patient: Wally Pérez MRN: [id number] :1960 Visit Information Date & Time Provider Department Dept. Phone Encounter #  
 2018 11:00 AM Velasquez Sandoval MD [facility] [phone] [id number] Follow-up Instructions Return in about 4 weeks (around 10/10/2018) for routine follow up. Your Appointments 10/3/2018  9:30 AM  
ROUTINE CARE with Yue Rocha MD  
[facility]us) Appt Note: f/u  
 [address] [address]  
[phone]  
  
   
 [address] [place] [address]  
  
    
 10/5/2018  1:45 PM  
[facility] [address] Kraus) Appt Note: f/u on medication $0 cp [facility] 18  
 [address] [place] [address]. [address] [facility] [address]  
[phone]  
  
   
 [address] [place] [address] [address] Upcoming Health Maintenance Date Due Influenza Age 5 to Adult 2018 PAP AKA CERVICAL CYTOLOGY 12/3/2018 MEDICARE YEARLY EXAM 2019 BREAST CANCER SCRN MAMMOGRAM 2020 DTaP/Tdap/Td series (2 - Tdap) 2021 COLONOSCOPY 8/3/2026 Allergies as of 2018  Review Complete On: 2018 By: Velasquez Sandoval MD  
  
 Severity Noted Reaction Type Reactions Tylenol [Acetaminophen] High 10/02/2015    Anaphylaxis, Hives Benadryl [Diphenhydramine Hcl] Medium 2015    Nausea and Vomiting Ibuprofen  10/12/2014    Nausea and Vomiting  
 Sulfamethoxazole-trimethoprim    Nausea Only Tramadol  2018    Anxiety Current Immunizations  Reviewed on 10/2/2015 Name Date DTaP 2011 Hep B Vaccine 10/16/2016, 2016 Hep B Vaccine (Adult) 2017  Influenza Vaccine 10/13/2016, 2015, 2014, 2013, 3/28/2013, 11/14/2011, 1/13/2011, 1/13/2011, 12/17/2009, 12/17/2009, 1/8/2009, 1/8/2009, 11/8/2007, 11/8/2007 Influenza Vaccine (Quad) PF 12/22/2017, 9/24/2014 Pneumococcal Polysaccharide (PPSV-23) 1/8/2009 Tdap 6/23/2011 Not reviewed this visit You Were Diagnosed With   
  
 Codes Comments Epigastric pain    -  Primary ICD-10-CM: R10.13 ICD-9-CM: 789.06 Bradycardia     ICD-10-CM: R00.1 ICD-9-CM: 427.89 Bipolar affective disorder, currently depressed, mild (HCC)     ICD-10-CM: F31.31 
ICD-9-CM: 296.51 Gastroesophageal reflux disease without esophagitis     ICD-10-CM: K21.9 ICD-9-CM: 530.81 Vitals BP Pulse Temp Resp Height(growth percentile) Weight(growth percentile) 136/77 (BP 1 Location: Left arm, BP Patient Position: Sitting) (!) 49 97.1 °F (36.2 °C) (Oral) 16 4' 9\" (1.448 m) 177 lb (80.3 kg) SpO2 BMI OB Status Smoking Status 99% 38.3 kg/m2 Hysterectomy Never Smoker BMI and BSA Data Body Mass Index Body Surface Area  
 38.3 kg/m 2 1.8 m 2 Preferred Pharmacy Pharmacy Name Phone [facility] [address] [facility] [address] [facility] [phone] Your Updated Medication List  
  
   
This list is accurate as of 9/12/18 12:20 PM.  Always use your most recent med list.  
  
  
  
  
 albuterol 2.5 mg /3 mL (0.083 %) nebulizer solution Commonly known as:  PROVENTIL VENTOLIN  
3 mL by Nebulization route every four (4) hours as needed for Wheezing. butalbital-acetaminophen-caff -40 mg per capsule Commonly known as:  [employer] Take 1 Cap by mouth every four (4) hours as needed for Pain. cholecalciferol 1,000 unit tablet Commonly known as:  VITAMIN D3 Take 1 Tab by mouth daily. Indications: Vitamin D Deficiency  
  
 cyanocobalamin 1,000 mcg tablet Commonly known as:  VITAMIN B-12 TAKE 1 TABLET BY MOUTH DAILY FOR VITAMIN B12 DEFICIENCY  
  
 diclofenac EC 50 mg EC tablet Commonly known as:  VOLTAREN Take 1 Tab by mouth two (2) times a day. fluticasone 50 mcg/actuation nasal spray Commonly known as:  Noreen El 2 Sprays by Both Nostrils route daily. furosemide 20 mg tablet Commonly known as:  LASIX Take 1 Tab by mouth daily. As needed  
  
 glucose 4 gram chewable tablet Take 4 Tabs by mouth as needed. glucose blood VI test strips strip Commonly known as:  Vanessa Long Test 2 -3 times daily Dx Code: E16.2  
  
 lancets 33 gauge Misc Commonly known as: One Touch Alina Austin Test 2-3 times daily Dx Code: E16.2  
  
 meclizine 25 mg chewable tablet Commonly known as:  TRAVEL SICKNESS (MECLIZINE) TAKE 1 TABLET BY MOUTH THREE TIMES DAILY AS NEEDED FOR UP TO 10 DAYS FOR DIZZINESS Nebulizer & Compressor machine Use as directed  
  
 polyethylene glycol 17 gram packet Commonly known as:  Gina Kehr Take 1 Packet by mouth daily. potassium chloride 10 mEq tablet Commonly known as:  KLOR-CON Take 1 Tab by mouth daily. If takes lasix PROTONIX 40 mg tablet Generic drug:  pantoprazole Take 40 mg by mouth daily. topiramate 100 mg tablet Commonly known as:  TOPAMAX TAKE 1 TABLET BY MOUTH DAILY. INDICATIONS: MOOD  
  
 traZODone 50 mg tablet Commonly known as:  Ivan [place] Take 50 mg by mouth nightly. ZOLOFT 50 mg tablet Generic drug:  sertraline Take 50 mg by mouth daily. We Performed the Following AMB POC EKG ROUTINE W/ 12 LEADS, INTER & REP [04858 CPT(R)] REFERRAL TO CARDIOLOGY [AQR39 Custom] Comments:  
 Please evaluate patient for bradycardia, seen during recent hospitalization for abdo pain. Follow-up Instructions Return in about 4 weeks (around 10/10/2018) for routine follow up. Referral Information Referral ID Referred By Referred To  
  
 [id number] Shelly Vasquez MD   
   [id number] [address]  Phone: [phone] Fax: [phone] Visits Status Start Date End Date 1 New Request 9/12/18 9/12/19 If your referral has a status of pending review or denied, additional information will be sent to support the outcome of this decision. Introducing [facility]! Geri Dafne introduces [employer] patient portal. Now you can access parts of your medical record, email your doctor's office, and request medication refills online. 1. In your internet browser, go to [email or web address] 2. Click on the First Time User? Click Here link in the Sign In box. You will see the New Member Sign Up page. 3. Enter your [employer] Access Code exactly as it appears below. You will not need to use this code after youve completed the sign-up process. If you do not sign up before the expiration date, you must request a new code. · [employer] Access Code: [email or web address] Expires: 11/1/2018  2:10 PM 
 
4. Enter the last four digits of your Social Security Number (xxxx) and Date of Birth (mm/dd/yyyy) as indicated and click Submit. You will be taken to the next sign-up page. 5. Create a [employer] ID. This will be your [employer] login ID and cannot be changed, so think of one that is secure and easy to remember. 6. Create a [employer] password. You can change your password at any time. 7. Enter your Password Reset Question and Answer. This can be used at a later time if you forget your password. 8. Enter your e-mail address. You will receive e-mail notification when new information is available in [address] 9. Click Sign Up. You can now view and download portions of your medical record. 10. Click the Download Summary menu link to download a portable copy of your medical information. If you have questions, please visit the Frequently Asked Questions section of the [employer] website. Remember, [employer] is NOT to be used for urgent needs. For medical emergencies, dial 911. Now available from your iPhone and Android! Please provide this summary of care documentation to your next provider. Your primary care clinician is listed as Ayse Joshi. If you have any questions after today's visit, please call [phone].

## 2018-09-14 ENCOUNTER — TELEPHONE (OUTPATIENT)
Dept: INTERNAL MEDICINE CLINIC | Age: 58
End: 2018-09-14

## 2018-09-14 RX ORDER — FUROSEMIDE 20 MG/1
TABLET ORAL
Qty: 90 TAB | Refills: 3 | Status: SHIPPED | OUTPATIENT
Start: 2018-09-14 | End: 2019-07-29 | Stop reason: SDUPTHER

## 2018-09-14 RX ORDER — OMEPRAZOLE 40 MG/1
CAPSULE, DELAYED RELEASE ORAL
Qty: 90 CAP | Refills: 3 | Status: SHIPPED | OUTPATIENT
Start: 2018-09-14 | End: 2019-07-29 | Stop reason: SDUPTHER

## 2018-09-17 NOTE — TELEPHONE ENCOUNTER
Patient states that her heart rate has consistently been in the low 40's for the past week - she walked outside and was too tired to make it back to the house - has an appt already scheduled with Dr Lorenzo Holland on September 28, 2018, but [facility] that she try to be seen earlier than that - calling Dr Brittany Greene office at [phone] to request an earlier appt for this patient  Debbie Connelly LPN  2/48/2376  78:12 AM  Spoke with Deshaun Lanza at [facility] - [facility] 2018 at 115pm - will fax referral and office notes with EKG to [phone] - patient was notified of this new appt  Debbie Connelly LPN  0/78/2552  54:10 AM

## 2018-09-18 ENCOUNTER — PATIENT OUTREACH (OUTPATIENT)
Dept: INTERNAL MEDICINE CLINIC | Age: 58
End: 2018-09-18

## 2018-09-18 RX ORDER — DICLOFENAC SODIUM 50 MG/1
50 TABLET, DELAYED RELEASE ORAL 2 TIMES DAILY
Qty: 60 TAB | Refills: 5 | Status: CANCELLED | OUTPATIENT
Start: 2018-09-18

## 2018-09-18 NOTE — PROGRESS NOTES
Goals      Knowledge and adherence of prescribed medication (ie. action, side effects, missed dose, etc.).            9/11  Patient will understand general knowledge of medications, s/e and take meds as prescribed. NN reviewed medications list with pt.reports taking medication as directed. Reports he is able to fill pill boxes, and familiar with medications and why she is taking them. NN encouraged pt. to call with any questions or concerns, NN contact information provided. NN will f/u in 1 week. -[address]     9/18 Pt.reports taking all medications as directed, no c/o s/e, will call NN with any questions or concerns. NN will f/u with pt.in 1 week. -             Supportive resources in place to maintain patient in the community (ie. Home Health, DME equipment, refer to, medication assistant plan, etc.)            9/11 Pt.reports she will continue f/u's with(Diabetes and Endocrinologist), [phone] Amol Estrada MD.(monitoring FBS) Pt.reports she has upcoming appointment 10/3/18 @ 9:30 a.m., pt. reports appt. with PCP Dr. Jocelyn Penaloza 9/12/18 @ 11 am., (Cardiologist) Dr. John Pinon 9/28/18 . Pt.reports her  very active and supportive in her care, reports he provides transportation to all scheduled appointments. Pt.reports she will attend upcoming appts. NN will f/u with pt.in 1 week. -[address]    9/18 Pt.reports she has a earlier appt. with Cardiologist, Dr. John Pinon. Pt.reports she has had more episodes of heart rate decreasing, and symptoms of dizziness, and weak. Pt. Reports she will f/u with  on 9/18/18. Pt.reports she is doing fine today, but having problems with knee pain. Pt.reports she will  pain medication today from Pharmacy. NN will f/u with pt.in 1 week. -       Understands red flags post discharge. 9/11 NN encourage pt.to monitor symptoms Bradycardia, notify PCP office if symptomatic.  Report lightheadedness or dizziness, confusion or hard time concentrating, fainting, SOB with or without chest pain, you may also find that you tire out easily with even just a little activity. Pt reports she will notify PCP office of symptoms listed above, Pt.reports she has scheduled f/u with (Cardiologist) Dr. Gwendolyn Ragsdale 9/28/18. NN reminded pt.the importance attending f/u with specialist and PCP, pt.reports she will attend upcoming appts. NN will f/u with pt.in 1 week. -[address]    9/18 Pt.reports she feels fine, but c/o pain knees. Pt.reports she does not have any refills left on her Diclofenac tabs. NN spoke with office nurse Mary Baugh. ,LPN report she will call Pharmacy to verify refills remain active. Pt.made aware, NN will f/u in 1 week. -[address]

## 2018-09-18 NOTE — TELEPHONE ENCOUNTER
Last office visit:  9/12/18  Last filled:  Diclofenac tabs 8/13/18 #60 X 5 refills  BID dosing  No refills needed at this time

## 2018-09-18 NOTE — TELEPHONE ENCOUNTER
Requested Prescriptions     Pending Prescriptions Disp Refills    diclofenac EC (VOLTAREN) 50 mg EC tablet 60 Tab 5     Sig: Take 1 Tab by mouth two (2) times a day.

## 2018-09-19 NOTE — TELEPHONE ENCOUNTER
Requested Prescriptions     Pending Prescriptions Disp Refills    butalbital-acetaminophen-caff (FIORICET) -40 mg per capsule 10 Cap 1     Sig: Take 1 Cap by mouth every four (4) hours as needed for Pain.

## 2018-09-20 ENCOUNTER — PATIENT OUTREACH (OUTPATIENT)
Dept: INTERNAL MEDICINE CLINIC | Age: 58
End: 2018-09-20

## 2018-09-20 NOTE — TELEPHONE ENCOUNTER
Requested Prescriptions     Pending Prescriptions Disp Refills    butalbital-acetaminophen-caff (FIORICET) -40 mg per capsule 30 Cap 2     Sig: Take 1 Cap by mouth every four (4) hours as needed for Pain.      Last office visit 9/12/18 future 10/5/18  Last filled  7/6/18  Changes made to medication on last visit  no

## 2018-09-20 NOTE — PROGRESS NOTES
Goals      Knowledge and adherence of prescribed medication (ie. action, side effects, missed dose, etc.).            9/11  Patient will understand general knowledge of medications, s/e and take meds as prescribed. NN reviewed medications list with pt.reports taking medication as directed. Reports he is able to fill pill boxes, and familiar with medications and why she is taking them. NN encouraged pt. to call with any questions or concerns, NN contact information provided. NN will f/u in 1 week. -[address]     9/18 Pt.reports taking all medications as directed, no c/o s/e, will call NN with any questions or concerns. NN will f/u with pt.in 1 week. -             Supportive resources in place to maintain patient in the community (ie. Home Health, DME equipment, refer to, medication assistant plan, etc.)            9/11 Pt.reports she will continue f/u's with(Diabetes and Endocrinologist), [phone] Pete Shields MD.(monitoring FBS) Pt.reports she has upcoming appointment 10/3/18 @ 9:30 a.m., pt. reports appt. with PCP Dr. Missy Luong 9/12/18 @ 11 am., (Cardiologist) Dr. Susu Dean 9/28/18 . Pt.reports her  very active and supportive in her care, reports he provides transportation to all scheduled appointments. Pt.reports she will attend upcoming appts. NN will f/u with pt.in 1 week. -[address]    9/18 Pt.reports she has a earlier appt. with Cardiologist, Dr. Susu Dean. Pt.reports she has had more episodes of heart rate decreasing, and symptoms of dizziness, and weak. Pt. Reports she will f/u with  on 9/18/18. Pt.reports she is doing fine today, but having problems with knee pain. Pt.reports she will  pain medication today from Pharmacy. NN will f/u with pt.in 1 week. -[address]    9/20 Pt.reports she has attend scheduled appt. with  (Cardiology), reports scheduled Stress Test at [facility] on 10/3/18 @ 10 am. Pt.reports she will attend scheduled appts. NN will f/u with pt.in 1-2 weeks. -[facility]       Understands red flags post discharge. 9/11 NN encourage pt.to monitor symptoms Bradycardia, notify PCP office if symptomatic. Report lightheadedness or dizziness, confusion or hard time concentrating, fainting, SOB with or without chest pain, you may also find that you tire out easily with even just a little activity. Pt reports she will notify PCP office of symptoms listed above, Pt.reports she has scheduled f/u with (Cardiologist) Dr. Skylar Merida 9/28/18. NN reminded pt.the importance attending f/u with specialist and PCP, pt.reports she will attend upcoming appts. NN will f/u with pt.in 1 week. -[address]    9/18 Pt.reports she feels fine, but c/o pain knees. Pt.reports she does not have any refills left on her Diclofenac tabs. NN spoke with office nurse Wong Abdi. ERICA report she will call Pharmacy to verify refills remain active. Pt.made aware, NN will f/u in 1 week. -[address]    9/20 Pt.reports no c/o knee pain but c/o headache, reports she called office requesting (FIORICET). NN spoke with office nurse manager Diego Eden. KALLI) reports message sent to PCP, awaiting his response. NN notified pt.of this,  NN will f/u in am.-

## 2018-09-21 RX ORDER — BUTALBITAL, ACETAMINOPHEN AND CAFFEINE 300; 40; 50 MG/1; MG/1; MG/1
1 CAPSULE ORAL
Qty: 30 CAP | Refills: 0 | Status: SHIPPED | OUTPATIENT
Start: 2018-09-21 | End: 2018-10-23 | Stop reason: SDUPTHER

## 2018-09-24 ENCOUNTER — PATIENT OUTREACH (OUTPATIENT)
Dept: INTERNAL MEDICINE CLINIC | Age: 58
End: 2018-09-24

## 2018-10-01 ENCOUNTER — TELEPHONE (OUTPATIENT)
Dept: INTERNAL MEDICINE CLINIC | Age: 58
End: 2018-10-01

## 2018-10-01 NOTE — TELEPHONE ENCOUNTER
Pt called wanting to speak with the nurse. She would like to speak with them in reference to some test the heart doctor wants her to take. Please call her at [phone].

## 2018-10-01 NOTE — TELEPHONE ENCOUNTER
Patient seems upset and confused - states that Dr Ella Carrel wants her to come off of her medication that helps her sleep at night, her psychiatrist prescribed this for her and she cannot come off of it or she will never sleep - advised her that she should discuss this with her psychiatrist at her next appt - she also is having confusion over labs and a stress test that Dr Ella Carrel has asked for her to do - we will try to get the note from Dr Samina Moran last visit and order the labs from her at her visit with us on 10/5/18, will also discuss the stress test at that time as well  Shawanda Oneal LPN  43/4/4797  6:54 PM

## 2018-10-03 ENCOUNTER — PATIENT OUTREACH (OUTPATIENT)
Dept: INTERNAL MEDICINE CLINIC | Age: 58
End: 2018-10-03

## 2018-10-03 NOTE — PROGRESS NOTES
Goals      Knowledge and adherence of prescribed medication (ie. action, side effects, missed dose, etc.).            9/11  Patient will understand general knowledge of medications, s/e and take meds as prescribed. NN reviewed medications list with pt.reports taking medication as directed. Reports he is able to fill pill boxes, and familiar with medications and why she is taking them. NN encouraged pt. to call with any questions or concerns, NN contact information provided. NN will f/u in 1 week. -[address] [facility]     9/18 Pt.reports taking all medications as directed, no c/o s/e, will call NN with any questions or concerns. NN will f/u with pt.in 1 week. -[address] [facility]    9/24 Pt.reports taking all medications as directed, no c/o s/e, will call NN with any questions or concerns. NN will f/u with pt.in 1 week. -[address] [facility]    10/3 Pt reports taking all medications as directed, no c/o s/e, will call NN with any questions or concerns. Pt.reports some confusion with taking sleep medication at night reports one physician ask her not to take Trazodone and another suggest she take it. Pt.will f/u with PCP on 10/5 concerning this matter. NN will f/u with pt.in 1 week. -                  Supportive resources in place to maintain patient in the community (ie. Home Health, DME equipment, refer to, medication assistant plan, etc.)            9/11 Pt.reports she will continue f/u's with(Diabetes and Endocrinologist), [phone] Mamie Chapman MD.(monitoring FBS) Pt.reports she has upcoming appointment 10/3/18 @ 9:30 a.m., pt. reports appt. with PCP Dr. Marah Brar 9/12/18 @ 11 am., (Cardiologist) Dr. Diedre Gowers 9/28/18 . Pt.reports her  very active and supportive in her care, reports he provides transportation to all scheduled appointments. Pt.reports she will attend upcoming appts. NN will f/u with pt.in 1 week. -[address]    9/18 Pt.reports she has a earlier appt. with Cardiologist, Dr. Diedre Gowers.  Pt.reports she has had more episodes of heart rate decreasing, and symptoms of dizziness, and weak. Pt. Reports she will f/u with  on 9/18/18. Pt.reports she is doing fine today, but having problems with knee pain. Pt.reports she will  pain medication today from Pharmacy. NN will f/u with pt.in 1 week. -[address]    9/20 Pt.reports she has attend scheduled appt. with  (Cardiology), reports scheduled Stress Test at [facility] on 10/3/18 @ 10 am. Pt.reports she will attend scheduled appts. NN will f/u with pt.in 1-2 weeks. -[address]    9/24 Pt.reports headache has resolved, report scheduled for Stress Test at [facility] on 10/3 @ 10 am, pt reports she is not sure if she wants to have the Stress Test d/t she remember having Stress Test years ago and it caused her to have a severe headache. Pt.reports she has discuss this with (Cardiologist) , he suggest her to continue to have the Stress Test. NN informed pt.of some s/e of Nuclear Stress Test chest pain, severe headache, erratic heart rate, tiredness. NN encourage pt. if she has concerns about Stress Test  to call to notify the testing center at [facility]  of her concerns and what suggestions they may have. Pt.reports she will call [facility] Stess Test lab to make them aware of her concerns based on what she has experienced in the past. Pt.reports appt. with Rayo Chavez (Endocrinologist) 10/3 @ 9:30 am (Pt.reminded to carry FBS reading to f/u appt.), and  10/5 @ 1:45 pm. -    10/3 Pt.reports she missed her appt. with  (Endocrinologist) today, 10/3. Pt.reports she will reschedule. Pt. Reports having transportation problems, NN discuss pulbic transportation service, pt.reports she might consider using the public transportation as backup. NN provide pt.with information on [employer] ([phone]), pt.reports she will ryan if needed. NN will f/u with pt.in 1 week. -       Understands red flags post discharge.             9/11 NN encourage pt.to monitor symptoms Bradycardia, notify PCP office if symptomatic. Report lightheadedness or dizziness, confusion or hard time concentrating, fainting, SOB with or without chest pain, you may also find that you tire out easily with even just a little activity. Pt reports she will notify PCP office of symptoms listed above, Pt.reports she has scheduled f/u with (Cardiologist) Dr. Diedre Gowers 9/28/18. NN reminded pt.the importance attending f/u with specialist and PCP, pt.reports she will attend upcoming appts. NN will f/u with pt.in 1 week. -[facility]    9/18 Pt.reports she feels fine, but c/o pain knees. Pt.reports she does not have any refills left on her Diclofenac tabs. NN spoke with office nurse Ty León LPN report she will call Pharmacy to verify refills remain active. Pt.made aware, NN will f/u in 1 week. -[facility]    9/20 Pt.reports no c/o knee pain but c/o headache, reports she called office requesting (FIORICET). NN spoke with office nurse manager Gaby Comer. KALLI) reports message sent to PCP, awaiting his response. NN notified pt.of this,  NN will f/u in am.-     9/24 Pt.reports she is feeling better, no other c/o headache, reports she was taking Tylenol for HA. NN encourage pt.continue monitor s/s decreased heartrate, such as symptoms listed above. Pt.reports she scheduled for Stress Test at [facility] 10/3/18 @ 10 am.-    10/3 Pt.reports pain to L side of chest today, that radiates to her back, describes pain as constant and dull, no sweats, no n-v, SOB, feeling faint, or racing heart-rate. Pt.stated she did not eat prior to having chest discomfort. NN encourage pt. if symptoms occur again to go to ED for eval. Pt.reports she will go to ED for eval.if symptoms return. NN will f/u with pt.in 1 week. -[facility]

## 2018-10-05 ENCOUNTER — TELEPHONE (OUTPATIENT)
Dept: INTERNAL MEDICINE CLINIC | Age: 58
End: 2018-10-05

## 2018-10-05 ENCOUNTER — OFFICE VISIT (OUTPATIENT)
Dept: INTERNAL MEDICINE CLINIC | Age: 58
End: 2018-10-05

## 2018-10-05 VITALS
DIASTOLIC BLOOD PRESSURE: 65 MMHG | BODY MASS INDEX: 38.4 KG/M2 | WEIGHT: 178 LBS | HEART RATE: 52 BPM | OXYGEN SATURATION: 99 % | HEIGHT: 57 IN | TEMPERATURE: 97.9 F | RESPIRATION RATE: 16 BRPM | SYSTOLIC BLOOD PRESSURE: 116 MMHG

## 2018-10-05 DIAGNOSIS — R00.1 BRADYCARDIA: ICD-10-CM

## 2018-10-05 DIAGNOSIS — F31.30 BIPOLAR AFFECTIVE DISORDER, CURRENT EPISODE DEPRESSED, CURRENT EPISODE SEVERITY UNSPECIFIED (HCC): ICD-10-CM

## 2018-10-05 DIAGNOSIS — R07.9 CHEST PAIN, UNSPECIFIED TYPE: Primary | ICD-10-CM

## 2018-10-05 RX ORDER — GUAIFENESIN 100 MG/5ML
81 LIQUID (ML) ORAL DAILY
Qty: 90 TAB | Refills: 3 | Status: SHIPPED | OUTPATIENT
Start: 2018-10-05 | End: 2019-07-19 | Stop reason: SDUPTHER

## 2018-10-05 RX ORDER — ASPIRIN 325 MG
325 TABLET ORAL DAILY
Qty: 90 TAB | Refills: 1 | Status: SHIPPED | OUTPATIENT
Start: 2018-10-05 | End: 2018-10-05 | Stop reason: ALTCHOICE

## 2018-10-05 NOTE — MR AVS SNAPSHOT
[address] [place] [address]. [address] [address] [facility] 
[phone] Patient: Zuly Erickson MRN: [id number] :1960 Visit Information Date & Time Provider Department Dept. Phone Encounter #  
 10/5/2018  1:45 PM Jane Nayak  Jamie Alex [id number] Follow-up Instructions Return in about 4 weeks (around 2018) for routine follow up. Upcoming Health Maintenance Date Due Shingrix Vaccine Age 50> (1 of 2) 2011 Influenza Age 5 to Adult 2018 PAP AKA CERVICAL CYTOLOGY 12/3/2018 MEDICARE YEARLY EXAM 2019 BREAST CANCER SCRN MAMMOGRAM 2020 DTaP/Tdap/Td series (2 - Tdap) 2021 COLONOSCOPY 8/3/2026 Allergies as of 10/5/2018  Review Complete On: 10/5/2018 By: Sarahi Leger LPN Severity Noted Reaction Type Reactions Tylenol [Acetaminophen] High 10/02/2015    Anaphylaxis, Hives Benadryl [Diphenhydramine Hcl] Medium 2015    Nausea and Vomiting Ibuprofen  10/12/2014    Nausea and Vomiting  
 Sulfamethoxazole-trimethoprim    Nausea Only Tramadol  2018    Anxiety Current Immunizations  Reviewed on 10/2/2015 Name Date DTaP 2011 Hep B Vaccine 10/16/2016, 2016 Hep B Vaccine (Adult) 2017 Influenza Vaccine 10/13/2016, 2015, 2014, 2013, 3/28/2013, 2011, 2011, 2011, 2009, 2009, 2009, 2009, 2007, 2007 Influenza Vaccine (Quad) PF 2017, 2014 Pneumococcal Polysaccharide (PPSV-23) 2009 Tdap 2011 Not reviewed this visit You Were Diagnosed With   
  
 Codes Comments Chest pain, unspecified type    -  Primary ICD-10-CM: R07.9 ICD-9-CM: 786.50 Bipolar affective disorder, current episode depressed, current episode severity unspecified ([facility]     ICD-10-CM: F31.30 ICD-9-CM: 296.50   
 Bradycardia     ICD-10-CM: R00.1 ICD-9-CM: 427.89 Vitals BP Pulse Temp Resp Height(growth percentile) Weight(growth percentile) 116/65 (BP 1 Location: Left arm, BP Patient Position: Sitting) (!) 52 97.9 °F (36.6 °C) (Oral) 16 4' 9\" (1.448 m) 178 lb (80.7 kg) SpO2 BMI OB Status Smoking Status 99% 38.52 kg/m2 Hysterectomy Never Smoker BMI and BSA Data Body Mass Index Body Surface Area 38.52 kg/m 2 1.8 m 2 Preferred Pharmacy Pharmacy Name Phone [address], [address] [facility] [phone] Your Updated Medication List  
  
   
This list is accurate as of 10/5/18  2:28 PM.  Always use your most recent med list.  
  
  
  
  
 albuterol 2.5 mg /3 mL (0.083 %) nebulizer solution Commonly known as:  PROVENTIL VENTOLIN  
3 mL by Nebulization route every four (4) hours as needed for Wheezing. aspirin 81 mg chewable tablet Take 1 Tab by mouth daily. butalbital-acetaminophen-caff -40 mg per capsule Commonly known as:  [employer] Take 1 Cap by mouth every four (4) hours as needed for Pain. cholecalciferol 1,000 unit tablet Commonly known as:  VITAMIN D3 Take 1 Tab by mouth daily. Indications: Vitamin D Deficiency  
  
 cyanocobalamin 1,000 mcg tablet Commonly known as:  VITAMIN B-12 TAKE 1 TABLET BY MOUTH DAILY FOR VITAMIN B12 DEFICIENCY  
  
 diclofenac EC 50 mg EC tablet Commonly known as:  VOLTAREN Take 1 Tab by mouth two (2) times a day. fluticasone 50 mcg/actuation nasal spray Commonly known as:  Steven Bang 2 Sprays by Both Nostrils route daily. furosemide 20 mg tablet Commonly known as:  LASIX TAKE 1 TABLET BY MOUTH DAILY AS NEEDED  
  
 glucose 4 gram chewable tablet Take 4 Tabs by mouth as needed. glucose blood VI test strips strip Commonly known as:  Nitish Yoder Test 2 -3 times daily Dx Code: E16.2  
  
 lancets 33 gauge Misc Commonly known as: One Touch Clement Abraham Test 2-3 times daily Dx Code: E16.2  
  
 meclizine 25 mg chewable tablet Commonly known as:  TRAVEL SICKNESS (MECLIZINE) TAKE 1 TABLET BY MOUTH THREE TIMES DAILY AS NEEDED FOR UP TO 10 DAYS FOR DIZZINESS Nebulizer & Compressor machine Use as directed  
  
 omeprazole 40 mg capsule Commonly known as:  PRILOSEC  
TAKE 1 CAPSULE BY MOUTH DAILY. polyethylene glycol 17 gram packet Commonly known as:  Fredo Hooksett Take 1 Packet by mouth daily. potassium chloride 10 mEq tablet Commonly known as:  KLOR-CON Take 1 Tab by mouth daily. If takes lasix PROTONIX 40 mg tablet Generic drug:  pantoprazole Take 40 mg by mouth daily. topiramate 100 mg tablet Commonly known as:  TOPAMAX TAKE 1 TABLET BY MOUTH DAILY. INDICATIONS: MOOD  
  
 traZODone 50 mg tablet Commonly known as:  Tarun Exon Take 50 mg by mouth nightly. ZOLOFT 50 mg tablet Generic drug:  sertraline Take 50 mg by mouth daily. Prescriptions Sent to Pharmacy Refills  
 aspirin 81 mg chewable tablet 3 Sig: Take 1 Tab by mouth daily. Class: Normal  
 Pharmacy: [address] [place] [address] #: [phone] Route: Oral  
  
Follow-up Instructions Return in about 4 weeks (around 11/2/2018) for routine follow up. Patient Instructions Chest Pain: Care Instructions Your Care Instructions There are many things that can cause chest pain. Some are not serious and will get better on their own in a few days. But some kinds of chest pain need more testing and treatment. Your doctor may have recommended a follow-up visit in the next 8 to 12 hours. If you are not getting better, you may need more tests or treatment. Even though your doctor has released you, you still need to watch for any problems.  The doctor carefully checked you, but sometimes problems can develop later. If you have new symptoms or if your symptoms do not get better, get medical care right away. If you have worse or different chest pain or pressure that lasts more than 5 minutes or you passed out (lost consciousness), call 911 or seek other emergency help right away. A medical visit is only one step in your treatment. Even if you feel better, you still need to do what your doctor recommends, such as going to all suggested follow-up appointments and taking medicines exactly as directed. This will help you recover and help prevent future problems. How can you care for yourself at home? · Rest until you feel better. · Take your medicine exactly as prescribed. Call your doctor if you think you are having a problem with your medicine. · Do not drive after taking a prescription pain medicine. When should you call for help? Call 911 if: 
  · You passed out (lost consciousness).  
  · You have severe difficulty breathing.  
  · You have symptoms of a heart attack. These may include: ¨ Chest pain or pressure, or a strange feeling in your chest. 
¨ Sweating. ¨ Shortness of breath. ¨ Nausea or vomiting. ¨ Pain, pressure, or a strange feeling in your back, neck, jaw, or upper belly or in one or both shoulders or arms. ¨ Lightheadedness or sudden weakness. ¨ A fast or irregular heartbeat. After you call 911, the  may tell you to chew 1 adult-strength or 2 to 4 low-dose aspirin. Wait for an ambulance. Do not try to drive yourself.  
 Call your doctor today if: 
  · You have any trouble breathing.  
  · Your chest pain gets worse.  
  · You are dizzy or lightheaded, or you feel like you may faint.  
  · You are not getting better as expected.  
  · You are having new or different chest pain. Where can you learn more? Go to http://freddy-viral.info/. Enter A120 in the search box to learn more about \"Chest Pain: Care Instructions. \" Current as of: November 20, 2017 Content Version: 11.8 © 0140-4318 Healthwise, [employer]. Care instructions adapted under license by [employer] (which disclaims liability or warranty for this information). If you have questions about a medical condition or this instruction, always ask your healthcare professional. [address] any warranty or liability for your use of this information. Introducing [facility]! Fatoumata Mcdonald introduces [facility] patient portal. Now you can access parts of your medical record, email your doctor's office, and request medication refills online. 1. In your internet browser, go to [email or web address] 2. Click on the First Time User? Click Here link in the Sign In box. You will see the New Member Sign Up page. 3. Enter your [facility] Access Code exactly as it appears below. You will not need to use this code after youve completed the sign-up process. If you do not sign up before the expiration date, you must request a new code. · [facility] Access Code: -[email or web address] Expires: 11/1/2018  2:10 PM 
 
4. Enter the last four digits of your Social Security Number (xxxx) and Date of Birth (mm/dd/yyyy) as indicated and click Submit. You will be taken to the next sign-up page. 5. Create a [facility] ID. This will be your [facility] login ID and cannot be changed, so think of one that is secure and easy to remember. 6. Create a [facility] password. You can change your password at any time. 7. Enter your Password Reset Question and Answer. This can be used at a later time if you forget your password. 8. Enter your e-mail address. You will receive e-mail notification when new information is available in [address] 9. Click Sign Up. You can now view and download portions of your medical record. 10. Click the Download Summary menu link to download a portable copy of your medical information. If you have questions, please visit the Frequently Asked Questions section of the [employer] website. Remember, [employer] is NOT to be used for urgent needs. For medical emergencies, dial 911. Now available from your iPhone and Android! Please provide this summary of care documentation to your next provider. Your primary care clinician is listed as Richard Marcos. If you have any questions after today's visit, please call [phone].

## 2018-10-05 NOTE — PROGRESS NOTES
Chief Complaint   Patient presents with    Chest Pain     upper gastric pain and it slows pt down     I have reviewed the patient's medical history in detail and updated the computerized patient record. Health Maintenance reviewed. 1. Have you been to the ER, urgent care clinic since your last visit? Hospitalized since your last visit?no    2. Have you seen or consulted any other health care providers outside of the [address] [place] [address] [place] since your last visit? Include any pap smears or colon screening. No    Encouraged pt to discuss pt's wishes with spouse/partner/family and bring them in the next appt to follow thru with the Advanced Directive    Fall Risk Assessment, last 12 mths 1/17/2018   Able to walk? Yes   Fall in past 12 months? Yes   Fall with injury? Yes   Number of falls in past 12 months 4   Fall Risk Score 5       PHQ over the last two weeks 1/17/2018   Little interest or pleasure in doing things Several days   Feeling down, depressed, irritable, or hopeless Several days   Total Score PHQ 2 2       Abuse Screening Questionnaire 1/17/2018   Do you ever feel afraid of your partner? N   Are you in a relationship with someone who physically or mentally threatens you? N   Is it safe for you to go home?  Y       ADL Assessment 1/17/2018   Feeding yourself No Help Needed   Getting from bed to chair No Help Needed   Getting dressed No Help Needed   Bathing or showering No Help Needed   Walk across the room (includes cane/walker) No Help Needed   Using the telphone No Help Needed   Taking your medications No Help Needed   Preparing meals No Help Needed   Managing money (expenses/bills) No Help Needed   Moderately strenuous housework (laundry) No Help Needed   Shopping for personal items (toiletries/medicines) No Help Needed   Shopping for groceries No Help Needed   Driving No Help Needed   Climbing a flight of stairs No Help Needed   Getting to places beyond walking distances No Help Needed

## 2018-10-05 NOTE — PROGRESS NOTES
HISTORY OF PRESENT Clint Singh is a 65 y.o. female. Chest Pain    The history is provided by the patient. This is a new problem. The current episode started more than 1 week ago. The problem has been gradually worsening. Duration of episode(s) is 30 minutes. The problem occurs daily. The pain is associated with normal activity. The pain is present in the substernal region. The pain is at a severity of 10/10. The quality of the pain is described as pressure-like. The pain radiates to the left arm. Associated symptoms include diaphoresis. Pertinent negatives include no near-syncope and no shortness of breath. She has tried nothing (was set up for stress test but did not show) for the symptoms. She was evaluated for epigastric pain last month and an EKG performed showed some sinus bradycardia with evidence of inferior ischemia. She saw [facility]. Her cardiologist.  Had an episode of chest pain earlier and almost went to the emergency room but did not go. She is frightened of the headache which she experienced after her last chemical stress test.  It must have been pretty bad. Has no known heart disease. Currently does not complain of any chest pain. Unclear to me what brings on the chest pain but it does not seem to be exertional.    Patient Active Problem List   Diagnosis Code    Bilateral leg edema R60.0    History of gastric bypass Z98.84    Migraine aura without headache G43. 109    Bipolar disorder with current episode depressed ([address] F31.30    Gastroesophageal reflux disease without esophagitis K21.9    Obesity, morbid ([address] E66.01       Review of Systems   Constitutional: Positive for diaphoresis. Respiratory: Negative for shortness of breath. Cardiovascular: Positive for chest pain. Negative for near-syncope. Neurological: Negative for focal weakness.      Social History     Social History    Marital status:      Spouse name: N/A    Number of children: 2    Years of education: N/A     Occupational History    retired      Social History Main Topics    Smoking status: Never Smoker    Smokeless tobacco: Never Used    Alcohol use No    Drug use: No    Sexual activity: No     Other Topics Concern    Not on file     Social History Narrative     has brain damage, lives with him       Physical Exam  Visit Vitals    /65 (BP 1 Location: Left arm, BP Patient Position: Sitting)    Pulse (!) 52    Temp 97.9 °F (36.6 °C) (Oral)    Resp 16    Ht 4' 9\" (1.448 m)    Wt 178 lb (80.7 kg)    SpO2 99%    BMI 38.52 kg/m2     WD WN female NAD frightened and scared  Heart RRR without murmers clicks or rubs  Lungs CTA  Abdo soft nontender  Ext no edema    ASSESSMENT and PLAN  Encounter Diagnoses   Name Primary?  Chest pain, unspecified type Yes    Bipolar affective disorder, current episode depressed, current episode severity unspecified [address]     Bradycardia      Orders Placed This Encounter    DISCONTD: aspirin (ASPIRIN) 325 mg tablet    aspirin 81 mg chewable tablet     Spent some time discussing the need to have the procedure done especially with inferior ischemia. Reassured her that the new agents used hopefully will not cause as much pain but she gets that we can treat her. Discussed chest pain precautions. sHe will go to the ER if they occur again. Start low-dose aspirin, 1 dose given today to more to take home with her she can buy the rest.  Really needs to schedule stress test as soon as possible. Follow-up Disposition:  Return in about 4 weeks (around 11/2/2018) for routine follow up.

## 2018-10-05 NOTE — PATIENT INSTRUCTIONS
Chest Pain: Care Instructions  Your Care Instructions    There are many things that can cause chest pain. Some are not serious and will get better on their own in a few days. But some kinds of chest pain need more testing and treatment. Your doctor may have recommended a follow-up visit in the next 8 to 12 hours. If you are not getting better, you may need more tests or treatment. Even though your doctor has released you, you still need to watch for any problems. The doctor carefully checked you, but sometimes problems can develop later. If you have new symptoms or if your symptoms do not get better, get medical care right away. If you have worse or different chest pain or pressure that lasts more than 5 minutes or you passed out (lost consciousness), call 911 or seek other emergency help right away. A medical visit is only one step in your treatment. Even if you feel better, you still need to do what your doctor recommends, such as going to all suggested follow-up appointments and taking medicines exactly as directed. This will help you recover and help prevent future problems. How can you care for yourself at home? · Rest until you feel better. · Take your medicine exactly as prescribed. Call your doctor if you think you are having a problem with your medicine. · Do not drive after taking a prescription pain medicine. When should you call for help? Call 911 if:    · You passed out (lost consciousness).     · You have severe difficulty breathing.     · You have symptoms of a heart attack. These may include:  ¨ Chest pain or pressure, or a strange feeling in your chest.  ¨ Sweating. ¨ Shortness of breath. ¨ Nausea or vomiting. ¨ Pain, pressure, or a strange feeling in your back, neck, jaw, or upper belly or in one or both shoulders or arms. ¨ Lightheadedness or sudden weakness. ¨ A fast or irregular heartbeat.   After you call 911, the  may tell you to chew 1 adult-strength or 2 to 4 low-dose aspirin. Wait for an ambulance. Do not try to drive yourself.    Call your doctor today if:    · You have any trouble breathing.     · Your chest pain gets worse.     · You are dizzy or lightheaded, or you feel like you may faint.     · You are not getting better as expected.     · You are having new or different chest pain. Where can you learn more? Go to http://freddy-viral.info/. Enter A120 in the search box to learn more about \"Chest Pain: Care Instructions. \"  Current as of: November 20, 2017  Content Version: 11.8  © 5753-9464 [employer]. Care instructions adapted under license by [employer] (which disclaims liability or warranty for this information). If you have questions about a medical condition or this instruction, always ask your healthcare professional. [address] any warranty or liability for your use of this information.

## 2018-10-10 ENCOUNTER — PATIENT OUTREACH (OUTPATIENT)
Dept: INTERNAL MEDICINE CLINIC | Age: 58
End: 2018-10-10

## 2018-10-10 NOTE — PROGRESS NOTES
Goals      Knowledge and adherence of prescribed medication (ie. action, side effects, missed dose, etc.).            9/11  Patient will understand general knowledge of medications, s/e and take meds as prescribed. NN reviewed medications list with pt.reports taking medication as directed. Reports he is able to fill pill boxes, and familiar with medications and why she is taking them. NN encouraged pt. to call with any questions or concerns, NN contact information provided. NN will f/u in 1 week. -[address] [facility]     9/18 Pt.reports taking all medications as directed, no c/o s/e, will call NN with any questions or concerns. NN will f/u with pt.in 1 week. -[address] [facility]    9/24 Pt.reports taking all medications as directed, no c/o s/e, will call NN with any questions or concerns. NN will f/u with pt.in 1 week. -[address] [facility]    10/3 Pt reports taking all medications as directed, no c/o s/e, will call NN with any questions or concerns. Pt.reports some confusion with taking sleep medication at night reports one physician ask her not to take Trazodone and another suggest she take it. Pt.will f/u with PCP on 10/5 concerning this matter. NN will f/u with pt.in 1 week. -     10/10 Pt.reports taking all medication as directed, no c/o  S/e , will call NN with any questions or concerns. NN will f/uw with pt.in 1 week. -               Supportive resources in place to maintain patient in the community (ie. Home Health, DME equipment, refer to, medication assistant plan, etc.)            9/11 Pt.reports she will continue f/u's with(Diabetes and Endocrinologist), [phone] [address] [place] [address] MD. Ari(monitoring FBS) Pt.reports she has upcoming appointment 10/3/18 @ 9:30 a.m., pt. reports appt. with PCP Dr. Jefferson Alvares 9/12/18 @ 11 am., (Cardiologist) Dr. Haresh Aguirre 9/28/18 . Pt.reports her  very active and supportive in her care, reports he provides transportation to all scheduled appointments. Pt.reports she will attend upcoming appts.  NN will f/u with pt.in 1 week.-    9/18 Pt.reports she has a earlier appt. with Cardiologist, Dr. Rigo Forrester. Pt.reports she has had more episodes of heart rate decreasing, and symptoms of dizziness, and weak. Pt. Reports she will f/u with  on 9/18/18. Pt.reports she is doing fine today, but having problems with knee pain. Pt.reports she will  pain medication today from Pharmacy. NN will f/u with pt.in 1 week. -[address] [facility]    9/20 Pt.reports she has attend scheduled appt. with  (Cardiology), reports scheduled Stress Test at [facility] on 10/3/18 @ 10 am. Pt.reports she will attend scheduled appts. NN will f/u with pt.in 1-2 weeks. -[address] [facility]    9/24 Pt.reports headache has resolved, report scheduled for Stress Test at [facility] on 10/3 @ 10 am, pt reports she is not sure if she wants to have the Stress Test d/t she remember having Stress Test years ago and it caused her to have a severe headache. Pt.reports she has discuss this with (Cardiologist) , he suggest her to continue to have the Stress Test. NN informed pt.of some s/e of Nuclear Stress Test chest pain, severe headache, erratic heart rate, tiredness. NN encourage pt. if she has concerns about Stress Test  to call to notify the testing center at [facility]  of her concerns and what suggestions they may have. Pt.reports she will call [facility] Stess Test lab to make them aware of her concerns based on what she has experienced in the past. Pt.reports appt. with Gerson Burkett (Endocrinologist) 10/3 @ 9:30 am (Pt.reminded to carry FBS reading to f/u appt.), and  10/5 @ 1:45 pm. -    10/3 Pt.reports she missed her appt. with  (Endocrinologist) today, 10/3. Pt.reports she will reschedule. Pt. Reports having transportation problems, NN discuss pulbic transportation service, pt.reports she might consider using the public transportation as backup. NN provide pt.with information on [employer] ([phone]), pt.reports she will ryan if needed.  NN will f/u with pt.in 1 week. -    10/10 Pt.seen in office PCP on 10/5, after taking with PCP pt.agreeable to having Stress Test. Pt.reports Stress Test schedule on 10/17 @ 1 pm. Pt.c/o or R knee/leg pain, describes as pens and needles, very painful rates 10/10 pain,unable to bear weight, swollen; increased in warmth, reports legs swollen from knee down. Pt.reports taking pain meds with no relief. NN encourage pt.to go to Urgent Care or ED for eval. NN will f/u with pt. in am.-       Understands red flags post discharge. 9/11 NN encourage pt.to monitor symptoms Bradycardia, notify PCP office if symptomatic. Report lightheadedness or dizziness, confusion or hard time concentrating, fainting, SOB with or without chest pain, you may also find that you tire out easily with even just a little activity. Pt reports she will notify PCP office of symptoms listed above, Pt.reports she has scheduled f/u with (Cardiologist) Dr. Antonio Bethea 9/28/18. NN reminded pt.the importance attending f/u with specialist and PCP, pt.reports she will attend upcoming appts. NN will f/u with pt.in 1 week. -[address] [facility]    9/18 Pt.reports she feels fine, but c/o pain knees. Pt.reports she does not have any refills left on her Diclofenac tabs. NN spoke with office nurse Murtaza Mena LPN report she will call Pharmacy to verify refills remain active. Pt.made aware, NN will f/u in 1 week. -[address]    9/20 Pt.reports no c/o knee pain but c/o headache, reports she called office requesting (FIORICET). NN spoke with office nurse manager Margaret Jane W.LPN) reports message sent to PCP, awaiting his response. NN notified pt.of this,  NN will f/u in am.-     9/24 Pt.reports she is feeling better, no other c/o headache, reports she was taking Tylenol for HA. NN encourage pt.continue monitor s/s decreased heartrate, such as symptoms listed above.  Pt.reports she scheduled for Stress Test at [address] 10/3/18 @ 10 am.-    10/3 Pt.reports pain to L side of chest today, that radiates to her back, describes pain as constant and dull, no sweats, no n-v, SOB, feeling faint, or racing heart-rate. Pt.stated she did not eat prior to having chest discomfort. NN encourage pt. if symptoms occur again to go to ED for eval. Pt.reports she will go to ED for eval.if symptoms return. NN will f/u with pt.in 1 week. -     10/10 Pt.reports no c/o chest discomfort at this time, c/o R leg swollen and warm, unable to bear weight, unrelieved with pain meds, rates pain 10/10. NN encourage to go to Urgent Care or ED for eval.NN will f/u with pt. in am.-

## 2018-10-15 ENCOUNTER — PATIENT OUTREACH (OUTPATIENT)
Dept: INTERNAL MEDICINE CLINIC | Age: 58
End: 2018-10-15

## 2018-10-15 NOTE — PROGRESS NOTES
Patient has graduated from the Transitions of Care Coordination  program on 10/15/18. Patient's symptoms are stable at this time. Patient/family has the ability to self-manage. Care management goals have been completed at this time. No further nurse navigator follow up scheduled. Goals Addressed             Most Recent     Knowledge and adherence of prescribed medication (ie. action, side effects, missed dose, etc.). On track (10/15/2018)             9/11  Patient will understand general knowledge of medications, s/e and take meds as prescribed. NN reviewed medications list with pt.reports taking medication as directed. Reports he is able to fill pill boxes, and familiar with medications and why she is taking them. NN encouraged pt. to call with any questions or concerns, NN contact information provided. NN will f/u in 1 week. -1969 [facility]     9/18 Pt.reports taking all medications as directed, no c/o s/e, will call NN with any questions or concerns. NN will f/u with pt.in 1 week. -[address] [facility]    9/24 Pt.reports taking all medications as directed, no c/o s/e, will call NN with any questions or concerns. NN will f/u with pt.in 1 week. -1969 [facility]    10/3 Pt reports taking all medications as directed, no c/o s/e, will call NN with any questions or concerns. Pt.reports some confusion with taking sleep medication at night reports one physician ask her not to take Trazodone and another suggest she take it. Pt.will f/u with PCP on 10/5 concerning this matter. NN will f/u with pt.in 1 week. -     10/10 Pt.reports taking all medication as directed, no c/o  S/e , will call NN with any questions or concerns. NN will f/uw with pt.in 1 week. -[address] [facility]    10/15 Pt.reports taking all medication as directed, no c/o  S/e , will call NN with any questions or concerns. -                 Supportive resources in place to maintain patient in the community (ie.  Home Health, DME equipment, refer to, medication assistant plan, etc.)   On track (10/15/2018) 9/11 Pt.reports she will continue f/u's with(Diabetes and Endocrinologist), [phone] Kiah Bingham MD.(monitoring FBS) Pt.reports she has upcoming appointment 10/3/18 @ 9:30 a.m., pt. reports appt. with PCP Dr. Dayan Hernandez 9/12/18 @ 11 am., (Cardiologist) Dr. Armani Alvarez 9/28/18 . Pt.reports her  very active and supportive in her care, reports he provides transportation to all scheduled appointments. Pt.reports she will attend upcoming appts. NN will f/u with pt.in 1 week. -[address]    9/18 Pt.reports she has a earlier appt. with Cardiologist, Dr. Armani Alvarez. Pt.reports she has had more episodes of heart rate decreasing, and symptoms of dizziness, and weak. Pt. Reports she will f/u with  on 9/18/18. Pt.reports she is doing fine today, but having problems with knee pain. Pt.reports she will  pain medication today from Pharmacy. NN will f/u with pt.in 1 week. -[address]    9/20 Pt.reports she has attend scheduled appt. with  (Cardiology), [facility]/18 @ 10 am. Pt.reports she will attend scheduled appts. NN will f/u with pt.in 1-2 weeks. -[address]    9/24 Pt.reports headache has resolved, report [facility]/3 @ 10 am, pt reports she is not sure if she wants to have the Stress Test d/t she remember having Stress Test years ago and it caused her to have a severe headache. Pt.reports she has discuss this with (Cardiologist) , he suggest her to continue to have the Stress Test. NN informed pt.of some s/e of Nuclear Stress Test chest pain, severe headache, erratic heart rate, tiredness. NN encourage pt. if she has concerns about Stress Test  to call to notify [facility] [address] [facility]  of her concerns and what suggestions they may have. Pt.reports she will call [address] Stess Test lab to make them aware of her concerns based on what she has experienced in the past. Pt.reports appt. with Nohelia Panchal (Endocrinologist) 10/3 @ 9:30 am (Pt.reminded to carry FBS reading to f/u appt.), and  10/5 @ 1:45 pm. -    10/3 Pt.reports she missed her appt. with  (Endocrinologist) today, 10/3. Pt.reports she will reschedule. Pt. Reports having transportation problems, NN discuss pulbic transportation service, pt.reports she might consider using the public transportation as backup. NN provide pt.with information on [employer] ([phone]), pt.reports she will ryan if needed. NN will f/u with pt.in 1 week. -    10/10 Pt.seen in office PCP on 10/5, after taking with PCP pt.agreeable to having Stress Test. Pt.reports Stress Test schedule on 10/17 @ 1 pm. Pt.c/o or R knee/leg pain, describes as pens and needles, very painful rates 10/10 pain,unable to bear weight, swollen; increased in warmth, reports legs swollen from knee down. Pt.reports taking pain meds with no relief. NN encourage pt.to go to Urgent Care or ED for eval. NN will f/u with pt. in am.-    10/15 Pt.miss appointment with (Diabetes and Endocrinologist), [phone] Isai Godinez MD on 10/3; will reschedule after Stress Test on 10/17. -        Understands red flags post discharge. On track (10/15/2018)             9/11 NN encourage pt.to monitor symptoms Bradycardia, notify PCP office if symptomatic. Report lightheadedness or dizziness, confusion or hard time concentrating, fainting, SOB with or without chest pain, you may also find that you tire out easily with even just a little activity. Pt reports she will notify PCP office of symptoms listed above, Pt.reports she has scheduled f/u with (Cardiologist) Dr. Dorothy Samuels 9/28/18. NN reminded pt.the importance attending f/u with specialist and PCP, pt.reports she will attend upcoming appts. NN will f/u with pt.in 1 week. -[facility]    9/18 Pt.reports she feels fine, but c/o pain knees. Pt.reports she does not have any refills left on her Diclofenac tabs. NN spoke with office nurse Zahida Hernandez. ,LPN report she will call Pharmacy to verify refills remain active. Pt.made aware, NN will f/u in 1 week. -[address]    9/20 Pt.reports no c/o knee pain but c/o headache, reports she called office requesting (FIORICET). NN spoke with office nurse manager Vicente Snowden. AKLLI) reports message sent to PCP, awaiting his response. NN notified pt.of this,  NN will f/u in am.-     9/24 Pt.reports she is feeling better, no other c/o headache, reports she was taking Tylenol for HA. NN encourage pt.continue monitor s/s decreased heartrate, such as symptoms listed above. Pt.reports she scheduled for Stress Test at [facility] 10/3/18 @ 10 am.-    10/3 Pt.reports pain to L side of chest today, that radiates to her back, describes pain as constant and dull, no sweats, no n-v, SOB, feeling faint, or racing heart-rate. Pt.stated she did not eat prior to having chest discomfort. NN encourage pt. if symptoms occur again to go to ED for eval. Pt.reports she will go to ED for eval.if symptoms return. NN will f/u with pt.in 1 week. -     10/10 Pt.reports no c/o chest discomfort at this time, c/o R leg swollen and warm, unable to bear weight, unrelieved with pain meds, rates pain 10/10. NN encourage to go to Urgent Care or ED for eval.NN will f/u with pt. in am.-    10/15 Pt.reports R leg less painful, able to ambulate without difficulty; will f/u with PCP for treatment after Stress Test on 10/17. -            Pt has nurse navigator's contact information for any further questions, concerns, or needs. Patients upcoming visits:  No future appointments.

## 2018-10-15 NOTE — PROGRESS NOTES
Pt.in office request speak with NN. Pt.states she has an appointment. to have Stress Test on 10/18/18 at [address] Pt.reports she has not received any instruction for preparation for the scheduled date. NN call [facility] ([phone]) scheduling; reports their record show pt.has not rescheduled appt. miss on 10/3. NN informed scheduling PCP office nurse (E.P.) note on 10/5 states she called  office to have them reschedule appt. for Stress Test. NN request [facility] to call pt.directly with information to reschedule Stress Test.

## 2018-10-19 ENCOUNTER — OFFICE VISIT (OUTPATIENT)
Dept: INTERNAL MEDICINE CLINIC | Age: 58
End: 2018-10-19

## 2018-10-19 VITALS
RESPIRATION RATE: 16 BRPM | TEMPERATURE: 97.2 F | WEIGHT: 178 LBS | HEART RATE: 50 BPM | SYSTOLIC BLOOD PRESSURE: 124 MMHG | BODY MASS INDEX: 38.4 KG/M2 | DIASTOLIC BLOOD PRESSURE: 68 MMHG | OXYGEN SATURATION: 98 % | HEIGHT: 57 IN

## 2018-10-19 DIAGNOSIS — R60.0 LEG EDEMA, RIGHT: Primary | ICD-10-CM

## 2018-10-19 DIAGNOSIS — M17.11 ARTHRITIS OF RIGHT KNEE: ICD-10-CM

## 2018-10-19 DIAGNOSIS — K21.9 GASTROESOPHAGEAL REFLUX DISEASE WITHOUT ESOPHAGITIS: ICD-10-CM

## 2018-10-19 DIAGNOSIS — R07.9 CHEST PAIN, UNSPECIFIED TYPE: ICD-10-CM

## 2018-10-19 RX ORDER — DICLOFENAC SODIUM 10 MG/G
GEL TOPICAL 4 TIMES DAILY
Qty: 1 EACH | Refills: 5 | Status: SHIPPED | OUTPATIENT
Start: 2018-10-19 | End: 2019-10-04 | Stop reason: SINTOL

## 2018-10-19 NOTE — PROGRESS NOTES
Chief Complaint   Patient presents with    Leg Pain     R/leg pain     I have reviewed the patient's medical history in detail and updated the computerized patient record. Health Maintenance reviewed. 1. Have you been to the ER, urgent care clinic since your last visit? Hospitalized since your last visit?no    2. Have you seen or consulted any other health care providers outside of the [facility] since your last visit? Include any pap smears or colon screening. No    Encouraged pt to discuss pt's wishes with spouse/partner/family and bring them in the next appt to follow thru with the Advanced Directive    Fall Risk Assessment, last 12 mths 1/17/2018   Able to walk? Yes   Fall in past 12 months? Yes   Fall with injury? Yes   Number of falls in past 12 months 4   Fall Risk Score 5       PHQ over the last two weeks 1/17/2018   Little interest or pleasure in doing things Several days   Feeling down, depressed, irritable, or hopeless Several days   Total Score PHQ 2 2       Abuse Screening Questionnaire 1/17/2018   Do you ever feel afraid of your partner? N   Are you in a relationship with someone who physically or mentally threatens you? N   Is it safe for you to go home?  Y       ADL Assessment 1/17/2018   Feeding yourself No Help Needed   Getting from bed to chair No Help Needed   Getting dressed No Help Needed   Bathing or showering No Help Needed   Walk across the room (includes cane/walker) No Help Needed   Using the telphone No Help Needed   Taking your medications No Help Needed   Preparing meals No Help Needed   Managing money (expenses/bills) No Help Needed   Moderately strenuous housework (laundry) No Help Needed   Shopping for personal items (toiletries/medicines) No Help Needed   Shopping for groceries No Help Needed   Driving No Help Needed   Climbing a flight of stairs No Help Needed   Getting to places beyond walking distances No Help Needed

## 2018-10-21 NOTE — PROGRESS NOTES
HISTORY OF PRESENT Michael Hidalgo is a 65 y.o. female. Leg Pain    The history is provided by the patient. This is a recurrent problem. The current episode started more than 1 week ago. The problem occurs constantly. The problem has been rapidly worsening. The pain is present in the right upper leg and right lower leg. The quality of the pain is described as aching. The pain is severe. Pertinent negatives include no numbness and no tingling. The symptoms are aggravated by movement. There has been no history of extremity trauma. At the last visit, patient reported some chest pain issues, states that those have decreased greatly. She was supposed to have a stress test but be because of the recent weather issues she has delayed it until next week. Does continue her aspirin. Still some chest pain but it is better. No trauma. Allergies   Allergen Reactions    Tylenol [Acetaminophen] Anaphylaxis and Hives    Benadryl [Diphenhydramine Hcl] Nausea and Vomiting    Ibuprofen Nausea and Vomiting    Sulfamethoxazole-Trimethoprim Nausea Only    Tramadol Anxiety     Patient Active Problem List   Diagnosis Code    Bilateral leg edema R60.0    History of gastric bypass Z98.84    Migraine aura without headache G43. 109    Bipolar disorder with current episode depressed ([facility]) F31.30    Gastroesophageal reflux disease without esophagitis K21.9    Obesity, morbid ([facility]) E66.01       Social History     Socioeconomic History    Marital status:      Spouse name: Not on file    Number of children: 2    Years of education: Not on file    Highest education level: Not on file   Social Needs    Financial resource strain: Not on file    Food insecurity - worry: Not on file    Food insecurity - inability: Not on file   My [employer] needs - medical: Not on file   My Best Interest needs - non-medical: Not on file   Occupational History    Occupation: retired   Tobacco Use    Smoking status: Never Smoker    Smokeless tobacco: Never Used   Substance and Sexual Activity    Alcohol use: No    Drug use: No    Sexual activity: No   Other Topics Concern    Not on file   Social History Narrative     has brain damage, lives with him       Review of Systems   Respiratory: Negative for shortness of breath. Cardiovascular: Positive for chest pain. Musculoskeletal: Positive for joint pain. Negative for falls. Neurological: Negative for tingling and numbness. Physical Exam  Visit Vitals  /68 (BP 1 Location: Left arm, BP Patient Position: Sitting)   Pulse (!) 50   Temp 97.2 °F (36.2 °C) (Oral)   Resp 16   Ht 4' 9\" (1.448 m)   Wt 178 lb (80.7 kg)   SpO2 98%   BMI 38.52 kg/m²     WD WN female NAD morbid obese  Heart RRR without murmers clicks or rubs  Lungs CTA  Abdo soft nontender  Ext mild bilateral edema, right greater than left    ASSESSMENT and PLAN  Encounter Diagnoses   Name Primary?  Leg edema, right Yes    Arthritis of right knee     Chest pain, unspecified type     Gastroesophageal reflux disease without esophagitis      Orders Placed This Encounter    DUPLEX LOWER EXT VENOUS BILAT    diclofenac (VOLTAREN) 1 % gel     She is going to the hospital on Monday her stress test.  We will see if she can also have an ultrasound to help rule out deep vein thrombosis. Treatment for pain issues as above. Possibly it is GERD, continue PPI. Chest pain precautions given. Follow-up Disposition:  Return in about 1 week (around 10/26/2018) for routine follow up.

## 2018-10-22 ENCOUNTER — HOSPITAL ENCOUNTER (OUTPATIENT)
Dept: ULTRASOUND IMAGING | Age: 58
Discharge: HOME | End: 2018-10-22
Attending: FAMILY MEDICINE
Payer: MEDICARE

## 2018-10-22 DIAGNOSIS — R60.0 LEG EDEMA, RIGHT: ICD-10-CM

## 2018-10-22 PROCEDURE — 93970 EXTREMITY STUDY: CPT

## 2018-10-23 RX ORDER — BUTALBITAL, ACETAMINOPHEN AND CAFFEINE 300; 40; 50 MG/1; MG/1; MG/1
1 CAPSULE ORAL
Qty: 30 CAP | Refills: 1 | Status: SHIPPED | OUTPATIENT
Start: 2018-10-23 | End: 2018-11-26 | Stop reason: SDUPTHER

## 2018-10-23 NOTE — TELEPHONE ENCOUNTER
Last office visit:  10/19/18  Last filled:  Fioricet 9/21/18 #30 X no refills  NEEDS APPT  Follow up 10/24/18

## 2018-10-24 ENCOUNTER — OFFICE VISIT (OUTPATIENT)
Dept: INTERNAL MEDICINE CLINIC | Age: 58
End: 2018-10-24

## 2018-10-24 VITALS
HEIGHT: 57 IN | DIASTOLIC BLOOD PRESSURE: 56 MMHG | TEMPERATURE: 98 F | HEART RATE: 52 BPM | BODY MASS INDEX: 37.97 KG/M2 | RESPIRATION RATE: 16 BRPM | WEIGHT: 176 LBS | OXYGEN SATURATION: 99 % | SYSTOLIC BLOOD PRESSURE: 109 MMHG

## 2018-10-24 DIAGNOSIS — M13.861: ICD-10-CM

## 2018-10-24 DIAGNOSIS — R00.1 BRADYCARDIA: Primary | ICD-10-CM

## 2018-10-24 DIAGNOSIS — R07.9 CHEST PAIN, UNSPECIFIED TYPE: ICD-10-CM

## 2018-10-24 DIAGNOSIS — I89.0 LYMPHEDEMA: ICD-10-CM

## 2018-10-24 NOTE — PROGRESS NOTES
HISTORY OF PRESENT Angel Luis Schmtit is a 65 y.o. female. Chest Pain    The history is provided by the patient. This is a recurrent problem. The problem has not changed since onset. Duration of episode(s) is 30 minutes. The problem occurs daily. The pain is associated with normal activity. The pain is present in the substernal region. The pain is mild (to moderate). Pertinent negatives include no shortness of breath. Stress asif wa sneg for ischemia  Inquires as to why her heart rate is slow. No syncope. Last few visits heart rate runs in the low 50s. No new trauma, pain times months. Current Outpatient Medications   Medication Sig Dispense Refill    butalbital-acetaminophen-caff (FIORICET) -40 mg per capsule Take 1 Cap by mouth every four (4) hours as needed for Pain. 30 Cap 1    diclofenac (VOLTAREN) 1 % gel Apply  to affected area four (4) times daily. 1 Each 5    aspirin 81 mg chewable tablet Take 1 Tab by mouth daily. 90 Tab 3    furosemide (LASIX) 20 mg tablet TAKE 1 TABLET BY MOUTH DAILY AS NEEDED 90 Tab 3    omeprazole (PRILOSEC) 40 mg capsule TAKE 1 CAPSULE BY MOUTH DAILY. 90 Cap 3    glucose blood VI test strips (ONETOUCH VERIO) strip Test 2 -3 times daily Dx Code: E16.2 100 Strip 11    lancets (ONE TOUCH DELICA) 33 gauge misc Test 2-3 times daily Dx Code: E16.2 100 Lancet 11    meclizine (TRAVEL SICKNESS, MECLIZINE,) 25 mg chewable tablet TAKE 1 TABLET BY MOUTH THREE TIMES DAILY AS NEEDED FOR UP TO 10 DAYS FOR DIZZINESS 60 Tab 2    fluticasone (FLONASE) 50 mcg/actuation nasal spray 2 Sprays by Both Nostrils route daily. 1 Bottle 5    glucose 4 gram chewable tablet Take 4 Tabs by mouth as needed. 30 Tab 5    cholecalciferol (VITAMIN D3) 1,000 unit tablet Take 1 Tab by mouth daily.  Indications: Vitamin D Deficiency 180 Tab 1    cyanocobalamin (VITAMIN B-12) 1,000 mcg tablet TAKE 1 TABLET BY MOUTH DAILY FOR VITAMIN B12 DEFICIENCY 90 Tab 1    polyethylene glycol (MIRALAX) 17 gram packet Take 1 Packet by mouth daily. 50 Packet 5    topiramate (TOPAMAX) 100 mg tablet TAKE 1 TABLET BY MOUTH DAILY. INDICATIONS: MOOD 30 Tab 5    traZODone (DESYREL) 50 mg tablet Take 50 mg by mouth nightly.  potassium chloride (KLOR-CON) 10 mEq tablet Take 1 Tab by mouth daily. If takes lasix 90 Tab 3    Nebulizer & Compressor machine Use as directed 1 Each 0    albuterol (PROVENTIL VENTOLIN) 2.5 mg /3 mL (0.083 %) nebulizer solution 3 mL by Nebulization route every four (4) hours as needed for Wheezing. 100 Each 1    sertraline (ZOLOFT) 50 mg tablet Take 50 mg by mouth daily. Allergies   Allergen Reactions    Tylenol [Acetaminophen] Anaphylaxis and Hives    Benadryl [Diphenhydramine Hcl] Nausea and Vomiting    Ibuprofen Nausea and Vomiting    Sulfamethoxazole-Trimethoprim Nausea Only    Tramadol Anxiety       Review of Systems   Respiratory: Negative for shortness of breath. Cardiovascular: Positive for chest pain. Negative for leg swelling. Musculoskeletal: Positive for joint pain. Right knee       Physical Exam  Visit Vitals  /56 (BP 1 Location: Left arm, BP Patient Position: Sitting)   Pulse (!) 52   Temp 98 °F (36.7 °C) (Oral)   Resp 16   Ht 4' 9\" (1.448 m)   Wt 176 lb (79.8 kg)   SpO2 99%   BMI 38.09 kg/m²     WD WN female NAD  Heart RRR without murmers clicks or rubs  Lungs CTA  Abdo soft nontender  Ext mild edema  Right knee paoin with rom testing  ASSESSMENT and PLAN  Encounter Diagnoses   Name Primary?  Bradycardia Yes    Chest pain, unspecified type     Lymphedema      Orders Placed This Encounter    REFERRAL TO CARDIOLOGY   Knee arthritis, use Voltaren gel, if continued pain, injection and/or PT referral.  Lymphedema  Follow-up Disposition:  Return in about 4 weeks (around 11/21/2018).

## 2018-10-24 NOTE — PROGRESS NOTES
Chief Complaint   Patient presents with    Heart Problem     follow up with stress test     I have reviewed the patient's medical history in detail and updated the computerized patient record. Health Maintenance reviewed. 1. Have you been to the ER, urgent care clinic since your last visit? Hospitalized since your last visit?no    2. Have you seen or consulted any other health care providers outside of the [address] [place] [address] since your last visit? Include any pap smears or colon screening. No      Encouraged pt to discuss pt's wishes with spouse/partner/family and bring them in the next appt to follow thru with the Advanced Directive    Fall Risk Assessment, last 12 mths 1/17/2018   Able to walk? Yes   Fall in past 12 months? Yes   Fall with injury? Yes   Number of falls in past 12 months 4   Fall Risk Score 5       PHQ over the last two weeks 1/17/2018   Little interest or pleasure in doing things Several days   Feeling down, depressed, irritable, or hopeless Several days   Total Score PHQ 2 2       Abuse Screening Questionnaire 1/17/2018   Do you ever feel afraid of your partner? N   Are you in a relationship with someone who physically or mentally threatens you? N   Is it safe for you to go home?  Y       ADL Assessment 1/17/2018   Feeding yourself No Help Needed   Getting from bed to chair No Help Needed   Getting dressed No Help Needed   Bathing or showering No Help Needed   Walk across the room (includes cane/walker) No Help Needed   Using the telphone No Help Needed   Taking your medications No Help Needed   Preparing meals No Help Needed   Managing money (expenses/bills) No Help Needed   Moderately strenuous housework (laundry) No Help Needed   Shopping for personal items (toiletries/medicines) No Help Needed   Shopping for groceries No Help Needed   Driving No Help Needed   Climbing a flight of stairs No Help Needed   Getting to places beyond walking distances No Help Needed

## 2018-10-30 ENCOUNTER — TELEPHONE (OUTPATIENT)
Dept: INTERNAL MEDICINE CLINIC | Age: 58
End: 2018-10-30

## 2018-10-30 NOTE — TELEPHONE ENCOUNTER
Returned pt's call and encouraged pt to keep her cardiology appt in Nov 14th consult due her low 40s HR

## 2018-10-30 NOTE — TELEPHONE ENCOUNTER
Pt called because she says she will be going to the cardiologists on 11/15 and wants to know what they will be doing to her. ... She said she doesn't want anymore test done.

## 2018-10-31 ENCOUNTER — TELEPHONE (OUTPATIENT)
Dept: INTERNAL MEDICINE CLINIC | Age: 58
End: 2018-10-31

## 2018-10-31 NOTE — TELEPHONE ENCOUNTER
----- Message from Santhosh Sandoval sent at 10/30/2018 12:50 PM EDT -----  Regarding: Dr. Andres Webb  Pt is requesting a call back from Dr. Efra Ndiaye or nurse in reference to heart pacemaker. Stated Cardiologist Dr. Giovanny Stone. Alyse Coe [phone] is recommending pt to get pacemaker. Heart rate is still fluctuating; painful. I advised to call practice but declined. Pt wants to hear from Dr. Efra Ndiaye to know next step. Pt best contact [phone].

## 2018-11-09 DIAGNOSIS — G44.229 CHRONIC TENSION-TYPE HEADACHE, NOT INTRACTABLE: ICD-10-CM

## 2018-11-09 RX ORDER — TOPIRAMATE 100 MG/1
TABLET, FILM COATED ORAL
Qty: 30 TAB | Refills: 6 | Status: SHIPPED | OUTPATIENT
Start: 2018-11-09 | End: 2019-05-22 | Stop reason: ALTCHOICE

## 2018-11-15 ENCOUNTER — CLINICAL SUPPORT (OUTPATIENT)
Dept: CARDIOLOGY CLINIC | Age: 58
End: 2018-11-15

## 2018-11-15 ENCOUNTER — OFFICE VISIT (OUTPATIENT)
Dept: CARDIOLOGY CLINIC | Age: 58
End: 2018-11-15

## 2018-11-15 VITALS
HEART RATE: 56 BPM | HEIGHT: 57 IN | DIASTOLIC BLOOD PRESSURE: 80 MMHG | BODY MASS INDEX: 38.57 KG/M2 | OXYGEN SATURATION: 96 % | SYSTOLIC BLOOD PRESSURE: 118 MMHG | RESPIRATION RATE: 18 BRPM | WEIGHT: 178.8 LBS

## 2018-11-15 DIAGNOSIS — I49.5 SSS (SICK SINUS SYNDROME) (HCC): ICD-10-CM

## 2018-11-15 DIAGNOSIS — R00.1 BRADYCARDIA: Primary | ICD-10-CM

## 2018-11-15 DIAGNOSIS — E66.01 OBESITY, MORBID (HCC): ICD-10-CM

## 2018-11-15 DIAGNOSIS — K21.9 GASTROESOPHAGEAL REFLUX DISEASE WITHOUT ESOPHAGITIS: ICD-10-CM

## 2018-11-15 DIAGNOSIS — R42 DIZZINESS: ICD-10-CM

## 2018-11-15 NOTE — PROGRESS NOTES
1. Have you been to the ER, urgent care clinic since your last visit? Hospitalized since your last visit? No.    2. Have you seen or consulted any other health care providers outside of the [address] [place] [address] since your last visit? Include any pap smears or colon screening.    No.      Chief Complaint   Patient presents with    New Patient     referred by PCP in cc-bradycardia - heart fluttering, chest discomfort on exertion, soboe, very fatigued-syncope last week

## 2018-11-15 NOTE — PROGRESS NOTES
Subjective:      Jacky Gordon is a 65 y.o. female is here for EP consult. She complains of fatigue and dizziness.      Patient Active Problem List    Diagnosis Date Noted    Obesity, morbid ([facility] 2018    Gastroesophageal reflux disease without esophagitis 2016    Bipolar disorder with current episode depressed ([facility] 2016    Migraine aura without headache 10/24/2014    Bilateral leg edema 10/12/2014    History of gastric bypass 10/12/2014      Pako Castaneda MD  Past Medical History:   Diagnosis Date    Hypoglycemia     Hypotension     Syncope and collapse 7/24/15    [facility]      Past Surgical History:   Procedure Laterality Date    HX ABDOMINAL LAPAROSCOPY      HX CARPAL TUNNEL RELEASE Bilateral more than 10 years ago    HX  SECTION  7580,0107    HX COLONOSCOPY  2016    HX GASTRIC BYPASS  1997    x2    HX HYSTERECTOMY      HX TONSIL AND ADENOIDECTOMY  more than 10 years ago     Allergies   Allergen Reactions    Tylenol [Acetaminophen] Anaphylaxis and Hives    Benadryl [Diphenhydramine Hcl] Nausea and Vomiting    Ibuprofen Nausea and Vomiting    Sulfamethoxazole-Trimethoprim Nausea Only    Tramadol Anxiety      Family History   Problem Relation Age of Onset    Stroke Mother     Cancer Father     Diabetes Brother     Cancer Maternal Aunt     Cancer Maternal Grandmother     Cancer Brother     Kidney Disease Brother     negative for cardiac disease  Social History     Socioeconomic History    Marital status:      Spouse name: Not on file    Number of children: 2    Years of education: Not on file    Highest education level: Not on file   Social Needs    Financial resource strain: Not on file    Food insecurity - worry: Not on file    Food insecurity - inability: Not on file   [employer] needs - medical: Not on file   [employer] needs - non-medical: Not on file   Occupational History    Occupation: retired   Tobacco Use  Smoking status: Never Smoker    Smokeless tobacco: Never Used   Substance and Sexual Activity    Alcohol use: No    Drug use: No    Sexual activity: No   Other Topics Concern    Not on file   Social History Narrative     has brain damage, lives with him     Current Outpatient Medications   Medication Sig    topiramate (TOPAMAX) 100 mg tablet TAKE 1 TABLET BY MOUTH DAILY.  butalbital-acetaminophen-caff (FIORICET) -40 mg per capsule Take 1 Cap by mouth every four (4) hours as needed for Pain.  diclofenac (VOLTAREN) 1 % gel Apply  to affected area four (4) times daily.  aspirin 81 mg chewable tablet Take 1 Tab by mouth daily.  furosemide (LASIX) 20 mg tablet TAKE 1 TABLET BY MOUTH DAILY AS NEEDED    omeprazole (PRILOSEC) 40 mg capsule TAKE 1 CAPSULE BY MOUTH DAILY.  glucose blood VI test strips (ONETOUCH VERIO) strip Test 2 -3 times daily Dx Code: E16.2    lancets (ONE TOUCH DELICA) 33 gauge misc Test 2-3 times daily Dx Code: E16.2    meclizine (TRAVEL SICKNESS, MECLIZINE,) 25 mg chewable tablet TAKE 1 TABLET BY MOUTH THREE TIMES DAILY AS NEEDED FOR UP TO 10 DAYS FOR DIZZINESS    fluticasone (FLONASE) 50 mcg/actuation nasal spray 2 Sprays by Both Nostrils route daily.  glucose 4 gram chewable tablet Take 4 Tabs by mouth as needed.  cholecalciferol (VITAMIN D3) 1,000 unit tablet Take 1 Tab by mouth daily. Indications: Vitamin D Deficiency    cyanocobalamin (VITAMIN B-12) 1,000 mcg tablet TAKE 1 TABLET BY MOUTH DAILY FOR VITAMIN B12 DEFICIENCY    polyethylene glycol (MIRALAX) 17 gram packet Take 1 Packet by mouth daily.  traZODone (DESYREL) 50 mg tablet Take 50 mg by mouth nightly.  potassium chloride (KLOR-CON) 10 mEq tablet Take 1 Tab by mouth daily. If takes lasix    Nebulizer & Compressor machine Use as directed    albuterol (PROVENTIL VENTOLIN) 2.5 mg /3 mL (0.083 %) nebulizer solution 3 mL by Nebulization route every four (4) hours as needed for Wheezing.  sertraline (ZOLOFT) 50 mg tablet Take 50 mg by mouth daily. No current facility-administered medications for this visit. Vitals:    11/15/18 0936 11/15/18 0957   BP: 122/72 118/80   Pulse: (!) 50 (!) 56   Resp: 18    SpO2: 97% 96%   Weight: 178 lb 12.8 oz (81.1 kg)    Height: 4' 9\" (1.448 m)        I have reviewed the nurses notes, vitals, problem list, allergy list, medical history, family, social history and medications. Review of Symptoms:    General: Pt denies excessive weight gain or loss. Pt is able to conduct ADL's  HEENT: Denies blurred vision, headaches, epistaxis and difficulty swallowing. Respiratory: + shortness of breath, HERNADEZ, denies wheezing or stridor. Cardiovascular: +daron, Denies precordial pain, palpitations, edema or PND  Gastrointestinal: Denies poor appetite, indigestion, abdominal pain or blood in stool  Urinary: Denies dysuria, pyuria  Musculoskeletal: Denies pain or swelling from muscles or joints  Neurologic: Denies tremor, paresthesias, or sensory motor disturbance  Skin: Denies rash, itching or texture change. Psych: Denies depression      Physical Exam:      General: Well developed, in no acute distress. HEENT: Eyes - PERRL, no jvd  Heart:  Normal S1/S2 negative S3 or S4. Regular, no murmur, gallop or rub.   Respiratory: Clear bilaterally x 4, no wheezing or rales  Abdomen:   Soft, non-tender, bowel sounds are active.   Extremities:  No edema, normal cap refill, no cyanosis. Musculoskeletal: No clubbing  Neuro: A&Ox3, speech clear, gait stable. Skin: Skin color is normal. No rashes or lesions.  Non diaphoretic  Vascular: 2+ pulses symmetric in all extremities    Cardiographics    Ekg: s daron    Results for orders placed or performed during the hospital encounter of 01/31/16   EKG, 12 LEAD, INITIAL   Result Value Ref Range    Ventricular Rate 76 BPM    Atrial Rate 76 BPM    P-R Interval 192 ms    QRS Duration 96 ms    Q-T Interval 374 ms    QTC Calculation (Meagan) 420 ms    Calculated P Axis 39 degrees    Calculated R Axis 12 degrees    Calculated T Axis 23 degrees    Diagnosis       Normal sinus rhythm  Normal ECG  When compared with ECG of 12-OCT-2014 12:34,  No significant change was found  Confirmed by Rebecca Dowell ([id number]) on 2/1/2016 1:46:51 PM     Results for orders placed or performed in visit on 10/21/14   CARDIAC HOLTER MONITOR, 24 HOURS    Narrative    ECG Monitor/24 hours, Complete    Reason for Holter Monitor   BRADYCARDIA    Heartbeat    Slowest 49  Average 70  Fastest  132          Results:   Underlying Rhythm: Normal sinus rhythm      Atrial Arrhythmias: premature atrial contraction, (1 beat ). AV Conduction: normal    Ventricular Arrhythmias: premature ventricular contractions; rare  (2 beats)    ST Segment Analysis:normal     Symptom Correlation:  None     Comment:   No significant dysrhythmias. Kena Novoa MD                       Lab Results   Component Value Date/Time    WBC 4.3 05/09/2017 03:25 PM    Hemoglobin (POC) 13.2 03/28/2016 03:29 PM    HGB 13.4 05/09/2017 03:25 PM    HCT 41.5 05/09/2017 03:25 PM    PLATELET 762 87/91/4848 03:25 PM    MCV 92 05/09/2017 03:25 PM      Lab Results   Component Value Date/Time    Sodium 143 08/22/2018 08:10 AM    Potassium 3.9 08/22/2018 08:10 AM    Chloride 111 (H) 08/22/2018 08:10 AM    CO2 20 08/22/2018 08:10 AM    Anion gap 8 01/31/2016 04:49 PM    Glucose 86 08/22/2018 08:10 AM    BUN 17 08/22/2018 08:10 AM    Creatinine 0.62 08/22/2018 08:10 AM    BUN/Creatinine ratio 27 (H) 08/22/2018 08:10 AM    GFR est  08/22/2018 08:10 AM    GFR est non- 08/22/2018 08:10 AM    Calcium 8.9 08/22/2018 08:10 AM    Bilirubin, total <0.2 08/22/2018 08:10 AM    AST (SGOT) 10 08/22/2018 08:10 AM    Alk.  phosphatase 81 08/22/2018 08:10 AM    Protein, total 5.3 (L) 08/22/2018 08:10 AM    Albumin 3.5 08/22/2018 08:10 AM    Globulin 2.5 01/31/2016 04:49 PM    A-G Ratio 1.9 08/22/2018 08:10 AM ALT (SGPT) 7 08/22/2018 08:10 AM         Assessment:     Assessment:        ICD-10-CM ICD-9-CM    1. Bradycardia R00.1 427.89 AMB POC EKG ROUTINE W/ 12 LEADS, INTER & REP   2. Gastroesophageal reflux disease without esophagitis K21.9 530.81    3. Obesity, morbid ([facility] E66.01 278.01    4. Dizziness R42 780.4    5. SSS (sick sinus syndrome) ([facility]) I49.5 427.81      Orders Placed This Encounter    AMB POC EKG ROUTINE W/ 12 LEADS, INTER & REP     Order Specific Question:   Reason for Exam:     Answer:   routine        Plan:   Ms. Gisella Wagner is here for EP consult for symptomatic bradycardia. She reports fatigue, dizziness as well as recent syncope. EKGs at her PCP office indicate marked sinus bradycardia. Her EKG today demonstrates sinus bradycardia with rates in the 40s. She has had unrevealing labwork and normal stress testing. She is referred for discussion of pacemaker. Continue medical management for GERD, obesity. Thank you for allowing me to participate [facility]. Carter Art MD    Patient seen and examined by me with nurse practitioner. I personally performed all components of the history, physical, and medical decision making and agree with the assessment and plan with minor modifications as noted. Symptomatic bradycardia - will obtain a 24 hour monitor to confirm. Will likely need a pacemaker. I discussed the risks/benefits/alternatives of the procedure with the patient. Risks include (but are not limited to) bleeding, heart block, infection, cva/mi/tamponade/death. The patient understands and agrees to proceed. Thank you for this interesting consultation.       Carter Art MD, Tereza Ferraro

## 2018-11-16 ENCOUNTER — HOSPITAL ENCOUNTER (OUTPATIENT)
Dept: GENERAL RADIOLOGY | Age: 58
Discharge: HOME | End: 2018-11-16
Payer: MEDICARE

## 2018-11-16 ENCOUNTER — HOSPITAL ENCOUNTER (OUTPATIENT)
Dept: LAB | Age: 58
Discharge: HOME | End: 2018-11-16
Payer: MEDICARE

## 2018-11-16 DIAGNOSIS — R00.1 BRADYCARDIA: ICD-10-CM

## 2018-11-16 PROCEDURE — 36415 COLL VENOUS BLD VENIPUNCTURE: CPT

## 2018-11-16 PROCEDURE — 71046 X-RAY EXAM CHEST 2 VIEWS: CPT

## 2018-11-16 PROCEDURE — 80053 COMPREHEN METABOLIC PANEL: CPT

## 2018-11-16 PROCEDURE — 85027 COMPLETE CBC AUTOMATED: CPT

## 2018-11-16 PROCEDURE — 85610 PROTHROMBIN TIME: CPT

## 2018-11-17 LAB
ALBUMIN SERPL-MCNC: 3.9 G/DL (ref 3.5–5.5)
ALBUMIN/GLOB SERPL: 2.1 {RATIO} (ref 1.2–2.2)
ALP SERPL-CCNC: 86 IU/L (ref 39–117)
ALT SERPL-CCNC: 11 IU/L (ref 0–32)
AST SERPL-CCNC: 16 IU/L (ref 0–40)
BILIRUB SERPL-MCNC: <0.2 MG/DL (ref 0–1.2)
BUN SERPL-MCNC: 22 MG/DL (ref 6–24)
BUN/CREAT SERPL: 31 (ref 9–23)
CALCIUM SERPL-MCNC: 8.8 MG/DL (ref 8.7–10.2)
CHLORIDE SERPL-SCNC: 114 MMOL/L (ref 96–106)
CO2 SERPL-SCNC: 16 MMOL/L (ref 20–29)
CREAT SERPL-MCNC: 0.71 MG/DL (ref 0.57–1)
ERYTHROCYTE [DISTWIDTH] IN BLOOD BY AUTOMATED COUNT: 15.3 % (ref 12.3–15.4)
GLOBULIN SER CALC-MCNC: 1.9 G/DL (ref 1.5–4.5)
GLUCOSE SERPL-MCNC: 74 MG/DL (ref 65–99)
HCT VFR BLD AUTO: 40.4 % (ref 34–46.6)
HGB BLD-MCNC: 12.8 G/DL (ref 11.1–15.9)
INR PPP: 1.1 (ref 0.8–1.2)
MCH RBC QN AUTO: 28.9 PG (ref 26.6–33)
MCHC RBC AUTO-ENTMCNC: 31.7 G/DL (ref 31.5–35.7)
MCV RBC AUTO: 91 FL (ref 79–97)
PLATELET # BLD AUTO: 280 X10E3/UL (ref 150–379)
POTASSIUM SERPL-SCNC: 4.2 MMOL/L (ref 3.5–5.2)
PROT SERPL-MCNC: 5.8 G/DL (ref 6–8.5)
PROTHROMBIN TIME: 11.2 SEC (ref 9.1–12)
RBC # BLD AUTO: 4.43 X10E6/UL (ref 3.77–5.28)
SODIUM SERPL-SCNC: 145 MMOL/L (ref 134–144)
WBC # BLD AUTO: 5.2 X10E3/UL (ref 3.4–10.8)

## 2018-11-20 ENCOUNTER — TELEPHONE (OUTPATIENT)
Dept: CARDIOLOGY CLINIC | Age: 58
End: 2018-11-20

## 2018-11-20 NOTE — TELEPHONE ENCOUNTER
Called patient back to make sure someone was able to reach her, during the call the patient was still having active chest pains and sob. I advised patient not to wait for a call back but to go the the ER.   I will be calling patient back later today to get her in to see RHINA Connor

## 2018-11-20 NOTE — TELEPHONE ENCOUNTER
Verified patient with two identifiers. Spoke with pt c/o SOB, no energy, tightness in chest.  Please advise.

## 2018-11-26 ENCOUNTER — TELEPHONE (OUTPATIENT)
Dept: CARDIOLOGY CLINIC | Age: 58
End: 2018-11-26

## 2018-11-26 ENCOUNTER — TELEPHONE (OUTPATIENT)
Dept: INTERNAL MEDICINE CLINIC | Age: 58
End: 2018-11-26

## 2018-11-26 ENCOUNTER — OFFICE VISIT (OUTPATIENT)
Dept: INTERNAL MEDICINE CLINIC | Age: 58
End: 2018-11-26

## 2018-11-26 VITALS
OXYGEN SATURATION: 98 % | SYSTOLIC BLOOD PRESSURE: 129 MMHG | RESPIRATION RATE: 18 BRPM | HEART RATE: 63 BPM | DIASTOLIC BLOOD PRESSURE: 79 MMHG | WEIGHT: 177 LBS | BODY MASS INDEX: 38.19 KG/M2 | HEIGHT: 57 IN | TEMPERATURE: 96.8 F

## 2018-11-26 DIAGNOSIS — R00.1 BRADYCARDIA: ICD-10-CM

## 2018-11-26 DIAGNOSIS — G44.229 CHRONIC TENSION-TYPE HEADACHE, NOT INTRACTABLE: ICD-10-CM

## 2018-11-26 DIAGNOSIS — J06.9 UPPER RESPIRATORY TRACT INFECTION, UNSPECIFIED TYPE: Primary | ICD-10-CM

## 2018-11-26 RX ORDER — MECLIZINE HCL 25MG 25 MG/1
TABLET, CHEWABLE ORAL
Qty: 60 TAB | Refills: 5 | Status: SHIPPED | OUTPATIENT
Start: 2018-11-26 | End: 2019-03-20 | Stop reason: SDUPTHER

## 2018-11-26 RX ORDER — BUTALBITAL, ACETAMINOPHEN AND CAFFEINE 300; 40; 50 MG/1; MG/1; MG/1
1 CAPSULE ORAL
Qty: 30 CAP | Refills: 1 | Status: SHIPPED | OUTPATIENT
Start: 2018-11-26 | End: 2018-12-20 | Stop reason: SDUPTHER

## 2018-11-26 RX ORDER — BENZONATATE 200 MG/1
200 CAPSULE ORAL
Qty: 21 CAP | Refills: 0 | Status: SHIPPED | OUTPATIENT
Start: 2018-11-26 | End: 2018-12-03

## 2018-11-26 NOTE — TELEPHONE ENCOUNTER
Pt Called,  two identifiers verified. States that her nurse friend has been checking her heart rate and it is dipping down into the 30's. Pt reports feeling fatigued, has been in bed for five days. Is going to [facility] today to see her neurologist for an unrelated issue, will have her vitals taken at that time. Advised pt will send a note to Dr. Scooby Arauz NP and get back with her. Pt verbalized understanding of information discussed w/ no further questions at this time.      Kin Given, RN

## 2018-11-26 NOTE — PROGRESS NOTES
Subjective:   Laurel De Jesus is a 65 y.o. female who complains of sore throat, cough described as dry, chills and sweats for 2 days, stable since that time. She denies a history of vomiting and wheezing. Needs dizziness med rf. Evaluation to date: none. Treatment to date: none. Patient does not smoke cigarettes. Relevant PMH: sinus daron waiting for pacemaker  . Issues with her heart cont, says needs pacemaker but seems to be an issue or delay. COnt fatigue. HR quite slow. Needs arthritis meds rf, uses fioricet and cream.    Allergies   Allergen Reactions    Tylenol [Acetaminophen] Anaphylaxis and Hives    Benadryl [Diphenhydramine Hcl] Nausea and Vomiting    Ibuprofen Nausea and Vomiting    Sulfamethoxazole-Trimethoprim Nausea Only    Tramadol Anxiety         Current Outpatient Medications   Medication Sig Dispense Refill    meclizine (TRAVEL SICKNESS, MECLIZINE,) 25 mg chewable tablet TAKE 1 TABLET BY MOUTH THREE TIMES DAILY AS NEEDED FOR UP TO 10 DAYS FOR DIZZINESS 60 Tab 5    butalbital-acetaminophen-caff (FIORICET) -40 mg per capsule Take 1 Cap by mouth daily as needed for Pain. 30 Cap 1    benzonatate (TESSALON) 200 mg capsule Take 1 Cap by mouth three (3) times daily as needed for Cough for up to 7 days. 21 Cap 0    topiramate (TOPAMAX) 100 mg tablet TAKE 1 TABLET BY MOUTH DAILY. 30 Tab 6    diclofenac (VOLTAREN) 1 % gel Apply  to affected area four (4) times daily. 1 Each 5    aspirin 81 mg chewable tablet Take 1 Tab by mouth daily. 90 Tab 3    furosemide (LASIX) 20 mg tablet TAKE 1 TABLET BY MOUTH DAILY AS NEEDED 90 Tab 3    omeprazole (PRILOSEC) 40 mg capsule TAKE 1 CAPSULE BY MOUTH DAILY.  90 Cap 3    glucose blood VI test strips (ONETOUCH VERIO) strip Test 2 -3 times daily Dx Code: E16.2 100 Strip 11    lancets (ONE TOUCH DELICA) 33 gauge misc Test 2-3 times daily Dx Code: E16.2 100 Lancet 11    fluticasone (FLONASE) 50 mcg/actuation nasal spray 2 Sprays by Both Nostrils route daily. 1 Bottle 5    glucose 4 gram chewable tablet Take 4 Tabs by mouth as needed. 30 Tab 5    cholecalciferol (VITAMIN D3) 1,000 unit tablet Take 1 Tab by mouth daily. Indications: Vitamin D Deficiency 180 Tab 1    cyanocobalamin (VITAMIN B-12) 1,000 mcg tablet TAKE 1 TABLET BY MOUTH DAILY FOR VITAMIN B12 DEFICIENCY 90 Tab 1    polyethylene glycol (MIRALAX) 17 gram packet Take 1 Packet by mouth daily. 50 Packet 5    traZODone (DESYREL) 50 mg tablet Take 50 mg by mouth nightly.  potassium chloride (KLOR-CON) 10 mEq tablet Take 1 Tab by mouth daily. If takes lasix 90 Tab 3    Nebulizer & Compressor machine Use as directed 1 Each 0    albuterol (PROVENTIL VENTOLIN) 2.5 mg /3 mL (0.083 %) nebulizer solution 3 mL by Nebulization route every four (4) hours as needed for Wheezing. 100 Each 1    sertraline (ZOLOFT) 50 mg tablet Take 50 mg by mouth daily. Allergies   Allergen Reactions    Benadryl [Diphenhydramine Hcl] Nausea and Vomiting    Ibuprofen Nausea and Vomiting    Sulfamethoxazole-Trimethoprim Nausea Only    Tramadol Anxiety     Past Surgical History:   Procedure Laterality Date    HX ABDOMINAL LAPAROSCOPY      HX CARPAL TUNNEL RELEASE Bilateral more than 10 years ago    HX  SECTION  0528,7707    HX COLONOSCOPY  2016    HX GASTRIC BYPASS  1997    x2    HX HYSTERECTOMY      HX TONSIL AND ADENOIDECTOMY  more than 10 years ago     Social History     Tobacco Use    Smoking status: Never Smoker    Smokeless tobacco: Never Used   Substance Use Topics    Alcohol use: No        Review of Systems  Pertinent items are noted in HPI. Objective:     Visit Vitals  /79 (BP 1 Location: Left arm, BP Patient Position: Sitting)   Pulse 63   Temp 96.8 °F (36 °C) (Temporal)   Resp 18   Ht 4' 9\" (1.448 m)   Wt 177 lb (80.3 kg)   SpO2 98%   BMI 38.30 kg/m²     General:  alert, cooperative, no distress   Eyes: conjunctivae/corneas clear. PERRL, EOM's intact.  Fundi benign Ears: normal TM's and external ear canals AU   Sinuses: Normal paranasal sinuses without tenderness   Mouth:  Lips, mucosa, and tongue normal. Teeth and gums normal   Neck: supple, symmetrical, trachea midline and no adenopathy. Heart: S1 and S2 normal, no murmurs noted. Lungs: clear to auscultation bilaterally   Abdomen: soft, non-tender. Bowel sounds normal. No masses,  no organomegaly      Assessment/Plan:   viral upper respiratory illness  Suggested symptomatic OTC remedies. RTC prn. Discussed diagnosis and treatment of viral URIs. Discussed the importance of avoiding unnecessary antibiotic therapy. Encounter Diagnoses   Name Primary?  Upper respiratory tract infection, unspecified type Yes    Bradycardia     Chronic tension-type headache, not intractable      Orders Placed This Encounter    meclizine (TRAVEL SICKNESS, MECLIZINE,) 25 mg chewable tablet    butalbital-acetaminophen-caff (FIORICET) -40 mg per capsule    benzonatate (TESSALON) 200 mg capsule   .   Will send Dr Jacqui Ferguson a note

## 2018-11-26 NOTE — TELEPHONE ENCOUNTER
Pt called in reference to Dr. Perez Credit wanting to speak with Dr. Heber Nichols about her pacemaker. Please call the office at [phone]. Pt would like a call from the nurse once the provider has been called. Please call her at [phone].

## 2018-11-26 NOTE — PROGRESS NOTES
Chief Complaint   Patient presents with    Cold Symptoms     chest conjestion, nonprod cough, fatigued, fever, chills, SOB,      I have reviewed the patient's medical history in detail and updated the computerized patient record. Health Maintenance reviewed. 1. Have you been to the ER, urgent care clinic since your last visit? Hospitalized since your last visit?no    2. Have you seen or consulted any other health care providers outside of the [facility] since your last visit? Include any pap smears or colon screening. No    Encouraged pt to discuss pt's wishes with spouse/partner/family and bring them in the next appt to follow thru with the Advanced Directive      Fall Risk Assessment, last 12 mths 1/17/2018   Able to walk? Yes   Fall in past 12 months? Yes   Fall with injury? Yes   Number of falls in past 12 months 4   Fall Risk Score 5       PHQ over the last two weeks 1/17/2018   Little interest or pleasure in doing things Several days   Feeling down, depressed, irritable, or hopeless Several days   Total Score PHQ 2 2       Abuse Screening Questionnaire 1/17/2018   Do you ever feel afraid of your partner? N   Are you in a relationship with someone who physically or mentally threatens you? N   Is it safe for you to go home?  Y       ADL Assessment 1/17/2018   Feeding yourself No Help Needed   Getting from bed to chair No Help Needed   Getting dressed No Help Needed   Bathing or showering No Help Needed   Walk across the room (includes cane/walker) No Help Needed   Using the telphone No Help Needed   Taking your medications No Help Needed   Preparing meals No Help Needed   Managing money (expenses/bills) No Help Needed   Moderately strenuous housework (laundry) No Help Needed   Shopping for personal items (toiletries/medicines) No Help Needed   Shopping for groceries No Help Needed   Driving No Help Needed   Climbing a flight of stairs No Help Needed   Getting to places beyond walking distances No Help Needed

## 2018-11-27 NOTE — TELEPHONE ENCOUNTER
Patient came by office wanting to know if Dr. Jon Fletcher had spoken with Dr. Shayne Salas. Please call her at [phone].

## 2018-11-27 NOTE — TELEPHONE ENCOUNTER
Pt called again, she fell out yesterday in [address] [facility], she got her HR checked it was 30, she states \"she need a pacemaker\" please give pt a call regarding last note put in from dr. Lor Zuñiga about possible needing a pacemaker. She would like a call back, and she wanted a call to her PCP regarding this info as well.  Pt has apt on 12/4 to see Dr. Lor Zuñiga    thanks

## 2018-12-03 ENCOUNTER — TELEPHONE (OUTPATIENT)
Dept: INTERNAL MEDICINE CLINIC | Age: 58
End: 2018-12-03

## 2018-12-03 NOTE — TELEPHONE ENCOUNTER
Pt called in reference to wanting to speak with a nurse. She said that she has the shakes. . Wilton Lemons said that Roseline Hicks left her a message but she didn't know what she said to do. Please call her at [phone]. She has to go to the hospital tomorrow.

## 2018-12-04 ENCOUNTER — HOSPITAL ENCOUNTER (OUTPATIENT)
Dept: GENERAL RADIOLOGY | Age: 58
Discharge: HOME | End: 2018-12-04
Payer: MEDICARE

## 2018-12-04 ENCOUNTER — HOSPITAL ENCOUNTER (OUTPATIENT)
Dept: LAB | Age: 58
Discharge: HOME | End: 2018-12-04
Payer: MEDICARE

## 2018-12-04 ENCOUNTER — OFFICE VISIT (OUTPATIENT)
Dept: CARDIOLOGY CLINIC | Age: 58
End: 2018-12-04

## 2018-12-04 VITALS
WEIGHT: 180.4 LBS | HEART RATE: 60 BPM | BODY MASS INDEX: 38.92 KG/M2 | OXYGEN SATURATION: 96 % | HEIGHT: 57 IN | SYSTOLIC BLOOD PRESSURE: 120 MMHG | RESPIRATION RATE: 16 BRPM

## 2018-12-04 DIAGNOSIS — R05.9 COUGH: ICD-10-CM

## 2018-12-04 DIAGNOSIS — R00.1 BRADYCARDIA ON ECG: Primary | ICD-10-CM

## 2018-12-04 DIAGNOSIS — R42 DIZZINESS: ICD-10-CM

## 2018-12-04 DIAGNOSIS — R00.1 SYMPTOMATIC BRADYCARDIA: ICD-10-CM

## 2018-12-04 DIAGNOSIS — I49.5 SSS (SICK SINUS SYNDROME) (HCC): ICD-10-CM

## 2018-12-04 PROCEDURE — 36415 COLL VENOUS BLD VENIPUNCTURE: CPT

## 2018-12-04 PROCEDURE — 85610 PROTHROMBIN TIME: CPT

## 2018-12-04 PROCEDURE — 71046 X-RAY EXAM CHEST 2 VIEWS: CPT

## 2018-12-04 PROCEDURE — 85025 COMPLETE CBC W/AUTO DIFF WBC: CPT

## 2018-12-04 PROCEDURE — 80053 COMPREHEN METABOLIC PANEL: CPT

## 2018-12-04 NOTE — PROGRESS NOTES
Subjective:      Joseph Meyers is a 65 y.o. female is here for EP consult. She stated that she was at [employer] last week and had felt like she was going to pass out - her pulse was checked by the pharmacist and was found to be in the 30s. She complains of fatigue and episodes of dizziness.       Patient Active Problem List    Diagnosis Date Noted    Obesity, morbid ([facility] [address] 2018    Gastroesophageal reflux disease without esophagitis 2016    Bipolar disorder with current episode depressed ([facility] [address] 2016    Migraine aura without headache 10/24/2014    Bilateral leg edema 10/12/2014    History of gastric bypass 10/12/2014      Penelope Luna MD  Past Medical History:   Diagnosis Date    Hypoglycemia     Hypotension     Syncope and collapse 7/24/15    [facility]      Past Surgical History:   Procedure Laterality Date    HX ABDOMINAL LAPAROSCOPY      HX CARPAL TUNNEL RELEASE Bilateral more than 10 years ago    HX  SECTION  [id number]    HX COLONOSCOPY  2016    HX GASTRIC BYPASS  1997    x2    HX HYSTERECTOMY  1985    HX TONSIL AND ADENOIDECTOMY  more than 10 years ago     Allergies   Allergen Reactions    Benadryl [Diphenhydramine Hcl] Nausea and Vomiting    Ibuprofen Nausea and Vomiting    Sulfamethoxazole-Trimethoprim Nausea Only    Tramadol Anxiety      Family History   Problem Relation Age of Onset    Stroke Mother     Cancer Father     Diabetes Brother     Cancer Maternal Aunt     Cancer Maternal Grandmother     Cancer Brother     Kidney Disease Brother     negative for cardiac disease  Social History     Socioeconomic History    Marital status:      Spouse name: Not on file    Number of children: 2    Years of education: Not on file    Highest education level: Not on file   Occupational History    Occupation: retired   Tobacco Use    Smoking status: Never Smoker    Smokeless tobacco: Never Used   Substance and Sexual Activity    Alcohol use: No    Drug use: No    Sexual activity: No   Social History Narrative     has brain damage, lives with him     Current Outpatient Medications   Medication Sig    meclizine (TRAVEL SICKNESS, MECLIZINE,) 25 mg chewable tablet TAKE 1 TABLET BY MOUTH THREE TIMES DAILY AS NEEDED FOR UP TO 10 DAYS FOR DIZZINESS    butalbital-acetaminophen-caff (FIORICET) -40 mg per capsule Take 1 Cap by mouth daily as needed for Pain.  topiramate (TOPAMAX) 100 mg tablet TAKE 1 TABLET BY MOUTH DAILY.  diclofenac (VOLTAREN) 1 % gel Apply  to affected area four (4) times daily.  aspirin 81 mg chewable tablet Take 1 Tab by mouth daily.  furosemide (LASIX) 20 mg tablet TAKE 1 TABLET BY MOUTH DAILY AS NEEDED    omeprazole (PRILOSEC) 40 mg capsule TAKE 1 CAPSULE BY MOUTH DAILY.  glucose blood VI test strips (ONETOUCH VERIO) strip Test 2 -3 times daily Dx Code: E16.2    lancets (ONE TOUCH DELICA) 33 gauge misc Test 2-3 times daily Dx Code: E16.2    fluticasone (FLONASE) 50 mcg/actuation nasal spray 2 Sprays by Both Nostrils route daily.  glucose 4 gram chewable tablet Take 4 Tabs by mouth as needed.  cholecalciferol (VITAMIN D3) 1,000 unit tablet Take 1 Tab by mouth daily. Indications: Vitamin D Deficiency    cyanocobalamin (VITAMIN B-12) 1,000 mcg tablet TAKE 1 TABLET BY MOUTH DAILY FOR VITAMIN B12 DEFICIENCY    polyethylene glycol (MIRALAX) 17 gram packet Take 1 Packet by mouth daily.  traZODone (DESYREL) 50 mg tablet Take 50 mg by mouth nightly.  potassium chloride (KLOR-CON) 10 mEq tablet Take 1 Tab by mouth daily. If takes lasix    Nebulizer & Compressor machine Use as directed    albuterol (PROVENTIL VENTOLIN) 2.5 mg /3 mL (0.083 %) nebulizer solution 3 mL by Nebulization route every four (4) hours as needed for Wheezing.  sertraline (ZOLOFT) 50 mg tablet Take 50 mg by mouth daily. No current facility-administered medications for this visit.        Vitals:    12/04/18 0923   BP: (!) 120/0   Pulse: 60   Resp: 16   SpO2: 96%   Weight: 180 lb 6.4 oz (81.8 kg)   Height: 4' 9\" (1.448 m)       I have reviewed the nurses notes, vitals, problem list, allergy list, medical history, family, social history and medications. Review of Symptoms:    General:fatigue  HEENT: Denies blurred vision, headaches, epistaxis and difficulty swallowing. Respiratory: Denies shortness of breath, HERNADEZ, wheezing or stridor. Cardiovascular: Denies precordial pain, palpitations, edema or PND  Gastrointestinal: Denies poor appetite, indigestion, abdominal pain or blood in stool  Urinary: Denies dysuria, pyuria  Musculoskeletal: Denies pain or swelling from muscles or joints  Neurologic: Dizziness  Skin: Denies rash, itching or texture change. Psych: Denies depression      Physical Exam:      General: Well developed, in no acute distress. HEENT: Eyes - PERRL, no jvd  Heart:  Normal S1/S2 negative S3 or S4. Regular, no murmur, gallop or rub.   Respiratory: Clear bilaterally x 4, no wheezing or rales  Abdomen:   Soft, non-tender, bowel sounds are active.   Extremities:  No edema, normal cap refill, no cyanosis. Musculoskeletal: No clubbing  Neuro: A&Ox3, speech clear, gait stable. Skin: Skin color is normal. No rashes or lesions.  Non diaphoretic  Vascular: 2+ pulses symmetric in all extremities    Cardiographics    Ekg: s daron    Results for orders placed or performed in visit on 11/15/18   CARDIAC HOLTER MONITOR, 24 HOURS    Narrative    ECG Monitor/24 hours, Complete    Reason for Holter Monitor  BRADYCARDIA    Heartbeat    Slowest 41  Average 59  Fastest  121        Results:   Underlying Rhythm: Sinus bradycardia      Atrial Arrhythmias: premature atrial contractions; occasional and severe bradycardia            AV Conduction: normal    Ventricular Arrhythmias: premature ventricular contractions; rare     ST Segment Analysis:normal     Symptom Correlation:  None reported    Comment:   Sinus bradycardia with bradycardia to the 40s (while asleep). Unchanged from monitor in 2014     Dana Obrien MD, Juan C Juan      Results for orders placed or performed during the hospital encounter of 01/31/16   EKG, 12 LEAD, INITIAL   Result Value Ref Range    Ventricular Rate 76 BPM    Atrial Rate 76 BPM    P-R Interval 192 ms    QRS Duration 96 ms    Q-T Interval 374 ms    QTC Calculation (Bezet) 420 ms    Calculated P Axis 39 degrees    Calculated R Axis 12 degrees    Calculated T Axis 23 degrees    Diagnosis       Normal sinus rhythm  Normal ECG  When compared with ECG of 12-OCT-2014 12:34,  No significant change was found  Confirmed by Madison Chandler ([id number]) on 2/1/2016 1:46:51 PM           Lab Results   Component Value Date/Time    WBC 5.2 11/16/2018 11:13 AM    Hemoglobin (POC) 13.2 03/28/2016 03:29 PM    HGB 12.8 11/16/2018 11:13 AM    HCT 40.4 11/16/2018 11:13 AM    PLATELET 101 01/72/3391 11:13 AM    MCV 91 11/16/2018 11:13 AM      Lab Results   Component Value Date/Time    Sodium 145 (H) 11/16/2018 11:13 AM    Potassium 4.2 11/16/2018 11:13 AM    Chloride 114 (H) 11/16/2018 11:13 AM    CO2 16 (L) 11/16/2018 11:13 AM    Anion gap 8 01/31/2016 04:49 PM    Glucose 74 11/16/2018 11:13 AM    BUN 22 11/16/2018 11:13 AM    Creatinine 0.71 11/16/2018 11:13 AM    BUN/Creatinine ratio 31 (H) 11/16/2018 11:13 AM    GFR est  11/16/2018 11:13 AM    GFR est non-AA 96 11/16/2018 11:13 AM    Calcium 8.8 11/16/2018 11:13 AM    Bilirubin, total <0.2 11/16/2018 11:13 AM    AST (SGOT) 16 11/16/2018 11:13 AM    Alk. phosphatase 86 11/16/2018 11:13 AM    Protein, total 5.8 (L) 11/16/2018 11:13 AM    Albumin 3.9 11/16/2018 11:13 AM    Globulin 2.5 01/31/2016 04:49 PM    A-G Ratio 2.1 11/16/2018 11:13 AM    ALT (SGPT) 11 11/16/2018 11:13 AM         Assessment:     Assessment:        ICD-10-CM ICD-9-CM    1. Bradycardia on ECG R00.1 427.89 AMB POC EKG ROUTINE W/ 12 LEADS, INTER & REP   2. SSS (sick sinus syndrome) (HCC) I49.5 427.81    3. Symptomatic bradycardia R00.1 427.89    4. Dizziness R42 780.4      Orders Placed This Encounter    AMB POC EKG ROUTINE W/ 12 LEADS, INTER & REP     Order Specific Question:   Reason for Exam:     Answer:   routine        Plan:   Ms Yuni Garrido has symptomatic bradycardia. She continues to have episodes of near syncope and was found to have a HR in the 30s. We will proceed with a pacemaker. I discussed the risks/benefits/alternatives of the procedure with the patient. Risks include (but are not limited to) bleeding, heart block, infection, cva/mi/tamponade/death. The patient understands and agrees to proceed. Thank you for this interesting consultation. Continue medical management for bradycardia. Thank you for allowing me to participate in Nestor Alexander 's care.     Arnaud Ochoa MD, Hermelindo Waterman

## 2018-12-04 NOTE — PROGRESS NOTES
1. Have you been to the ER, urgent care clinic since your last visit? Hospitalized since your last visit? No    2. Have you seen or consulted any other health care providers outside of the [address] [place] [address] since your last visit? Include any pap smears or colon screening.  No     Pt c/o chest tightness, heaviness and weakness

## 2018-12-04 NOTE — H&P (VIEW-ONLY)
Subjective:  
  
Ravin Gardner is a 65 y.o. female is here for EP consult. She stated that she was at [employer] last week and had felt like she was going to pass out - her pulse was checked by the pharmacist and was found to be in the 30s. She complains of fatigue and episodes of dizziness. Patient Active Problem List  
 Diagnosis Date Noted  Obesity, morbid ([facility] [address] 2018  Gastroesophageal reflux disease without esophagitis 2016  Bipolar disorder with current episode depressed ([facility] 2016  Migraine aura without headache 10/24/2014  Bilateral leg edema 10/12/2014  History of gastric bypass 10/12/2014 Thuan Green MD 
Past Medical History:  
Diagnosis Date  Hypoglycemia   Hypotension   Syncope and collapse 7/24/15 [address] Past Surgical History:  
Procedure Laterality Date  HX ABDOMINAL LAPAROSCOPY    HX CARPAL TUNNEL RELEASE Bilateral more than 10 years ago  HX  SECTION  [id number]  HX COLONOSCOPY  2016 [facility]  
 x2 [facility]  HX TONSIL AND ADENOIDECTOMY  more than 10 years ago Allergies Allergen Reactions  Benadryl [Diphenhydramine Hcl] Nausea and Vomiting  Ibuprofen Nausea and Vomiting  Sulfamethoxazole-Trimethoprim Nausea Only  Tramadol Anxiety Family History Problem Relation Age of Onset  Stroke Mother  Cancer Father  Diabetes Brother  Cancer Maternal Aunt  Cancer Maternal Grandmother  Cancer Brother  Kidney Disease Brother   
 negative for cardiac disease Social History Socioeconomic History  Marital status:  Spouse name: Not on file  Number of children: 2  
 Years of education: Not on file  Highest education level: Not on file Occupational History  Occupation: retired Tobacco Use  Smoking status: Never Smoker  Smokeless tobacco: Never Used Substance and Sexual Activity  Alcohol use: No  
 Drug use: No  
 Sexual activity: No  
Social History Narrative  has brain damage, lives with him Current Outpatient Medications Medication Sig  
 meclizine (TRAVEL SICKNESS, MECLIZINE,) 25 mg chewable tablet TAKE 1 TABLET BY MOUTH THREE TIMES DAILY AS NEEDED FOR UP TO 10 DAYS FOR DIZZINESS  butalbital-acetaminophen-caff (FIORICET) -40 mg per capsule Take 1 Cap by mouth daily as needed for Pain.  topiramate (TOPAMAX) 100 mg tablet TAKE 1 TABLET BY MOUTH DAILY.  diclofenac (VOLTAREN) 1 % gel Apply  to affected area four (4) times daily.  aspirin 81 mg chewable tablet Take 1 Tab by mouth daily.  furosemide (LASIX) 20 mg tablet TAKE 1 TABLET BY MOUTH DAILY AS NEEDED  
 omeprazole (PRILOSEC) 40 mg capsule TAKE 1 CAPSULE BY MOUTH DAILY.  glucose blood VI test strips (ONETOUCH VERIO) strip Test 2 -3 times daily Dx Code: Z18.6  lancets (ONE TOUCH DELICA) 33 gauge misc Test 2-3 times daily Dx Code: E16.2  fluticasone (FLONASE) 50 mcg/actuation nasal spray 2 Sprays by Both Nostrils route daily.  glucose 4 gram chewable tablet Take 4 Tabs by mouth as needed.  cholecalciferol (VITAMIN D3) 1,000 unit tablet Take 1 Tab by mouth daily. Indications: Vitamin D Deficiency  cyanocobalamin (VITAMIN B-12) 1,000 mcg tablet TAKE 1 TABLET BY MOUTH DAILY FOR VITAMIN B12 DEFICIENCY  polyethylene glycol (MIRALAX) 17 gram packet Take 1 Packet by mouth daily.  traZODone (DESYREL) 50 mg tablet Take 50 mg by mouth nightly.  potassium chloride (KLOR-CON) 10 mEq tablet Take 1 Tab by mouth daily. If takes lasix  Nebulizer & Compressor machine Use as directed  albuterol (PROVENTIL VENTOLIN) 2.5 mg /3 mL (0.083 %) nebulizer solution 3 mL by Nebulization route every four (4) hours as needed for Wheezing.  sertraline (ZOLOFT) 50 mg tablet Take 50 mg by mouth daily. No current facility-administered medications for this visit. Vitals: 12/04/18 2354 BP: (!) 120/0 Pulse: 60 Resp: 16 SpO2: 96% Weight: 180 lb 6.4 oz (81.8 kg) Height: 4' 9\" (1.448 m) I have reviewed the nurses notes, vitals, problem list, allergy list, medical history, family, social history and medications. Review of Symptoms: 
 
General:fatigue HEENT: Denies blurred vision, headaches, epistaxis and difficulty swallowing. Respiratory: Denies shortness of breath, HERNADEZ, wheezing or stridor. Cardiovascular: Denies precordial pain, palpitations, edema or PND Gastrointestinal: Denies poor appetite, indigestion, abdominal pain or blood in stool Urinary: Denies dysuria, pyuria Musculoskeletal: Denies pain or swelling from muscles or joints Neurologic: Dizziness Skin: Denies rash, itching or texture change. Psych: Denies depression Physical Exam:   
 
General: Well developed, in no acute distress. HEENT: Eyes - PERRL, no jvd Heart:  Normal S1/S2 negative S3 or S4. Regular, no murmur, gallop or rub.  
Respiratory: Clear bilaterally x 4, no wheezing or rales Abdomen:   Soft, non-tender, bowel sounds are active.  
Extremities:  No edema, normal cap refill, no cyanosis. Musculoskeletal: No clubbing Neuro: A&Ox3, speech clear, gait stable. Skin: Skin color is normal. No rashes or lesions. Non diaphoretic Vascular: 2+ pulses symmetric in all extremities Cardiographics Ekg: s daron Results for orders placed or performed in visit on 11/15/18 CARDIAC HOLTER MONITOR, 24 HOURS Narrative ECG Monitor/24 hours, Complete Reason for Holter Monitor  BRADYCARDIA Heartbeat Slowest 41 Average 59 Fastest  121 Results:  
Underlying Rhythm: Sinus bradycardia Atrial Arrhythmias: premature atrial contractions; occasional and severe bradycardia AV Conduction: normal 
 
Ventricular Arrhythmias: premature ventricular contractions; rare ST Segment Analysis:normal  
 
Symptom Correlation: 
None reported Comment: Sinus bradycardia with bradycardia to the 40s (while asleep). Unchanged from monitor in 2014 Bg Weathers MD, Lyubov Faye Results for orders placed or performed during the hospital encounter of 01/31/16 EKG, 12 LEAD, INITIAL Result Value Ref Range Ventricular Rate 76 BPM  
 Atrial Rate 76 BPM  
 P-R Interval 192 ms QRS Duration 96 ms  
 Q-T Interval 374 ms QTC Calculation (Bezet) 420 ms Calculated P Axis 39 degrees Calculated R Axis 12 degrees Calculated T Axis 23 degrees Diagnosis Normal sinus rhythm Normal ECG When compared with ECG of 12-OCT-2014 12:34, No significant change was found Confirmed by Radha Rucker ([id number]) on 2/1/2016 1:46:51 PM 
  
 
 
 
Lab Results Component Value Date/Time WBC 5.2 11/16/2018 11:13 AM  
 Hemoglobin (POC) 13.2 03/28/2016 03:29 PM  
 HGB 12.8 11/16/2018 11:13 AM  
 HCT 40.4 11/16/2018 11:13 AM  
 PLATELET 018 14/97/6521 11:13 AM  
 MCV 91 11/16/2018 11:13 AM  
  
Lab Results Component Value Date/Time Sodium 145 (H) 11/16/2018 11:13 AM  
 Potassium 4.2 11/16/2018 11:13 AM  
 Chloride 114 (H) 11/16/2018 11:13 AM  
 CO2 16 (L) 11/16/2018 11:13 AM  
 Anion gap 8 01/31/2016 04:49 PM  
 Glucose 74 11/16/2018 11:13 AM  
 BUN 22 11/16/2018 11:13 AM  
 Creatinine 0.71 11/16/2018 11:13 AM  
 BUN/Creatinine ratio 31 (H) 11/16/2018 11:13 AM  
 GFR est  11/16/2018 11:13 AM  
 GFR est non-AA 96 11/16/2018 11:13 AM  
 Calcium 8.8 11/16/2018 11:13 AM  
 Bilirubin, total <0.2 11/16/2018 11:13 AM  
 AST (SGOT) 16 11/16/2018 11:13 AM  
 Alk. phosphatase 86 11/16/2018 11:13 AM  
 Protein, total 5.8 (L) 11/16/2018 11:13 AM  
 Albumin 3.9 11/16/2018 11:13 AM  
 Globulin 2.5 01/31/2016 04:49 PM  
 A-G Ratio 2.1 11/16/2018 11:13 AM  
 ALT (SGPT) 11 11/16/2018 11:13 AM  
  
 
 Assessment: 
 
 Assessment: ICD-10-CM ICD-9-CM 1. Bradycardia on ECG R00.1 427.89 AMB POC EKG ROUTINE W/ 12 LEADS, INTER & REP  
 2. SSS (sick sinus syndrome) ([facility]) I49.5 427.81   
3. Symptomatic bradycardia R00.1 427.89   
4. Dizziness R42 780.4 Orders Placed This Encounter  AMB POC EKG ROUTINE W/ 12 LEADS, INTER & REP Order Specific Question:   Reason for Exam: Answer:   routine Plan: Ms Ko Obrien has symptomatic bradycardia. She continues to have episodes of near syncope and was found to have a HR in the 30s. We will proceed with a pacemaker. I discussed the risks/benefits/alternatives of the procedure with the patient. Risks include (but are not limited to) bleeding, heart block, infection, cva/mi/tamponade/death. The patient understands and agrees to proceed. Thank you for this interesting consultation. Continue medical management for bradycardia. Thank you for allowing me to participate in Timbo Montgomery 's care.  
 
Mj Price MD, [facility]

## 2018-12-05 ENCOUNTER — OFFICE VISIT (OUTPATIENT)
Dept: INTERNAL MEDICINE CLINIC | Age: 58
End: 2018-12-05

## 2018-12-05 VITALS
OXYGEN SATURATION: 98 % | DIASTOLIC BLOOD PRESSURE: 82 MMHG | BODY MASS INDEX: 37.97 KG/M2 | HEIGHT: 57 IN | TEMPERATURE: 96.3 F | SYSTOLIC BLOOD PRESSURE: 128 MMHG | WEIGHT: 176 LBS | RESPIRATION RATE: 16 BRPM | HEART RATE: 55 BPM

## 2018-12-05 DIAGNOSIS — J06.9 UPPER RESPIRATORY TRACT INFECTION, UNSPECIFIED TYPE: Primary | ICD-10-CM

## 2018-12-05 DIAGNOSIS — R00.1 BRADYCARDIA: ICD-10-CM

## 2018-12-05 LAB
ALBUMIN SERPL-MCNC: 3.8 G/DL (ref 3.5–5.5)
ALBUMIN/GLOB SERPL: 1.7 {RATIO} (ref 1.2–2.2)
ALP SERPL-CCNC: 88 IU/L (ref 39–117)
ALT SERPL-CCNC: 11 IU/L (ref 0–32)
AST SERPL-CCNC: 18 IU/L (ref 0–40)
BASOPHILS # BLD AUTO: 0 X10E3/UL (ref 0–0.2)
BASOPHILS NFR BLD AUTO: 0 %
BILIRUB SERPL-MCNC: <0.2 MG/DL (ref 0–1.2)
BUN SERPL-MCNC: 15 MG/DL (ref 6–24)
BUN/CREAT SERPL: 22 (ref 9–23)
CALCIUM SERPL-MCNC: 8.9 MG/DL (ref 8.7–10.2)
CHLORIDE SERPL-SCNC: 113 MMOL/L (ref 96–106)
CO2 SERPL-SCNC: 17 MMOL/L (ref 20–29)
CREAT SERPL-MCNC: 0.67 MG/DL (ref 0.57–1)
EOSINOPHIL # BLD AUTO: 0.1 X10E3/UL (ref 0–0.4)
EOSINOPHIL NFR BLD AUTO: 2 %
ERYTHROCYTE [DISTWIDTH] IN BLOOD BY AUTOMATED COUNT: 15.8 % (ref 12.3–15.4)
GLOBULIN SER CALC-MCNC: 2.3 G/DL (ref 1.5–4.5)
GLUCOSE SERPL-MCNC: 69 MG/DL (ref 65–99)
HCT VFR BLD AUTO: 40.9 % (ref 34–46.6)
HGB BLD-MCNC: 12.9 G/DL (ref 11.1–15.9)
IMM GRANULOCYTES # BLD: 0 X10E3/UL (ref 0–0.1)
IMM GRANULOCYTES NFR BLD: 0 %
INR PPP: 1.1 (ref 0.8–1.2)
LYMPHOCYTES # BLD AUTO: 1.4 X10E3/UL (ref 0.7–3.1)
LYMPHOCYTES NFR BLD AUTO: 35 %
MCH RBC QN AUTO: 28.7 PG (ref 26.6–33)
MCHC RBC AUTO-ENTMCNC: 31.5 G/DL (ref 31.5–35.7)
MCV RBC AUTO: 91 FL (ref 79–97)
MONOCYTES # BLD AUTO: 0.4 X10E3/UL (ref 0.1–0.9)
MONOCYTES NFR BLD AUTO: 10 %
NEUTROPHILS # BLD AUTO: 2.2 X10E3/UL (ref 1.4–7)
NEUTROPHILS NFR BLD AUTO: 53 %
PLATELET # BLD AUTO: 306 X10E3/UL (ref 150–379)
POTASSIUM SERPL-SCNC: 4.4 MMOL/L (ref 3.5–5.2)
PROT SERPL-MCNC: 6.1 G/DL (ref 6–8.5)
PROTHROMBIN TIME: 11.2 SEC (ref 9.1–12)
RBC # BLD AUTO: 4.5 X10E6/UL (ref 3.77–5.28)
SODIUM SERPL-SCNC: 143 MMOL/L (ref 134–144)
WBC # BLD AUTO: 4.1 X10E3/UL (ref 3.4–10.8)

## 2018-12-05 NOTE — PROGRESS NOTES
Chief Complaint   Patient presents with    Cough     prod cough thick white sputum     I have reviewed the patient's medical history in detail and updated the computerized patient record. Health Maintenance reviewed. 1. Have you been to the ER, urgent care clinic since your last visit? Hospitalized since your last visit?no    2. Have you seen or consulted any other health care providers outside of the [address] [place] [address] since your last visit? Include any pap smears or colon screening. No      Encouraged pt to discuss pt's wishes with spouse/partner/family and bring them in the next appt to follow thru with the Advanced Directive    Fall Risk Assessment, last 12 mths 1/17/2018   Able to walk? Yes   Fall in past 12 months? Yes   Fall with injury? Yes   Number of falls in past 12 months 4   Fall Risk Score 5       PHQ over the last two weeks 1/17/2018   Little interest or pleasure in doing things Several days   Feeling down, depressed, irritable, or hopeless Several days   Total Score PHQ 2 2       Abuse Screening Questionnaire 1/17/2018   Do you ever feel afraid of your partner? N   Are you in a relationship with someone who physically or mentally threatens you? N   Is it safe for you to go home?  Y       ADL Assessment 1/17/2018   Feeding yourself No Help Needed   Getting from bed to chair No Help Needed   Getting dressed No Help Needed   Bathing or showering No Help Needed   Walk across the room (includes cane/walker) No Help Needed   Using the telphone No Help Needed   Taking your medications No Help Needed   Preparing meals No Help Needed   Managing money (expenses/bills) No Help Needed   Moderately strenuous housework (laundry) No Help Needed   Shopping for personal items (toiletries/medicines) No Help Needed   Shopping for groceries No Help Needed   Driving No Help Needed   Climbing a flight of stairs No Help Needed   Getting to places beyond walking distances No Help Needed

## 2018-12-05 NOTE — PROGRESS NOTES
Subjective:   Ginger Perkins is a 65 y.o. female who complains of clear producing cough for 10 days, gradually improving since that time. She denies a history of fevers, shortness of breath and wheezing. Evaluation to date: seen previously and thought to have a viral URI. Rx tessalon  Treatment to date: tessalon, now set up for pacemaker. Patient does not smoke cigarettes. Relevant PMH: No pertinent additional PMH and getting pacemaker done [place]  . Allergies   Allergen Reactions    Benadryl [Diphenhydramine Hcl] Nausea and Vomiting    Ibuprofen Nausea and Vomiting    Sulfamethoxazole-Trimethoprim Nausea Only    Tramadol Anxiety         Current Outpatient Medications   Medication Sig Dispense Refill    meclizine (TRAVEL SICKNESS, MECLIZINE,) 25 mg chewable tablet TAKE 1 TABLET BY MOUTH THREE TIMES DAILY AS NEEDED FOR UP TO 10 DAYS FOR DIZZINESS 60 Tab 5    butalbital-acetaminophen-caff (FIORICET) -40 mg per capsule Take 1 Cap by mouth daily as needed for Pain. 30 Cap 1    topiramate (TOPAMAX) 100 mg tablet TAKE 1 TABLET BY MOUTH DAILY. 30 Tab 6    diclofenac (VOLTAREN) 1 % gel Apply  to affected area four (4) times daily. 1 Each 5    furosemide (LASIX) 20 mg tablet TAKE 1 TABLET BY MOUTH DAILY AS NEEDED 90 Tab 3    omeprazole (PRILOSEC) 40 mg capsule TAKE 1 CAPSULE BY MOUTH DAILY. 90 Cap 3    glucose blood VI test strips (ONETOUCH VERIO) strip Test 2 -3 times daily Dx Code: E16.2 100 Strip 11    lancets (ONE TOUCH DELICA) 33 gauge misc Test 2-3 times daily Dx Code: E16.2 100 Lancet 11    fluticasone (FLONASE) 50 mcg/actuation nasal spray 2 Sprays by Both Nostrils route daily. 1 Bottle 5    glucose 4 gram chewable tablet Take 4 Tabs by mouth as needed. 30 Tab 5    cholecalciferol (VITAMIN D3) 1,000 unit tablet Take 1 Tab by mouth daily.  Indications: Vitamin D Deficiency 180 Tab 1    cyanocobalamin (VITAMIN B-12) 1,000 mcg tablet TAKE 1 TABLET BY MOUTH DAILY FOR VITAMIN B12 DEFICIENCY 90 Tab 1    polyethylene glycol (MIRALAX) 17 gram packet Take 1 Packet by mouth daily. 50 Packet 5    traZODone (DESYREL) 50 mg tablet Take 50 mg by mouth nightly.  potassium chloride (KLOR-CON) 10 mEq tablet Take 1 Tab by mouth daily. If takes lasix 90 Tab 3    Nebulizer & Compressor machine Use as directed 1 Each 0    albuterol (PROVENTIL VENTOLIN) 2.5 mg /3 mL (0.083 %) nebulizer solution 3 mL by Nebulization route every four (4) hours as needed for Wheezing. 100 Each 1    sertraline (ZOLOFT) 50 mg tablet Take 50 mg by mouth daily.  aspirin 81 mg chewable tablet Take 1 Tab by mouth daily. 90 Tab 3     Social History     Tobacco Use    Smoking status: Never Smoker    Smokeless tobacco: Never Used   Substance Use Topics    Alcohol use: No        Review of Systems  Pertinent items are noted in HPI. Objective:     Visit Vitals  /82 (BP 1 Location: Left arm, BP Patient Position: Sitting)   Pulse (!) 55   Temp 96.3 °F (35.7 °C) (Temporal)   Resp 16   Ht 4' 9\" (1.448 m)   Wt 176 lb (79.8 kg)   SpO2 98%   BMI 38.09 kg/m²     General:  alert, cooperative, no distress   Eyes: nl   Ears: deferred   Sinuses: deferred   Mouth:  deferred   Neck: defrred. Heart: S1 and S2 normal, bradycardic. Lungs: clear to auscultation bilaterally   Abdomen: soft, non-tender. Bowel sounds normal. No masses,  no organomegaly      Assessment/Plan:   viral upper respiratory illness  RTC in 2 months or PRN. Discussed diagnosis and treatment of viral URIs. Discussed the importance of avoiding unnecessary antibiotic therapy. Encounter Diagnoses   Name Primary?  Upper respiratory tract infection, unspecified type Yes    Bradycardia      No orders of the defined types were placed in this encounter. Cold improving OK to do pacemaker.

## 2018-12-06 ENCOUNTER — HOSPITAL ENCOUNTER (OUTPATIENT)
Dept: NON INVASIVE DIAGNOSTICS | Age: 58
Discharge: HOME | End: 2018-12-06
Attending: INTERNAL MEDICINE | Admitting: INTERNAL MEDICINE
Payer: MEDICARE

## 2018-12-06 ENCOUNTER — APPOINTMENT (OUTPATIENT)
Dept: GENERAL RADIOLOGY | Age: 58
End: 2018-12-06
Attending: INTERNAL MEDICINE
Payer: MEDICARE

## 2018-12-06 VITALS
BODY MASS INDEX: 37.97 KG/M2 | WEIGHT: 176 LBS | DIASTOLIC BLOOD PRESSURE: 78 MMHG | OXYGEN SATURATION: 96 % | HEART RATE: 60 BPM | TEMPERATURE: 98.5 F | HEIGHT: 57 IN | SYSTOLIC BLOOD PRESSURE: 146 MMHG | RESPIRATION RATE: 28 BRPM

## 2018-12-06 PROBLEM — I49.5 SSS (SICK SINUS SYNDROME) (HCC): Status: ACTIVE | Noted: 2018-12-06

## 2018-12-06 LAB
GLUCOSE BLD STRIP.AUTO-MCNC: 162 MG/DL (ref 65–100)
GLUCOSE BLD STRIP.AUTO-MCNC: 67 MG/DL (ref 65–100)
SERVICE CMNT-IMP: ABNORMAL
SERVICE CMNT-IMP: NORMAL

## 2018-12-06 PROCEDURE — 71045 X-RAY EXAM CHEST 1 VIEW: CPT

## 2018-12-06 PROCEDURE — 77030018673

## 2018-12-06 PROCEDURE — 74011250636 HC RX REV CODE- 250/636

## 2018-12-06 PROCEDURE — 77030037713 HC CLOSR DEV INCIS ZIP STRY -B

## 2018-12-06 PROCEDURE — 77030002996 HC SUT SLK J&J -A

## 2018-12-06 PROCEDURE — 77030018836 HC SOL IRR NACL ICUM -A

## 2018-12-06 PROCEDURE — 33208 INSRT HEART PM ATRIAL & VENT: CPT

## 2018-12-06 PROCEDURE — A4565 SLINGS: HCPCS

## 2018-12-06 PROCEDURE — 74011250636 HC RX REV CODE- 250/636: Performed by: NURSE PRACTITIONER

## 2018-12-06 PROCEDURE — 74011000250 HC RX REV CODE- 250

## 2018-12-06 PROCEDURE — 82962 GLUCOSE BLOOD TEST: CPT

## 2018-12-06 PROCEDURE — C1898 LEAD, PMKR, OTHER THAN TRANS: HCPCS

## 2018-12-06 PROCEDURE — 77030018729 HC ELECTRD DEFIB PAD CARD -B

## 2018-12-06 PROCEDURE — C1894 INTRO/SHEATH, NON-LASER: HCPCS

## 2018-12-06 PROCEDURE — 77030031139 HC SUT VCRL2 J&J -A

## 2018-12-06 PROCEDURE — C1893 INTRO/SHEATH, FIXED,NON-PEEL: HCPCS

## 2018-12-06 PROCEDURE — C1785 PMKR, DUAL, RATE-RESP: HCPCS

## 2018-12-06 RX ORDER — LIDOCAINE HYDROCHLORIDE 10 MG/ML
1-40 INJECTION INFILTRATION; PERINEURAL
Status: DISCONTINUED | OUTPATIENT
Start: 2018-12-06 | End: 2018-12-06 | Stop reason: HOSPADM

## 2018-12-06 RX ORDER — DEXTROSE 50 % IN WATER (D50W) INTRAVENOUS SYRINGE
25
Status: COMPLETED | OUTPATIENT
Start: 2018-12-06 | End: 2018-12-06

## 2018-12-06 RX ORDER — FENTANYL CITRATE 50 UG/ML
INJECTION, SOLUTION INTRAMUSCULAR; INTRAVENOUS
Status: COMPLETED
Start: 2018-12-06 | End: 2018-12-06

## 2018-12-06 RX ORDER — HEPARIN SODIUM 200 [USP'U]/100ML
1000 INJECTION, SOLUTION INTRAVENOUS ONCE
Status: COMPLETED | OUTPATIENT
Start: 2018-12-06 | End: 2018-12-06

## 2018-12-06 RX ORDER — BACITRACIN 50000 [IU]/1
50000 INJECTION, POWDER, FOR SOLUTION INTRAMUSCULAR ONCE
Status: COMPLETED | OUTPATIENT
Start: 2018-12-06 | End: 2018-12-06

## 2018-12-06 RX ORDER — DEXTROSE 50 % IN WATER (D50W) INTRAVENOUS SYRINGE
Status: COMPLETED
Start: 2018-12-06 | End: 2018-12-06

## 2018-12-06 RX ORDER — MIDAZOLAM HYDROCHLORIDE 1 MG/ML
INJECTION, SOLUTION INTRAMUSCULAR; INTRAVENOUS
Status: COMPLETED
Start: 2018-12-06 | End: 2018-12-06

## 2018-12-06 RX ORDER — KETOROLAC TROMETHAMINE 30 MG/ML
15 INJECTION, SOLUTION INTRAMUSCULAR; INTRAVENOUS
Status: COMPLETED | OUTPATIENT
Start: 2018-12-06 | End: 2018-12-06

## 2018-12-06 RX ORDER — HEPARIN SODIUM 200 [USP'U]/100ML
INJECTION, SOLUTION INTRAVENOUS
Status: COMPLETED
Start: 2018-12-06 | End: 2018-12-06

## 2018-12-06 RX ORDER — LIDOCAINE HYDROCHLORIDE 10 MG/ML
INJECTION INFILTRATION; PERINEURAL
Status: COMPLETED
Start: 2018-12-06 | End: 2018-12-06

## 2018-12-06 RX ORDER — NALOXONE HYDROCHLORIDE 0.4 MG/ML
0.4 INJECTION, SOLUTION INTRAMUSCULAR; INTRAVENOUS; SUBCUTANEOUS AS NEEDED
Status: DISCONTINUED | OUTPATIENT
Start: 2018-12-06 | End: 2018-12-06 | Stop reason: HOSPADM

## 2018-12-06 RX ORDER — SODIUM CHLORIDE 0.9 % (FLUSH) 0.9 %
5-10 SYRINGE (ML) INJECTION EVERY 8 HOURS
Status: DISCONTINUED | OUTPATIENT
Start: 2018-12-06 | End: 2018-12-06 | Stop reason: HOSPADM

## 2018-12-06 RX ORDER — MIDAZOLAM HYDROCHLORIDE 1 MG/ML
1-5 INJECTION, SOLUTION INTRAMUSCULAR; INTRAVENOUS
Status: DISCONTINUED | OUTPATIENT
Start: 2018-12-06 | End: 2018-12-06 | Stop reason: HOSPADM

## 2018-12-06 RX ORDER — CEFAZOLIN SODIUM/WATER 2 G/20 ML
SYRINGE (ML) INTRAVENOUS
Status: COMPLETED
Start: 2018-12-06 | End: 2018-12-06

## 2018-12-06 RX ORDER — FENTANYL CITRATE 50 UG/ML
12.5-5 INJECTION, SOLUTION INTRAMUSCULAR; INTRAVENOUS
Status: DISCONTINUED | OUTPATIENT
Start: 2018-12-06 | End: 2018-12-06 | Stop reason: HOSPADM

## 2018-12-06 RX ORDER — SODIUM CHLORIDE 0.9 % (FLUSH) 0.9 %
5-10 SYRINGE (ML) INJECTION AS NEEDED
Status: DISCONTINUED | OUTPATIENT
Start: 2018-12-06 | End: 2018-12-06 | Stop reason: HOSPADM

## 2018-12-06 RX ORDER — BACITRACIN 50000 [IU]/1
INJECTION, POWDER, FOR SOLUTION INTRAMUSCULAR
Status: COMPLETED
Start: 2018-12-06 | End: 2018-12-06

## 2018-12-06 RX ORDER — CEFAZOLIN SODIUM/WATER 2 G/20 ML
2 SYRINGE (ML) INTRAVENOUS ONCE
Status: COMPLETED | OUTPATIENT
Start: 2018-12-06 | End: 2018-12-06

## 2018-12-06 RX ADMIN — FENTANYL CITRATE 50 MCG: 50 INJECTION, SOLUTION INTRAMUSCULAR; INTRAVENOUS at 08:40

## 2018-12-06 RX ADMIN — FENTANYL CITRATE 50 MCG: 50 INJECTION, SOLUTION INTRAMUSCULAR; INTRAVENOUS at 08:35

## 2018-12-06 RX ADMIN — Medication 2 G: at 08:30

## 2018-12-06 RX ADMIN — LIDOCAINE HYDROCHLORIDE 30 ML: 10 INJECTION, SOLUTION INFILTRATION; PERINEURAL at 08:48

## 2018-12-06 RX ADMIN — HEPARIN SODIUM 1000 UNITS: 200 INJECTION, SOLUTION INTRAVENOUS at 08:52

## 2018-12-06 RX ADMIN — KETOROLAC TROMETHAMINE 15 MG: 30 INJECTION, SOLUTION INTRAMUSCULAR at 13:00

## 2018-12-06 RX ADMIN — BACITRACIN 50000 UNITS: 5000 INJECTION, POWDER, FOR SOLUTION INTRAMUSCULAR at 09:05

## 2018-12-06 RX ADMIN — DEXTROSE MONOHYDRATE 25 G: 25 INJECTION, SOLUTION INTRAVENOUS at 08:09

## 2018-12-06 RX ADMIN — MIDAZOLAM HYDROCHLORIDE 1 MG: 1 INJECTION, SOLUTION INTRAMUSCULAR; INTRAVENOUS at 08:54

## 2018-12-06 RX ADMIN — DEXTROSE 50 % IN WATER (D50W) INTRAVENOUS SYRINGE 25 G: at 08:09

## 2018-12-06 RX ADMIN — HEPARIN SODIUM IN SODIUM CHLORIDE 1000 UNITS: 200 INJECTION INTRAVENOUS at 08:52

## 2018-12-06 RX ADMIN — LIDOCAINE HYDROCHLORIDE 30 ML: 10 INJECTION INFILTRATION; PERINEURAL at 08:48

## 2018-12-06 RX ADMIN — BACITRACIN 50000 UNITS: 50000 INJECTION, POWDER, FOR SOLUTION INTRAMUSCULAR at 09:05

## 2018-12-06 RX ADMIN — MIDAZOLAM HYDROCHLORIDE 2 MG: 1 INJECTION, SOLUTION INTRAMUSCULAR; INTRAVENOUS at 08:30

## 2018-12-06 RX ADMIN — MIDAZOLAM HYDROCHLORIDE 2 MG: 1 INJECTION, SOLUTION INTRAMUSCULAR; INTRAVENOUS at 08:35

## 2018-12-06 RX ADMIN — MIDAZOLAM HYDROCHLORIDE 2 MG: 1 INJECTION, SOLUTION INTRAMUSCULAR; INTRAVENOUS at 08:45

## 2018-12-06 NOTE — PROCEDURES
[address]  [phone]    Indications and Pre-Procedure Diagnosis:  Liza Goff is a 65 y.o. female with symptomatic bradycardia is referred for dual chamber pacemaker. Post Procedure Diagnosis:    Symptomatic bradycardia    Pacemaker Implant Procedure and Findings:  Informed consent was obtained and the patient was premedicated with cefazolin. The procedure was performed under local anesthesia. Continuous pulse oximetry and cuff pressure were monitored. During the procedure, the patient received Versed and Fentanyl for sedation per nursing personnel. The left deltopectoral area was prepped and draped in the usual sterile fashion and was liberally infiltrated with 1% lidocaine. An incision was made over the left subpectoral area and a generator pocket was manually dissected. Access was achieved in the left axillary vein under fluoroscopic guidance and using the seldinger technique. Through the left axillary vein, pacing leads were positioned in appropriate regions in the right heart chambers where satisfactory pacing and sensing parameters were measured. Stability of the leads was assessed with deep breathing and there was no diaphragmatic pacing at 10V output. The leads were anchored using the sleeves and a pulse generator pocket fashioned using blunt dissection. The leads were then connected to the pulse generator. The pulse generator pocket was then liberally infiltrated with bacitracin solution, and the device implanted with a single silk fixation suture in the header to prevent migration. The wound was closed in layers using intermittent 2-0 Vicryl and Zipline suture sleeve. A bio-occlusive dressing were applied to the skin. Fluoroscopy and total procedure times were 1.5 and 30 minutes respectively. Estimated blood loss <10 ml. Sharp count: correct. Specimen(s) collected: none. The following procedure related complication occurred: none.  The following problems were encountered: none. Findings: successful pacemaker placement. Device Data Measurements:  Lead Sensing (mV) Threshold (V)Pulse Width (ms) Impedance (Ohms)  RA 3.3  1.2  0.5   819  RV 3.9  0.7  0.5   635      Final Programmed Parameters  Bradycardia pacing rate  60 bpm  Pacing Mode    AAIR-DDDR  Pacing Output    3.5 V@ 0.5 ms    Supplies Summary available in the chart  [employer]    Thank you for allowing me to participate in this patients care.     Keegan Smith MD, [facility]

## 2018-12-06 NOTE — DISCHARGE INSTRUCTIONS
Zaira Queen, [address] [facility]  [phone]        NEW PACEMAKER IMPLANT DISCHARGE INSTRUCTIONS    Patient ID:  Linda Oscar  [id number]  61 y.o.  1960    Admit Date: 12/6/2018    Discharge Date: 12/6/2018     Admitting Physician: Chandra Vela MD     Discharge Physician: Chandra Vela MD    Admission Diagnoses:   daron    Discharge Diagnoses: Active Problems:    * No active hospital problems. *      Discharge Condition: Good    Cardiology Procedures this Admission:  Pacemaker insertion. Disposition: home    Reference discharge instructions provided by nursing for diet and activity. Follow-up with device clinic in two weeks. Call [phone] to make an appointment. Signed:  ALEXX Salmon  12/6/2018  11:39 AM      DISCHARGE INSTRUCTIONS FOR PATIENTS WITH PACEMAKERS    1. Remember to call for an appointment for 2 weeks [phone] to check healing and implant programming. 2. Medic Alert Bracelets are available from your pharmacist to wear at all times if you choose to wear one. 3. Carry your ID card for pacemaker with you at all times. This card will be given to you in the hospital or mailed to you. 4. The pacemaker will bulge slightly under your skin. The bulge will decrease in size over the next few weeks. Please notify the doctor's office if you notice any of the following around your site:   A.  A bruise that does not go away. B.  Soreness or yellow, green, or brown drainage from the site. C. Any swelling from the site. D. If you have a fever of 100 degrees or higher that lasts for a few days. INCISION CARE       1.  Leave dressing over your site until it starts to fall off, usually in a few weeks. 2.  You may shower after 3 days as long as your incision isnt submerged or directly sprayed upon until well healed. 3.  For comfort, wear loose fitting clothing.   4.  Report any signs of infection, fever, pain, swelling, redness, oozing, or heat at site especially if these symptoms increase after the first 3 to 4 days. ACTIVITY PRECAUTIONS     1. Avoid rough contact with the implant site. 2. No driving for 14 days. 3. Avoid lifting your arm over your head, carrying anything on the affected side, or lifting over 10 pounds for 30 days. For the first 2 days only bend your arm at the elbow. 4. Any extreme activity such as golf, weight lifting or exercise biking should be restricted for 60 days. 5. Do not carry objects by holding them against your implant site. 6.  No shooting rifles or any type of gun with the affected shoulder permanently. SPECIAL PRECAUTIONS     1. You should avoid all strong magnetic fields, such as arc welding, large transformers, large motors. 2.  You may or may not (depending on your device) have an MRI which uses a strong magnet to take pictures. 3.  Treatments or surgery that requires diathermy or electrocautery should be discussed with your doctor before scheduled. 4. Avoid radio frequency transmitters, including radar. 5. Advise dentist or other medical personnel you see that you have a pacemaker. 6.  Cell phones and microwave oven use is okay. 7.  If you plan to move or take a trip to a new area, the doctor's office will give you a name of a doctor to contact for any problems. ANTIBIOTIC THERAPY    During the first 8 weeks after your pacemaker insertion, you may need antibiotics before any dental work or certain tests or operations. Let the dentist or doctor who is caring for you know that you have had an implanted device.

## 2018-12-06 NOTE — PROGRESS NOTES
Cardiac Cath Lab Recovery Arrival Note: 
 
 
Linda Oscar arrived to Cardiac Cath Lab, Recovery Area. Staff introduced to patient. Patient identifiers verified with NAME and DATE OF BIRTH. Procedure verified with patient. Consent forms reviewed and signed by patient or authorized representative and verified. Allergies verified. Patient and family oriented to department. Patient and family informed of procedure and plan of care. Questions answered with review. Patient prepped for procedure, per orders from physician, prior to arrival. 
 
Patient on cardiac monitor, non-invasive blood pressure, SPO2 monitor. On room air. Patient is A&Ox 3. Patient reports no c/o. Patient in stretcher, in low position, with side rails up, call bell within reach, patient instructed to call if assistance as needed. Patient prep in: [address], [place] [address]. Patient family has pager # none Family in: CCL waiting area.   
Prep by: Kyle Dickson RN and Salina Aldrich RN

## 2018-12-06 NOTE — INTERVAL H&P NOTE
H&P Update: 
Kimberly Obrien was seen and examined. History and physical has been reviewed. The patient has been examined.  There have been no significant clinical changes since the completion of the originally dated History and Physical. 
 
Signed By: Vanessa Timmons MD   
 December 6, 2018 9:09 AM

## 2018-12-06 NOTE — PROGRESS NOTES
TRANSFER - IN REPORT: 
 
Verbal report received from CAPTAIN PADMINI PERRY DEE [facility] on [employer]  being received from EP Lab for routine progression of care. Report consisted of patients Situation, Background, Assessment and Recommendations(SBAR). Information from the following report(s) SBAR, Procedure Summary, Med Rec Status and Cardiac Rhythm Paced was reviewed with the receiving clinician. Opportunity for questions and clarification was provided. Assessment completed upon patients arrival to [address] [place] [address] and care assumed. Cardiac Cath Lab Recovery Arrival Note: 
 
[employer] arrived to [facility] recovery area. Patient procedure= pacemaker insertion. Patient on cardiac monitor, non-invasive blood pressure, SPO2 monitor. On O2 @ 3 lpm via nc. IV  of ns on pump at 25 ml/hr. Patient status doing well without problems. Patient is A&Ox 3. Patient reports no complaints. PROCEDURE SITE CHECK: 
 
Procedure site:without any bleeding and ice pack on site, no c/o pain/discomfort reported at procedure site. No change in patient status. Continue to monitor patient and status.

## 2018-12-06 NOTE — PROGRESS NOTES
Patient up and ambulated with assist tolerating well. Patient tolerating po intake. Patient c/o left chest incision pain and soreness and medicated with toradol 15mg IV with some relief. Reviewed for her to use tylenol at home- states gives her upset stomach. Reviewed with patient to take with food.

## 2018-12-20 RX ORDER — BUTALBITAL, ACETAMINOPHEN AND CAFFEINE 300; 40; 50 MG/1; MG/1; MG/1
CAPSULE ORAL
Qty: 30 CAP | Refills: 1 | Status: SHIPPED | OUTPATIENT
Start: 2018-12-20 | End: 2019-02-22 | Stop reason: SDUPTHER

## 2018-12-21 ENCOUNTER — OFFICE VISIT (OUTPATIENT)
Dept: INTERNAL MEDICINE CLINIC | Age: 58
End: 2018-12-21

## 2018-12-21 VITALS
TEMPERATURE: 98.1 F | RESPIRATION RATE: 18 BRPM | HEIGHT: 57 IN | DIASTOLIC BLOOD PRESSURE: 78 MMHG | BODY MASS INDEX: 37.67 KG/M2 | OXYGEN SATURATION: 94 % | HEART RATE: 61 BPM | SYSTOLIC BLOOD PRESSURE: 114 MMHG | WEIGHT: 174.6 LBS

## 2018-12-21 DIAGNOSIS — F31.30 BIPOLAR AFFECTIVE DISORDER, CURRENT EPISODE DEPRESSED, CURRENT EPISODE SEVERITY UNSPECIFIED (HCC): ICD-10-CM

## 2018-12-21 DIAGNOSIS — R00.1 BRADYCARDIA: Primary | ICD-10-CM

## 2018-12-21 DIAGNOSIS — R51.9 HEADACHE, UNSPECIFIED HEADACHE TYPE: ICD-10-CM

## 2018-12-21 DIAGNOSIS — Z95.0 STATUS POST PLACEMENT OF CARDIAC PACEMAKER: ICD-10-CM

## 2018-12-21 RX ORDER — HYDROCODONE BITARTRATE AND ACETAMINOPHEN 5; 325 MG/1; MG/1
1 TABLET ORAL
Qty: 10 TAB | Refills: 0 | Status: SHIPPED | OUTPATIENT
Start: 2018-12-21 | End: 2019-02-05 | Stop reason: ALTCHOICE

## 2018-12-21 RX ORDER — OXYCODONE AND ACETAMINOPHEN 5; 325 MG/1; MG/1
TABLET ORAL
COMMUNITY
Start: 2018-09-24 | End: 2018-12-21 | Stop reason: ALTCHOICE

## 2018-12-21 NOTE — PROGRESS NOTES
Chief Complaint   Patient presents with    Pacemaker Check    Dressing Change     1. Have you been to the ER, urgent care clinic since your last visit? Hospitalized since your last visit? Yes When: 2-3 weeks ago Where: ED [facility] Reason for visit: Pacemaker [employer]    2. Have you seen or consulted any other health care providers outside of the [address] [place] [address] since your last visit? Include any pap smears or colon screening.  No     Health Maintenance Due   Topic Date Due    Shingrix Vaccine Age 50> (1 of 2) 12/29/2011    PAP AKA CERVICAL CYTOLOGY  12/03/2018

## 2018-12-21 NOTE — PROGRESS NOTES
Subjective:     Kimmie East is a 65 y.o. female who presents for follow up of symptomatic bradycardia, had pacemaker 2 weeks ago. New concerns: soreness at site. Lots of pain c/o frequent user of narcotics. Finished percs given to her. No further 'dizziness', mood OK. HAs doing OK. Current Outpatient Medications   Medication Sig Dispense Refill    butalbital-acetaminophen-caff (FIORICET) -40 mg per capsule TAKE 1 CAPSULE BY MOUTH DAILY AS NEEDED FOR PAIN. 30 Cap 1    meclizine (TRAVEL SICKNESS, MECLIZINE,) 25 mg chewable tablet TAKE 1 TABLET BY MOUTH THREE TIMES DAILY AS NEEDED FOR UP TO 10 DAYS FOR DIZZINESS 60 Tab 5    topiramate (TOPAMAX) 100 mg tablet TAKE 1 TABLET BY MOUTH DAILY. 30 Tab 6    diclofenac (VOLTAREN) 1 % gel Apply  to affected area four (4) times daily. 1 Each 5    aspirin 81 mg chewable tablet Take 1 Tab by mouth daily. 90 Tab 3    furosemide (LASIX) 20 mg tablet TAKE 1 TABLET BY MOUTH DAILY AS NEEDED 90 Tab 3    omeprazole (PRILOSEC) 40 mg capsule TAKE 1 CAPSULE BY MOUTH DAILY. 90 Cap 3    glucose blood VI test strips (ONETOUCH VERIO) strip Test 2 -3 times daily Dx Code: E16.2 100 Strip 11    lancets (ONE TOUCH DELICA) 33 gauge misc Test 2-3 times daily Dx Code: E16.2 100 Lancet 11    fluticasone (FLONASE) 50 mcg/actuation nasal spray 2 Sprays by Both Nostrils route daily. 1 Bottle 5    glucose 4 gram chewable tablet Take 4 Tabs by mouth as needed. 30 Tab 5    cholecalciferol (VITAMIN D3) 1,000 unit tablet Take 1 Tab by mouth daily. Indications: Vitamin D Deficiency 180 Tab 1    cyanocobalamin (VITAMIN B-12) 1,000 mcg tablet TAKE 1 TABLET BY MOUTH DAILY FOR VITAMIN B12 DEFICIENCY 90 Tab 1    polyethylene glycol (MIRALAX) 17 gram packet Take 1 Packet by mouth daily. 50 Packet 5    traZODone (DESYREL) 50 mg tablet Take 50 mg by mouth nightly.  potassium chloride (KLOR-CON) 10 mEq tablet Take 1 Tab by mouth daily.  If takes lasix 90 Tab 3    Nebulizer & Compressor machine Use as directed 1 Each 0    albuterol (PROVENTIL VENTOLIN) 2.5 mg /3 mL (0.083 %) nebulizer solution 3 mL by Nebulization route every four (4) hours as needed for Wheezing. 100 Each 1    sertraline (ZOLOFT) 50 mg tablet Take 50 mg by mouth daily. Allergies   Allergen Reactions    Benadryl [Diphenhydramine Hcl] Nausea and Vomiting    Ibuprofen Nausea and Vomiting    Sulfamethoxazole-Trimethoprim Nausea Only    Tramadol Anxiety       Diet and Lifestyle: follows a diabetic diet regularly    Cardiovascular ROS: taking medications as instructed, no medication side effects noted, no TIA's, no chest pain on exertion, no dyspnea on exertion, no swelling of ankles. Review of Systems, additional:  Pertinent items are noted in HPI.       Patient Active Problem List    Diagnosis Date Noted    SSS (sick sinus syndrome) ([facility] 12/06/2018    Obesity, morbid ([facility] 02/06/2018    Gastroesophageal reflux disease without esophagitis 08/24/2016    Bipolar disorder with current episode depressed ([facility] 02/01/2016    Migraine aura without headache 10/24/2014    Bilateral leg edema 10/12/2014    History of gastric bypass 10/12/2014       Social History     Tobacco Use    Smoking status: Never Smoker    Smokeless tobacco: Never Used   Substance Use Topics    Alcohol use: No        Lab Results   Component Value Date/Time    WBC 4.1 12/04/2018 12:36 PM    HGB 12.9 12/04/2018 12:36 PM    Hemoglobin (POC) 13.2 03/28/2016 03:29 PM    HCT 40.9 12/04/2018 12:36 PM    PLATELET 684 66/46/0604 12:36 PM    MCV 91 12/04/2018 12:36 PM     Lab Results   Component Value Date/Time    Hemoglobin A1c <3.5 (L) 10/13/2014 05:00 AM    Glucose 69 12/04/2018 12:36 PM    Glucose (POC) 162 (H) 12/06/2018 08:32 AM    LDL, calculated 149 (H) 05/09/2017 03:25 PM    Creatinine 0.67 12/04/2018 12:36 PM      Lab Results   Component Value Date/Time    Cholesterol, total 224 (H) 05/09/2017 03:25 PM    HDL Cholesterol 64 05/09/2017 03:25 PM    LDL, calculated 149 (H) 05/09/2017 03:25 PM    Triglyceride 54 05/09/2017 03:25 PM    CHOL/HDL Ratio 2.4 10/14/2014 03:00 AM     Lab Results   Component Value Date/Time    ALT (SGPT) 11 12/04/2018 12:36 PM    AST (SGOT) 18 12/04/2018 12:36 PM    Alk. phosphatase 88 12/04/2018 12:36 PM    Bilirubin, total <0.2 12/04/2018 12:36 PM    Albumin 3.8 12/04/2018 12:36 PM    Protein, total 6.1 12/04/2018 12:36 PM    INR 1.1 12/04/2018 12:36 PM    Prothrombin time 11.2 12/04/2018 12:36 PM    PLATELET 164 45/41/0169 12:36 PM     No results found for: PSA, PSA2, Donal Hart, [id number], [id number], PSALT  Lab Results   Component Value Date/Time    Glucose 69 12/04/2018 12:36 PM    Glucose (POC) 162 (H) 12/06/2018 08:32 AM             Objective:     Physical exam significant for the following:     Pacemaker in place, incsion site tender min redness or DC    Visit Vitals  /78 (BP 1 Location: Right arm, BP Patient Position: Sitting)   Pulse 61   Temp 98.1 °F (36.7 °C) (Oral)   Resp 18   Ht 4' 9\" (1.448 m)   Wt 174 lb 9.6 oz (79.2 kg)   SpO2 94%   BMI 37.78 kg/m²     Appearance: alert, well appearing, and in no distress. General exam: CVS exam BP noted to be well controlled today in office, S1, S2 normal, no gallop, no murmur, chest clear, no JVD, no HSM, no edema. .   Assessment/Plan:     S/p pacemaker for daron cardia      ICD-10-CM ICD-9-CM    1. Bradycardia R00.1 427.89    2. Status post placement of cardiac pacemaker Z95.0 V45.01 HYDROcodone-acetaminophen (NORCO) 5-325 mg per tablet   3. Bipolar affective disorder, current episode depressed, current episode severity unspecified ([facility] F31.30 296.50    4. Headache, unspecified headache type R51 784.0          Orders Placed This Encounter    DISCONTD: oxyCODONE-acetaminophen (PERCOCET) 5-325 mg per tablet    HYDROcodone-acetaminophen (NORCO) 5-325 mg per tablet     Sig: Take 1 Tab by mouth every six (6) hours as needed for Pain.  Max Daily Amount: 4 Tabs. Dispense:  10 Tab     Refill:  0       We discussed this pain and the usage of controlled substances for s/p procedure pain relief. In general these medications are indicated for the acute symptom relief and not for chronic usage, allthough they are frequently used for chronic management  After a certain period of time they should be stopped to avoid dependence and/or addiction. I warned her about the dangers of addiction and other side affects including respiratory depression and death. Discussed how this is a controlled substance and that the prescribing of it is should be monitored very carefully. Drug usage is also monitored by our practise and the [facility], since there has been a lot of abuse and diversion of controlled substances. In general we do not refill this medication over the phone. PLease ask for enough pills to get you to your next appointment and make sure you keep your appointments. Warned not to take other medications like alcohol, other benzodiazapines marijuana or other narcotics when on this medication. Keep f/u with cards to do pacemaker check. No fioricet while on HC. Follow-up Disposition:  Return in about 2 months (around 2/21/2019).

## 2018-12-26 RX ORDER — POTASSIUM CHLORIDE 750 MG/1
TABLET, FILM COATED, EXTENDED RELEASE ORAL
Qty: 90 TAB | Refills: 3 | Status: SHIPPED | OUTPATIENT
Start: 2018-12-26 | End: 2019-07-29 | Stop reason: SDUPTHER

## 2019-01-01 ENCOUNTER — HOSPITAL ENCOUNTER (EMERGENCY)
Age: 59
Discharge: HOME | End: 2019-01-01
Attending: EMERGENCY MEDICINE
Payer: MEDICARE

## 2019-01-01 ENCOUNTER — APPOINTMENT (OUTPATIENT)
Dept: GENERAL RADIOLOGY | Age: 59
End: 2019-01-01
Attending: EMERGENCY MEDICINE
Payer: MEDICARE

## 2019-01-01 VITALS
TEMPERATURE: 98.2 F | HEART RATE: 60 BPM | OXYGEN SATURATION: 93 % | BODY MASS INDEX: 38.15 KG/M2 | SYSTOLIC BLOOD PRESSURE: 110 MMHG | RESPIRATION RATE: 20 BRPM | WEIGHT: 176.81 LBS | DIASTOLIC BLOOD PRESSURE: 67 MMHG | HEIGHT: 57 IN

## 2019-01-01 DIAGNOSIS — R07.89 CHEST WALL PAIN: Primary | ICD-10-CM

## 2019-01-01 DIAGNOSIS — G89.18 POST-OPERATIVE PAIN: ICD-10-CM

## 2019-01-01 LAB
ALBUMIN SERPL-MCNC: 3 G/DL (ref 3.5–5)
ALBUMIN/GLOB SERPL: 1 {RATIO} (ref 1.1–2.2)
ALP SERPL-CCNC: 97 U/L (ref 45–117)
ALT SERPL-CCNC: 16 U/L (ref 12–78)
ANION GAP SERPL CALC-SCNC: 9 MMOL/L (ref 5–15)
AST SERPL-CCNC: 15 U/L (ref 15–37)
BASOPHILS # BLD: 0 K/UL (ref 0–0.1)
BASOPHILS NFR BLD: 1 % (ref 0–1)
BILIRUB SERPL-MCNC: 0.3 MG/DL (ref 0.2–1)
BUN SERPL-MCNC: 27 MG/DL (ref 6–20)
BUN/CREAT SERPL: 42 (ref 12–20)
CALCIUM SERPL-MCNC: 8.2 MG/DL (ref 8.5–10.1)
CHLORIDE SERPL-SCNC: 113 MMOL/L (ref 97–108)
CK MB CFR SERPL CALC: NORMAL % (ref 0–2.5)
CK MB SERPL-MCNC: <1 NG/ML (ref 5–25)
CK SERPL-CCNC: 57 U/L (ref 26–192)
CO2 SERPL-SCNC: 19 MMOL/L (ref 21–32)
CREAT SERPL-MCNC: 0.65 MG/DL (ref 0.55–1.02)
DIFFERENTIAL METHOD BLD: ABNORMAL
EOSINOPHIL # BLD: 0.1 K/UL (ref 0–0.4)
EOSINOPHIL NFR BLD: 2 % (ref 0–7)
ERYTHROCYTE [DISTWIDTH] IN BLOOD BY AUTOMATED COUNT: 14.9 % (ref 11.5–14.5)
GLOBULIN SER CALC-MCNC: 3.1 G/DL (ref 2–4)
GLUCOSE SERPL-MCNC: 74 MG/DL (ref 65–100)
HCT VFR BLD AUTO: 40 % (ref 35–47)
HGB BLD-MCNC: 12.5 G/DL (ref 11.5–16)
IMM GRANULOCYTES # BLD: 0 K/UL (ref 0–0.04)
IMM GRANULOCYTES NFR BLD AUTO: 0 % (ref 0–0.5)
LACTATE BLD-SCNC: 0.41 MMOL/L (ref 0.4–2)
LYMPHOCYTES # BLD: 1.5 K/UL (ref 0.8–3.5)
LYMPHOCYTES NFR BLD: 29 % (ref 12–49)
MCH RBC QN AUTO: 29.4 PG (ref 26–34)
MCHC RBC AUTO-ENTMCNC: 31.3 G/DL (ref 30–36.5)
MCV RBC AUTO: 94.1 FL (ref 80–99)
MONOCYTES # BLD: 0.6 K/UL (ref 0–1)
MONOCYTES NFR BLD: 12 % (ref 5–13)
NEUTS SEG # BLD: 2.8 K/UL (ref 1.8–8)
NEUTS SEG NFR BLD: 56 % (ref 32–75)
NRBC # BLD: 0 K/UL (ref 0–0.01)
NRBC BLD-RTO: 0 PER 100 WBC
PLATELET # BLD AUTO: 210 K/UL (ref 150–400)
PMV BLD AUTO: 9.9 FL (ref 8.9–12.9)
POTASSIUM SERPL-SCNC: 4 MMOL/L (ref 3.5–5.1)
PROT SERPL-MCNC: 6.1 G/DL (ref 6.4–8.2)
RBC # BLD AUTO: 4.25 M/UL (ref 3.8–5.2)
SODIUM SERPL-SCNC: 141 MMOL/L (ref 136–145)
TROPONIN I SERPL-MCNC: <0.05 NG/ML
WBC # BLD AUTO: 5 K/UL (ref 3.6–11)

## 2019-01-01 PROCEDURE — 80053 COMPREHEN METABOLIC PANEL: CPT

## 2019-01-01 PROCEDURE — 74011250636 HC RX REV CODE- 250/636: Performed by: EMERGENCY MEDICINE

## 2019-01-01 PROCEDURE — 84484 ASSAY OF TROPONIN QUANT: CPT

## 2019-01-01 PROCEDURE — 87040 BLOOD CULTURE FOR BACTERIA: CPT

## 2019-01-01 PROCEDURE — 96375 TX/PRO/DX INJ NEW DRUG ADDON: CPT

## 2019-01-01 PROCEDURE — 99285 EMERGENCY DEPT VISIT HI MDM: CPT

## 2019-01-01 PROCEDURE — 83605 ASSAY OF LACTIC ACID: CPT

## 2019-01-01 PROCEDURE — 93005 ELECTROCARDIOGRAM TRACING: CPT

## 2019-01-01 PROCEDURE — 85025 COMPLETE CBC W/AUTO DIFF WBC: CPT

## 2019-01-01 PROCEDURE — 36415 COLL VENOUS BLD VENIPUNCTURE: CPT

## 2019-01-01 PROCEDURE — 96374 THER/PROPH/DIAG INJ IV PUSH: CPT

## 2019-01-01 PROCEDURE — 74011250637 HC RX REV CODE- 250/637: Performed by: EMERGENCY MEDICINE

## 2019-01-01 PROCEDURE — 82550 ASSAY OF CK (CPK): CPT

## 2019-01-01 PROCEDURE — 71045 X-RAY EXAM CHEST 1 VIEW: CPT

## 2019-01-01 RX ORDER — ONDANSETRON 2 MG/ML
4 INJECTION INTRAMUSCULAR; INTRAVENOUS
Status: COMPLETED | OUTPATIENT
Start: 2019-01-01 | End: 2019-01-01

## 2019-01-01 RX ORDER — FENTANYL CITRATE 50 UG/ML
100 INJECTION, SOLUTION INTRAMUSCULAR; INTRAVENOUS
Status: COMPLETED | OUTPATIENT
Start: 2019-01-01 | End: 2019-01-01

## 2019-01-01 RX ORDER — HYDROCODONE BITARTRATE AND ACETAMINOPHEN 7.5; 325 MG/1; MG/1
1 TABLET ORAL
Status: COMPLETED | OUTPATIENT
Start: 2019-01-01 | End: 2019-01-01

## 2019-01-01 RX ADMIN — HYDROCODONE BITARTRATE AND ACETAMINOPHEN 1 TABLET: 7.5; 325 TABLET ORAL at 21:50

## 2019-01-01 RX ADMIN — FENTANYL CITRATE 100 MCG: 50 INJECTION, SOLUTION INTRAMUSCULAR; INTRAVENOUS at 20:44

## 2019-01-01 RX ADMIN — ONDANSETRON 4 MG: 2 INJECTION INTRAMUSCULAR; INTRAVENOUS at 20:42

## 2019-01-01 NOTE — ED NOTES
Pt presents today reporting chest pain onset prior to [address] [place]. Reports being s/p pacemaker placement approx 3 weeks ago.

## 2019-01-01 NOTE — ED PROVIDER NOTES
EMERGENCY DEPARTMENT HISTORY AND PHYSICAL EXAM 
 
 
Date: 1/1/2019 Patient Name: Raúl Ortega History of Presenting Illness Chief Complaint Patient presents with  Chest Pain Pt reports onset of left sided chest pain 2 days ago. Pain radiates down left arm History Provided By: Patient HPI: Raúl Ortega, 63 y.o. female with PMHx significant for hypoglycemia, hypotension, pacemaker, presents to the ED with cc of left sided CP x 2-3 days. Pt states pain radiates across to left axilla and down left arm. She reports associated SOB. Pt reports diaphoresis last night. She describes the pain as feeling \"full\" in her chest. Pt notes she had a pacemaker placed 3 weeks ago by Dr. Jennifer Alarcon. She denies any drainage from the site. Pt denies any trauma or injury. She denies associated nausea, vomiting, or cough. There are no other complaints, changes, or physical findings at this time. PCP: Jesse Vaughn MD 
 
No current facility-administered medications on file prior to encounter. Current Outpatient Medications on File Prior to Encounter Medication Sig Dispense Refill  potassium chloride SR (KLOR-CON 10) 10 mEq tablet TAKE 1 TABLET BY MOUTH DAILY, IF TAKES LASIX (FUROSEMIDE) 90 Tab 3  
 HYDROcodone-acetaminophen (NORCO) 5-325 mg per tablet Take 1 Tab by mouth every six (6) hours as needed for Pain. Max Daily Amount: 4 Tabs. 10 Tab 0  
 butalbital-acetaminophen-caff (FIORICET) -40 mg per capsule TAKE 1 CAPSULE BY MOUTH DAILY AS NEEDED FOR PAIN. 30 Cap 1  
 meclizine (TRAVEL SICKNESS, MECLIZINE,) 25 mg chewable tablet TAKE 1 TABLET BY MOUTH THREE TIMES DAILY AS NEEDED FOR UP TO 10 DAYS FOR DIZZINESS 60 Tab 5  
 topiramate (TOPAMAX) 100 mg tablet TAKE 1 TABLET BY MOUTH DAILY. 30 Tab 6  
 diclofenac (VOLTAREN) 1 % gel Apply  to affected area four (4) times daily. 1 Each 5  
 aspirin 81 mg chewable tablet Take 1 Tab by mouth daily.  90 Tab 3  
  furosemide (LASIX) 20 mg tablet TAKE 1 TABLET BY MOUTH DAILY AS NEEDED 90 Tab 3  
 omeprazole (PRILOSEC) 40 mg capsule TAKE 1 CAPSULE BY MOUTH DAILY. 90 Cap 3  
 glucose blood VI test strips (ONETOUCH VERIO) strip Test 2 -3 times daily Dx Code: E16.2 100 Strip 11  
 lancets (ONE TOUCH DELICA) 33 gauge misc Test 2-3 times daily Dx Code: E16.2 100 Lancet 11  
 fluticasone (FLONASE) 50 mcg/actuation nasal spray 2 Sprays by Both Nostrils route daily. 1 Bottle 5  
 glucose 4 gram chewable tablet Take 4 Tabs by mouth as needed. 30 Tab 5  cholecalciferol (VITAMIN D3) 1,000 unit tablet Take 1 Tab by mouth daily. Indications: Vitamin D Deficiency 180 Tab 1  cyanocobalamin (VITAMIN B-12) 1,000 mcg tablet TAKE 1 TABLET BY MOUTH DAILY FOR VITAMIN B12 DEFICIENCY 90 Tab 1  polyethylene glycol (MIRALAX) 17 gram packet Take 1 Packet by mouth daily. 50 Packet 5  
 traZODone (DESYREL) 50 mg tablet Take 50 mg by mouth nightly.  Nebulizer & Compressor machine Use as directed 1 Each 0  
 albuterol (PROVENTIL VENTOLIN) 2.5 mg /3 mL (0.083 %) nebulizer solution 3 mL by Nebulization route every four (4) hours as needed for Wheezing. 100 Each 1  
 sertraline (ZOLOFT) 50 mg tablet Take 50 mg by mouth daily. Past History Past Medical History: 
Past Medical History:  
Diagnosis Date  Hypoglycemia   Hypotension   Syncope and collapse 7/24/15 [address] Past Surgical History: 
Past Surgical History:  
Procedure Laterality Date  HX ABDOMINAL LAPAROSCOPY    HX CARPAL TUNNEL RELEASE Bilateral more than 10 years ago  HX  SECTION  [id number]  HX COLONOSCOPY  2016 [address]  
 x2 [address] [place]  HX PACEMAKER    
 HX TONSIL AND ADENOIDECTOMY  more than 10 years ago Family History: 
Family History Problem Relation Age of Onset  Stroke Mother  Cancer Father  Diabetes Brother  Cancer Maternal Aunt  Cancer Maternal Grandmother  Cancer Brother  Kidney Disease Brother Social History: 
Social History Tobacco Use  Smoking status: Never Smoker  Smokeless tobacco: Never Used Substance Use Topics  Alcohol use: No  
 Drug use: No  
 
 
Allergies: Allergies Allergen Reactions  Benadryl [Diphenhydramine Hcl] Nausea and Vomiting  Ibuprofen Nausea and Vomiting  Sulfamethoxazole-Trimethoprim Nausea Only  Tramadol Anxiety Review of Systems Review of Systems Constitutional: Positive for diaphoresis. Negative for appetite change, chills, fatigue and fever. HENT: Negative. Negative for congestion, rhinorrhea, sinus pressure and sore throat. Eyes: Negative. Respiratory: Positive for shortness of breath. Negative for cough, choking, chest tightness and wheezing. Cardiovascular: Positive for chest pain (left sided). Negative for palpitations and leg swelling. Gastrointestinal: Negative for abdominal pain, constipation, diarrhea, nausea and vomiting. Endocrine: Negative. Genitourinary: Negative. Negative for difficulty urinating, dysuria, flank pain and urgency. Musculoskeletal: Positive for myalgias (left axilla, left arm, secondary to CP). Skin: Negative. Negative for drainage from pacemaker site. Neurological: Negative. Negative for dizziness, speech difficulty, weakness, light-headedness, numbness and headaches. Psychiatric/Behavioral: Negative. All other systems reviewed and are negative. Physical Exam  
Physical Exam  
Constitutional: She is oriented to person, place, and time. She appears well-developed and well-nourished. No distress. HENT:  
Head: Normocephalic and atraumatic. Mouth/Throat: Oropharynx is clear and moist. No oropharyngeal exudate. Eyes: Conjunctivae and EOM are normal. Pupils are equal, round, and reactive to light. Neck: Normal range of motion. Neck supple. No JVD present.  No tracheal deviation present. Cardiovascular: Normal rate, regular rhythm, normal heart sounds and intact distal pulses. No murmur heard. Pulmonary/Chest: Effort normal and breath sounds normal. No stridor. No respiratory distress. She has no wheezes. She has no rales. She exhibits tenderness. Pacemaker site left upper chest- incision c/d/i, no significant drainage or erythema Abdominal: Soft. She exhibits no distension. There is no tenderness. There is no rebound and no guarding. Obese Musculoskeletal: Normal range of motion. She exhibits no edema or tenderness. Neurological: She is alert and oriented to person, place, and time. No cranial nerve deficit. No gross motor or sensory deficits Skin: Skin is warm and dry. She is not diaphoretic. Psychiatric: She has a normal mood and affect. Her behavior is normal.  
Nursing note and vitals reviewed. Diagnostic Study Results Labs - Recent Results (from the past 12 hour(s)) EKG, 12 LEAD, INITIAL Collection Time: 01/01/19  5:16 PM  
Result Value Ref Range Ventricular Rate 60 BPM  
 Atrial Rate 60 BPM  
 P-R Interval 202 ms QRS Duration 94 ms Q-T Interval 406 ms QTC Calculation (Bezet) 406 ms Calculated P Axis 32 degrees Calculated R Axis 18 degrees Calculated T Axis 20 degrees Diagnosis Atrial-paced rhythm When compared with ECG of 31-JAN-2016 16:07, Electronic atrial pacemaker has replaced Sinus rhythm CBC WITH AUTOMATED DIFF Collection Time: 01/01/19  7:28 PM  
Result Value Ref Range WBC 5.0 3.6 - 11.0 K/uL  
 RBC 4.25 3.80 - 5.20 M/uL  
 HGB 12.5 11.5 - 16.0 g/dL HCT 40.0 35.0 - 47.0 % MCV 94.1 80.0 - 99.0 FL  
 MCH 29.4 26.0 - 34.0 PG  
 MCHC 31.3 30.0 - 36.5 g/dL  
 RDW 14.9 (H) 11.5 - 14.5 % PLATELET 181 757 - 768 K/uL MPV 9.9 8.9 - 12.9 FL  
 NRBC 0.0 0  WBC ABSOLUTE NRBC 0.00 0.00 - 0.01 K/uL NEUTROPHILS 56 32 - 75 % LYMPHOCYTES 29 12 - 49 % MONOCYTES 12 5 - 13 % EOSINOPHILS 2 0 - 7 % BASOPHILS 1 0 - 1 % IMMATURE GRANULOCYTES 0 0.0 - 0.5 % ABS. NEUTROPHILS 2.8 1.8 - 8.0 K/UL  
 ABS. LYMPHOCYTES 1.5 0.8 - 3.5 K/UL  
 ABS. MONOCYTES 0.6 0.0 - 1.0 K/UL  
 ABS. EOSINOPHILS 0.1 0.0 - 0.4 K/UL  
 ABS. BASOPHILS 0.0 0.0 - 0.1 K/UL  
 ABS. IMM. GRANS. 0.0 0.00 - 0.04 K/UL  
 DF AUTOMATED METABOLIC PANEL, COMPREHENSIVE Collection Time: 01/01/19  7:28 PM  
Result Value Ref Range Sodium 141 136 - 145 mmol/L Potassium 4.0 3.5 - 5.1 mmol/L Chloride 113 (H) 97 - 108 mmol/L  
 CO2 19 (L) 21 - 32 mmol/L Anion gap 9 5 - 15 mmol/L Glucose 74 65 - 100 mg/dL BUN 27 (H) 6 - 20 MG/DL Creatinine 0.65 0.55 - 1.02 MG/DL  
 BUN/Creatinine ratio 42 (H) 12 - 20 GFR est AA >60 >60 ml/min/1.73m2 GFR est non-AA >60 >60 ml/min/1.73m2 Calcium 8.2 (L) 8.5 - 10.1 MG/DL Bilirubin, total 0.3 0.2 - 1.0 MG/DL  
 ALT (SGPT) 16 12 - 78 U/L  
 AST (SGOT) 15 15 - 37 U/L Alk. phosphatase 97 45 - 117 U/L Protein, total 6.1 (L) 6.4 - 8.2 g/dL Albumin 3.0 (L) 3.5 - 5.0 g/dL Globulin 3.1 2.0 - 4.0 g/dL A-G Ratio 1.0 (L) 1.1 - 2.2 CK W/ CKMB & INDEX Collection Time: 01/01/19  7:28 PM  
Result Value Ref Range CK 57 26 - 192 U/L  
 CK - MB <1.0 <3.6 NG/ML  
 CK-MB Index Cannot be calculated 0 - 2.5    
TROPONIN I Collection Time: 01/01/19  7:28 PM  
Result Value Ref Range Troponin-I, Qt. <0.05 <0.05 ng/mL POC LACTIC ACID Collection Time: 01/01/19  7:37 PM  
Result Value Ref Range Lactic Acid (POC) 0.41 0.40 - 2.00 mmol/L Radiologic Studies -  
XR CHEST PORT Final Result IMPRESSION: No acute cardiopulmonary disease. CT Results  (Last 48 hours) None CXR Results  (Last 48 hours) 01/01/19 1831  XR CHEST PORT Final result Impression:  IMPRESSION: No acute cardiopulmonary disease. Narrative:  INDICATION: Chest pain. Radiates down left arm. Portable AP upright view of the chest.  
   
Direct comparison made to prior chest x-ray dated December 6, 2018. Cardiomediastinal silhouette is stable. Intact pacer leads overlie the right  
atrium and right ventricle. Lungs are clear bilaterally. Pleural spaces are  
normal. Osseous structures are intact. Medical Decision Making I am the first provider for this patient. I reviewed the vital signs, available nursing notes, past medical history, past surgical history, family history and social history. Vital Signs-Reviewed the patient's vital signs. Patient Vitals for the past 12 hrs: 
 Temp Pulse Resp BP SpO2  
01/01/19 1705 98.2 °F (36.8 °C) 63 16 117/76 99 % Pulse Oximetry Analysis - 93% on RA Cardiac Monitor:  
Rate: 60 bpm 
Rhythm: Paced EKG interpretation: (Preliminary) Paced rate of 60, [place],  
 
 
Records Reviewed: Nursing Notes, Old Medical Records, Previous electrocardiograms, Previous Radiology Studies and Previous Laboratory Studies Provider Notes (Medical Decision Making): DDx: post-op pain, wound infection, muscle strain ED Course:  
Initial assessment performed. The patients presenting problems have been discussed, and they are in agreement with the care plan formulated and outlined with them. I have encouraged them to ask questions as they arise throughout their visit. During initial evaluation pt with very exaggerated response without even touching pt arm or chest she was reacting. Will order labs and chest x-ray. [facility] pt with large number of opiate rx one most recently from PCP. 7:00 PM 
Pt saw her PCP last week who prescribed her percocet because she ran out of pain medication. Pt is a high utilizer of narcotic pain medications per PCP note. Critical Care Time:  
none Disposition: 
Discharge Note: 
10:30 PM 
The pt is ready for discharge.  The pt's signs, symptoms, diagnosis, and discharge instructions have been discussed and pt has conveyed their understanding. The pt is to follow up as recommended or return to ER should their symptoms worsen. Plan has been discussed and pt is in agreement. PLAN: 
1. Current Discharge Medication List  
  
 
2. Follow-up Information None Return to ED if worse Diagnosis Clinical Impression: 1. Chest wall pain 2. Post-operative pain Attestations: This note is prepared by Bowen Bashir, acting as a Scribe for DO Jennifer Encarnacion DO: The scribe's documentation has been prepared under my direction and personally reviewed by me in its entirety. I confirm that the notes above accurately reflects all work, treatment, procedures, and medical decision making performed by me.

## 2019-01-02 LAB
ATRIAL RATE: 60 BPM
CALCULATED P AXIS, ECG09: 32 DEGREES
CALCULATED R AXIS, ECG10: 18 DEGREES
CALCULATED T AXIS, ECG11: 20 DEGREES
DIAGNOSIS, 93000: NORMAL
P-R INTERVAL, ECG05: 202 MS
Q-T INTERVAL, ECG07: 406 MS
QRS DURATION, ECG06: 94 MS
QTC CALCULATION (BEZET), ECG08: 406 MS
VENTRICULAR RATE, ECG03: 60 BPM

## 2019-01-02 NOTE — DISCHARGE INSTRUCTIONS
Chest Pain: Care Instructions  Your Care Instructions    There are many things that can cause chest pain. Some are not serious and will get better on their own in a few days. But some kinds of chest pain need more testing and treatment. Your doctor may have recommended a follow-up visit in the next 8 to 12 hours. If you are not getting better, you may need more tests or treatment. Even though your doctor has released you, you still need to watch for any problems. The doctor carefully checked you, but sometimes problems can develop later. If you have new symptoms or if your symptoms do not get better, get medical care right away. If you have worse or different chest pain or pressure that lasts more than 5 minutes or you passed out (lost consciousness), call 911 or seek other emergency help right away. A medical visit is only one step in your treatment. Even if you feel better, you still need to do what your doctor recommends, such as going to all suggested follow-up appointments and taking medicines exactly as directed. This will help you recover and help prevent future problems. How can you care for yourself at home? · Rest until you feel better. · Take your medicine exactly as prescribed. Call your doctor if you think you are having a problem with your medicine. · Do not drive after taking a prescription pain medicine. When should you call for help? Call 911 if:    · You passed out (lost consciousness).     · You have severe difficulty breathing.     · You have symptoms of a heart attack. These may include:  ? Chest pain or pressure, or a strange feeling in your chest.  ? Sweating. ? Shortness of breath. ? Nausea or vomiting. ? Pain, pressure, or a strange feeling in your back, neck, jaw, or upper belly or in one or both shoulders or arms. ? Lightheadedness or sudden weakness. ? A fast or irregular heartbeat.   After you call 911, the  may tell you to chew 1 adult-strength or 2 to 4 low-dose aspirin. Wait for an ambulance. Do not try to drive yourself.    Call your doctor today if:    · You have any trouble breathing.     · Your chest pain gets worse.     · You are dizzy or lightheaded, or you feel like you may faint.     · You are not getting better as expected.     · You are having new or different chest pain. Where can you learn more? Go to http://freddy-viral.info/. Enter A120 in the search box to learn more about \"Chest Pain: Care Instructions. \"  Current as of: November 20, 2017  Content Version: 11.8  © 8516-7058 [employer]. Care instructions adapted under license by [employer] (which disclaims liability or warranty for this information). If you have questions about a medical condition or this instruction, always ask your healthcare professional. [address] any warranty or liability for your use of this information. Musculoskeletal Chest Pain: Care Instructions  Your Care Instructions    Chest pain is not always a sign that something is wrong with your heart or that you have another serious problem. The doctor thinks your chest pain is caused by strained muscles or ligaments, inflamed chest cartilage, or another problem in your chest, rather than by your heart. You may need more tests to find the cause of your chest pain. Follow-up care is a key part of your treatment and safety. Be sure to make and go to all appointments, and call your doctor if you are having problems. It's also a good idea to know your test results and keep a list of the medicines you take. How can you care for yourself at home? · Take pain medicines exactly as directed. ? If the doctor gave you a prescription medicine for pain, take it as prescribed. ? If you are not taking a prescription pain medicine, ask your doctor if you can take an over-the-counter medicine. · Rest and protect the sore area.   · Stop, change, or take a break from any activity that may be causing your pain or soreness. · Put ice or a cold pack on the sore area for 10 to 20 minutes at a time. Try to do this every 1 to 2 hours for the next 3 days (when you are awake) or until the swelling goes down. Put a thin cloth between the ice and your skin. · After 2 or 3 days, apply a heating pad set on low or a warm cloth to the area that hurts. Some doctors suggest that you go back and forth between hot and cold. · Do not wrap or tape your ribs for support. This may cause you to take smaller breaths, which could increase your risk of lung problems. · Mentholated creams such as Bengay or Icy Hot may soothe sore muscles. Follow the instructions on the package. · Follow your doctor's instructions for exercising. · Gentle stretching and massage may help you get better faster. Stretch slowly to the point just before pain begins, and hold the stretch for at least 15 to 30 seconds. Do this 3 or 4 times a day. Stretch just after you have applied heat. · As your pain gets better, slowly return to your normal activities. Any increased pain may be a sign that you need to rest a while longer. When should you call for help? Call 911 anytime you think you may need emergency care. For example, call if:    · You have chest pain or pressure. This may occur with:  ? Sweating. ? Shortness of breath. ? Nausea or vomiting. ? Pain that spreads from the chest to the neck, jaw, or one or both shoulders or arms. ? Dizziness or lightheadedness. ? A fast or uneven pulse. After calling 911, chew 1 adult-strength aspirin. Wait for an ambulance.  Do not try to drive yourself.     · You have sudden chest pain and shortness of breath, or you cough up blood.    Call your doctor now or seek immediate medical care if:    · You have any trouble breathing.     · Your chest pain gets worse.     · Your chest pain occurs consistently with exercise and is relieved by rest.    Watch closely for changes in your health, and be sure to contact your doctor if:    · Your chest pain does not get better after 1 week. Where can you learn more? Go to http://freddy-viral.info/. Enter V293 in the search box to learn more about \"Musculoskeletal Chest Pain: Care Instructions. \"  Current as of: November 20, 2017  Content Version: 11.8  © 6015-1896 [employer]. Care instructions adapted under license by [employer] (which disclaims liability or warranty for this information). If you have questions about a medical condition or this instruction, always ask your healthcare professional. [place] [address] any warranty or liability for your use of this information. Acute Pain After Surgery: Care Instructions  Your Care Instructions    It's common to have some pain after surgery. Pain doesn't mean that something is wrong or that the surgery didn't go well. But when the pain is severe, it's important to work with your doctor to manage it. It's also important to be aware of a few facts about pain and pain medicine. · You are the only person who knows what your pain feels like. So be sure to tell your doctor when you are in pain or when the pain changes. Then he or she will know how to adjust your medicines. · Pain is often easier to control right after it starts. So it may be better to take regular doses of pain medicine and not wait until the pain gets bad. · Medicine can help control pain. But this doesn't mean you'll have no pain. Medicine works to keep the pain at a level you can live with. With time, you will feel better. Follow-up care is a key part of your treatment and safety. Be sure to make and go to all appointments, and call your doctor if you are having problems. It's also a good idea to know your test results and keep a list of the medicines you take. How can you care for yourself at home? · Be safe with medicines.  Read and follow all instructions on the label. ? If the doctor gave you a prescription medicine for pain, take it as prescribed. ? If you are not taking a prescription pain medicine, ask your doctor if you can take an over-the-counter medicine. · If you take an over-the-counter pain medicine, such as acetaminophen (Tylenol), ibuprofen (Advil, Motrin), or naproxen (Aleve), read and follow all instructions on the label. · Do not take two or more pain medicines at the same time unless the doctor told you to. · Do not drink alcohol while you are taking pain medicines. · Try to walk each day if your doctor recommends it. Start by walking a little more than you did the day before. Bit by bit, increase the amount you walk. Walking increases blood flow. It also helps prevent pneumonia and constipation. · To prevent constipation from opioid pain medicines:  ? Talk to your doctor about a laxative. ? Include fruits, vegetables, beans, and whole grains in your diet each day. These foods are high in fiber. ? Drink plenty of fluids, enough so that your urine is light yellow or clear like water. Drink water, fruit juice, or other drinks that do not contain caffeine or alcohol. If you have kidney, heart, or liver disease and have to limit fluids, talk with your doctor before you increase the amount of fluids you drink. ? Take a fiber supplement, such as Citrucel or Metamucil, every day if needed. Read and follow all instructions on the label. If you take pain medicine for more than a few days, talk to your doctor before you take fiber. When should you call for help? Call your doctor now or seek immediate medical care if:    · Your pain gets worse.     · Your pain is not controlled by medicine.    Watch closely for changes in your health, and be sure to contact your doctor if you have any problems. Where can you learn more? Go to http://freddy-viral.info/.   Enter [phone] in the search box to learn more about \"Acute Pain After Surgery: Care Instructions. \"  Current as of: November 20, 2017  Content Version: 11.8  © 6003-5088 [employer]. Care instructions adapted under license by [employer] (which disclaims liability or warranty for this information). If you have questions about a medical condition or this instruction, always ask your healthcare professional. [place] [address] any warranty or liability for your use of this information.

## 2019-01-03 ENCOUNTER — CLINICAL SUPPORT (OUTPATIENT)
Dept: CARDIOLOGY CLINIC | Age: 59
End: 2019-01-03

## 2019-01-03 DIAGNOSIS — R00.1 BRADYCARDIA ON ECG: ICD-10-CM

## 2019-01-03 DIAGNOSIS — Z95.0 CARDIAC PACEMAKER IN SITU: Primary | ICD-10-CM

## 2019-01-03 DIAGNOSIS — I49.5 SSS (SICK SINUS SYNDROME) (HCC): ICD-10-CM

## 2019-01-03 DIAGNOSIS — E66.01 OBESITY, MORBID (HCC): ICD-10-CM

## 2019-01-03 DIAGNOSIS — Z95.0 PACEMAKER: ICD-10-CM

## 2019-01-03 DIAGNOSIS — I49.5 SSS (SICK SINUS SYNDROME) (HCC): Primary | ICD-10-CM

## 2019-01-04 NOTE — PROGRESS NOTES
Christy Mathur  1960  Jose M Nogueira MD          Subjective:    Patient is here for 2 week site check and device interrogation after pacemaker implantation. The patient denies chest pain/shortness of breath or fevers. Patient Active Problem List    Diagnosis Date Noted    SSS (sick sinus syndrome) ([facility] 2018    Obesity, morbid ([facility] 2018    Gastroesophageal reflux disease without esophagitis 2016    Bipolar disorder with current episode depressed ([facility] [facility] 2016    Migraine aura without headache 10/24/2014    Bilateral leg edema 10/12/2014    History of gastric bypass 10/12/2014      Past Medical History:   Diagnosis Date    Hypoglycemia     Hypotension     Syncope and collapse 7/24/15    [facility]      Past Surgical History:   Procedure Laterality Date    HX ABDOMINAL LAPAROSCOPY      HX CARPAL TUNNEL RELEASE Bilateral more than 10 years ago    HX  SECTION  [id number]    HX COLONOSCOPY  2016    HX GASTRIC BYPASS  1997    x2    HX HYSTERECTOMY  1985    HX PACEMAKER      HX TONSIL AND ADENOIDECTOMY  more than 10 years ago     Allergies   Allergen Reactions    Benadryl [Diphenhydramine Hcl] Nausea and Vomiting    Ibuprofen Nausea and Vomiting    Sulfamethoxazole-Trimethoprim Nausea Only    Tramadol Anxiety      Family History   Problem Relation Age of Onset    Stroke Mother     Cancer Father     Diabetes Brother     Cancer Maternal Aunt     Cancer Maternal Grandmother     Cancer Brother     Kidney Disease Brother       Current Outpatient Medications   Medication Sig    potassium chloride SR (KLOR-CON 10) 10 mEq tablet TAKE 1 TABLET BY MOUTH DAILY, IF TAKES LASIX (FUROSEMIDE)    HYDROcodone-acetaminophen (NORCO) 5-325 mg per tablet Take 1 Tab by mouth every six (6) hours as needed for Pain. Max Daily Amount: 4 Tabs.  butalbital-acetaminophen-caff (FIORICET) -40 mg per capsule TAKE 1 CAPSULE BY MOUTH DAILY AS NEEDED FOR PAIN.     meclizine (TRAVEL SICKNESS, MECLIZINE,) 25 mg chewable tablet TAKE 1 TABLET BY MOUTH THREE TIMES DAILY AS NEEDED FOR UP TO 10 DAYS FOR DIZZINESS    topiramate (TOPAMAX) 100 mg tablet TAKE 1 TABLET BY MOUTH DAILY.  diclofenac (VOLTAREN) 1 % gel Apply  to affected area four (4) times daily.  aspirin 81 mg chewable tablet Take 1 Tab by mouth daily.  furosemide (LASIX) 20 mg tablet TAKE 1 TABLET BY MOUTH DAILY AS NEEDED    omeprazole (PRILOSEC) 40 mg capsule TAKE 1 CAPSULE BY MOUTH DAILY.  glucose blood VI test strips (ONETOUCH VERIO) strip Test 2 -3 times daily Dx Code: E16.2    lancets (ONE TOUCH DELICA) 33 gauge misc Test 2-3 times daily Dx Code: E16.2    fluticasone (FLONASE) 50 mcg/actuation nasal spray 2 Sprays by Both Nostrils route daily.  glucose 4 gram chewable tablet Take 4 Tabs by mouth as needed.  cholecalciferol (VITAMIN D3) 1,000 unit tablet Take 1 Tab by mouth daily. Indications: Vitamin D Deficiency    cyanocobalamin (VITAMIN B-12) 1,000 mcg tablet TAKE 1 TABLET BY MOUTH DAILY FOR VITAMIN B12 DEFICIENCY    polyethylene glycol (MIRALAX) 17 gram packet Take 1 Packet by mouth daily.  traZODone (DESYREL) 50 mg tablet Take 50 mg by mouth nightly.  Nebulizer & Compressor machine Use as directed    albuterol (PROVENTIL VENTOLIN) 2.5 mg /3 mL (0.083 %) nebulizer solution 3 mL by Nebulization route every four (4) hours as needed for Wheezing.  sertraline (ZOLOFT) 50 mg tablet Take 50 mg by mouth daily. No current facility-administered medications for this visit. Review of Systems:    General: Pt denies excessive weight gain or loss. Pt is able to conduct ADL's  Respiratory: Denies shortness of breath, HERNADEZ, wheezing or stridor. Cardiovascular: Denies precordial pain, palpitations, edema or PND        Physical ExamPhysical Exam:      General: Well developed, in no acute distress. .  Chest: left subclavian pacer site approximated well  Neuro: A&Ox3, speech clear, gait stable. Assessment:       ICD-10-CM ICD-9-CM    1. SSS (sick sinus syndrome) (HCC) I49.5 427.81    2. Bradycardia on ECG R00.1 427.89    3. Obesity, morbid ([address] E66.01 278.01    4. Pacemaker Z95.0 V45.01         Plan:     Patient feels well following pacemaker implantation. She is 71.6% AP and <0.1% RVP, no events, functioning appropriately. Left subclavian pacemaker site approximated well, no discharge. No erythema or heat. Follow up as planned in 3 mo.       Elaine Soto, ANP

## 2019-01-06 LAB
BACTERIA SPEC CULT: NORMAL
SERVICE CMNT-IMP: NORMAL

## 2019-01-23 ENCOUNTER — TELEPHONE (OUTPATIENT)
Dept: CARDIOLOGY CLINIC | Age: 59
End: 2019-01-23

## 2019-01-23 NOTE — TELEPHONE ENCOUNTER
Pt would like a call regarding pacemaker \"instructions\"  Please advise.   Thanks, [facility]

## 2019-02-05 ENCOUNTER — OFFICE VISIT (OUTPATIENT)
Dept: INTERNAL MEDICINE CLINIC | Age: 59
End: 2019-02-05

## 2019-02-05 ENCOUNTER — TELEPHONE (OUTPATIENT)
Dept: INTERNAL MEDICINE CLINIC | Age: 59
End: 2019-02-05

## 2019-02-05 VITALS
HEIGHT: 57 IN | HEART RATE: 82 BPM | BODY MASS INDEX: 37.76 KG/M2 | DIASTOLIC BLOOD PRESSURE: 90 MMHG | RESPIRATION RATE: 18 BRPM | SYSTOLIC BLOOD PRESSURE: 139 MMHG | WEIGHT: 175 LBS | TEMPERATURE: 97.5 F | OXYGEN SATURATION: 96 %

## 2019-02-05 DIAGNOSIS — F31.31 BIPOLAR AFFECTIVE DISORDER, CURRENTLY DEPRESSED, MILD (HCC): ICD-10-CM

## 2019-02-05 DIAGNOSIS — E66.01 OBESITY, MORBID (HCC): ICD-10-CM

## 2019-02-05 DIAGNOSIS — G43.111 INTRACTABLE MIGRAINE WITH AURA WITH STATUS MIGRAINOSUS: Primary | ICD-10-CM

## 2019-02-05 DIAGNOSIS — Z95.0 STATUS POST PLACEMENT OF CARDIAC PACEMAKER: ICD-10-CM

## 2019-02-05 DIAGNOSIS — H60.311 ACUTE DIFFUSE OTITIS EXTERNA OF RIGHT EAR: ICD-10-CM

## 2019-02-05 RX ORDER — RIZATRIPTAN BENZOATE 10 MG/1
10 TABLET ORAL
Qty: 6 TAB | Refills: 1 | Status: SHIPPED | OUTPATIENT
Start: 2019-02-05 | End: 2019-02-05

## 2019-02-05 RX ORDER — NEOMYCIN SULFATE, POLYMYXIN B SULFATE AND HYDROCORTISONE 10; 3.5; 1 MG/ML; MG/ML; [USP'U]/ML
3 SUSPENSION/ DROPS AURICULAR (OTIC) 3 TIMES DAILY
Qty: 10 ML | Refills: 0 | Status: SHIPPED | OUTPATIENT
Start: 2019-02-05 | End: 2019-02-12

## 2019-02-05 NOTE — PATIENT INSTRUCTIONS
Learning About Low-Carbohydrate Diets for Weight Loss  What is a low-carbohydrate diet? Low-carb diets avoid foods that are high in carbohydrate. These high-carb foods include pasta, bread, rice, cereal, fruits, and starchy vegetables. Instead, these diets usually have you eat foods that are high in fat and protein. Many people lose weight quickly on a low-carb diet. But the early weight loss is water. People on this diet often gain the weight back after they start eating carbs again. Not all diet plans are safe or work well. A lot of the evidence shows that low-carb diets aren't healthy. That's because these diets often don't include healthy foods like fruits and vegetables. Losing weight safely means balancing protein, fat, and carbs with every meal and snack. And low-carb diets don't always provide the vitamins, minerals, and fiber you need. If you have a serious medical condition, talk to your doctor before you try any diet. These conditions include kidney disease, heart disease, type 2 diabetes, high cholesterol, and high blood pressure. If you are pregnant, it may not be safe for your baby if you are on a low-carb diet. How can you lose weight safely? You might have heard that a diet plan helped another person lose weight. But that doesn't mean that it will work for you. It is very hard to stay on a diet that includes lots of big changes in your eating habits. If you want to get to a healthy weight and stay there, making healthy lifestyle changes will often work better than dieting. These steps can help. · Make a plan for change. Work with your doctor to create a plan that is right for you. · See a dietitian. He or she can show you how to make healthy changes in your eating habits. · Manage stress. If you have a lot of stress in your life, it can be hard to focus on making healthy changes to your daily habits. · Track your food and activity.  You are likely to do better at losing weight if you keep track of what you eat and what you do. Follow-up care is a key part of your treatment and safety. Be sure to make and go to all appointments, and call your doctor if you are having problems. It's also a good idea to know your test results and keep a list of the medicines you take. Where can you learn more? Go to http://freddy-viral.info/. Enter A121 in the search box to learn more about \"Learning About Low-Carbohydrate Diets for Weight Loss. \"  Current as of: March 28, 2018  Content Version: 11.9  © 2623-4022 [employer]. Care instructions adapted under license by [employer] (which disclaims liability or warranty for this information). If you have questions about a medical condition or this instruction, always ask your healthcare professional. [address] any warranty or liability for your use of this information.

## 2019-02-05 NOTE — TELEPHONE ENCOUNTER
Chief Complaint   Patient presents with    Medication Evaluation     RIZATRIPTAN     Per Dr. Ruffus Boast gave Kanika Lovett approval to switch from name to generic.

## 2019-02-05 NOTE — PROGRESS NOTES
HISTORY OF PRESENT ILLNESS  Christy Mathur is a 63 y.o. female. Migraine    This is a recurrent problem. The current episode started yesterday. The problem occurs constantly. The problem has not changed since onset. The headache is aggravated by nausea, vomiting and visual complaints. The pain is located in the frontal region. The quality of the pain is described as sharp. The pain is severe. Associated symptoms include visual change. Pertinent negatives include no fever and no shortness of breath. She has tried acetaminophen (seeing neurologist fioricet) for the symptoms. The last day or so her right ear is been hurting    Patient Active Problem List   Diagnosis Code    Bilateral leg edema R60.0    History of gastric bypass Z98.84    Migraine aura without headache G43. 109    Bipolar disorder with current episode depressed ([facility]) F31.30    Gastroesophageal reflux disease without esophagitis K21.9    Obesity, morbid ([facility]) E66.01    [facility]) [facility] I49.5         Review of Systems   Constitutional: Negative for fever. HENT: Positive for ear pain. Right   Eyes: Positive for photophobia. Respiratory: Negative for cough, shortness of breath and wheezing. Musculoskeletal: Negative for falls. Neurological: Negative for focal weakness.      Social History     Socioeconomic History    Marital status:      Spouse name: Not on file    Number of children: 2    Years of education: Not on file    Highest education level: Not on file   Social Needs    Financial resource strain: Not on file    Food insecurity - worry: Not on file    Food insecurity - inability: Not on file   [employer] needs - medical: Not on file   [employer] needs - non-medical: Not on file   Occupational History    Occupation: retired   Tobacco Use    Smoking status: Never Smoker    Smokeless tobacco: Never Used   Substance and Sexual Activity    Alcohol use: No    Drug use: No    Sexual activity: No   Other Topics Concern    Not on file   Social History Narrative     has brain damage, lives with him       Physical Exam  Visit Vitals  /90 (BP 1 Location: Left arm, BP Patient Position: Sitting)   Pulse 82   Temp 97.5 °F (36.4 °C) (Oral)   Resp 18   Ht 4' 9\" (1.448 m)   Wt 175 lb (79.4 kg)   SpO2 96%   BMI 37.87 kg/m²     Well developed well nourished no acute distress. light hurt eye  Right ear pain with palp. TM cl, canal flaky. Pupils equal round react to light, extraocular muscles intact. Tympanic membranes within normal limits throat unremarkable  Cranial nerves II through XII are intact. Neck unremarkable  Heart regular rate and rhythm without clicks murmurs rubs  Lungs are clear to auscultation  Abdomen soft. Extremities, motor 5 out of 5 bilaterally, reflexes 2+ equal bilaterally. ASSESSMENT and PLAN  Encounter Diagnoses   Name Primary?  Intractable migraine with aura with status migrainosus Yes    Acute diffuse otitis externa of right ear     Bipolar affective disorder, currently depressed, mild (HCC)     Obesity, morbid ([address]     Status post placement of cardiac pacemaker      Orders Placed This Encounter    rizatriptan (MAXALT) 10 mg tablet    neomycin-polymyxin-hydrocortisone, buffered, (PEDIOTIC) 3.5-10,000-1 mg/mL-unit/mL-% otic suspension     Discussed possible side affects, precautions, and drug interactions and possible benefits of the medication(s). Briefly discussed her weight issues. See patient instructions, went over them personally with the patient. Emphasized compliance. Questions answered. Patient states that they understand the plan of action and will call if there are any issues or misunderstandings. Follow-up Disposition:  Return in about 6 weeks (around 3/19/2019) for routine follow up.

## 2019-02-05 NOTE — PROGRESS NOTES
Chief Complaint   Patient presents with    Migraine     migraine x 2 days     I have reviewed the patient's medical history in detail and updated the computerized patient record. Health Maintenance reviewed. 1. Have you been to the ER, urgent care clinic since your last visit? Hospitalized since your last visit?no    2. Have you seen or consulted any other health care providers outside of the [facility] since your last visit? Include any pap smears or colon screening. No      Encouraged pt to discuss pt's wishes with spouse/partner/family and bring them in the next appt to follow thru with the Advanced Directive    Fall Risk Assessment, last 12 mths 2/5/2019   Able to walk? Yes   Fall in past 12 months? No   Fall with injury? -   Number of falls in past 12 months -   Fall Risk Score -       PHQ over the last two weeks 2/5/2019   Little interest or pleasure in doing things Several days   Feeling down, depressed, irritable, or hopeless Several days   Total Score PHQ 2 2       Abuse Screening Questionnaire 2/5/2019   Do you ever feel afraid of your partner? N   Are you in a relationship with someone who physically or mentally threatens you? N   Is it safe for you to go home?  Y       ADL Assessment 2/5/2019   Feeding yourself No Help Needed   Getting from bed to chair No Help Needed   Getting dressed No Help Needed   Bathing or showering No Help Needed   Walk across the room (includes cane/walker) No Help Needed   Using the telphone No Help Needed   Taking your medications No Help Needed   Preparing meals No Help Needed   Managing money (expenses/bills) No Help Needed   Moderately strenuous housework (laundry) No Help Needed   Shopping for personal items (toiletries/medicines) No Help Needed   Shopping for groceries No Help Needed   Driving No Help Needed   Climbing a flight of stairs No Help Needed   Getting to places beyond walking distances No Help Needed

## 2019-02-06 ENCOUNTER — DOCUMENTATION ONLY (OUTPATIENT)
Dept: INTERNAL MEDICINE CLINIC | Age: 59
End: 2019-02-06

## 2019-02-06 NOTE — PROGRESS NOTES
Received notice from [facility] regarding Maxalt 10mg - prescription states \"JINNY\" - however brand name is not covered by insurance, only the generic form is covered - Is there any reason why patient is not able to use the generic form of this medication?   Elizabeth Dugan LPN  9/8/0894  2:36 PM

## 2019-02-08 ENCOUNTER — CLINICAL SUPPORT (OUTPATIENT)
Dept: INTERNAL MEDICINE CLINIC | Age: 59
End: 2019-02-08

## 2019-02-08 VITALS — HEART RATE: 69 BPM | DIASTOLIC BLOOD PRESSURE: 64 MMHG | SYSTOLIC BLOOD PRESSURE: 114 MMHG

## 2019-02-08 DIAGNOSIS — R53.83 FATIGUE, UNSPECIFIED TYPE: Primary | ICD-10-CM

## 2019-02-08 DIAGNOSIS — E16.2 LOW BLOOD SUGAR: ICD-10-CM

## 2019-02-08 LAB — GLUCOSE POC: 66 MG/DL

## 2019-02-08 NOTE — PROGRESS NOTES
Chief Complaint   Patient presents with    Blood Pressure Check    Blood sugar problem     Patient came into the office requesting blood pressure check and to check glucose related to fatigue. Glucose checked and Dr. Laney Arizmendi notified. Given glucose tablet per Dr. Laney Arizmendi.  Rechecked BS and it was 161 mg/dL

## 2019-02-18 ENCOUNTER — CLINICAL SUPPORT (OUTPATIENT)
Dept: INTERNAL MEDICINE CLINIC | Age: 59
End: 2019-02-18

## 2019-02-18 VITALS — SYSTOLIC BLOOD PRESSURE: 100 MMHG | HEART RATE: 87 BPM | DIASTOLIC BLOOD PRESSURE: 70 MMHG | RESPIRATION RATE: 16 BRPM

## 2019-02-18 DIAGNOSIS — R53.82 CHRONIC FATIGUE: Primary | ICD-10-CM

## 2019-02-18 NOTE — PROGRESS NOTES
Patient walked in c/o feeling very tired all the time - /70 Manual to left arm - heart rate 87 - blood sugar 79 - patient states she has not eaten since 11am today but has been drinking juice - scheduled patient for an appointment for 2/22/19 with Dr Ry Stephens - advised patient to make sure she ate dinner tonight, and call us if she needed to be seen sooner than on 2/22/19  Brunilda Conner LPN  2/[id number]/[id number]  5:90 PM

## 2019-02-22 ENCOUNTER — OFFICE VISIT (OUTPATIENT)
Dept: INTERNAL MEDICINE CLINIC | Age: 59
End: 2019-02-22

## 2019-02-22 VITALS
RESPIRATION RATE: 16 BRPM | DIASTOLIC BLOOD PRESSURE: 84 MMHG | HEART RATE: 72 BPM | HEIGHT: 57 IN | OXYGEN SATURATION: 97 % | TEMPERATURE: 97.3 F | WEIGHT: 176 LBS | SYSTOLIC BLOOD PRESSURE: 118 MMHG | BODY MASS INDEX: 37.97 KG/M2

## 2019-02-22 DIAGNOSIS — R53.83 FATIGUE, UNSPECIFIED TYPE: Primary | ICD-10-CM

## 2019-02-22 DIAGNOSIS — G43.009 MIGRAINE WITHOUT AURA AND WITHOUT STATUS MIGRAINOSUS, NOT INTRACTABLE: ICD-10-CM

## 2019-02-22 DIAGNOSIS — E66.01 OBESITY, MORBID (HCC): ICD-10-CM

## 2019-02-22 DIAGNOSIS — F31.31 BIPOLAR AFFECTIVE DISORDER, CURRENTLY DEPRESSED, MILD (HCC): ICD-10-CM

## 2019-02-22 LAB — GLUCOSE POC: 69 MG/DL

## 2019-02-22 RX ORDER — RIZATRIPTAN BENZOATE 10 MG/1
10 TABLET, ORALLY DISINTEGRATING ORAL
Qty: 6 TAB | Refills: 11 | Status: SHIPPED | OUTPATIENT
Start: 2019-02-22 | End: 2019-02-22

## 2019-02-22 RX ORDER — BUTALBITAL, ACETAMINOPHEN AND CAFFEINE 300; 40; 50 MG/1; MG/1; MG/1
CAPSULE ORAL
Qty: 30 CAP | Refills: 0 | Status: SHIPPED | OUTPATIENT
Start: 2019-02-22 | End: 2019-03-19 | Stop reason: SDUPTHER

## 2019-02-22 NOTE — PROGRESS NOTES
HISTORY OF PRESENT ILLNESS  Vicente Comer is a 63 y.o. female. Headache   The history is provided by the patient. This is a recurrent problem. The problem occurs every several days. The problem has been gradually improving. Associated symptoms include headaches. Pertinent negatives include no chest pain and no shortness of breath. Treatments tried: fioricet maxalt. The treatment provided moderate relief. Fatigue   This is a new problem. The current episode started more than 1 week ago. The problem occurs daily. The problem has not changed since onset. Associated symptoms include headaches. Pertinent negatives include no chest pain and no shortness of breath. Labs from today were reviewed  yes, labs done previously were reviewed  yes, Labs done in ER were reviewed  yes, Additional labs are ordered  no, she was to check her sugar. Never been diagnosed with diabetes. She has appointments to see her psychiatrist as well as her neurologist.  Requests refills of her migraine medications. Patient Active Problem List   Diagnosis Code    Bilateral leg edema R60.0    History of gastric bypass Z98.84    Migraine aura without headache G43. 109    Bipolar disorder with current episode depressed ([facility]) F31.30    Gastroesophageal reflux disease without esophagitis K21.9    Obesity, morbid ([facility]) E66.01    SSS (sick sinus syndrome) ([facility] I49.5         Review of Systems   Constitutional: Positive for fatigue. Negative for fever. Respiratory: Negative for shortness of breath. Cardiovascular: Negative for chest pain. Neurological: Positive for headaches. Psychiatric/Behavioral: Positive for depression.         She is seeing psychiatry, has her on a number of mood agents       Physical Exam  Visit Vitals  /84 (BP 1 Location: Left arm, BP Patient Position: Sitting)   Pulse 72   Temp 97.3 °F (36.3 °C) (Oral)   Resp 16   Ht 4' 9\" (1.448 m)   Wt 176 lb (79.8 kg)   SpO2 97%   BMI 38.09 kg/m²     WD WN female NAD morbid obesity  Heart RRR without murmers clicks or rubs  Lungs CTA  Abdo soft nontender  Ext no edema    ASSESSMENT and PLAN  Encounter Diagnoses   Name Primary?  Fatigue, unspecified type Yes    Migraine without aura and without status migrainosus, not intractable     Bipolar affective disorder, currently depressed, mild (HCC)     Obesity, morbid ([address]      Orders Placed This Encounter    AMB POC GLUCOSE BLOOD, BY GLUCOSE MONITORING DEVICE    butalbital-acetaminophen-caff (FIORICET) -40 mg per capsule    rizatriptan (MAXALT-MLT) 10 mg disintegrating tablet     Not sure why she is feeling so tired most likely it is from her psychiatric and/or neurological medications. She will discuss this with her specialists. Consider depression, continue medications until discussed with psychiatry. Follow-up Disposition:  Return in about 3 months (around 5/22/2019) for routine follow up.

## 2019-02-22 NOTE — PROGRESS NOTES
Chief Complaint   Patient presents with    Fatigue     weakness x 2 weeks, headache, dizziness, ears ringing, pt stated \"her BS drops\", coughing     I have reviewed the patient's medical history in detail and updated the computerized patient record. Health Maintenance reviewed. 1. Have you been to the ER, urgent care clinic since your last visit? Hospitalized since your last visit?no    2. Have you seen or consulted any other health care providers outside of the [address] [place] [address] [place] since your last visit? Include any pap smears or colon screening. No      Encouraged pt to discuss pt's wishes with spouse/partner/family and bring them in the next appt to follow thru with the Advanced Directive      Fall Risk Assessment, last 12 mths 2/5/2019   Able to walk? Yes   Fall in past 12 months? No   Fall with injury? -   Number of falls in past 12 months -   Fall Risk Score -       3 most recent PHQ Screens 2/5/2019   Little interest or pleasure in doing things Several days   Feeling down, depressed, irritable, or hopeless Several days   Total Score PHQ 2 2       Abuse Screening Questionnaire 2/5/2019   Do you ever feel afraid of your partner? N   Are you in a relationship with someone who physically or mentally threatens you? N   Is it safe for you to go home?  Y       ADL Assessment 2/5/2019   Feeding yourself No Help Needed   Getting from bed to chair No Help Needed   Getting dressed No Help Needed   Bathing or showering No Help Needed   Walk across the room (includes cane/walker) No Help Needed   Using the telphone No Help Needed   Taking your medications No Help Needed   Preparing meals No Help Needed   Managing money (expenses/bills) No Help Needed   Moderately strenuous housework (laundry) No Help Needed   Shopping for personal items (toiletries/medicines) No Help Needed   Shopping for groceries No Help Needed   Driving No Help Needed   Climbing a flight of stairs No Help Needed   Getting to places beyond walking distances No Help Needed

## 2019-03-19 NOTE — TELEPHONE ENCOUNTER
Requested Prescriptions     Pending Prescriptions Disp Refills    butalbital-acetaminophen-caff (FIORICET) -40 mg per capsule 30 Cap 0     Sig: TAKE 1 CAPSULE BY MOUTH DAILY AS NEEDED FOR PAIN.

## 2019-03-19 NOTE — TELEPHONE ENCOUNTER
Requested Prescriptions     Pending Prescriptions Disp Refills    butalbital-acetaminophen-caff (FIORICET) -40 mg per capsule 30 Cap 0     Sig: TAKE 1 CAPSULE BY MOUTH DAILY AS NEEDED FOR PAIN.      Future Appointments:  5/22/2019 11:00 AM Randall Cuellar MD   Last Appointment With Me:  2/22/2019

## 2019-03-20 RX ORDER — BUTALBITAL, ACETAMINOPHEN AND CAFFEINE 300; 40; 50 MG/1; MG/1; MG/1
CAPSULE ORAL
Qty: 30 CAP | Refills: 1 | Status: SHIPPED | OUTPATIENT
Start: 2019-03-20 | End: 2019-04-25 | Stop reason: SDUPTHER

## 2019-03-20 RX ORDER — MECLIZINE HCL 25MG 25 MG/1
TABLET, CHEWABLE ORAL
Qty: 60 TAB | Refills: 5 | Status: SHIPPED | OUTPATIENT
Start: 2019-03-20 | End: 2020-07-09

## 2019-03-20 NOTE — TELEPHONE ENCOUNTER
Requested Prescriptions     Pending Prescriptions Disp Refills    meclizine (TRAVEL SICKNESS, MECLIZINE,) 25 mg chewable tablet 60 Tab 5     Sig: TAKE 1 TABLET BY MOUTH THREE TIMES DAILY AS NEEDED FOR UP TO 10 DAYS FOR DIZZINESS     Future Appointments:  5/22/2019 11:00 AM Keith Castro MD   Last Appointment With Me:  2/22/2019

## 2019-03-20 NOTE — TELEPHONE ENCOUNTER
Requested Prescriptions     Pending Prescriptions Disp Refills    meclizine (TRAVEL SICKNESS, MECLIZINE,) 25 mg chewable tablet 60 Tab 5     Sig: TAKE 1 TABLET BY MOUTH THREE TIMES DAILY AS NEEDED FOR UP TO 10 DAYS FOR DIZZINESS

## 2019-04-16 ENCOUNTER — CLINICAL SUPPORT (OUTPATIENT)
Dept: CARDIOLOGY CLINIC | Age: 59
End: 2019-04-16

## 2019-04-16 ENCOUNTER — OFFICE VISIT (OUTPATIENT)
Dept: CARDIOLOGY CLINIC | Age: 59
End: 2019-04-16

## 2019-04-16 VITALS
HEART RATE: 110 BPM | SYSTOLIC BLOOD PRESSURE: 110 MMHG | WEIGHT: 184 LBS | OXYGEN SATURATION: 93 % | HEIGHT: 57 IN | BODY MASS INDEX: 39.7 KG/M2 | RESPIRATION RATE: 17 BRPM | DIASTOLIC BLOOD PRESSURE: 70 MMHG

## 2019-04-16 DIAGNOSIS — E66.01 OBESITY, MORBID (HCC): ICD-10-CM

## 2019-04-16 DIAGNOSIS — Z95.0 CARDIAC PACEMAKER IN SITU: Primary | ICD-10-CM

## 2019-04-16 DIAGNOSIS — Z45.018 PACEMAKER REPROGRAMMING/CHECK: Primary | ICD-10-CM

## 2019-04-16 DIAGNOSIS — F31.30 BIPOLAR AFFECTIVE DISORDER, CURRENT EPISODE DEPRESSED, CURRENT EPISODE SEVERITY UNSPECIFIED (HCC): ICD-10-CM

## 2019-04-16 DIAGNOSIS — I49.5 SSS (SICK SINUS SYNDROME) (HCC): ICD-10-CM

## 2019-04-16 NOTE — PROGRESS NOTES
Subjective:      Andres Sender is a 63 y.o. female is here for device follow up. No further syncope. The patient denies chest pain/ shortness of breath, orthopnea, PND, LE edema, palpitations, syncope, presyncope or fatigue.        Patient Active Problem List    Diagnosis Date Noted    SSS (sick sinus syndrome) ([facility] 2018    Obesity, morbid ([facility] 2018    Gastroesophageal reflux disease without esophagitis 2016    Bipolar disorder with current episode depressed ([facility] 2016    Migraine aura without headache 10/24/2014    Bilateral leg edema 10/12/2014    History of gastric bypass 10/12/2014      Elisa Cerna MD  Past Medical History:   Diagnosis Date    Hypoglycemia     Hypotension 2014    Syncope and collapse 7/24/15    [facility]      Past Surgical History:   Procedure Laterality Date    HX ABDOMINAL LAPAROSCOPY      HX CARPAL TUNNEL RELEASE Bilateral more than 10 years ago    HX  SECTION  3331,2464    HX COLONOSCOPY  2016    HX GASTRIC BYPASS  1997    x2    HX HYSTERECTOMY      HX PACEMAKER      HX TONSIL AND ADENOIDECTOMY  more than 10 years ago     Allergies   Allergen Reactions    Benadryl [Diphenhydramine Hcl] Nausea and Vomiting    Ibuprofen Nausea and Vomiting    Sulfamethoxazole-Trimethoprim Nausea Only    Tramadol Anxiety      Family History   Problem Relation Age of Onset    Stroke Mother     Cancer Father     Diabetes Brother     Cancer Maternal Aunt     Cancer Maternal Grandmother     Cancer Brother     Kidney Disease Brother     negative for cardiac disease  Social History     Socioeconomic History    Marital status:      Spouse name: Not on file    Number of children: 2    Years of education: Not on file    Highest education level: Not on file   Occupational History    Occupation: retired   Tobacco Use    Smoking status: Never Smoker    Smokeless tobacco: Never Used   Substance and Sexual Activity    Alcohol use: No    Drug use: No    Sexual activity: Never   Social History Narrative     has brain damage, lives with him     Current Outpatient Medications   Medication Sig    butalbital-acetaminophen-caff (FIORICET) -40 mg per capsule TAKE 1 CAPSULE BY MOUTH DAILY AS NEEDED FOR PAIN.  meclizine (TRAVEL SICKNESS, MECLIZINE,) 25 mg chewable tablet TAKE 1 TABLET BY MOUTH THREE TIMES DAILY AS NEEDED FOR UP TO 10 DAYS FOR DIZZINESS    potassium chloride SR (KLOR-CON 10) 10 mEq tablet TAKE 1 TABLET BY MOUTH DAILY, IF TAKES LASIX (FUROSEMIDE)    topiramate (TOPAMAX) 100 mg tablet TAKE 1 TABLET BY MOUTH DAILY.  diclofenac (VOLTAREN) 1 % gel Apply  to affected area four (4) times daily.  aspirin 81 mg chewable tablet Take 1 Tab by mouth daily.  furosemide (LASIX) 20 mg tablet TAKE 1 TABLET BY MOUTH DAILY AS NEEDED    omeprazole (PRILOSEC) 40 mg capsule TAKE 1 CAPSULE BY MOUTH DAILY.  glucose blood VI test strips (ONETOUCH VERIO) strip Test 2 -3 times daily Dx Code: E16.2    lancets (ONE TOUCH DELICA) 33 gauge misc Test 2-3 times daily Dx Code: E16.2    fluticasone (FLONASE) 50 mcg/actuation nasal spray 2 Sprays by Both Nostrils route daily.  cholecalciferol (VITAMIN D3) 1,000 unit tablet Take 1 Tab by mouth daily. Indications: Vitamin D Deficiency    cyanocobalamin (VITAMIN B-12) 1,000 mcg tablet TAKE 1 TABLET BY MOUTH DAILY FOR VITAMIN B12 DEFICIENCY    polyethylene glycol (MIRALAX) 17 gram packet Take 1 Packet by mouth daily.  traZODone (DESYREL) 50 mg tablet Take 50 mg by mouth nightly.  Nebulizer & Compressor machine Use as directed    albuterol (PROVENTIL VENTOLIN) 2.5 mg /3 mL (0.083 %) nebulizer solution 3 mL by Nebulization route every four (4) hours as needed for Wheezing.  sertraline (ZOLOFT) 50 mg tablet Take 50 mg by mouth daily.  glucose 4 gram chewable tablet Take 4 Tabs by mouth as needed. No current facility-administered medications for this visit.        Vitals: 04/16/19 1417   BP: 110/70   Pulse: (!) 110   Resp: 17   SpO2: 93%   Weight: 184 lb (83.5 kg)   Height: 4' 9\" (1.448 m)       I have reviewed the nurses notes, vitals, problem list, allergy list, medical history, family, social history and medications. Review of Symptoms:    General: Pt denies excessive weight gain or loss. Pt is able to conduct ADL's  HEENT: Denies blurred vision, headaches, epistaxis and difficulty swallowing. Respiratory: Denies shortness of breath, HERNADEZ, wheezing or stridor. Cardiovascular: Denies precordial pain, palpitations, edema or PND  Gastrointestinal: Denies poor appetite, indigestion, abdominal pain or blood in stool  Urinary: Denies dysuria, pyuria  Musculoskeletal: Denies pain or swelling from muscles or joints  Neurologic: Denies tremor, paresthesias, or sensory motor disturbance  Skin: Denies rash, itching or texture change. Psych: Denies depression      Physical Exam:      General: Well developed, in no acute distress. HEENT: Eyes - PERRL, no jvd  Heart:  Normal S1/S2 negative S3 or S4. Regular, no murmur, gallop or rub.   Respiratory: Clear bilaterally x 4, no wheezing or rales  Abdomen:   Soft, non-tender, bowel sounds are active.   Extremities:  No edema, normal cap refill, no cyanosis. Musculoskeletal: No clubbing  Neuro: A&Ox3, speech clear, gait stable. Skin: Skin color is normal. No rashes or lesions.  Non diaphoretic  Vascular: 2+ pulses symmetric in all extremities    Cardiographics    Ekg: s tach    Results for orders placed or performed during the hospital encounter of 01/01/19   EKG, 12 LEAD, INITIAL   Result Value Ref Range    Ventricular Rate 60 BPM    Atrial Rate 60 BPM    P-R Interval 202 ms    QRS Duration 94 ms    Q-T Interval 406 ms    QTC Calculation (Bezet) 406 ms    Calculated P Axis 32 degrees    Calculated R Axis 18 degrees    Calculated T Axis 20 degrees    Diagnosis       Atrial-paced rhythm  When compared with ECG of 31-JAN-2016 16:07,  Electronic atrial pacemaker has replaced Sinus rhythm  Confirmed by Lynette Church ([id number]) on 1/2/2019 10:34:39 AM     Results for orders placed or performed in visit on 11/15/18   CARDIAC HOLTER MONITOR, 24 HOURS    Narrative    ECG Monitor/24 hours, Complete    Reason for Holter Monitor  BRADYCARDIA    Heartbeat    Slowest 41  Average 59  Fastest  121        Results:   Underlying Rhythm: Sinus bradycardia      Atrial Arrhythmias: premature atrial contractions; occasional and severe bradycardia            AV Conduction: normal    Ventricular Arrhythmias: premature ventricular contractions; rare     ST Segment Analysis:normal     Symptom Correlation:  None reported    Comment:   Sinus bradycardia with bradycardia to the 40s (while asleep). Unchanged from monitor in 2014     Qiana Krishnan MD, [facility]            Lab Results   Component Value Date/Time    WBC 5.0 01/01/2019 07:28 PM    Hemoglobin (POC) 13.2 03/28/2016 03:29 PM    HGB 12.5 01/01/2019 07:28 PM    HCT 40.0 01/01/2019 07:28 PM    PLATELET 040 35/67/1719 07:28 PM    MCV 94.1 01/01/2019 07:28 PM      Lab Results   Component Value Date/Time    Sodium 141 01/01/2019 07:28 PM    Potassium 4.0 01/01/2019 07:28 PM    Chloride 113 (H) 01/01/2019 07:28 PM    CO2 19 (L) 01/01/2019 07:28 PM    Anion gap 9 01/01/2019 07:28 PM    Glucose 74 01/01/2019 07:28 PM    BUN 27 (H) 01/01/2019 07:28 PM    Creatinine 0.65 01/01/2019 07:28 PM    BUN/Creatinine ratio 42 (H) 01/01/2019 07:28 PM    GFR est AA >60 01/01/2019 07:28 PM    GFR est non-AA >60 01/01/2019 07:28 PM    Calcium 8.2 (L) 01/01/2019 07:28 PM    Bilirubin, total 0.3 01/01/2019 07:28 PM    AST (SGOT) 15 01/01/2019 07:28 PM    Alk.  phosphatase 97 01/01/2019 07:28 PM    Protein, total 6.1 (L) 01/01/2019 07:28 PM    Albumin 3.0 (L) 01/01/2019 07:28 PM    Globulin 3.1 01/01/2019 07:28 PM    A-G Ratio 1.0 (L) 01/01/2019 07:28 PM    ALT (SGPT) 16 01/01/2019 07:28 PM         Assessment:     Assessment: ICD-10-CM ICD-9-CM    1. Pacemaker reprogramming/check Z45.018 V53.31 AMB POC EKG ROUTINE W/ 12 LEADS, INTER & REP   2. SSS (sick sinus syndrome) ([facility]) I49.5 427.81    3. Obesity, morbid ([facility] E66.01 278.01    4. Bipolar affective disorder, current episode depressed, current episode severity unspecified ([facility] F31.30 296.50      Orders Placed This Encounter    AMB POC EKG ROUTINE W/ 12 LEADS, INTER & REP     Order Specific Question:   Reason for Exam:     Answer:   routine        Plan:   Ms Agustina Contreras is A paced 31% for sick sinus. She has not had any further syncope. Cont med rx for bipolar do. Encouraged diet and exercise for weight loss. F/u in one year. Continue medical management for sick sinus, syncope and obesity. Thank you for allowing me to participate in Sebastian Aden 's care.     Margarita Danielle MD, [facility]

## 2019-04-16 NOTE — PROGRESS NOTES
1. Have you been to the ER, urgent care clinic since your last visit? Hospitalized since your last visit? No    2. Have you seen or consulted any other health care providers outside of the [address] [place] [address] since your last visit? Include any pap smears or colon screening. No    Chief Complaint   Patient presents with    Pacemaker Check     3 mo appt. C/O fatigue.

## 2019-04-24 DIAGNOSIS — Z98.84 HISTORY OF GASTRIC BYPASS: ICD-10-CM

## 2019-04-24 RX ORDER — CHOLECALCIFEROL (VITAMIN D3) 25 MCG
TABLET ORAL
Qty: 180 TAB | Refills: 10 | Status: SHIPPED | OUTPATIENT
Start: 2019-04-24 | End: 2020-05-20

## 2019-04-29 ENCOUNTER — TELEPHONE (OUTPATIENT)
Dept: ENDOCRINOLOGY | Age: 59
End: 2019-04-29

## 2019-04-29 DIAGNOSIS — E16.2 HYPOGLYCEMIA: Primary | ICD-10-CM

## 2019-04-29 DIAGNOSIS — E16.9 NESIDIOBLASTOSIS: ICD-10-CM

## 2019-04-29 NOTE — TELEPHONE ENCOUNTER
Pt has an appt scheduled soon and needs orders mailed.  Order placed for pt,  per Verbal Order with read back from Dr Colby Penn 4/29/2019

## 2019-05-02 ENCOUNTER — OFFICE VISIT (OUTPATIENT)
Dept: INTERNAL MEDICINE CLINIC | Age: 59
End: 2019-05-02

## 2019-05-02 VITALS
SYSTOLIC BLOOD PRESSURE: 107 MMHG | HEART RATE: 83 BPM | TEMPERATURE: 98.2 F | WEIGHT: 177 LBS | DIASTOLIC BLOOD PRESSURE: 76 MMHG | RESPIRATION RATE: 16 BRPM | OXYGEN SATURATION: 95 % | BODY MASS INDEX: 38.19 KG/M2 | HEIGHT: 57 IN

## 2019-05-02 DIAGNOSIS — I49.5 SSS (SICK SINUS SYNDROME) (HCC): ICD-10-CM

## 2019-05-02 DIAGNOSIS — F32.1 MODERATE MAJOR DEPRESSION (HCC): Primary | ICD-10-CM

## 2019-05-02 DIAGNOSIS — K21.9 GASTROESOPHAGEAL REFLUX DISEASE WITHOUT ESOPHAGITIS: ICD-10-CM

## 2019-05-02 DIAGNOSIS — F31.30 BIPOLAR AFFECTIVE DISORDER, CURRENT EPISODE DEPRESSED, CURRENT EPISODE SEVERITY UNSPECIFIED (HCC): ICD-10-CM

## 2019-05-02 RX ORDER — SERTRALINE HYDROCHLORIDE 50 MG/1
50 TABLET, FILM COATED ORAL DAILY
Qty: 30 TAB | Refills: 5 | Status: SHIPPED | OUTPATIENT
Start: 2019-05-02 | End: 2019-05-22 | Stop reason: SDUPTHER

## 2019-05-02 NOTE — PATIENT INSTRUCTIONS
Depression Treatment: Care Instructions  Your Care Instructions    Depression is a condition that affects the way you feel, think, and act. It causes symptoms such as low energy, loss of interest in daily activities, and sadness or grouchiness that goes on for a long time. Depression is very common and affects men and women of all ages. Depression is a medical illness caused by changes in the natural chemicals in your brain. It is not a character flaw, and it does not mean that you are a bad or weak person. It does not mean that you are going crazy. It is important to know that depression can be treated. Medicines, counseling, and self-care can all help. Many people do not get help because they are embarrassed or think that they will get over the depression on their own. But some people do not get better without treatment. Follow-up care is a key part of your treatment and safety. Be sure to make and go to all appointments, and call your doctor if you are having problems. It's also a good idea to know your test results and keep a list of the medicines you take. How can you care for yourself at home? Learn about antidepressant medicines  Antidepressant medicines can improve or end the symptoms of depression. You may need to take the medicine for at least 6 months, and often longer. Keep taking your medicine even if you feel better. If you stop taking it too soon, your symptoms may come back or get worse. You may start to feel better within 1 to 3 weeks of taking antidepressant medicine. But it can take as many as 6 to 8 weeks to see more improvement. Talk to your doctor if you have problems with your medicine or if you do not notice any improvement after 3 weeks. Antidepressants can make you feel tired, dizzy, or nervous. Some people have dry mouth, constipation, headaches, sexual problems, an upset stomach, or diarrhea.  Many of these side effects are mild and go away on their own after you take the medicine for a few weeks. Some may last longer. Talk to your doctor if side effects bother you too much. You might be able to try a different medicine. If you are pregnant or breastfeeding, talk to your doctor about what medicines you can take. Learn about counseling  In many cases, counseling can work as well as medicines to treat mild to moderate depression. Counseling is done by licensed mental health providers, such as psychologists, social workers, and some types of nurses. It can be done in one-on-one sessions or in a group setting. Many people find group sessions helpful. Cognitive-behavioral therapy is a type of counseling. In this treatment therapy, you learn how to see and change unhelpful thinking styles that may be adding to your depression. Counseling and medicines often work well when used together. To manage depression  · Be physically active. Getting 30 minutes of exercise each day is good for your body and your mind. Begin slowly if it is hard for you to get started. If you already exercise, keep it up. · Plan something pleasant for yourself every day. Include activities that you have enjoyed in the past.  · Get enough sleep. Talk to your doctor if you have problems sleeping. · Eat a balanced diet. If you do not feel hungry, eat small snacks rather than large meals. · Do not drink alcohol, use illegal drugs, or take medicines that your doctor has not prescribed for you. They may interfere with your treatment. · Spend time with family and friends. It may help to speak openly about your depression with people you trust.  · Take your medicines exactly as prescribed. Call your doctor if you think you are having a problem with your medicine. · Do not make major life decisions while you are depressed. Depression may change the way you think. You will be able to make better decisions after you feel better. · Think positively.  Challenge negative thoughts with statements such as \"I am hopeful\"; \"Things will get better\"; and \"I can ask for the help I need. \" Write down these statements and read them often, even if you don't believe them yet. · Be patient with yourself. It took time for your depression to develop, and it will take time for your symptoms to improve. Do not take on too much or be too hard on yourself. · Learn all you can about depression from written and online materials. · Check out behavioral health classes to learn more about dealing with depression. · Keep the numbers for these national suicide hotlines: 0-849-681-TALK (9-240.913.2289) and 8-627-VVMMGDP (9-210.157.4599). If you or someone you know talks about suicide or feeling hopeless, get help right away. When should you call for help? Call 911 anytime you think you may need emergency care. For example, call if:    · You feel you cannot stop from hurting yourself or someone else.   [facility] your doctor now or seek immediate medical care if:    · You hear voices.     · You feel much more depressed.    Watch closely for changes in your health, and be sure to contact your doctor if:    · You are having problems with your depression medicine.     · You are not getting better as expected. Where can you learn more? Go to http://freddy-viral.info/. Enter F693 in the search box to learn more about \"Depression Treatment: Care Instructions. \"  Current as of: September 11, 2018  Content Version: 11.9  © 0296-7041 [employer]. Care instructions adapted under license by [employer] (which disclaims liability or warranty for this information). If you have questions about a medical condition or this instruction, always ask your healthcare professional. [address] any warranty or liability for your use of this information.

## 2019-05-02 NOTE — PROGRESS NOTES
Chief Complaint   Patient presents with    Anxiety     pt in crying, emotional x 6 days     I have reviewed the patient's medical history in detail and updated the computerized patient record. Health Maintenance reviewed. 1. Have you been to the ER, urgent care clinic since your last visit? Hospitalized since your last visit?no    2. Have you seen or consulted any other health care providers outside of the [address] [place] [address] [facility] since your last visit? Include any pap smears or colon screening. No    Encouraged pt to discuss pt's wishes with spouse/partner/family and bring them in the next appt to follow thru with the Advanced Directive      Fall Risk Assessment, last 12 mths 2/5/2019   Able to walk? Yes   Fall in past 12 months? No   Fall with injury? -   Number of falls in past 12 months -   Fall Risk Score -       3 most recent PHQ Screens 2/5/2019   Little interest or pleasure in doing things Several days   Feeling down, depressed, irritable, or hopeless Several days   Total Score PHQ 2 2       Abuse Screening Questionnaire 2/5/2019   Do you ever feel afraid of your partner? N   Are you in a relationship with someone who physically or mentally threatens you? N   Is it safe for you to go home?  Y       ADL Assessment 2/5/2019   Feeding yourself No Help Needed   Getting from bed to chair No Help Needed   Getting dressed No Help Needed   Bathing or showering No Help Needed   Walk across the room (includes cane/walker) No Help Needed   Using the telphone No Help Needed   Taking your medications No Help Needed   Preparing meals No Help Needed   Managing money (expenses/bills) No Help Needed   Moderately strenuous housework (laundry) No Help Needed   Shopping for personal items (toiletries/medicines) No Help Needed   Shopping for groceries No Help Needed   Driving No Help Needed   Climbing a flight of stairs No Help Needed   Getting to places beyond walking distances No Help Needed
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
## 2019-05-08 ENCOUNTER — HOSPITAL ENCOUNTER (OUTPATIENT)
Dept: LAB | Age: 59
Discharge: HOME | End: 2019-05-08
Payer: MEDICARE

## 2019-05-08 PROCEDURE — 36415 COLL VENOUS BLD VENIPUNCTURE: CPT

## 2019-05-08 PROCEDURE — 80053 COMPREHEN METABOLIC PANEL: CPT

## 2019-05-09 LAB
ALBUMIN SERPL-MCNC: 3.5 G/DL (ref 3.5–5.5)
ALBUMIN/GLOB SERPL: 1.8 {RATIO} (ref 1.2–2.2)
ALP SERPL-CCNC: 80 IU/L (ref 39–117)
ALT SERPL-CCNC: 8 IU/L (ref 0–32)
AST SERPL-CCNC: 17 IU/L (ref 0–40)
BILIRUB SERPL-MCNC: <0.2 MG/DL (ref 0–1.2)
BUN SERPL-MCNC: 14 MG/DL (ref 6–24)
BUN/CREAT SERPL: 21 (ref 9–23)
CALCIUM SERPL-MCNC: 8.5 MG/DL (ref 8.7–10.2)
CHLORIDE SERPL-SCNC: 113 MMOL/L (ref 96–106)
CO2 SERPL-SCNC: 19 MMOL/L (ref 20–29)
CREAT SERPL-MCNC: 0.66 MG/DL (ref 0.57–1)
GLOBULIN SER CALC-MCNC: 1.9 G/DL (ref 1.5–4.5)
GLUCOSE SERPL-MCNC: 84 MG/DL (ref 65–99)
POTASSIUM SERPL-SCNC: 4.1 MMOL/L (ref 3.5–5.2)
PROT SERPL-MCNC: 5.4 G/DL (ref 6–8.5)
SODIUM SERPL-SCNC: 143 MMOL/L (ref 134–144)

## 2019-05-10 NOTE — PROGRESS NOTES
Macarena Chase is a 63 y.o. female here for   Chief Complaint   Patient presents with    Low Blood Sugar       1. Have you been to the ER, urgent care clinic since your last visit? Hospitalized since your last visit? -RYAN and M[facility] Snellen Dec/Jan for SSS and Chest pains    2. Have you seen or consulted any other health care providers outside of the [address] [place] [address] since your last visit?   Include any pap smears or colon screening.-no

## 2019-05-13 ENCOUNTER — OFFICE VISIT (OUTPATIENT)
Dept: ENDOCRINOLOGY | Age: 59
End: 2019-05-13

## 2019-05-13 VITALS
WEIGHT: 184 LBS | TEMPERATURE: 96.8 F | OXYGEN SATURATION: 99 % | HEIGHT: 57 IN | SYSTOLIC BLOOD PRESSURE: 114 MMHG | HEART RATE: 73 BPM | BODY MASS INDEX: 39.7 KG/M2 | DIASTOLIC BLOOD PRESSURE: 70 MMHG | RESPIRATION RATE: 16 BRPM

## 2019-05-13 DIAGNOSIS — R53.82 CHRONIC FATIGUE: ICD-10-CM

## 2019-05-13 DIAGNOSIS — E16.2 HYPOGLYCEMIA: Primary | ICD-10-CM

## 2019-05-13 DIAGNOSIS — E53.8 VITAMIN B12 DEFICIENCY: ICD-10-CM

## 2019-05-13 DIAGNOSIS — E16.9 NESIDIOBLASTOSIS: ICD-10-CM

## 2019-05-13 DIAGNOSIS — E16.2 HYPOGLYCEMIA: ICD-10-CM

## 2019-05-13 LAB — GLUCOSE POC: 70 MG/DL

## 2019-05-13 RX ORDER — ACARBOSE 50 MG/1
TABLET ORAL
Qty: 90 TAB | Refills: 5 | Status: SHIPPED | OUTPATIENT
Start: 2019-05-13 | End: 2019-10-18 | Stop reason: SDUPTHER

## 2019-05-13 RX ORDER — BLOOD-GLUCOSE METER
EACH MISCELLANEOUS
Qty: 1 EACH | Refills: 0 | Status: SHIPPED | OUTPATIENT
Start: 2019-05-13 | End: 2019-05-22 | Stop reason: ALTCHOICE

## 2019-05-13 RX ORDER — LANCETS 33 GAUGE
EACH MISCELLANEOUS
Qty: 100 LANCET | Refills: 11 | Status: SHIPPED | OUTPATIENT
Start: 2019-05-13 | End: 2019-05-22 | Stop reason: ALTCHOICE

## 2019-05-13 NOTE — PATIENT INSTRUCTIONS
Check sugar 2 times a day - before a meal and 2 hours that meal         Basic Dietary Guidelines      1. Eat smaller, more frequent meals - 4 small meals a day. 2. Eat fewer simple sugars. Foods high in simple sugar should be avoided as it can cause quick rise and fall in sugars. Avoid or limit high sugary foods and beverages including the following: Adan-Aid, fruit juices/drinks, soda,cakes, pies, candy, doughnuts, cookies. 3.  Eat more foods high in soluble fiber to prevent sugars from being absorbed too quickly. The following foods are high in soluble fiber: apples, beets, Brussel sprouts, carrots, oats, spinach, pears. 4.Increase the protein in your diet. Eat a protein containing food with each meal. High protein foods include : Eggs, meat, poultry, fish, milk, yogurt, cottage cheese, cheese, peanut butter. 5. If milk causes distress, try lactose-free milk. Milk and milk products are often not tolerated;avoid if this it true for you. It will be important to ensure that adequate calcium and vitamin D are eaten in the diet.

## 2019-05-13 NOTE — PROGRESS NOTES
[facility]               Nidhi Washburn MD        [address] [address] BU:[phone] Fax [phone]       Patient Information  Date:2019  Name : Macarena Chase 63 y.o.     YOB: 1960         Referred by: Amy Guevara MD       Chief Complaint   Patient presents with    Low Blood Sugar       History of Present Illness: Macarena Chase is a 63 y.o. female here for follow-up  She reports long-standing history of hypoglycemia, she has not been checking the blood glucose. She was given a meter last visit but did not get it from the pharmacy. She reports intermittent hypoglycemia. No syncope    Sick sinus syndrome, status post pacemaker  History of gastric bypass several years ago, still on a high carb diet       has diabetes, denies mixing up of the medications with 's medicines      No chest pain, shortness of breath, diarrhea, nausea, vomiting  Past Medical History:   Diagnosis Date    Hypoglycemia     Hypotension     Syncope and collapse 7/24/15    RTH     Past Surgical History:   Procedure Laterality Date    HX ABDOMINAL LAPAROSCOPY      HX CARPAL TUNNEL RELEASE Bilateral more than 10 years ago    HX  SECTION  8694,3406    HX COLONOSCOPY  2016    HX GASTRIC BYPASS  1997    x2    HX HYSTERECTOMY      HX PACEMAKER      HX TONSIL AND ADENOIDECTOMY  more than 10 years ago     Current Outpatient Medications   Medication Sig    sertraline (ZOLOFT) 50 mg tablet Take 1 Tab by mouth daily. Half a pill dally for a few days then 1 daily as tolerated    butalbital-acetaminophen-caff (FIORICET) -40 mg per capsule TAKE 1 CAPSULE BY MOUTH DAILY AS NEEDED FOR PAIN.     VITAMIN D3 1,000 unit tablet TAKE 1 TABLET BY MOUTH DAILY FOR VITAMIN D DEFICIENCY    meclizine (TRAVEL SICKNESS, MECLIZINE,) 25 mg chewable tablet TAKE 1 TABLET BY MOUTH THREE TIMES DAILY AS NEEDED FOR UP TO 10 DAYS FOR DIZZINESS    potassium chloride SR (KLOR-CON 10) 10 mEq tablet TAKE 1 TABLET BY MOUTH DAILY, IF TAKES LASIX (FUROSEMIDE)    topiramate (TOPAMAX) 100 mg tablet TAKE 1 TABLET BY MOUTH DAILY.  diclofenac (VOLTAREN) 1 % gel Apply  to affected area four (4) times daily.  aspirin 81 mg chewable tablet Take 1 Tab by mouth daily.  furosemide (LASIX) 20 mg tablet TAKE 1 TABLET BY MOUTH DAILY AS NEEDED    omeprazole (PRILOSEC) 40 mg capsule TAKE 1 CAPSULE BY MOUTH DAILY.  fluticasone (FLONASE) 50 mcg/actuation nasal spray 2 Sprays by Both Nostrils route daily.  glucose 4 gram chewable tablet Take 4 Tabs by mouth as needed.  cyanocobalamin (VITAMIN B-12) 1,000 mcg tablet TAKE 1 TABLET BY MOUTH DAILY FOR VITAMIN B12 DEFICIENCY    polyethylene glycol (MIRALAX) 17 gram packet Take 1 Packet by mouth daily.  traZODone (DESYREL) 50 mg tablet Take 50 mg by mouth nightly.  Nebulizer & Compressor machine Use as directed    albuterol (PROVENTIL VENTOLIN) 2.5 mg /3 mL (0.083 %) nebulizer solution 3 mL by Nebulization route every four (4) hours as needed for Wheezing.  acarbose (PRECOSE) 50 mg tablet Take 1 tab 30 mins before meals TID    Blood-Glucose Meter (ONETOUCH VERIO FLEX) misc Check sugar 2 times a day - before a meal and 2 hours that meal Dx Code: E16.2    glucose blood VI test strips (ONETOUCH VERIO) strip Check sugar 2 times a day - before a meal and 2 hours that meal Dx Code: E16.2    lancets (ONE TOUCH DELICA) 33 gauge misc Check sugar 2 times a day - before a meal and 2 hours that meal Dx Code: E16.2     No current facility-administered medications for this visit.       Allergies   Allergen Reactions    Benadryl [Diphenhydramine Hcl] Nausea and Vomiting    Ibuprofen Nausea and Vomiting    Sulfamethoxazole-Trimethoprim Nausea Only    Tramadol Anxiety         Review of Systems:  All 10 systems reviewed and are negative other than mentioned in HPI    Physical Examination:  Blood pressure 114/70, pulse 73, temperature 96.8 °F (36 °C), temperature source Oral, resp. rate 16, height 4' 9\" (1.448 m), weight 184 lb (83.5 kg), SpO2 99 %. Body mass index is 39.82 kg/m². - General: pleasant, no distress, good eye contact  - HEENT: no exopthalmos, no periorbital edema, no scleral/conjunctival injection, EOMI, no lid lag or stare  - Neck: supple, n  - Cardiovascular: regular, normal rate, normal S1 and S2  - Respiratory: clear to auscultation bilaterally  - Gastrointestinal: soft, nontender, nondistended,BS +  - Musculoskeletal: no proximal muscle weakness in upper or lower extremities  - Integumentary:  no rashes, no edema  - Neurological: Alert and oriented, no tremor  - Psychiatric: normal mood and affect    Data Reviewed:     Assessment/Plan:     1. Hypoglycemia    2. Vitamin B12 deficiency    3. Chronic fatigue    4. Nesidioblastosis      Reactive hypoglycemia/nesidioblastosis/post gastric bypass hypoglycemia  2018: Normal: ACTH, a.m. cortisol, insulin antibodies, insulin, proinsulin, BMP, C-peptide  Discussed about changes in the diet, increasing protein gain, discussed about meeting the dietitian which she does not want to. Dispensed meter, check sugars for the next 2 weeks, return in 2 weeks  Acarbose   written instructions given for diet  Reports that the blood glucose was less than 50 once. Need close monitoring    Fatigue: She was told to have B12 deficiency/on supplements    Sick sinus syndrome: Status post pacemaker   she states she is not able to differentiate the symptoms of hypoglycemia and her heart symptoms, she was asked to check the blood glucose whenever she feels different, so we can differentiate. Thank you for allowing me to participate in the care of this patient. Adria Stone MD      Follow-up and Dispositions    · Return in about 2 weeks (around 5/27/2019). Patient /caregiver verbalized understanding .   Voice-recognition software was used to generate this report, which may result in some phonetic-based errors in the grammar and contents. Even though attempts were made to correct all the mistakes, some may have been missed and remained in the body of the report.

## 2019-05-13 NOTE — LETTER
5/13/19 Patient: Rosina Robles YOB: 1960 Date of Visit: 5/13/2019 Amrita Nick MD 
[address] [address] VIA In Basket Dear Amrita Nick MD, Thank you for referring Ms. Sridevi Hollis to [id number] [place] [address] for evaluation. My notes for this consultation are attached. If you have questions, please do not hesitate to call me. I look forward to following your patient along with you. Sincerely, Ankita Frias MD

## 2019-05-19 RX ORDER — POLYETHYLENE GLYCOL 3350 17 G/17G
17 POWDER, FOR SOLUTION ORAL DAILY
Qty: 50 PACKET | Refills: 5 | Status: SHIPPED | OUTPATIENT
Start: 2019-05-19 | End: 2022-03-21 | Stop reason: SDUPTHER

## 2019-05-22 ENCOUNTER — OFFICE VISIT (OUTPATIENT)
Dept: INTERNAL MEDICINE CLINIC | Age: 59
End: 2019-05-22

## 2019-05-22 VITALS
BODY MASS INDEX: 37.97 KG/M2 | WEIGHT: 176 LBS | SYSTOLIC BLOOD PRESSURE: 120 MMHG | DIASTOLIC BLOOD PRESSURE: 89 MMHG | TEMPERATURE: 98.1 F | HEART RATE: 83 BPM | HEIGHT: 57 IN | OXYGEN SATURATION: 96 % | RESPIRATION RATE: 16 BRPM

## 2019-05-22 DIAGNOSIS — E66.01 OBESITY, MORBID (HCC): ICD-10-CM

## 2019-05-22 DIAGNOSIS — K21.9 GASTROESOPHAGEAL REFLUX DISEASE WITHOUT ESOPHAGITIS: ICD-10-CM

## 2019-05-22 DIAGNOSIS — M17.11 ARTHRITIS OF KNEE, RIGHT: ICD-10-CM

## 2019-05-22 DIAGNOSIS — G43.109 MIGRAINE AURA WITHOUT HEADACHE: ICD-10-CM

## 2019-05-22 DIAGNOSIS — I49.5 SSS (SICK SINUS SYNDROME) (HCC): ICD-10-CM

## 2019-05-22 DIAGNOSIS — F32.1 MODERATE MAJOR DEPRESSION (HCC): Primary | ICD-10-CM

## 2019-05-22 DIAGNOSIS — E16.2 HYPOGLYCEMIA: ICD-10-CM

## 2019-05-22 DIAGNOSIS — F31.31 BIPOLAR AFFECTIVE DISORDER, CURRENTLY DEPRESSED, MILD (HCC): ICD-10-CM

## 2019-05-22 DIAGNOSIS — Z98.84 HISTORY OF GASTRIC BYPASS: Chronic | ICD-10-CM

## 2019-05-22 RX ORDER — DICLOFENAC SODIUM 50 MG/1
50 TABLET, DELAYED RELEASE ORAL 2 TIMES DAILY
Qty: 60 TAB | Refills: 5 | Status: SHIPPED | OUTPATIENT
Start: 2019-05-22 | End: 2019-05-22 | Stop reason: SDUPTHER

## 2019-05-22 RX ORDER — DICLOFENAC SODIUM 50 MG/1
50 TABLET, DELAYED RELEASE ORAL 2 TIMES DAILY
Qty: 60 TAB | Refills: 0 | Status: SHIPPED | OUTPATIENT
Start: 2019-05-22 | End: 2019-07-01 | Stop reason: SDUPTHER

## 2019-05-22 RX ORDER — LANOLIN ALCOHOL/MO/W.PET/CERES
CREAM (GRAM) TOPICAL
Qty: 90 TAB | Refills: 3 | Status: SHIPPED | OUTPATIENT
Start: 2019-05-22 | End: 2020-06-23

## 2019-05-22 RX ORDER — SERTRALINE HYDROCHLORIDE 50 MG/1
50 TABLET, FILM COATED ORAL DAILY
Qty: 90 TAB | Refills: 1 | Status: SHIPPED | OUTPATIENT
Start: 2019-05-22 | End: 2020-02-12 | Stop reason: SDUPTHER

## 2019-05-22 NOTE — PROGRESS NOTES
PROGRESS NOTE        SUBJECTIVE:  Diagnosis/Chief Complaint: Depression (follow up)    Went and saw her psychiatrist, he approved of the sertraline. She is avoiding contact with her mother and her mother's partner. They have argued. Doing well with mood yes - better  Symptoms was very sad and upset but she's better after starting zoloft  Suicidal: no  Side affects: no  States taking medications per medicine list.no  Complains of some right knee pain, lost some weight and is hoping that will help. No specific known injury. She is following a diet. Requests anti-inflammatory pain medicine. Complains of some pain from where her pacemaker was placed and is going to schedule an appointment to see her cardiologist.  No syncope or exertional chest pain. Patient Active Problem List    Diagnosis Date Noted    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    SSS (sick sinus syndrome) ([facility] 12/06/2018    Obesity, morbid ([facility] 02/06/2018    Gastroesophageal reflux disease without esophagitis 08/24/2016    B[facility] with current episode depressed [facility] 02/01/2016    Migraine aura without headache 10/24/2014    Bilateral leg edema 10/12/2014    Hypoglycemia 10/12/2014    History of gastric bypass 10/12/2014     Current Outpatient Medications   Medication Sig Dispense Refill    polyethylene glycol (MIRALAX) 17 gram packet TAKE 1 PACKET BY MOUTH DAILY. 50 Packet 5    acarbose (PRECOSE) 50 mg tablet Take 1 tab 30 mins before meals TID 90 Tab 5    sertraline (ZOLOFT) 50 mg tablet Take 1 Tab by mouth daily. Half a pill dally for a few days then 1 daily as tolerated 30 Tab 5    butalbital-acetaminophen-caff (FIORICET) -40 mg per capsule TAKE 1 CAPSULE BY MOUTH DAILY AS NEEDED FOR PAIN.  30 Cap 0    VITAMIN D3 1,000 unit tablet TAKE 1 TABLET BY MOUTH DAILY FOR VITAMIN D DEFICIENCY 180 Tab 10    meclizine (TRAVEL SICKNESS, MECLIZINE,) 25 mg chewable tablet TAKE 1 TABLET BY MOUTH THREE TIMES DAILY AS NEEDED FOR UP TO 10 DAYS FOR DIZZINESS 60 Tab 5    potassium chloride SR (KLOR-CON 10) 10 mEq tablet TAKE 1 TABLET BY MOUTH DAILY, IF TAKES LASIX (FUROSEMIDE) 90 Tab 3    diclofenac (VOLTAREN) 1 % gel Apply  to affected area four (4) times daily. 1 Each 5    aspirin 81 mg chewable tablet Take 1 Tab by mouth daily. 90 Tab 3    furosemide (LASIX) 20 mg tablet TAKE 1 TABLET BY MOUTH DAILY AS NEEDED 90 Tab 3    omeprazole (PRILOSEC) 40 mg capsule TAKE 1 CAPSULE BY MOUTH DAILY. 90 Cap 3    fluticasone (FLONASE) 50 mcg/actuation nasal spray 2 Sprays by Both Nostrils route daily. 1 Bottle 5    glucose 4 gram chewable tablet Take 4 Tabs by mouth as needed. 30 Tab 5    cyanocobalamin (VITAMIN B-12) 1,000 mcg tablet TAKE 1 TABLET BY MOUTH DAILY FOR VITAMIN B12 DEFICIENCY 90 Tab 1    traZODone (DESYREL) 50 mg tablet Take 50 mg by mouth nightly.  Nebulizer & Compressor machine Use as directed 1 Each 0    albuterol (PROVENTIL VENTOLIN) 2.5 mg /3 mL (0.083 %) nebulizer solution 3 mL by Nebulization route every four (4) hours as needed for Wheezing.  100 Each 1     Allergies   Allergen Reactions    Benadryl [Diphenhydramine Hcl] Nausea and Vomiting    Ibuprofen Nausea and Vomiting    Sulfamethoxazole-Trimethoprim Nausea Only    Tramadol Anxiety     Past Surgical History:   Procedure Laterality Date    HX ABDOMINAL LAPAROSCOPY      HX CARPAL TUNNEL RELEASE Bilateral more than 10 years ago    HX  SECTION  [id number]    HX COLONOSCOPY  2016    HX GASTRIC BYPASS  1997    x2    HX HYSTERECTOMY      HX PACEMAKER      HX TONSIL AND ADENOIDECTOMY  more than 10 years ago       Social History     Tobacco Use    Smoking status: Never Smoker    Smokeless tobacco: Never Used   Substance Use Topics    Alcohol use: No        OBJECTIVE:    .  Visit Vitals  /89 (BP 1 Location: Left arm, BP Patient Position: Sitting)   Pulse 83   Temp 98.1 °F (36.7 °C) (Oral)   Resp 16   Ht 4' 9\" (1.448 m)   Wt 176 lb (79.8 kg) SpO2 96%   BMI 38.09 kg/m²     WDWN in NAD, 8 pound weight loss. Heart RRR, no:C/M/R  Lungs CTA No wheezes, rales or rhonchi  Abdo: soft no tenderness, rebound or guarding  Neurological exam[de-identified] 2-12 intact  Psychiatric: Normal mood, judgement  Right knee grossly normal  Pacemaker site looks well-healed    Reviewed: Medications, allergies, clinical lab test results and imaging results have been reviewed. Any abnormal findings have been addressed. ASSESSMENT:       ICD-10-CM ICD-9-CM    1. Moderate major depression ([facility]) F32.1 296.22 sertraline (ZOLOFT) 50 mg tablet   2. Arthritis of knee, right M17.11 716.96    3. SSS (sick sinus syndrome) ([facility]) I49.5 427.81    4. Obesity, morbid ([facility] E66.01 278.01    5. Migraine aura without headache G43.109 346.00    6. History of gastric bypass Z98.84 V45.86 cyanocobalamin (VITAMIN B-12) 1,000 mcg tablet   7. Hypoglycemia E16.2 251.2    8. Gastroesophageal reflux disease without esophagitis K21.9 530.81    9. Bipolar affective disorder, currently depressed, mild ([facility] F31.31 296.51        Chronic Conditions Addressed Today     1. SSS (sick sinus syndrome) ([facility])    2. Obesity, morbid ([facility]    3. Migraine aura without headache     Relevant Medications     sertraline (ZOLOFT) 50 mg tablet    4. Hypoglycemia    5. History of gastric bypass     Relevant Medications     cyanocobalamin (VITAMIN B-12) 1,000 mcg tablet    6. Gastroesophageal reflux disease without esophagitis    7. Bipolar disorder with current episode depressed ([facility])     Relevant Medications     sertraline (ZOLOFT) 50 mg tablet      Acute Diagnoses Addressed Today     Moderate major depression ([facility]    -  Primary        Relevant Medications        sertraline (ZOLOFT) 50 mg tablet    Arthritis of knee, right              PLAN    Orders Placed This Encounter    sertraline (ZOLOFT) 50 mg tablet     Sig: Take 1 Tab by mouth daily.      Dispense:  90 Tab     Refill:  1    diclofenac EC (VOLTAREN) 50 mg EC tablet Sig: Take 1 Tab by mouth two (2) times a day.  As needed     Dispense:  60 Tab     Refill:  5    cyanocobalamin (VITAMIN B-12) 1,000 mcg tablet     Sig: TAKE 1 TABLET BY MOUTH DAILY FOR VITAMIN B12 DEFICIENCY     Dispense:  90 Tab     Refill:  3

## 2019-05-22 NOTE — PATIENT INSTRUCTIONS
You can try Salonpas patches for your pain, these are 4% lidocaine patches and can help a great deal for neuropathic pain. They are available at routine pharmacies and [facility].

## 2019-05-24 NOTE — TELEPHONE ENCOUNTER
Requested Prescriptions     Pending Prescriptions Disp Refills    butalbital-acetaminophen-caff (FIORICET) -40 mg per capsule 30 Cap 0     Future Appointments:  7/22/2019 11:15 AM Derick Waggoner MD   Last Appointment With Me:  5/22/2019

## 2019-05-24 NOTE — TELEPHONE ENCOUNTER
Requested Prescriptions     Pending Prescriptions Disp Refills    butalbital-acetaminophen-caff (FIORICET) -40 mg per capsule 30 Cap 0

## 2019-05-28 RX ORDER — BUTALBITAL, ACETAMINOPHEN AND CAFFEINE 300; 40; 50 MG/1; MG/1; MG/1
1 CAPSULE ORAL
Qty: 12 CAP | Refills: 0 | Status: SHIPPED | OUTPATIENT
Start: 2019-05-28 | End: 2019-07-02 | Stop reason: SDUPTHER

## 2019-07-01 RX ORDER — DICLOFENAC SODIUM 50 MG/1
50 TABLET, DELAYED RELEASE ORAL 2 TIMES DAILY
Qty: 60 TAB | Refills: 5 | Status: SHIPPED | OUTPATIENT
Start: 2019-07-01 | End: 2019-10-04 | Stop reason: ALTCHOICE

## 2019-07-01 NOTE — TELEPHONE ENCOUNTER
Requested Prescriptions     Pending Prescriptions Disp Refills    diclofenac EC (VOLTAREN) 50 mg EC tablet 60 Tab 0     Sig: Take 1 Tab by mouth two (2) times a day.  As needed

## 2019-07-01 NOTE — TELEPHONE ENCOUNTER
Requested Prescriptions     Pending Prescriptions Disp Refills    diclofenac EC (VOLTAREN) 50 mg EC tablet 60 Tab 0     Sig: Take 1 Tab by mouth two (2) times a day.  As needed     Future Appointments:  7/22/2019 11:15 AM Tahir King MD   Last Appointment With Me:  5/22/2019

## 2019-07-02 RX ORDER — BUTALBITAL, ACETAMINOPHEN AND CAFFEINE 300; 40; 50 MG/1; MG/1; MG/1
CAPSULE ORAL
Qty: 12 CAP | Refills: 0 | Status: SHIPPED | OUTPATIENT
Start: 2019-07-02 | End: 2019-07-29 | Stop reason: SDUPTHER

## 2019-07-19 ENCOUNTER — DOCUMENTATION ONLY (OUTPATIENT)
Dept: INTERNAL MEDICINE CLINIC | Age: 59
End: 2019-07-19

## 2019-07-29 ENCOUNTER — OFFICE VISIT (OUTPATIENT)
Dept: INTERNAL MEDICINE CLINIC | Age: 59
End: 2019-07-29

## 2019-07-29 VITALS
HEART RATE: 80 BPM | RESPIRATION RATE: 20 BRPM | HEIGHT: 57 IN | BODY MASS INDEX: 39.48 KG/M2 | SYSTOLIC BLOOD PRESSURE: 133 MMHG | OXYGEN SATURATION: 95 % | DIASTOLIC BLOOD PRESSURE: 87 MMHG | TEMPERATURE: 97.5 F | WEIGHT: 183 LBS

## 2019-07-29 DIAGNOSIS — L73.9 FOLLICULITIS: Primary | ICD-10-CM

## 2019-07-29 DIAGNOSIS — G43.009 MIGRAINE WITHOUT AURA AND WITHOUT STATUS MIGRAINOSUS, NOT INTRACTABLE: ICD-10-CM

## 2019-07-29 DIAGNOSIS — E66.01 OBESITY, MORBID (HCC): ICD-10-CM

## 2019-07-29 DIAGNOSIS — F32.1 MODERATE MAJOR DEPRESSION (HCC): ICD-10-CM

## 2019-07-29 DIAGNOSIS — K21.9 GASTROESOPHAGEAL REFLUX DISEASE WITHOUT ESOPHAGITIS: ICD-10-CM

## 2019-07-29 DIAGNOSIS — I89.0 LYMPHEDEMA: ICD-10-CM

## 2019-07-29 RX ORDER — MUPIROCIN 20 MG/G
OINTMENT TOPICAL 2 TIMES DAILY
Qty: 22 G | Refills: 0 | Status: SHIPPED | OUTPATIENT
Start: 2019-07-29 | End: 2019-08-05

## 2019-07-29 RX ORDER — BUTALBITAL, ACETAMINOPHEN AND CAFFEINE 300; 40; 50 MG/1; MG/1; MG/1
CAPSULE ORAL
Qty: 12 CAP | Refills: 1 | Status: SHIPPED | OUTPATIENT
Start: 2019-07-29 | End: 2020-01-25

## 2019-07-29 RX ORDER — POTASSIUM CHLORIDE 750 MG/1
TABLET, FILM COATED, EXTENDED RELEASE ORAL
Qty: 90 TAB | Refills: 3 | Status: SHIPPED | OUTPATIENT
Start: 2019-07-29 | End: 2020-04-17

## 2019-07-29 RX ORDER — OMEPRAZOLE 40 MG/1
CAPSULE, DELAYED RELEASE ORAL
Qty: 90 CAP | Refills: 3 | Status: SHIPPED | OUTPATIENT
Start: 2019-07-29 | End: 2020-05-20

## 2019-07-29 RX ORDER — FUROSEMIDE 20 MG/1
TABLET ORAL
Qty: 90 TAB | Refills: 3 | Status: SHIPPED | OUTPATIENT
Start: 2019-07-29 | End: 2020-07-09

## 2019-07-29 NOTE — PATIENT INSTRUCTIONS
Try 1 teaspoon of metamucil before meals to help with weight loss. Body Mass Index: Care Instructions  Your Care Instructions    Body mass index (BMI) can help you see if your weight is raising your risk for health problems. It uses a formula to compare how much you weigh with how tall you are. · A BMI lower than 18.5 is considered underweight. · A BMI between 18.5 and 24.9 is considered healthy. · A BMI between 25 and 29.9 is considered overweight. A BMI of 30 or higher is considered obese. If your BMI is in the normal range, it means that you have a lower risk for weight-related health problems. If your BMI is in the overweight or obese range, you may be at increased risk for weight-related health problems, such as high blood pressure, heart disease, stroke, arthritis or joint pain, and diabetes. If your BMI is in the underweight range, you may be at increased risk for health problems such as fatigue, lower protection (immunity) against illness, muscle loss, bone loss, hair loss, and hormone problems. BMI is just one measure of your risk for weight-related health problems. You may be at higher risk for health problems if you are not active, you eat an unhealthy diet, or you drink too much alcohol or use tobacco products. Follow-up care is a key part of your treatment and safety. Be sure to make and go to all appointments, and call your doctor if you are having problems. It's also a good idea to know your test results and keep a list of the medicines you take. How can you care for yourself at home? · Practice healthy eating habits. This includes eating plenty of fruits, vegetables, whole grains, lean protein, and low-fat dairy. · If your doctor recommends it, get more exercise. Walking is a good choice. Bit by bit, increase the amount you walk every day. Try for at least 30 minutes on most days of the week. · Do not smoke. Smoking can increase your risk for health problems.  If you need help quitting, talk to your doctor about stop-smoking programs and medicines. These can increase your chances of quitting for good. · Limit alcohol to 2 drinks a day for men and 1 drink a day for women. Too much alcohol can cause health problems. If you have a BMI higher than 25  · Your doctor may do other tests to check your risk for weight-related health problems. This may include measuring the distance around your waist. A waist measurement of more than 40 inches in men or 35 inches in women can increase the risk of weight-related health problems. · Talk with your doctor about steps you can take to stay healthy or improve your health. You may need to make lifestyle changes to lose weight and stay healthy, such as changing your diet and getting regular exercise. If you have a BMI lower than 18.5  · Your doctor may do other tests to check your risk for health problems. · Talk with your doctor about steps you can take to stay healthy or improve your health. You may need to make lifestyle changes to gain or maintain weight and stay healthy, such as getting more healthy foods in your diet and doing exercises to build muscle. Where can you learn more? Go to http://freddy-viral.info/. Enter S176 in the search box to learn more about \"Body Mass Index: Care Instructions. \"  Current as of: October 13, 2016  Content Version: 11.4  © 4367-3071 Healthwise, Incorporated. Care instructions adapted under license by [employer] (which disclaims liability or warranty for this information). If you have questions about a medical condition or this instruction, always ask your healthcare professional. [place] [address] any warranty or liability for your use of this information. Learning About Low-Carbohydrate Diets for Weight Loss  What is a low-carbohydrate diet? Low-carb diets avoid foods that are high in carbohydrate.  These high-carb foods include pasta, bread, rice, cereal, fruits, and starchy vegetables. Instead, these diets usually have you eat foods that are high in fat and protein. Many people lose weight quickly on a low-carb diet. But the early weight loss is water. People on this diet often gain the weight back after they start eating carbs again. Not all diet plans are safe or work well. A lot of the evidence shows that low-carb diets aren't healthy. That's because these diets often don't include healthy foods like fruits and vegetables. Losing weight safely means balancing protein, fat, and carbs with every meal and snack. And low-carb diets don't always provide the vitamins, minerals, and fiber you need. If you have a serious medical condition, talk to your doctor before you try any diet. These conditions include kidney disease, heart disease, type 2 diabetes, high cholesterol, and high blood pressure. If you are pregnant, it may not be safe for your baby if you are on a low-carb diet. How can you lose weight safely? You might have heard that a diet plan helped another person lose weight. But that doesn't mean that it will work for you. It is very hard to stay on a diet that includes lots of big changes in your eating habits. If you want to get to a healthy weight and stay there, making healthy lifestyle changes will often work better than dieting. These steps can help. · Make a plan for change. Work with your doctor to create a plan that is right for you. · See a dietitian. He or she can show you how to make healthy changes in your eating habits. · Manage stress. If you have a lot of stress in your life, it can be hard to focus on making healthy changes to your daily habits. · Track your food and activity. You are likely to do better at losing weight if you keep track of what you eat and what you do. Follow-up care is a key part of your treatment and safety. Be sure to make and go to all appointments, and call your doctor if you are having problems.  It's also a good idea to know your test results and keep a list of the medicines you take. Where can you learn more? Go to http://freddy-viral.info/. Enter A121 in the search box to learn more about \"Learning About Low-Carbohydrate Diets for Weight Loss. \"  Current as of: November 7, 2018  Content Version: 12.1  © 8114-6377 Healthwise, Incorporated. Care instructions adapted under license by [employer] (which disclaims liability or warranty for this information). If you have questions about a medical condition or this instruction, always ask your healthcare professional. [address] any warranty or liability for your use of this information.

## 2019-07-29 NOTE — PROGRESS NOTES
Chief Complaint   Patient presents with    Dry Skin     dark dry patched under L/arm - tender to touch, hurts to raise L/arm     I have reviewed the patient's medical history in detail and updated the computerized patient record. Health Maintenance reviewed. 1. Have you been to the ER, urgent care clinic since your last visit? Hospitalized since your last visit?no    2. Have you seen or consulted any other health care providers outside of the [address] [place] [address] [place] since your last visit? Include any pap smears or colon screening. ni      Encouraged pt to discuss pt's wishes with spouse/partner/family and bring them in the next appt to follow thru with the Advanced Directive    Fall Risk Assessment, last 12 mths 2/5/2019   Able to walk? Yes   Fall in past 12 months? No   Fall with injury? -   Number of falls in past 12 months -   Fall Risk Score -       3 most recent PHQ Screens 2/5/2019   Little interest or pleasure in doing things Several days   Feeling down, depressed, irritable, or hopeless Several days   Total Score PHQ 2 2       Abuse Screening Questionnaire 2/5/2019   Do you ever feel afraid of your partner? N   Are you in a relationship with someone who physically or mentally threatens you? N   Is it safe for you to go home?  Y       ADL Assessment 2/5/2019   Feeding yourself No Help Needed   Getting from bed to chair No Help Needed   Getting dressed No Help Needed   Bathing or showering No Help Needed   Walk across the room (includes cane/walker) No Help Needed   Using the telphone No Help Needed   Taking your medications No Help Needed   Preparing meals No Help Needed   Managing money (expenses/bills) No Help Needed   Moderately strenuous housework (laundry) No Help Needed   Shopping for personal items (toiletries/medicines) No Help Needed   Shopping for groceries No Help Needed   Driving No Help Needed   Climbing a flight of stairs No Help Needed   Getting to places beyond walking distances No Help Needed

## 2019-07-29 NOTE — PROGRESS NOTES
HISTORY OF PRESENT ILLNESS  Maverick Haddad is a 63 y.o. female. Dry Skin   The history is provided by the patient. This is a new problem. The current episode started 2 days ago. The problem occurs daily. The problem has been gradually improving. Pertinent negatives include no chest pain and no shortness of breath. She has tried nothing for the symptoms. Agree with comments, see chief complaint. No new exposures. No shaving  Minimal complaints of heartburn, her migraines have not been too bad. Allergies   Allergen Reactions    Benadryl [Diphenhydramine Hcl] Nausea and Vomiting    Ibuprofen Nausea and Vomiting    Sulfamethoxazole-Trimethoprim Nausea Only    Tramadol Anxiety       Review of Systems   Constitutional: Negative for fever. Respiratory: Negative for shortness of breath. Cardiovascular: Negative for chest pain. Skin: Positive for dry skin. Neurological: Negative for focal weakness.      Social History     Socioeconomic History    Marital status:      Spouse name: Not on file    Number of children: 2    Years of education: Not on file    Highest education level: Not on file   Occupational History    Occupation: retired   Social Needs    Financial resource strain: Not on file    Food insecurity:     Worry: Not on file     Inability: Not on file   [employer] needs:     Medical: Not on file     Non-medical: Not on file   Tobacco Use    Smoking status: Never Smoker    Smokeless tobacco: Never Used   Substance and Sexual Activity    Alcohol use: No    Drug use: No    Sexual activity: Never   Lifestyle    Physical activity:     Days per week: Not on file     Minutes per session: Not on file    Stress: Not on file   Relationships    Social connections:     Talks on phone: Not on file     Gets together: Not on file     Attends Yarsanism service: Not on file     Active member of club or organization: Not on file     Attends meetings of clubs or organizations: Not on file Relationship status: Not on file    Intimate partner violence:     Fear of current or ex partner: Not on file     Emotionally abused: Not on file     Physically abused: Not on file     Forced sexual activity: Not on file   Other Topics Concern    Not on file   Social History Narrative     has brain damage, lives with him       Physical Exam  Visit Vitals  /87 (BP 1 Location: Right arm, BP Patient Position: Sitting)   Pulse 80   Temp 97.5 °F (36.4 °C) (Oral)   Resp 20   Ht 4' 9\" (1.448 m)   Wt 183 lb (83 kg)   SpO2 95%   BMI 39.60 kg/m²     WD WN female NAD  Heart RRR without murmers clicks or rubs  Lungs CTA  Abdo soft nontender  Ext no edema  Left axilla healing sores  ASSESSMENT and PLAN  Encounter Diagnoses   Name Primary?  Folliculitis Yes    Gastroesophageal reflux disease without esophagitis     Migraine without aura and without status migrainosus, not intractable     Lymphedema     Moderate major depression (HCC)     Obesity, morbid (HCC)      Orders Placed This Encounter    mupirocin (BACTROBAN) 2 % ointment    omeprazole (PRILOSEC) 40 mg capsule    furosemide (LASIX) 20 mg tablet    potassium chloride SR (KLOR-CON 10) 10 mEq tablet    butalbital-acetaminophen-caff (FIORICET) -40 mg per capsule     Discussed the patient's BMI with her. The BMI follow up plan is as follows:     dietary management education, guidance, and counseling  encourage exercise  monitor weight  prescribed dietary intake    An After Visit Summary was printed and given to the patient. See patient instructions, went over them personally with the patient. Emphasized compliance. Questions answered. Patient states that they understand the plan of action and will call if there are any issues or misunderstandings. Follow-up and Dispositions    · Return in about 3 months (around 10/29/2019) for routine follow up.

## 2019-08-02 ENCOUNTER — TELEPHONE (OUTPATIENT)
Dept: CARDIOLOGY CLINIC | Age: 59
End: 2019-08-02

## 2019-08-02 NOTE — TELEPHONE ENCOUNTER
Patient has been swollen and sore near device site & under arm. PCP referred her to Dr. Samson Dowell.  Please call to discuss. Thanks!

## 2019-08-02 NOTE — TELEPHONE ENCOUNTER
Called, spoke to pt, two identifiers verified. Pt states she has shoulder pain on the side of her PM.  Complains of red dots, both symptoms started about a month ago. Pt did see PCP who advised that she check with us. Advised pt will send a note to the NP for any ideas, but does not sound device related. Pt verbalized understanding of information discussed w/ no further questions at this time.       Juli Bird RN

## 2019-08-05 ENCOUNTER — TELEPHONE (OUTPATIENT)
Dept: CARDIOLOGY CLINIC | Age: 59
End: 2019-08-05

## 2019-08-05 NOTE — TELEPHONE ENCOUNTER
Called, spoke to pt, two identifiers verified. States her pacer site is still very painful, would like to come in for an apt. Advised I will check with the NP and call her back. Pt verbalized understanding of information discussed w/ no further questions at this time.      Rivas Watt RN

## 2019-08-06 NOTE — TELEPHONE ENCOUNTER
Called, spoke to pt, two identifiers verified. Advised pt she needs to be seen, made apt for tomorrow at 9am.  Pt verbalized understanding of information discussed w/ no further questions at this time.      Rivas Watt RN

## 2019-08-07 ENCOUNTER — OFFICE VISIT (OUTPATIENT)
Dept: CARDIOLOGY CLINIC | Age: 59
End: 2019-08-07

## 2019-08-07 ENCOUNTER — CLINICAL SUPPORT (OUTPATIENT)
Dept: CARDIOLOGY CLINIC | Age: 59
End: 2019-08-07

## 2019-08-07 VITALS
WEIGHT: 182.2 LBS | HEIGHT: 57 IN | HEART RATE: 60 BPM | BODY MASS INDEX: 39.31 KG/M2 | DIASTOLIC BLOOD PRESSURE: 70 MMHG | RESPIRATION RATE: 16 BRPM | SYSTOLIC BLOOD PRESSURE: 100 MMHG | OXYGEN SATURATION: 98 %

## 2019-08-07 DIAGNOSIS — F31.30 BIPOLAR AFFECTIVE DISORDER, CURRENT EPISODE DEPRESSED, CURRENT EPISODE SEVERITY UNSPECIFIED (HCC): Primary | ICD-10-CM

## 2019-08-07 DIAGNOSIS — Z95.0 CARDIAC PACEMAKER IN SITU: Primary | ICD-10-CM

## 2019-08-07 DIAGNOSIS — I49.5 SSS (SICK SINUS SYNDROME) (HCC): ICD-10-CM

## 2019-08-07 DIAGNOSIS — R00.1 BRADYCARDIA ON ECG: ICD-10-CM

## 2019-08-07 DIAGNOSIS — Z95.0 PACEMAKER: ICD-10-CM

## 2019-08-07 RX ORDER — HYDROCODONE BITARTRATE AND ACETAMINOPHEN 5; 325 MG/1; MG/1
TABLET ORAL
COMMUNITY
Start: 2017-11-20 | End: 2019-10-04 | Stop reason: ALTCHOICE

## 2019-08-07 NOTE — PROGRESS NOTES
Subjective:      Anamika Harkins is a 63 y.o. female is here for EP follow up. The patient denies chest pain/ shortness of breath, orthopnea, PND, LE edema, palpitations, syncope, presyncope or fatigue. C/O rash under left breast and axilla. Has used cream with no improvement -Is scared. C/O pain over pm site with palpation.      Patient Active Problem List    Diagnosis Date Noted    Nesidioblastosis 2019    SSS (sick sinus syndrome) ([facility] [address] 2018    Obesity, morbid ([facility] [address] 2018    Gastroesophageal reflux disease without esophagitis 2016    Bipolar disorder with current episode depressed ([facility] [address] 2016    Migraine aura without headache 10/24/2014    Bilateral leg edema 10/12/2014    Hypoglycemia 10/12/2014    History of gastric bypass 10/12/2014      Kranthi Sampson MD  Past Medical History:   Diagnosis Date    Hypoglycemia     Hypotension     Syncope and collapse 7/24/15    [facility]      Past Surgical History:   Procedure Laterality Date    HX ABDOMINAL LAPAROSCOPY      HX CARPAL TUNNEL RELEASE Bilateral more than 10 years ago    HX  SECTION  0596,3032    HX COLONOSCOPY  2016    HX GASTRIC BYPASS  1997    x2    HX HYSTERECTOMY      HX PACEMAKER      HX TONSIL AND ADENOIDECTOMY  more than 10 years ago     Allergies   Allergen Reactions    Benadryl [Diphenhydramine Hcl] Nausea and Vomiting    Ibuprofen Nausea and Vomiting    Sulfamethoxazole-Trimethoprim Nausea Only    Tramadol Anxiety      Family History   Problem Relation Age of Onset    Stroke Mother     Cancer Father     Diabetes Brother     Cancer Maternal Aunt     Cancer Maternal Grandmother     Cancer Brother     Kidney Disease Brother     negative for cardiac disease  Social History     Socioeconomic History    Marital status:      Spouse name: Not on file    Number of children: 2    Years of education: Not on file    Highest education level: Not on file Occupational History    Occupation: retired   Tobacco Use    Smoking status: Never Smoker    Smokeless tobacco: Never Used   Substance and Sexual Activity    Alcohol use: No    Drug use: No    Sexual activity: Never   Social History Narrative     has brain damage, lives with him     Current Outpatient Medications   Medication Sig    HYDROcodone-acetaminophen (NORCO) 5-325 mg per tablet     omeprazole (PRILOSEC) 40 mg capsule TAKE 1 CAPSULE BY MOUTH DAILY.  furosemide (LASIX) 20 mg tablet TAKE 1 TABLET BY MOUTH DAILY AS NEEDED    potassium chloride SR (KLOR-CON 10) 10 mEq tablet TAKE 1 TABLET BY MOUTH DAILY, IF TAKES LASIX (FUROSEMIDE)    butalbital-acetaminophen-caff (FIORICET) -40 mg per capsule TAKE 1 CAPSULE BY MOUTH DAILY AS NEEDED FOR PAIN    aspirin 81 mg chewable tablet CHEW AND SWALLOW 1 TAB BY MOUTH DAILY.  diclofenac EC (VOLTAREN) 50 mg EC tablet Take 1 Tab by mouth two (2) times a day. As needed    sertraline (ZOLOFT) 50 mg tablet Take 1 Tab by mouth daily.  cyanocobalamin (VITAMIN B-12) 1,000 mcg tablet TAKE 1 TABLET BY MOUTH DAILY FOR VITAMIN B12 DEFICIENCY    polyethylene glycol (MIRALAX) 17 gram packet TAKE 1 PACKET BY MOUTH DAILY.  acarbose (PRECOSE) 50 mg tablet Take 1 tab 30 mins before meals TID    VITAMIN D3 1,000 unit tablet TAKE 1 TABLET BY MOUTH DAILY FOR VITAMIN D DEFICIENCY    meclizine (TRAVEL SICKNESS, MECLIZINE,) 25 mg chewable tablet TAKE 1 TABLET BY MOUTH THREE TIMES DAILY AS NEEDED FOR UP TO 10 DAYS FOR DIZZINESS    diclofenac (VOLTAREN) 1 % gel Apply  to affected area four (4) times daily.  fluticasone (FLONASE) 50 mcg/actuation nasal spray 2 Sprays by Both Nostrils route daily.  traZODone (DESYREL) 50 mg tablet Take 50 mg by mouth nightly.  Nebulizer & Compressor machine Use as directed    albuterol (PROVENTIL VENTOLIN) 2.5 mg /3 mL (0.083 %) nebulizer solution 3 mL by Nebulization route every four (4) hours as needed for Wheezing. No current facility-administered medications for this visit. Vitals:    08/07/19 1008   BP: 100/70   Pulse: 60   Resp: 16   SpO2: 98%   Weight: 182 lb 3.2 oz (82.6 kg)   Height: 4' 9\" (1.448 m)       I have reviewed the nurses notes, vitals, problem list, allergy list, medical history, family, social history and medications. Review of Symptoms:    General: Pt denies excessive weight gain or loss. Pt is able to conduct ADL's  HEENT: Denies blurred vision, headaches, epistaxis and difficulty swallowing. Respiratory: Denies shortness of breath, HERNADEZ, wheezing or stridor. Cardiovascular: Denies precordial pain, palpitations, edema or PND  Gastrointestinal: Denies poor appetite, indigestion, abdominal pain or blood in stool  Urinary: Denies dysuria, pyuria  Musculoskeletal: Denies pain or swelling from muscles or joints  Neurologic: Denies tremor, paresthesias, or sensory motor disturbance  Skin: Denies rash, itching or texture change. Psych: Denies depression      Physical Exam:      General: Well developed, in no acute distress. HEENT: Eyes - PERRL, no jvd  Heart:  Normal S1/S2 negative S3 or S4. Regular, no murmur, gallop or rub. Respiratory: Clear bilaterally x 4, no wheezing or rales  Extremities:  No edema, normal cap refill, no cyanosis. Musculoskeletal: No clubbing  Neuro: A&Ox3, speech clear, gait stable. Skin: Skin color is normal. Rash, under left breast and axilla. Vascular: 2+ pulses symmetric in all extremities    Cardiographics    Ekg: Sinus  Rhythm   Low voltage in precordial leads.    -Old anterior infarct.      Results for orders placed or performed during the hospital encounter of 01/01/19   EKG, 12 LEAD, INITIAL   Result Value Ref Range    Ventricular Rate 60 BPM    Atrial Rate 60 BPM    P-R Interval 202 ms    QRS Duration 94 ms    Q-T Interval 406 ms    QTC Calculation (Bezet) 406 ms    Calculated P Axis 32 degrees    Calculated R Axis 18 degrees    Calculated T Axis 20 degrees Diagnosis       Atrial-paced rhythm  When compared with ECG of 31-JAN-2016 16:07,  Electronic atrial pacemaker has replaced Sinus rhythm  Confirmed by John Duarte ([id number]) on 1/2/2019 10:34:39 AM     Results for orders placed or performed in visit on 11/15/18   CARDIAC HOLTER MONITOR, 24 HOURS    Narrative    ECG Monitor/24 hours, Complete    Reason for Holter Monitor  BRADYCARDIA    Heartbeat    Slowest 41  Average 59  Fastest  121        Results:   Underlying Rhythm: Sinus bradycardia      Atrial Arrhythmias: premature atrial contractions; occasional and severe bradycardia            AV Conduction: normal    Ventricular Arrhythmias: premature ventricular contractions; rare     ST Segment Analysis:normal     Symptom Correlation:  None reported    Comment:   Sinus bradycardia with bradycardia to the 40s (while asleep). Unchanged from monitor in 2014     Nancy Quiros MD, [facility]            Lab Results   Component Value Date/Time    WBC 5.0 01/01/2019 07:28 PM    Hemoglobin (POC) 13.2 03/28/2016 03:29 PM    HGB 12.5 01/01/2019 07:28 PM    HCT 40.0 01/01/2019 07:28 PM    PLATELET 459 57/87/0110 07:28 PM    MCV 94.1 01/01/2019 07:28 PM      Lab Results   Component Value Date/Time    Sodium 143 05/08/2019 08:18 AM    Potassium 4.1 05/08/2019 08:18 AM    Chloride 113 (H) 05/08/2019 08:18 AM    CO2 19 (L) 05/08/2019 08:18 AM    Anion gap 9 01/01/2019 07:28 PM    Glucose 84 05/08/2019 08:18 AM    BUN 14 05/08/2019 08:18 AM    Creatinine 0.66 05/08/2019 08:18 AM    BUN/Creatinine ratio 21 05/08/2019 08:18 AM    GFR est  05/08/2019 08:18 AM    GFR est non-AA 98 05/08/2019 08:18 AM    Calcium 8.5 (L) 05/08/2019 08:18 AM    Bilirubin, total <0.2 05/08/2019 08:18 AM    AST (SGOT) 17 05/08/2019 08:18 AM    Alk.  phosphatase 80 05/08/2019 08:18 AM    Protein, total 5.4 (L) 05/08/2019 08:18 AM    Albumin 3.5 05/08/2019 08:18 AM    Globulin 3.1 01/01/2019 07:28 PM    A-G Ratio 1.8 05/08/2019 08:18 AM    ALT (SGPT) 8 05/08/2019 08:18 AM      Lab Results   Component Value Date/Time    TSH 1.850 07/08/2016 04:17 PM        Assessment:           ICD-10-CM ICD-9-CM    1. Bipolar affective disorder, current episode depressed, current episode severity unspecified ([facility] [address] F31.30 296.50 AMB POC EKG ROUTINE W/ 12 LEADS, INTER & REP   2. SSS (sick sinus syndrome) ([facility]) I49.5 427.81 AMB POC EKG ROUTINE W/ 12 LEADS, INTER & REP   3. Bradycardia on ECG R00.1 427.89 AMB POC EKG ROUTINE W/ 12 LEADS, INTER & REP   4. Pacemaker Z95.0 V45.01 AMB POC EKG ROUTINE W/ 12 LEADS, INTER & REP     Orders Placed This Encounter    AMB POC EKG ROUTINE W/ 12 LEADS, INTER & REP     Order Specific Question:   Reason for Exam:     Answer:   routine    HYDROcodone-acetaminophen (NORCO) 5-325 mg per tablet        Plan:     Ms Crowell Found is s/p dual chamber pm 12/6/18. Device check with normally functioning dual chamber PM.  Her site has healed well. Discussed how the site looks fine, device is fine and that her pain may be from breast tissue pulling on site. Reassured. I have provided her with two names of local dematologists to see regarding her rash. Will follow device remotely and see her back in 1 year. Continue medical management for BMI 39, SSS and bipolar disorder. Thank you for allowing me to participate in Jelena Webber 's care.       Junior Rudd NP

## 2019-08-07 NOTE — PROGRESS NOTES
1. Have you been to the ER, urgent care clinic since your last visit? Hospitalized since your last visit? No    2. Have you seen or consulted any other health care providers outside of the [address] [place] [address] since your last visit? Include any pap smears or colon screening. No    Chief Complaint   Patient presents with    Follow-up    Pacemaker Check        Patient C/O pacer site site discomfort has recently lost her mother.

## 2019-08-23 ENCOUNTER — TELEPHONE (OUTPATIENT)
Dept: INTERNAL MEDICINE CLINIC | Age: 59
End: 2019-08-23

## 2019-08-23 DIAGNOSIS — Z12.39 SCREENING FOR BREAST CANCER: Primary | ICD-10-CM

## 2019-08-23 NOTE — TELEPHONE ENCOUNTER
Pt called in reference to wanting to speak with Yessica Enriquez about her referrals and mammo. Ashley Sachin Culp said that Yessica Mina was setting her mammogram appt. Please call her at [phone].

## 2019-08-27 ENCOUNTER — OFFICE VISIT (OUTPATIENT)
Dept: INTERNAL MEDICINE CLINIC | Age: 59
End: 2019-08-27

## 2019-08-27 VITALS
RESPIRATION RATE: 16 BRPM | WEIGHT: 183 LBS | HEART RATE: 72 BPM | SYSTOLIC BLOOD PRESSURE: 109 MMHG | OXYGEN SATURATION: 95 % | BODY MASS INDEX: 39.48 KG/M2 | TEMPERATURE: 98.1 F | DIASTOLIC BLOOD PRESSURE: 78 MMHG | HEIGHT: 57 IN

## 2019-08-27 DIAGNOSIS — B37.2 INTERTRIGINOUS CANDIDIASIS: ICD-10-CM

## 2019-08-27 DIAGNOSIS — T18.9XXA SWALLOWED FOREIGN BODY, INITIAL ENCOUNTER: Primary | ICD-10-CM

## 2019-08-27 DIAGNOSIS — F31.31 BIPOLAR AFFECTIVE DISORDER, CURRENTLY DEPRESSED, MILD (HCC): ICD-10-CM

## 2019-08-27 RX ORDER — NYSTATIN 100000 U/G
CREAM TOPICAL 2 TIMES DAILY
Qty: 15 G | Refills: 0 | Status: SHIPPED | OUTPATIENT
Start: 2019-08-27

## 2019-08-27 NOTE — PROGRESS NOTES
Chief Complaint   Patient presents with    Sore Throat     yesterday eating a salad pt swallowed a piece of thick plastic approximately 3 inches long from her meal, choking, could not talk and swallow, was able to pull piece out with her finger, throat still scratchy    Skin Problem     rash under both breast, itching and discolored skin     I have reviewed the patient's medical history in detail and updated the computerized patient record. Health Maintenance reviewed. 1. Have you been to the ER, urgent care clinic since your last visit? Hospitalized since your last visit?no    2. Have you seen or consulted any other health care providers outside of the [address] [place] [address] since your last visit? Include any pap smears or colon screening. No      Encouraged pt to discuss pt's wishes with spouse/partner/family and bring them in the next appt to follow thru with the Advanced Directive    Fall Risk Assessment, last 12 mths 2/5/2019   Able to walk? Yes   Fall in past 12 months? No   Fall with injury? -   Number of falls in past 12 months -   Fall Risk Score -       3 most recent PHQ Screens 2/5/2019   Little interest or pleasure in doing things Several days   Feeling down, depressed, irritable, or hopeless Several days   Total Score PHQ 2 2       Abuse Screening Questionnaire 2/5/2019   Do you ever feel afraid of your partner? N   Are you in a relationship with someone who physically or mentally threatens you? N   Is it safe for you to go home?  Y       ADL Assessment 2/5/2019   Feeding yourself No Help Needed   Getting from bed to chair No Help Needed   Getting dressed No Help Needed   Bathing or showering No Help Needed   Walk across the room (includes cane/walker) No Help Needed   Using the telphone No Help Needed   Taking your medications No Help Needed   Preparing meals No Help Needed   Managing money (expenses/bills) No Help Needed   Moderately strenuous housework (laundry) No Help Needed Shopping for personal items (toiletries/medicines) No Help Needed   Shopping for groceries No Help Needed   Driving No Help Needed   Climbing a flight of stairs No Help Needed   Getting to places beyond walking distances No Help Needed

## 2019-08-27 NOTE — PROGRESS NOTES
HISTORY OF PRESENT ILLNESS  Clyde Schwartz is a 63 y.o. female. Sore Throat    The history is provided by the patient. This is a new problem. The current episode started yesterday. The problem has been gradually improving. There has been no fever. Associated symptoms include stridor. Associated symptoms comments: Agree with comments, see chief complaint. . Treatments tried: reached down and picked out plastic FB from her throat. The treatment provided significant relief. Skin Problem       Agree with comments, see chief complaint. ex 3 cm not 3 in  Rash x mo, some pruttis, no fevel or new exposures. Almost swallowed 3 cm hard white plastic object as she was eating a salad. Reported to [employer] and filed a report. Despit above mood OK    Review of Systems   HENT: Positive for sore throat. Respiratory: Positive for stridor.       Social History     Socioeconomic History    Marital status:      Spouse name: Not on file    Number of children: 2    Years of education: Not on file    Highest education level: Not on file   Occupational History    Occupation: retired   Social Needs    Financial resource strain: Not on file    Food insecurity:     Worry: Not on file     Inability: Not on file   [employer] needs:     Medical: Not on file     Non-medical: Not on file   Tobacco Use    Smoking status: Never Smoker    Smokeless tobacco: Never Used   Substance and Sexual Activity    Alcohol use: No    Drug use: No    Sexual activity: Never   Lifestyle    Physical activity:     Days per week: Not on file     Minutes per session: Not on file    Stress: Not on file   Relationships    Social connections:     Talks on phone: Not on file     Gets together: Not on file     Attends Quaker service: Not on file     Active member of club or organization: Not on file     Attends meetings of clubs or organizations: Not on file     Relationship status: Not on file    Intimate partner violence:     Fear of current or ex partner: Not on file     Emotionally abused: Not on file     Physically abused: Not on file     Forced sexual activity: Not on file   Other Topics Concern    Not on file   Social History Narrative     has brain damage, lives with him       Physical Exam  Visit Vitals  /78 (BP 1 Location: Left arm, BP Patient Position: Sitting)   Pulse 72   Temp 98.1 °F (36.7 °C) (Oral)   Resp 16   Ht 4' 9\" (1.448 m)   Wt 183 lb (83 kg)   SpO2 95%   BMI 39.60 kg/m²     WD WN female NAD  Mouth throat nl  Heart RRR without murmers clicks or rubs  Lungs CTA  Abdo soft nontender  Ext no edema  Discoloration in breast folds. ASSESSMENT and PLAN  Encounter Diagnoses   Name Primary?  Swallowed foreign body, initial encounter Yes    Intertriginous candidiasis     Bipolar affective disorder, currently depressed, mild ([facility]      Orders Placed This Encounter    DISCONTD: RX AMB LIDOCAINE 2% DIPHENHYDRAMINE ORAL COMPOUND    nystatin (MYCOSTATIN) topical cream     Paper Rx for mylanta/viscous lidocaine swallow. Bipolar stable. Follow-up and Dispositions    · Return if symptoms worsen or fail to improve.

## 2019-10-04 ENCOUNTER — OFFICE VISIT (OUTPATIENT)
Dept: INTERNAL MEDICINE CLINIC | Age: 59
End: 2019-10-04

## 2019-10-04 VITALS
OXYGEN SATURATION: 95 % | TEMPERATURE: 98.2 F | WEIGHT: 181 LBS | SYSTOLIC BLOOD PRESSURE: 130 MMHG | HEIGHT: 57 IN | DIASTOLIC BLOOD PRESSURE: 90 MMHG | BODY MASS INDEX: 39.05 KG/M2 | RESPIRATION RATE: 16 BRPM | HEART RATE: 75 BPM

## 2019-10-04 DIAGNOSIS — Z98.84 WEIGHT GAIN STATUS POST GASTRIC BYPASS: ICD-10-CM

## 2019-10-04 DIAGNOSIS — S89.92XS INJURY OF LEFT KNEE, SEQUELA: Primary | ICD-10-CM

## 2019-10-04 DIAGNOSIS — R63.5 WEIGHT GAIN STATUS POST GASTRIC BYPASS: ICD-10-CM

## 2019-10-04 DIAGNOSIS — Z23 ENCOUNTER FOR IMMUNIZATION: ICD-10-CM

## 2019-10-04 DIAGNOSIS — E66.01 OBESITY, MORBID (HCC): ICD-10-CM

## 2019-10-04 DIAGNOSIS — F32.1 MODERATE MAJOR DEPRESSION (HCC): ICD-10-CM

## 2019-10-04 DIAGNOSIS — I73.9 PERIPHERAL VASCULAR DISEASE (HCC): ICD-10-CM

## 2019-10-04 RX ORDER — CELECOXIB 100 MG/1
100 CAPSULE ORAL 2 TIMES DAILY
Qty: 60 CAP | Refills: 2 | Status: SHIPPED | OUTPATIENT
Start: 2019-10-04 | End: 2020-01-02

## 2019-10-04 RX ORDER — CELECOXIB 100 MG/1
100 CAPSULE ORAL 2 TIMES DAILY
Qty: 60 CAP | Refills: 1 | Status: SHIPPED | OUTPATIENT
Start: 2019-10-04 | End: 2019-10-04 | Stop reason: ALTCHOICE

## 2019-10-04 NOTE — PROGRESS NOTES
HISTORY OF PRESENT ILLNESS  Preet Taylor is a 63 y.o. female. Knee gives way    Leg Pain    The history is provided by the patient. This is a recurrent problem. The current episode started more than 1 week ago. The problem occurs constantly. The problem has been gradually worsening. The pain is present in the left knee. The quality of the pain is described as sharp and aching. The pain is severe. Pertinent negatives include no stiffness. There has been a history of trauma (last fall in Sept.). Cant' take nsaids due to n/v, leg gives way, no steroids due to WG, ibuprofen NV  Top diclfenac caused itching    Patient Active Problem List   Diagnosis Code    Bilateral leg edema R60.0    Hypoglycemia E16.2    History of gastric bypass Z98.84    Migraine aura without headache G43. 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 Negative for stiffness. Right ok it's the left   Neurological: Positive for weakness.      Social History     Socioeconomic History    Marital status:      Spouse name: Not on file    Number of children: 2    Years of education: Not on file    Highest education level: Not on file   Occupational History    Occupation: retired   Social Needs    Financial resource strain: Not on file    Food insecurity:     Worry: Not on file     Inability: Not on file   [employer] needs:     Medical: Not on file     Non-medical: Not on file   Tobacco Use    Smoking status: Never Smoker    Smokeless tobacco: Never Used   Substance and Sexual Activity    Alcohol use: No    Drug use: No    Sexual activity: Never   Lifestyle    Physical activity:     Days per week: Not on file     Minutes per session: Not on file    Stress: Not on file   Relationships    Social connections:     Talks on phone: Not on file     Gets together: Not on file     Attends Jain service: Not on file     Active member of club or organization: Not on file     Attends meetings of clubs or organizations: Not on file     Relationship status: Not on file    Intimate partner violence:     Fear of current or ex partner: Not on file     Emotionally abused: Not on file     Physically abused: Not on file     Forced sexual activity: Not on file   Other Topics Concern    Not on file   Social History Narrative     has brain damage, lives with him       Physical Exam  Visit Vitals  /90 (BP 1 Location: Left arm, BP Patient Position: At rest)   Pulse 75   Temp 98.2 °F (36.8 °C) (Oral)   Resp 16   Ht 4' 9\" (1.448 m)   Wt 181 lb (82.1 kg)   SpO2 95%   BMI 39.17 kg/m²     WD WN female NAD  Heart RRR without murmers clicks or rubs  Lungs CTA  Abdo soft nontender  Ext sl edema, + tender to palp no effusion or instability the left r wnl    ASSESSMENT and PLAN  Encounter Diagnoses   Name Primary?  Injury of left knee, sequela Yes    Peripheral vascular disease ([address]     Encounter for immunization     Obesity, morbid ([address]     Weight gain status post gastric bypass     Moderate major depression ([address]      Orders Placed This Encounter    INFLUENZA VIRUS VACCINE QUADRIVALENT, PRESERVATIVE FREE SYRINGE (35833)    REFERRAL TO ORTHOPEDICS    DISCONTD: celecoxib (CELEBREX) 100 mg capsule    celecoxib (CELEBREX) 100 mg capsule     Discussed possible side affects, precautions, and drug interactions and possible benefits of the medication(s). Poss arthritis try celebrex.   Discussed possible side affects, precautions, and drug interactions and possible benefits of the medication(s). See ortho  Depression stable    Follow-up and Dispositions    · Return in about 6 weeks (around 11/15/2019) for routine follow up.

## 2019-10-07 ENCOUNTER — DOCUMENTATION ONLY (OUTPATIENT)
Dept: INTERNAL MEDICINE CLINIC | Age: 59
End: 2019-10-07

## 2019-10-18 DIAGNOSIS — E16.2 HYPOGLYCEMIA: ICD-10-CM

## 2019-10-18 RX ORDER — MECLIZINE HCL 25MG 25 MG/1
TABLET, CHEWABLE ORAL
Qty: 60 TAB | Refills: 5 | Status: SHIPPED | OUTPATIENT
Start: 2019-10-18 | End: 2020-02-12

## 2019-10-20 RX ORDER — ACARBOSE 50 MG/1
TABLET ORAL
Qty: 90 TAB | Refills: 5 | Status: SHIPPED | OUTPATIENT
Start: 2019-10-20 | End: 2020-09-28 | Stop reason: SDUPTHER

## 2019-10-28 ENCOUNTER — TELEPHONE (OUTPATIENT)
Dept: CARDIOLOGY CLINIC | Age: 59
End: 2019-10-28

## 2019-10-28 NOTE — TELEPHONE ENCOUNTER
Called, left vm for pt to return call to office. Did not receive her remote transmission today.      Brenda Lees RN

## 2019-11-26 ENCOUNTER — OFFICE VISIT (OUTPATIENT)
Dept: INTERNAL MEDICINE CLINIC | Age: 59
End: 2019-11-26

## 2019-11-26 VITALS
RESPIRATION RATE: 16 BRPM | TEMPERATURE: 96.7 F | BODY MASS INDEX: 37.97 KG/M2 | SYSTOLIC BLOOD PRESSURE: 110 MMHG | HEIGHT: 57 IN | DIASTOLIC BLOOD PRESSURE: 80 MMHG | OXYGEN SATURATION: 97 % | WEIGHT: 176 LBS | HEART RATE: 71 BPM

## 2019-11-26 DIAGNOSIS — M25.511 RIGHT SHOULDER PAIN, UNSPECIFIED CHRONICITY: ICD-10-CM

## 2019-11-26 DIAGNOSIS — F31.31 BIPOLAR AFFECTIVE DISORDER, CURRENTLY DEPRESSED, MILD (HCC): ICD-10-CM

## 2019-11-26 DIAGNOSIS — E66.01 OBESITY, MORBID (HCC): ICD-10-CM

## 2019-11-26 DIAGNOSIS — R55 SYNCOPE, UNSPECIFIED SYNCOPE TYPE: Primary | ICD-10-CM

## 2019-11-26 RX ORDER — CYCLOBENZAPRINE HCL 5 MG
5 TABLET ORAL
Qty: 60 TAB | Refills: 0 | Status: SHIPPED | OUTPATIENT
Start: 2019-11-26 | End: 2020-09-28 | Stop reason: SDUPTHER

## 2019-11-26 NOTE — PROGRESS NOTES
HISTORY OF PRESENT ILLNESS  Elpidio Rosen is a 63 y.o. female. Friend and her going to [employer] when friend noted that she became somewhat unresponsive. She eventually passes out and was taken to ER. Said BS was low and she did respond to oral and IV glucose solt. BS was 47 but repeat was 93. Incident occurred Oct 28th Ct head neg and Pt DC'd. Loss of Consciousness    The history is provided by the patient. This is a recurrent problem. Episode onset: 10/28/2019. The problem occurs rarely. The problem has been resolved. She lost consciousness for a period of greater than 5 minutes. Associated symptoms include visual change. Pertinent negatives include no chest pain, no focal weakness and no head injury. Treatments tried: glu. The treatment provided significant relief. Her past medical history is significant for syncope. Her past medical history does not include no CVA, no CAD, no DM or no seizures. Ant shoulde rpain x 2 weeks. No known injury, ant pain  Patient Active Problem List   Diagnosis Code    Bilateral leg edema R60.0    Hypoglycemia E16.2    History of gastric bypass Z98.84    Migraine aura without headache G43. 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    Neurological: Positive for loss of consciousness and syncope. Negative for focal weakness. Physical Exam  Visit Vitals  /80 (BP 1 Location: Left arm, BP Patient Position: Sitting)   Pulse 71   Temp 96.7 °F (35.9 °C) (Temporal)   Resp 16   Ht 4' 9\" (1.448 m)   Wt 176 lb (79.8 kg)   SpO2 97%   BMI 38.09 kg/m²     Well developed well nourished no acute distress. Pupils equal round react to light, extraocular muscles intact. Tympanic membranes within normal limits throat unremarkable  Cranial nerves II through XII are intact. Neck unremarkable  Heart regular rate and rhythm without clicks murmurs rubs  Lungs are clear to auscultation  Abdomen soft. Extremities, motor 5 out of 5 bilaterally, reflexes 2+ equal bilaterally. Right shoulder + impingment  ASSESSMENT and PLAN  Encounter Diagnoses   Name Primary?  Syncope, unspecified syncope type Yes    Right shoulder pain, unspecified chronicity     Obesity, morbid ([address]     Bipolar affective disorder, currently depressed, mild ([address]      Orders Placed This Encounter    REFERRAL TO NEUROLOGY    cyclobenzaprine (FLEXERIL) 5 mg tablet     Eval per neurology, consider endocrine referral. R/o neuro causes of syncpe  Offered shoulde rinjections pt scared will do MR, if no better can come in for injecyion  Discussed possible side affects, precautions, and drug interactions and possible benefits of the medication(s). Follow-up and Dispositions    · Return in about 4 weeks (around 12/24/2019) for routine follow up.

## 2019-11-26 NOTE — PROGRESS NOTES
Chief Complaint   Patient presents with    Headache     headaches x30 days     I have reviewed the patient's medical history in detail and updated the computerized patient record. Health Maintenance reviewed. 1. Have you been to the ER, urgent care clinic since your last visit? Hospitalized since your last visit? yes    2. Have you seen or consulted any other health care providers outside of the [address] [place] [address] since your last visit? Include any pap smears or colon screening. [facility]      Encouraged pt to discuss pt's wishes with spouse/partner/family and bring them in the next appt to follow thru with the Advanced Directive    Fall Risk Assessment, last 12 mths 2/5/2019   Able to walk? Yes   Fall in past 12 months? No   Fall with injury? -   Number of falls in past 12 months -   Fall Risk Score -       3 most recent PHQ Screens 2/5/2019   Little interest or pleasure in doing things Several days   Feeling down, depressed, irritable, or hopeless Several days   Total Score PHQ 2 2       Abuse Screening Questionnaire 2/5/2019   Do you ever feel afraid of your partner? N   Are you in a relationship with someone who physically or mentally threatens you? N   Is it safe for you to go home?  Y       ADL Assessment 2/5/2019   Feeding yourself No Help Needed   Getting from bed to chair No Help Needed   Getting dressed No Help Needed   Bathing or showering No Help Needed   Walk across the room (includes cane/walker) No Help Needed   Using the telphone No Help Needed   Taking your medications No Help Needed   Preparing meals No Help Needed   Managing money (expenses/bills) No Help Needed   Moderately strenuous housework (laundry) No Help Needed   Shopping for personal items (toiletries/medicines) No Help Needed   Shopping for groceries No Help Needed   Driving No Help Needed   Climbing a flight of stairs No Help Needed   Getting to places beyond walking distances No Help Needed

## 2019-12-06 ENCOUNTER — TELEPHONE (OUTPATIENT)
Dept: CARDIOLOGY CLINIC | Age: 59
End: 2019-12-06

## 2019-12-06 ENCOUNTER — DOCUMENTATION ONLY (OUTPATIENT)
Dept: INTERNAL MEDICINE CLINIC | Age: 59
End: 2019-12-06

## 2019-12-06 NOTE — TELEPHONE ENCOUNTER
Pt came by office, two identifiers verified. Stated she had a fall this morning and landed on her chest.  States her only symptom is not being able to take a deep breath. Pts  was with her, directed them to the ER across the street at [facility]. Pt verbalized understanding of information discussed w/ no further questions at this time.        Ten Valenzuela RN

## 2019-12-10 ENCOUNTER — TELEPHONE (OUTPATIENT)
Dept: INTERNAL MEDICINE CLINIC | Age: 59
End: 2019-12-10

## 2019-12-10 NOTE — TELEPHONE ENCOUNTER
Pt called in reference to saying Dr. Sarah Ventura wanted her to get a head scan done. She said that she needs someone to help her set this up. Pt would like it scheduled on a Monday. I didn't see an order in the system. Please call her at [phone].

## 2020-01-09 ENCOUNTER — HOSPITAL ENCOUNTER (OUTPATIENT)
Dept: MAMMOGRAPHY | Age: 60
Discharge: HOME | End: 2020-01-09
Attending: FAMILY MEDICINE
Payer: MEDICARE

## 2020-01-09 DIAGNOSIS — Z12.31 VISIT FOR SCREENING MAMMOGRAM: ICD-10-CM

## 2020-01-09 PROCEDURE — 77067 SCR MAMMO BI INCL CAD: CPT

## 2020-01-24 ENCOUNTER — OFFICE VISIT (OUTPATIENT)
Dept: INTERNAL MEDICINE CLINIC | Age: 60
End: 2020-01-24

## 2020-01-24 VITALS
RESPIRATION RATE: 18 BRPM | DIASTOLIC BLOOD PRESSURE: 70 MMHG | SYSTOLIC BLOOD PRESSURE: 104 MMHG | WEIGHT: 175 LBS | HEIGHT: 57 IN | HEART RATE: 87 BPM | BODY MASS INDEX: 37.76 KG/M2 | TEMPERATURE: 96.1 F | OXYGEN SATURATION: 96 %

## 2020-01-24 DIAGNOSIS — Z13.31 SCREENING FOR DEPRESSION: ICD-10-CM

## 2020-01-24 DIAGNOSIS — Z13.1 SCREENING FOR DIABETES MELLITUS: ICD-10-CM

## 2020-01-24 DIAGNOSIS — E66.01 OBESITY, MORBID (HCC): ICD-10-CM

## 2020-01-24 DIAGNOSIS — R05.3 PERSISTENT COUGH: ICD-10-CM

## 2020-01-24 DIAGNOSIS — I73.9 PERIPHERAL VASCULAR DISEASE (HCC): ICD-10-CM

## 2020-01-24 DIAGNOSIS — Z13.6 SCREENING FOR ISCHEMIC HEART DISEASE: ICD-10-CM

## 2020-01-24 DIAGNOSIS — Z13.39 SCREENING FOR ALCOHOLISM: ICD-10-CM

## 2020-01-24 DIAGNOSIS — F32.1 MODERATE MAJOR DEPRESSION (HCC): ICD-10-CM

## 2020-01-24 DIAGNOSIS — Z00.00 MEDICARE ANNUAL WELLNESS VISIT, SUBSEQUENT: Primary | ICD-10-CM

## 2020-01-24 RX ORDER — BENZONATATE 200 MG/1
200 CAPSULE ORAL
Qty: 21 CAP | Refills: 0 | Status: SHIPPED | OUTPATIENT
Start: 2020-01-24 | End: 2020-01-31

## 2020-01-24 RX ORDER — AZITHROMYCIN 250 MG/1
250 TABLET, FILM COATED ORAL SEE ADMIN INSTRUCTIONS
Qty: 6 TAB | Refills: 0 | Status: SHIPPED | OUTPATIENT
Start: 2020-01-24 | End: 2020-02-12 | Stop reason: ALTCHOICE

## 2020-01-24 NOTE — PROGRESS NOTES
HISTORY OF PRESENT Mili Tejeda is a 63 y.o. female. Cough   The history is provided by the patient. This is a recurrent problem. Episode onset: 3 mo. The problem occurs daily. The problem has not changed since onset. Associated symptoms include chest pain, abdominal pain, headaches and shortness of breath. Review of Systems   Constitutional: Positive for chills. HENT: Negative for sore throat. Respiratory: Positive for cough and shortness of breath. Negative for hemoptysis and sputum production. Cardiovascular: Positive for chest pain. Gastrointestinal: Positive for abdominal pain. Neurological: Positive for headaches.      Social History     Socioeconomic History    Marital status:      Spouse name: Not on file    Number of children: 2    Years of education: Not on file    Highest education level: Not on file   Occupational History    Occupation: retired   Social Needs    Financial resource strain: Not on file    Food insecurity:     Worry: Not on file     Inability: Not on file   [employer] needs:     Medical: Not on file     Non-medical: Not on file   Tobacco Use    Smoking status: Never Smoker    Smokeless tobacco: Never Used   Substance and Sexual Activity    Alcohol use: No    Drug use: No    Sexual activity: Never   Lifestyle    Physical activity:     Days per week: Not on file     Minutes per session: Not on file    Stress: Not on file   Relationships    Social connections:     Talks on phone: Not on file     Gets together: Not on file     Attends Yazidi service: Not on file     Active member of club or organization: Not on file     Attends meetings of clubs or organizations: Not on file     Relationship status: Not on file    Intimate partner violence:     Fear of current or ex partner: Not on file     Emotionally abused: Not on file     Physically abused: Not on file     Forced sexual activity: Not on file   Other Topics Concern    Not on file Social History Narrative     has brain damage, lives with him       Physical Exam  Visit Vitals  /70 (BP 1 Location: Left arm, BP Patient Position: Sitting)   Pulse 87   Temp 96.1 °F (35.6 °C) (Temporal)   Resp 18   Ht 4' 9\" (1.448 m)   Wt 175 lb (79.4 kg)   SpO2 96%   BMI 37.87 kg/m²     WD WN female NAD  Heart RRR without murmers clicks or rubs  Lungs CTA  Abdo soft nontender  Ext no edema    ASSESSMENT and PLAN  Encounter Diagnoses   Name Primary?  Medicare annual wellness visit, subsequent Yes    Persistent cough     Screening for alcoholism     Screening for depression     Screening for diabetes mellitus     Obesity, morbid ([address]     [facility]     Moderate major depression ([address]      Orders Placed This Encounter    Depression Screen Annual    XR CHEST PA LAT    azithromycin (ZITHROMAX) 250 mg tablet     This is the Subsequent Medicare Annual Wellness Exam, performed 12 months or more after the Initial AWV or the last Subsequent AWV    I have reviewed the patient's medical history in detail and updated the computerized patient record. History     Patient Active Problem List   Diagnosis Code    Bilateral leg edema R60.0    Hypoglycemia E16.2    History of gastric bypass Z98.84    Migraine aura without headache G43. 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    Peripheral vascular disease ([facility]) I73.9    Weight gain status post gastric bypass R63.5     Past Medical History:   Diagnosis Date    Asthma     Depression     Hypoglycemia     Hypotension     Syncope and collapse 7/24/15    RTH      Past Surgical History:   Procedure Laterality Date    HX ABDOMINAL LAPAROSCOPY      HX CARPAL TUNNEL RELEASE Bilateral more than 10 years ago    HX  SECTION  4083,8367    HX COLONOSCOPY  08/2016    HX GASTRIC BYPASS  1997    x2    HX HYSTERECTOMY  1985    HX PACEMAKER      HX TONSIL AND ADENOIDECTOMY  more than 10 years ago    NEUROLOGICAL PROCEDURE UNLISTED      Bi CTS     Current Outpatient Medications   Medication Sig Dispense Refill    azithromycin (ZITHROMAX) 250 mg tablet Take 1 Tab by mouth See Admin Instructions. Take two tablets today then one tablet daily for next 4 days 6 Tab 0    cyclobenzaprine (FLEXERIL) 5 mg tablet Take 1 Tab by mouth three (3) times daily as needed for Muscle Spasm(s). 60 Tab 0    acarbose (PRECOSE) 50 mg tablet TAKE 1 TABLETS BY MOUTH 30 MINS BEFORE MEALS THREE TIMES A DAY 90 Tab 5    nystatin (MYCOSTATIN) topical cream Apply  to affected area two (2) times a day. 15 g 0    omeprazole (PRILOSEC) 40 mg capsule TAKE 1 CAPSULE BY MOUTH DAILY. 90 Cap 3    furosemide (LASIX) 20 mg tablet TAKE 1 TABLET BY MOUTH DAILY AS NEEDED 90 Tab 3    potassium chloride SR (KLOR-CON 10) 10 mEq tablet TAKE 1 TABLET BY MOUTH DAILY, IF TAKES LASIX (FUROSEMIDE) 90 Tab 3    butalbital-acetaminophen-caff (FIORICET) -40 mg per capsule TAKE 1 CAPSULE BY MOUTH DAILY AS NEEDED FOR PAIN 12 Cap 1    aspirin 81 mg chewable tablet CHEW AND SWALLOW 1 TAB BY MOUTH DAILY. 90 Tab 3    sertraline (ZOLOFT) 50 mg tablet Take 1 Tab by mouth daily. 90 Tab 1    cyanocobalamin (VITAMIN B-12) 1,000 mcg tablet TAKE 1 TABLET BY MOUTH DAILY FOR VITAMIN B12 DEFICIENCY 90 Tab 3    polyethylene glycol (MIRALAX) 17 gram packet TAKE 1 PACKET BY MOUTH DAILY. 50 Packet 5    VITAMIN D3 1,000 unit tablet TAKE 1 TABLET BY MOUTH DAILY FOR VITAMIN D DEFICIENCY 180 Tab 10    meclizine (TRAVEL SICKNESS, MECLIZINE,) 25 mg chewable tablet TAKE 1 TABLET BY MOUTH THREE TIMES DAILY AS NEEDED FOR UP TO 10 DAYS FOR DIZZINESS 60 Tab 5    fluticasone (FLONASE) 50 mcg/actuation nasal spray 2 Sprays by Both Nostrils route daily.  1 Bottle 5    traZODone (DESYREL) 50 mg tablet Take 50 mg by mouth nightly.  Nebulizer & Compressor machine Use as directed 1 Each 0    albuterol (PROVENTIL VENTOLIN) 2.5 mg /3 mL (0.083 %) nebulizer solution 3 mL by Nebulization route every four (4) hours as needed for Wheezing. 100 Each 1    meclizine (TRAVEL SICKNESS, MECLIZINE,) 25 mg chewable tablet TAKE 1 TABLET BY MOUTH THREE TIMES DAILY AS NEEDED FOR UP TO 10 DAYS FOR DIZZINESS 60 Tab 5     Allergies   Allergen Reactions    Benadryl [Diphenhydramine Hcl] Nausea and Vomiting    Ibuprofen Nausea and Vomiting    Sulfamethoxazole-Trimethoprim Nausea Only    Tramadol Anxiety    Diclofenac Itching       Family History   Problem Relation Age of Onset    Stroke Mother     Cancer Father     Diabetes Brother     Cancer Maternal Aunt     Cancer Maternal Grandmother     Cancer Brother     Kidney Disease Brother      Social History     Tobacco Use    Smoking status: Never Smoker    Smokeless tobacco: Never Used   Substance Use Topics    Alcohol use: No       Depression Risk Factor Screening:     3 most recent PHQ Screens 1/24/2020   Little interest or pleasure in doing things Several days   Feeling down, depressed, irritable, or hopeless Several days   Total Score PHQ 2 2       Alcohol Risk Factor Screening:   Do you average 1 drink per night or more than 7 drinks a week:  No    On any one occasion in the past three months have you have had more than 3 drinks containing alcohol:  No      Functional Ability and Level of Safety:   Hearing: Hearing is good. Activities of Daily Living: The home contains: no safety equipment. Patient does total self care    Ambulation: with no difficulty    Fall Risk:  Fall Risk Assessment, last 12 mths 1/24/2020   Able to walk? Yes   Fall in past 12 months?  No   Fall with injury? -   Number of falls in past 12 months -   Fall Risk Score -       Abuse Screen:  Patient is not abused    Cognitive Screening   Has your family/caregiver stated any concerns about your memory: no  Cognitive Screening: Normal - Clock Drawing Test    Patient Care Team   Patient Care Team:  Broderick Lugo MD as PCP - General (Family Practice)  Broderick Lugo MD as PCP - [facility]  Aric Nails MD as Physician (Cardiology)  Oren Lerma MD (Cardiology)    Assessment/Plan   Education and counseling provided:  Cardiovascular screening blood test    Diagnoses and all orders for this visit:    1. Medicare annual wellness visit, subsequent    2. Persistent cough  -     XR CHEST PA LAT; Future  -     azithromycin (ZITHROMAX) 250 mg tablet; Take 1 Tab by mouth See Admin Instructions. Take two tablets today then one tablet daily for next 4 days    3. Screening for depression  -     DEPRESSION SCREEN ANNUAL    4. Obesity, morbid ([address]    5. Peripheral vascular disease ([facility]    6. Moderate major depression ([facility]    7. Screening for alcoholism  -     [place] ANNUAL ALCOHOL SCREEN 15 MIN    8. Screening for diabetes mellitus    9. Screening for ischemic heart disease  -     LIPID PANEL      Orders Placed This Encounter    Depression Screen Annual    XR CHEST PA LAT     Standing Status:   Future     Standing Expiration Date:   2/24/2021     Scheduling Instructions:      [place]     Order Specific Question:   Is Patient Allergic to Contrast Dye? Answer:   No    METABOLIC PANEL, COMPREHENSIVE     Standing Status:   Future     Standing Expiration Date:   7/26/2020    LIPID PANEL     Standing Status:   Future     Standing Expiration Date:   7/26/2020    CBC W/O DIFF     Standing Status:   Future     Standing Expiration Date:   7/26/2020    Annual  Alcohol Screen 15 min ()    azithromycin (ZITHROMAX) 250 mg tablet     Sig: Take 1 Tab by mouth See Admin Instructions.  Take two tablets today then one tablet daily for next 4 days     Dispense:  6 Tab     Refill:  0    benzonatate (TESSALON) 200 mg capsule     Sig: Take 1 Cap by mouth three (3) times daily as needed for Cough for up to 7 days. Dispense:  21 Cap     Refill:  0         Health Maintenance Due   Topic Date Due    Shingrix Vaccine Age 49> (1 of 2) 12/29/2011    MEDICARE YEARLY EXAM  07/07/2019     Follow-up and Dispositions    · Return in about 10 days (around 2/3/2020) for routine follow up.

## 2020-01-24 NOTE — PATIENT INSTRUCTIONS
Medicare Wellness Visit, Female     The best way to live healthy is to have a lifestyle where you eat a well-balanced diet, exercise regularly, limit alcohol use, and quit all forms of tobacco/nicotine, if applicable. Regular preventive services are another way to keep healthy. Preventive services (vaccines, screening tests, monitoring & exams) can help personalize your care plan, which helps you manage your own care. Screening tests can find health problems at the earliest stages, when they are easiest to treat. Norma follows the current, evidence-based guidelines published by the [facility] Keegan Terry ([facility]) when recommending preventive services for our patients. Because we follow these guidelines, sometimes recommendations change over time as research supports it. (For example, mammograms used to be recommended annually. Even though Medicare will still pay for an annual mammogram, the newer guidelines recommend a mammogram every two years for women of average risk). Of course, you and your doctor may decide to screen more often for some diseases, based on your risk and your co-morbidities (chronic disease you are already diagnosed with). Preventive services for you include:  - Medicare offers their members a free annual wellness visit, which is time for you and your primary care provider to discuss and plan for your preventive service needs. Take advantage of this benefit every year!  -All adults over the age of 72 should receive the recommended pneumonia vaccines. Current USPSTF guidelines recommend a series of two vaccines for the best pneumonia protection.   -All adults should have a flu vaccine yearly and a tetanus vaccine every 10 years.   -All adults age 48 and older should receive the shingles vaccines (series of two vaccines).       -All adults age 38-68 who are overweight should have a diabetes screening test once every three years.   -All adults born between 80 and 1965 should be screened once for Hepatitis C.  -Other screening tests and preventive services for persons with diabetes include: an eye exam to screen for diabetic retinopathy, a kidney function test, a foot exam, and stricter control over your cholesterol.   -Cardiovascular screening for adults with routine risk involves an electrocardiogram (ECG) at intervals determined by your doctor.   -Colorectal cancer screenings should be done for adults age 54-65 with no increased risk factors for colorectal cancer. There are a number of acceptable methods of screening for this type of cancer. Each test has its own benefits and drawbacks. Discuss with your doctor what is most appropriate for you during your annual wellness visit. The different tests include: colonoscopy (considered the best screening method), a fecal occult blood test, a fecal DNA test, and sigmoidoscopy.    -A bone mass density test is recommended when a woman turns 65 to screen for osteoporosis. This test is only recommended one time, as a screening. Some providers will use this same test as a disease monitoring tool if you already have osteoporosis. -Breast cancer screenings are recommended every other year for women of normal risk, age 54-69.  -Cervical cancer screenings for women over age 72 are only recommended with certain risk factors. Here is a list of your current Health Maintenance items (your personalized list of preventive services) with a due date:  Health Maintenance Due   Topic Date Due    Shingles Vaccine (1 of 2) 12/29/2011    Annual Well Visit  07/07/2019         Medicare Wellness Visit, Female     The best way to live healthy is to have a lifestyle where you eat a well-balanced diet, exercise regularly, limit alcohol use, and quit all forms of tobacco/nicotine, if applicable. Regular preventive services are another way to keep healthy.  Preventive services (vaccines, screening tests, monitoring & exams) can help personalize your care plan, which helps you manage your own care. Screening tests can find health problems at the earliest stages, when they are easiest to treat. Norma follows the current, evidence-based guidelines published by the [facility]egan Terry ([facility]) when recommending preventive services for our patients. Because we follow these guidelines, sometimes recommendations change over time as research supports it. (For example, mammograms used to be recommended annually. Even though Medicare will still pay for an annual mammogram, the newer guidelines recommend a mammogram every two years for women of average risk). Of course, you and your doctor may decide to screen more often for some diseases, based on your risk and your co-morbidities (chronic disease you are already diagnosed with). Preventive services for you include:  - Medicare offers their members a free annual wellness visit, which is time for you and your primary care provider to discuss and plan for your preventive service needs. Take advantage of this benefit every year!  -All adults over the age of 72 should receive the recommended pneumonia vaccines. Current USPSTF guidelines recommend a series of two vaccines for the best pneumonia protection.   -All adults should have a flu vaccine yearly and a tetanus vaccine every 10 years.   -All adults age 48 and older should receive the shingles vaccines (series of two vaccines).       -All adults age 38-68 who are overweight should have a diabetes screening test once every three years.   -All adults born between 80 and 1965 should be screened once for Hepatitis C.  -Other screening tests and preventive services for persons with diabetes include: an eye exam to screen for diabetic retinopathy, a kidney function test, a foot exam, and stricter control over your cholesterol.   -Cardiovascular screening for adults with routine risk involves an electrocardiogram (ECG) at intervals determined by your doctor.   -Colorectal cancer screenings should be done for adults age 54-65 with no increased risk factors for colorectal cancer. There are a number of acceptable methods of screening for this type of cancer. Each test has its own benefits and drawbacks. Discuss with your doctor what is most appropriate for you during your annual wellness visit. The different tests include: colonoscopy (considered the best screening method), a fecal occult blood test, a fecal DNA test, and sigmoidoscopy.    -A bone mass density test is recommended when a woman turns 65 to screen for osteoporosis. This test is only recommended one time, as a screening. Some providers will use this same test as a disease monitoring tool if you already have osteoporosis. -Breast cancer screenings are recommended every other year for women of normal risk, age 54-69.  -Cervical cancer screenings for women over age 72 are only recommended with certain risk factors.      Here is a list of your current Health Maintenance items (your personalized list of preventive services) with a due date:  Health Maintenance Due   Topic Date Due    Shingles Vaccine (1 of 2) 12/29/2011    Annual Well Visit  07/07/2019

## 2020-01-25 RX ORDER — BUTALBITAL, ACETAMINOPHEN AND CAFFEINE 300; 40; 50 MG/1; MG/1; MG/1
CAPSULE ORAL
Qty: 12 CAP | Refills: 0 | Status: SHIPPED | OUTPATIENT
Start: 2020-01-25 | End: 2020-02-12 | Stop reason: SDUPTHER

## 2020-02-05 LAB
ALBUMIN SERPL-MCNC: 3.6 G/DL (ref 3.8–4.9)
ALBUMIN/GLOB SERPL: 2 {RATIO} (ref 1.2–2.2)
ALP SERPL-CCNC: 85 IU/L (ref 39–117)
ALT SERPL-CCNC: 16 IU/L (ref 0–32)
AST SERPL-CCNC: 23 IU/L (ref 0–40)
BILIRUB SERPL-MCNC: <0.2 MG/DL (ref 0–1.2)
BUN SERPL-MCNC: 21 MG/DL (ref 6–24)
BUN/CREAT SERPL: 29 (ref 9–23)
CALCIUM SERPL-MCNC: 8.8 MG/DL (ref 8.7–10.2)
CHLORIDE SERPL-SCNC: 107 MMOL/L (ref 96–106)
CHOLEST SERPL-MCNC: 190 MG/DL (ref 100–199)
CO2 SERPL-SCNC: 20 MMOL/L (ref 20–29)
CREAT SERPL-MCNC: 0.72 MG/DL (ref 0.57–1)
ERYTHROCYTE [DISTWIDTH] IN BLOOD BY AUTOMATED COUNT: 13.5 % (ref 11.7–15.4)
GLOBULIN SER CALC-MCNC: 1.8 G/DL (ref 1.5–4.5)
GLUCOSE SERPL-MCNC: 80 MG/DL (ref 65–99)
HCT VFR BLD AUTO: 39 % (ref 34–46.6)
HDLC SERPL-MCNC: 54 MG/DL
HGB BLD-MCNC: 12.3 G/DL (ref 11.1–15.9)
INTERPRETATION, 910389: NORMAL
LDLC SERPL CALC-MCNC: 125 MG/DL (ref 0–99)
MCH RBC QN AUTO: 29.5 PG (ref 26.6–33)
MCHC RBC AUTO-ENTMCNC: 31.5 G/DL (ref 31.5–35.7)
MCV RBC AUTO: 94 FL (ref 79–97)
PLATELET # BLD AUTO: 312 X10E3/UL (ref 150–450)
POTASSIUM SERPL-SCNC: 4.6 MMOL/L (ref 3.5–5.2)
PROT SERPL-MCNC: 5.4 G/DL (ref 6–8.5)
RBC # BLD AUTO: 4.17 X10E6/UL (ref 3.77–5.28)
SODIUM SERPL-SCNC: 140 MMOL/L (ref 134–144)
TRIGL SERPL-MCNC: 57 MG/DL (ref 0–149)
VLDLC SERPL CALC-MCNC: 11 MG/DL (ref 5–40)
WBC # BLD AUTO: 4.8 X10E3/UL (ref 3.4–10.8)

## 2020-02-10 ENCOUNTER — TELEPHONE (OUTPATIENT)
Dept: INTERNAL MEDICINE CLINIC | Age: 60
End: 2020-02-10

## 2020-02-12 ENCOUNTER — OFFICE VISIT (OUTPATIENT)
Dept: INTERNAL MEDICINE CLINIC | Age: 60
End: 2020-02-12

## 2020-02-12 ENCOUNTER — TELEPHONE (OUTPATIENT)
Dept: INTERNAL MEDICINE CLINIC | Age: 60
End: 2020-02-12

## 2020-02-12 VITALS
WEIGHT: 178 LBS | SYSTOLIC BLOOD PRESSURE: 135 MMHG | HEART RATE: 71 BPM | TEMPERATURE: 97.6 F | OXYGEN SATURATION: 94 % | RESPIRATION RATE: 16 BRPM | BODY MASS INDEX: 38.4 KG/M2 | HEIGHT: 57 IN | DIASTOLIC BLOOD PRESSURE: 92 MMHG

## 2020-02-12 DIAGNOSIS — I73.9 PERIPHERAL VASCULAR DISEASE (HCC): ICD-10-CM

## 2020-02-12 DIAGNOSIS — G43.009 MIGRAINE WITHOUT AURA AND WITHOUT STATUS MIGRAINOSUS, NOT INTRACTABLE: ICD-10-CM

## 2020-02-12 DIAGNOSIS — E66.01 OBESITY, MORBID (HCC): ICD-10-CM

## 2020-02-12 DIAGNOSIS — F32.1 MODERATE MAJOR DEPRESSION (HCC): Primary | ICD-10-CM

## 2020-02-12 RX ORDER — TOPIRAMATE 25 MG/1
25 TABLET ORAL 2 TIMES DAILY WITH MEALS
Qty: 60 TAB | Refills: 5 | Status: SHIPPED | OUTPATIENT
Start: 2020-02-12 | End: 2020-09-11

## 2020-02-12 RX ORDER — SERTRALINE HYDROCHLORIDE 50 MG/1
50 TABLET, FILM COATED ORAL DAILY
Qty: 90 TAB | Refills: 1 | Status: SHIPPED | OUTPATIENT
Start: 2020-02-12 | End: 2020-06-02 | Stop reason: SDUPTHER

## 2020-02-12 RX ORDER — BUTALBITAL, ACETAMINOPHEN AND CAFFEINE 300; 40; 50 MG/1; MG/1; MG/1
1 CAPSULE ORAL
Qty: 20 CAP | Refills: 1 | Status: SHIPPED | OUTPATIENT
Start: 2020-02-12 | End: 2020-02-28

## 2020-02-12 NOTE — LETTER
2/12/2020 8:55 AM 
 
Ms. Allison Stoner [address] [address] Dear Allison Stoner: 
 
Please find your most recent results below. Resulted Orders METABOLIC PANEL, COMPREHENSIVE (Collected: 2/4/2020  9:07 AM) Result Value Ref Range Glucose 80 65 - 99 mg/dL BUN 21 6 - 24 mg/dL Creatinine 0.72 0.57 - 1.00 mg/dL GFR est non-AA 93 >59 mL/min/1.73 GFR est  >59 mL/min/1.73  
 BUN/Creatinine ratio 29 (H) 9 - 23 Sodium 140 134 - 144 mmol/L Potassium 4.6 3.5 - 5.2 mmol/L Chloride 107 (H) 96 - 106 mmol/L  
 CO2 20 20 - 29 mmol/L Calcium 8.8 8.7 - 10.2 mg/dL Protein, total 5.4 (L) 6.0 - 8.5 g/dL Albumin 3.6 (L) 3.8 - 4.9 g/dL Comment: **Please note reference interval change** GLOBULIN, TOTAL 1.8 1.5 - 4.5 g/dL A-G Ratio 2.0 1.2 - 2.2 Bilirubin, total <0.2 0.0 - 1.2 mg/dL Alk. phosphatase 85 39 - 117 IU/L  
 AST (SGOT) 23 0 - 40 IU/L  
 ALT (SGPT) 16 0 - 32 IU/L Narrative Performed at:  [address]  [id number] : Bre Muniz MD, Phone:  [phone] CBC W/O DIFF (Collected: 2/4/2020  9:07 AM) Result Value Ref Range WBC 4.8 3.4 - 10.8 x10E3/uL  
 RBC 4.17 3.77 - 5.28 x10E6/uL HGB 12.3 11.1 - 15.9 g/dL HCT 39.0 34.0 - 46.6 % MCV 94 79 - 97 fL  
 MCH 29.5 26.6 - 33.0 pg  
 MCHC 31.5 31.5 - 35.7 g/dL  
 RDW 13.5 11.7 - 15.4 % PLATELET 770 329 - 797 x10E3/uL Narrative Performed at:  [address]  [id number] : Bre Muniz MD, Phone:  [phone] LIPID PANEL (Collected: 2/4/2020  9:07 AM) Result Value Ref Range Cholesterol, total 190 100 - 199 mg/dL Triglyceride 57 0 - 149 mg/dL HDL Cholesterol 54 >39 mg/dL VLDL, calculated 11 5 - 40 mg/dL LDL, calculated 125 (H) 0 - 99 mg/dL Narrative Performed at:  [place] [id number] [address], [address] [place] [address]  [id number] : Gilberto Padgett MD, Phone:  [phone] CVD REPORT (Collected: 2/4/2020  9:07 AM) Result Value Ref Range INTERPRETATION Note Comment:  
   Supplemental report is available. Narrative Performed at:  [facility] [address]  [id number] : Herb Muñoz MD, Phone:  [phone] RECOMMENDATIONS: 
Work on diet and exercise. Please call me if you have any questions: [phone] Sincerely, Sammie Justin MD

## 2020-02-12 NOTE — TELEPHONE ENCOUNTER
Harriet Carter, from [facility], called in reference to needing clarifications on pt medicine. She has 2 diff dosages for her sertraline, and topiramate from diff drs.  Please call her at [phone]

## 2020-02-12 NOTE — PROGRESS NOTES
PROGRESS NOTE        SUBJECTIVE:  Diagnosis/Chief Complaint: Headache (follow up)  Agree with comments, see chief complaint. Ran out of topamax and HA med  Cough resolved finished AB  Doing well with mood no but not too bad, see phq2  Symptoms sad thinks about things  Suicidal: no  Side affects: no  States taking medications per medicine list.ran out of zoloft. No leg or foot pain    Patient Active Problem List    Diagnosis Date Noted    Peripheral vascular disease ([facility] 10/04/2019    Weight gain status post gastric bypass 10/04/2019    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    SSS (sick sinus syndrome) ([address] 12/06/2018    Obesity, morbid ([facility] 02/06/2018    Gastroesophageal reflux disease without esophagitis 08/24/2016    Bipolar disorder with current episode depressed ([facility] 02/01/2016    Migraine aura without headache 10/24/2014    Bilateral leg edema 10/12/2014    Hypoglycemia 10/12/2014    History of gastric bypass 10/12/2014     Allergies   Allergen Reactions    Benadryl [Diphenhydramine Hcl] Nausea and Vomiting    Ibuprofen Nausea and Vomiting    Sulfamethoxazole-Trimethoprim Nausea Only    Tramadol Anxiety    Diclofenac Itching     Social History     Tobacco Use    Smoking status: Never Smoker    Smokeless tobacco: Never Used   Substance Use Topics    Alcohol use: No    No domestic violence      OBJECTIVE:    .  Visit Vitals  BP (!) 135/92 (BP 1 Location: Left arm, BP Patient Position: At rest)   Pulse 71   Temp 97.6 °F (36.4 °C) (Oral)   Resp 16   Ht 4' 9\" (1.448 m)   Wt 178 lb (80.7 kg)   SpO2 94%   BMI 38.52 kg/m²     WDWN in NAD  Heart RRR, no:C/M/R  Lungs CTA No wheezes, rales or rhonchi  Abdo: soft no tenderness, rebound or guarding  Neurological exam[de-identified] 2-12 intact  Psychiatric: Occa sad,nl  judgement    Reviewed: Medications, allergies, clinical lab test results and imaging results have been reviewed. Any abnormal findings have been addressed.      ASSESSMENT:       PLAN

## 2020-02-25 ENCOUNTER — OFFICE VISIT (OUTPATIENT)
Dept: INTERNAL MEDICINE CLINIC | Age: 60
End: 2020-02-25

## 2020-02-25 VITALS
HEART RATE: 85 BPM | SYSTOLIC BLOOD PRESSURE: 126 MMHG | DIASTOLIC BLOOD PRESSURE: 81 MMHG | OXYGEN SATURATION: 96 % | HEIGHT: 57 IN | TEMPERATURE: 96.5 F | WEIGHT: 170 LBS | RESPIRATION RATE: 18 BRPM | BODY MASS INDEX: 36.68 KG/M2

## 2020-02-25 DIAGNOSIS — J45.41 MODERATE PERSISTENT ASTHMA WITH EXACERBATION: Primary | ICD-10-CM

## 2020-02-25 DIAGNOSIS — J21.8 ACUTE BRONCHIOLITIS DUE TO OTHER SPECIFIED ORGANISMS: ICD-10-CM

## 2020-02-25 RX ORDER — ALBUTEROL SULFATE 0.83 MG/ML
2.5 SOLUTION RESPIRATORY (INHALATION)
Qty: 100 EACH | Refills: 1 | Status: SHIPPED | OUTPATIENT
Start: 2020-02-25 | End: 2020-09-28 | Stop reason: SDUPTHER

## 2020-02-25 NOTE — PROGRESS NOTES
Subjective:   Mimi Chapin is a 63 y.o. female who complains of congestion, sore throat, nasal blockage, cough described as dry, chills and dyspnea N/V for 2 days, unchanged since that time. She denies a history of fevers, rash on body, wheezing and sputum production. Evaluation to date: none. Treatment to date: none. Patient does not smoke cigarettes. Relevant PMH: Asthma. 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    SSS (sick sinus syndrome) ([facility] 12/06/2018    Obesity, morbid ([facility] 02/06/2018    Gastroesophageal reflux disease without esophagitis 08/24/2016    Bipolar disorder with current episode depressed ([facility] 02/01/2016    Migraine aura without headache 10/24/2014    Bilateral leg edema 10/12/2014    Hypoglycemia 10/12/2014    History of gastric bypass 10/12/2014     Allergies   Allergen Reactions    Benadryl [Diphenhydramine Hcl] Nausea and Vomiting    Ibuprofen Nausea and Vomiting    Sulfamethoxazole-Trimethoprim Nausea Only    Tramadol Anxiety    Diclofenac Itching     Social History     Tobacco Use    Smoking status: Never Smoker    Smokeless tobacco: Never Used   Substance Use Topics    Alcohol use: No        Review of Systems  Pertinent items are noted in HPI.     Objective:     Visit Vitals  /81 (BP 1 Location: Left arm, BP Patient Position: Sitting)   Pulse 85   Temp 96.5 °F (35.8 °C) (Temporal)   Resp 18   Ht 4' 9\" (1.448 m)   Wt 170 lb (77.1 kg)   SpO2 96%   BMI 36.79 kg/m²     General:  alert, fatigued, cooperative, no distress   Eyes: negative   Ears: normal TM's and external ear canals AU   Sinuses: Normal paranasal sinuses without tenderness   Mouth:  Lips, mucosa, and tongue normal. Teeth and gums normal   Neck: supple, symmetrical, trachea midline and no adenopathy. Heart: S1 and S2 normal, no murmurs noted. Lungs: clear to auscultation bilaterally   Abdomen: soft, non-tender. Bowel sounds normal. No masses,  no organomegaly      Assessment/Plan:   asthma  Suggested symptomatic OTC remedies. RTC prn. Discussed diagnosis and treatment of viral URIs. Discussed the importance of avoiding unnecessary antibiotic therapy. Encounter Diagnoses   Name Primary?  Moderate persistent asthma with exacerbation Yes    Acute bronchiolitis due to other specified organisms      Orders Placed This Encounter    albuterol (PROVENTIL VENTOLIN) 2.5 mg /3 mL (0.083 %) nebu   . Follow-up and Dispositions    · Return if symptoms worsen or fail to improve.

## 2020-02-25 NOTE — PROGRESS NOTES
Chief Complaint   Patient presents with    Vomiting     Nausea and vomiting x3 days, fever, chills, body aches, headache     I have reviewed the patient's medical history in detail and updated the computerized patient record. Health Maintenance reviewed. 1. Have you been to the ER, urgent care clinic since your last visit? Hospitalized since your last visit?no    2. Have you seen or consulted any other health care providers outside of the [address] [place] [address] [place] since your last visit? Include any pap smears or colon screening. No      Encouraged pt to discuss pt's wishes with spouse/partner/family and bring them in the next appt to follow thru with the Advanced Directive    Fall Risk Assessment, last 12 mths 1/24/2020   Able to walk? Yes   Fall in past 12 months? No   Fall with injury? -   Number of falls in past 12 months -   Fall Risk Score -       3 most recent PHQ Screens 2/12/2020   Little interest or pleasure in doing things Several days   Feeling down, depressed, irritable, or hopeless Several days   Total Score PHQ 2 2       Abuse Screening Questionnaire 1/24/2020   Do you ever feel afraid of your partner? N   Are you in a relationship with someone who physically or mentally threatens you? N   Is it safe for you to go home?  Y       ADL Assessment 1/24/2020   Feeding yourself No Help Needed   Getting from bed to chair No Help Needed   Getting dressed No Help Needed   Bathing or showering No Help Needed   Walk across the room (includes cane/walker) No Help Needed   Using the telphone No Help Needed   Taking your medications No Help Needed   Preparing meals No Help Needed   Managing money (expenses/bills) No Help Needed   Moderately strenuous housework (laundry) No Help Needed   Shopping for personal items (toiletries/medicines) No Help Needed   Shopping for groceries No Help Needed   Driving No Help Needed   Climbing a flight of stairs No Help Needed   Getting to places beyond walking distances No Help Needed

## 2020-02-27 DIAGNOSIS — G43.009 MIGRAINE WITHOUT AURA AND WITHOUT STATUS MIGRAINOSUS, NOT INTRACTABLE: ICD-10-CM

## 2020-02-28 RX ORDER — BUTALBITAL, ACETAMINOPHEN AND CAFFEINE 300; 40; 50 MG/1; MG/1; MG/1
CAPSULE ORAL
Qty: 20 CAP | Refills: 0 | Status: SHIPPED | OUTPATIENT
Start: 2020-02-28 | End: 2020-04-21 | Stop reason: SDUPTHER

## 2020-04-01 ENCOUNTER — VIRTUAL VISIT (OUTPATIENT)
Dept: INTERNAL MEDICINE CLINIC | Age: 60
End: 2020-04-01

## 2020-04-01 ENCOUNTER — TELEPHONE (OUTPATIENT)
Dept: INTERNAL MEDICINE CLINIC | Age: 60
End: 2020-04-01

## 2020-04-01 DIAGNOSIS — G43.109 MIGRAINE AURA WITHOUT HEADACHE: Primary | ICD-10-CM

## 2020-04-01 RX ORDER — SUMATRIPTAN 100 MG/1
100 TABLET, FILM COATED ORAL
Qty: 10 TAB | Refills: 1 | Status: SHIPPED | OUTPATIENT
Start: 2020-04-01 | End: 2020-04-09

## 2020-04-01 NOTE — PROGRESS NOTES
Nazia Cummings is a 63 y.o. female evaluated via telephone on 4/1/2020. Consent:  She and/or health care decision maker is aware that that she may receive a bill for this telephone service, depending on her insurance coverage, and has provided verbal consent to proceed: Yes      Documentation:  I communicated with the patient and/or health care decision maker about her HA. Details of this discussion including any medical advice provided: see phone message      I affirm this is a Patient Initiated Episode with an Established Patient who has not had a related appointment within my department in the past 7 days or scheduled within the next 24 hours. Total Time: minutes: 11-20 minutes    Note: not billable if this call serves to triage the patient into an appointment for the relevant concern      Continuation of the phone message. ICD-10-CM ICD-9-CM    1. Migraine aura without headache G43.109 346.00      Trial of imitrex to see if that will help.     Jannet Gilliland MD

## 2020-04-01 NOTE — TELEPHONE ENCOUNTER
HA suffering not sleeping at night, sx x 3 weeks. APAP helps min. Excedrine HA cause GI upset. Fioricet not helping. Seeing neurologist.  Wants her to do shot but placed on hold due to corona. No trauma or focal weakness. Allergies   Allergen Reactions    Benadryl [Diphenhydramine Hcl] Nausea and Vomiting    Ibuprofen Nausea and Vomiting    Sulfamethoxazole-Trimethoprim Nausea Only    Tramadol Anxiety    Diclofenac Itching     Patient Active Problem List   Diagnosis Code    Bilateral leg edema R60.0    Hypoglycemia E16.2    History of gastric bypass Z98.84    Migraine aura without headache G43. 109    Bipolar disorder with current episode depressed ([facility]) F31.30    Gastroesophageal reflux disease without esophagitis K21.9    Obesity, morbid ([facility]) E66.01    SSS (sick sinus syndrome) ([facility]) I49.5    Nesidioblastosis D13.7    Peripheral vascular disease ([facility]) I73.9    Weight gain status post gastric bypass R63.5

## 2020-04-17 RX ORDER — POTASSIUM CHLORIDE 750 MG/1
TABLET, FILM COATED, EXTENDED RELEASE ORAL
Qty: 90 TAB | Refills: 2 | Status: SHIPPED | OUTPATIENT
Start: 2020-04-17 | End: 2021-05-10

## 2020-04-20 ENCOUNTER — TELEPHONE (OUTPATIENT)
Dept: CARDIOLOGY CLINIC | Age: 60
End: 2020-04-20

## 2020-04-20 NOTE — TELEPHONE ENCOUNTER
Pt called stating she has pain at her pacemaker site since yesterday. Pt also complaining of migraine. Pt denies palpitations or other symptoms. Pt states it hurts more with arm movement. Advised pt to take Tylenol and apply ice pack to site. Also suggested Flexeril (pt already has this) if Tylenol and ice do not help.

## 2020-04-21 ENCOUNTER — VIRTUAL VISIT (OUTPATIENT)
Dept: INTERNAL MEDICINE CLINIC | Age: 60
End: 2020-04-21

## 2020-04-21 DIAGNOSIS — E66.01 OBESITY, MORBID (HCC): ICD-10-CM

## 2020-04-21 DIAGNOSIS — G43.011 INTRACTABLE MIGRAINE WITHOUT AURA AND WITH STATUS MIGRAINOSUS: Primary | ICD-10-CM

## 2020-04-21 DIAGNOSIS — Z98.84 HISTORY OF GASTRIC BYPASS: Chronic | ICD-10-CM

## 2020-04-21 DIAGNOSIS — F31.31 BIPOLAR AFFECTIVE DISORDER, CURRENTLY DEPRESSED, MILD (HCC): ICD-10-CM

## 2020-04-21 DIAGNOSIS — G43.111 INTRACTABLE MIGRAINE WITH AURA WITH STATUS MIGRAINOSUS: ICD-10-CM

## 2020-04-21 RX ORDER — BUTALBITAL, ACETAMINOPHEN AND CAFFEINE 300; 40; 50 MG/1; MG/1; MG/1
CAPSULE ORAL
Qty: 30 CAP | Refills: 0 | Status: SHIPPED | OUTPATIENT
Start: 2020-04-21 | End: 2020-05-20

## 2020-04-21 NOTE — PROGRESS NOTES
Chief Complaint   Patient presents with    Migraine     head pain x2 days, nausea,      Patient has not been out of the country in 45-60 days, NO diarrhea, NO cough, NO chest conjestion, NO temp. Pt has not been around anyone with these symptoms. Health Maintenance reviewed. I have reviewed the patient's medical history in detail and updated the computerized patient record. 1. Have you been to the ER, urgent care clinic since your last visit? Hospitalized since your last visit?no    2. Have you seen or consulted any other health care providers outside of the [address] [place] [address] since your last visit? Include any pap smears or colon screening. No      Encouraged pt to discuss pt's wishes with spouse/partner/family and bring them in the next appt to follow thru with the Advanced Directive    Fall Risk Assessment, last 12 mths 1/24/2020   Able to walk? Yes   Fall in past 12 months? No   Fall with injury? -   Number of falls in past 12 months -   Fall Risk Score -       3 most recent PHQ Screens 2/12/2020   Little interest or pleasure in doing things Several days   Feeling down, depressed, irritable, or hopeless Several days   Total Score PHQ 2 2       Abuse Screening Questionnaire 1/24/2020   Do you ever feel afraid of your partner? N   Are you in a relationship with someone who physically or mentally threatens you? N   Is it safe for you to go home?  Y       ADL Assessment 1/24/2020   Feeding yourself No Help Needed   Getting from bed to chair No Help Needed   Getting dressed No Help Needed   Bathing or showering No Help Needed   Walk across the room (includes cane/walker) No Help Needed   Using the telphone No Help Needed   Taking your medications No Help Needed   Preparing meals No Help Needed   Managing money (expenses/bills) No Help Needed   Moderately strenuous housework (laundry) No Help Needed   Shopping for personal items (toiletries/medicines) No Help Needed   Shopping for groceries No Help Needed   Driving No Help Needed   Climbing a flight of stairs No Help Needed   Getting to places beyond walking distances No Help Needed

## 2020-04-23 NOTE — PROGRESS NOTES
Vikram Burnett is a 63 y.o. female who was phone evaluated on 4/21/2020. Consent:  She and/or her healthcare decision maker is aware that this patient-initiated Telehealth encounter is a billable service, with coverage as determined by her insurance carrier. She is aware that she may receive a bill and has provided verbal consent to proceed: Yes    I was in the office while conducting this encounter. Assessment & Plan:   Diagnoses and all orders for this visit:    1. Intractable migraine without aura and with status migrainosus  -     butalbital-acetaminophen-caff (FIORICET) -40 mg per capsule; TAKE 1 CAPSULE BY MOUTH EVERY 6 HOURS AS NEEDED FOR HEADACHE. 2. Intractable migraine with aura with status migrainosus    3. Bipolar affective disorder, currently depressed, mild ([facility]    4. History of gastric bypass    5. Obesity, morbid ([facility]      Orders Placed This Encounter    butalbital-acetaminophen-caff (FIORICET) -40 mg per capsule     Sig: TAKE 1 CAPSULE BY MOUTH EVERY 6 HOURS AS NEEDED FOR HEADACHE. Dispense:  30 Cap     Refill:  0       Current Outpatient Medications   Medication Sig Dispense Refill    butalbital-acetaminophen-caff (FIORICET) -40 mg per capsule TAKE 1 CAPSULE BY MOUTH EVERY 6 HOURS AS NEEDED FOR HEADACHE. 30 Cap 0    potassium chloride SR (KLOR-CON 10) 10 mEq tablet TAKE 1 TABLET BY MOUTH DAILY, IF TAKES LASIX (FUROSEMIDE) 90 Tab 2    albuterol (PROVENTIL VENTOLIN) 2.5 mg /3 mL (0.083 %) nebu 3 mL by Nebulization route every four (4) hours as needed for Wheezing. 100 Each 1    sertraline (ZOLOFT) 50 mg tablet Take 1 Tab by mouth daily. 90 Tab 1    topiramate (TOPAMAX) 25 mg tablet Take 1 Tab by mouth two (2) times daily (with meals). 60 Tab 5    cyclobenzaprine (FLEXERIL) 5 mg tablet Take 1 Tab by mouth three (3) times daily as needed for Muscle Spasm(s).  60 Tab 0    acarbose (PRECOSE) 50 mg tablet TAKE 1 TABLETS BY MOUTH 30 MINS BEFORE MEALS THREE TIMES A DAY 90 Tab 5    nystatin (MYCOSTATIN) topical cream Apply  to affected area two (2) times a day. 15 g 0    omeprazole (PRILOSEC) 40 mg capsule TAKE 1 CAPSULE BY MOUTH DAILY. 90 Cap 3    furosemide (LASIX) 20 mg tablet TAKE 1 TABLET BY MOUTH DAILY AS NEEDED 90 Tab 3    aspirin 81 mg chewable tablet CHEW AND SWALLOW 1 TAB BY MOUTH DAILY. 90 Tab 3    cyanocobalamin (VITAMIN B-12) 1,000 mcg tablet TAKE 1 TABLET BY MOUTH DAILY FOR VITAMIN B12 DEFICIENCY 90 Tab 3    polyethylene glycol (MIRALAX) 17 gram packet TAKE 1 PACKET BY MOUTH DAILY. 50 Packet 5    VITAMIN D3 1,000 unit tablet TAKE 1 TABLET BY MOUTH DAILY FOR VITAMIN D DEFICIENCY 180 Tab 10    meclizine (TRAVEL SICKNESS, MECLIZINE,) 25 mg chewable tablet TAKE 1 TABLET BY MOUTH THREE TIMES DAILY AS NEEDED FOR UP TO 10 DAYS FOR DIZZINESS 60 Tab 5    fluticasone (FLONASE) 50 mcg/actuation nasal spray 2 Sprays by Both Nostrils route daily. 1 Bottle 5    traZODone (DESYREL) 50 mg tablet Take 50 mg by mouth nightly.  Nebulizer & Compressor machine Use as directed 1 Each 0     Keep follow-up with neurology, if symptoms not improved can call back might consider steroids although she declines those currently. Continue Topamax for migraine prophylactic. Depression stable asthma stable    712  Subjective:      Comes in complaining of a headache which she has had for days now. She is been to see neurologist, sounds like he is interested in doing Botox injections but she has been afraid to have those done and those have been shut down due to corona. Seems like fares discussion of another injectable antimigraine medicine. Her neurologist is at [facility]. Gave her a trial of Imitrex at the last visit, reports no relief from her headache. Does get some nausea. Reports no focal weakness. No head trauma. Until the migraine started reports her mood is been okay. With a headache having a great deal of difficulty sleeping at night.   Previously she has been on Fioricet with some relief. Minimal wheezing asthma cough or shortness of breath. No fever. Patient Active Problem List   Diagnosis Code    Bilateral leg edema R60.0    Hypoglycemia E16.2    History of gastric bypass Z98.84    Migraine headache with aura G43. 109    Bipolar disorder with current episode depressed ([facility]) F31.30    Gastroesophageal reflux disease without esophagitis K21.9    Obesity, morbid ([facility]) E66.01    SSS (sick sinus syndrome) ([facility]) I49.5    Nesidioblastosis D13.7    Peripheral vascular disease ([facility]) I73.9    Weight gain status post gastric bypass R63.5     Social History     Socioeconomic History    Marital status:      Spouse name: Not on file    Number of children: 2    Years of education: Not on file    Highest education level: Not on file   Occupational History    Occupation: retired   Social Needs    Financial resource strain: Not on file    Food insecurity     Worry: Not on file     Inability: Not on file   [employer] needs     Medical: Not on file     Non-medical: Not on file   Tobacco Use    Smoking status: Never Smoker    Smokeless tobacco: Never Used   Substance and Sexual Activity    Alcohol use: No    Drug use: No    Sexual activity: Never   Lifestyle    Physical activity     Days per week: Not on file     Minutes per session: Not on file    Stress: Not on file   Relationships    Social connections     Talks on phone: Not on file     Gets together: Not on file     Attends Taoism service: Not on file     Active member of club or organization: Not on file     Attends meetings of clubs or organizations: Not on file     Relationship status: Not on file    Intimate partner violence     Fear of current or ex partner: Not on file     Emotionally abused: Not on file     Physically abused: Not on file     Forced sexual activity: Not on file   Other Topics Concern    Not on file   Social History Narrative     has brain damage, lives with him No physical exam      We discussed the expected course, resolution and complications of the diagnosis(es) in detail. Medication risks, benefits, costs, interactions, and alternatives were discussed as indicated. I advised her to contact the office if her condition worsens, changes or fails to improve as anticipated. She expressed understanding with the diagnosis(es) and plan. Pursuant to the emergency declaration under the [address] [place] [address] waiver authority and the Loc[employer] and [employer] Act, this Virtual  Visit was conducted, with patient's consent, to reduce the patient's risk of exposure to COVID-19 and provide continuity of care for an established patient. Services were provided through a video synchronous discussion virtually to substitute for in-person clinic visit.     Alisia Crespo MD

## 2020-05-13 ENCOUNTER — OFFICE VISIT (OUTPATIENT)
Dept: NEUROLOGY | Age: 60
End: 2020-05-13

## 2020-05-13 VITALS
WEIGHT: 175.6 LBS | OXYGEN SATURATION: 97 % | HEART RATE: 67 BPM | RESPIRATION RATE: 16 BRPM | HEIGHT: 57 IN | BODY MASS INDEX: 37.88 KG/M2 | TEMPERATURE: 98.2 F | SYSTOLIC BLOOD PRESSURE: 128 MMHG | DIASTOLIC BLOOD PRESSURE: 70 MMHG

## 2020-05-13 DIAGNOSIS — M89.9 DISORDER OF BONE, UNSPECIFIED: ICD-10-CM

## 2020-05-13 DIAGNOSIS — Z98.84 HX OF GASTRIC BYPASS: ICD-10-CM

## 2020-05-13 DIAGNOSIS — G44.221 CHRONIC TENSION-TYPE HEADACHE, INTRACTABLE: Primary | ICD-10-CM

## 2020-05-13 DIAGNOSIS — R52 GENERALIZED PAIN: ICD-10-CM

## 2020-05-13 RX ORDER — TIZANIDINE 4 MG/1
4 TABLET ORAL
Qty: 30 TAB | Refills: 3 | Status: SHIPPED | OUTPATIENT
Start: 2020-05-13 | End: 2020-07-24 | Stop reason: SDUPTHER

## 2020-05-13 NOTE — PROGRESS NOTES
NEUROLOGY HISTORY AND PHYSICAL    Name Rob Rainey Age 63 y.o. MRN [id number]  1960     Referring Physician: Ashish Comer MD      Chief Complaint: Migraine headaches     This is a 63 y.o. right handed female with ameidcla history of bipolar disease who is referred by Dr. Justine Tran for migraine headcahes. Her migraines are throbbing associated with nausea and photophobia. Her headaches last more than a week at at time. She can't take nsaids because of history of gastric bypass. She does not remember trying sumatriptan. Assessment and Plan  1. Chronic tension-type headache, intractable  She has generalized pain touching most of the muscles on the left side of her neck and shoulders. She also has pain associated with bilateral TMJ and temples. - tiZANidine (ZANAFLEX) 4 mg tablet; Take 1 Tab by mouth nightly. Dispense: 30 Tab; Refill: 3    2. Hx of gastric bypass  - VITAMIN B12; Future  - VITAMIN B6; Future  - VITAMIN B3/NICOTINIC ACID  - COPPER; Future  - FOLATE  - METABOLIC PANEL, COMPREHENSIVE  - VITAMIN D, 25 HYDROXY    3. Disorder of bone, unspecified   - VITAMIN D, 25 HYDROXY    4. Generalized pain  No clear reason by history will investigate for nutritional deficiencies  May have a rheumatologic disorder or fibromyalgia. Allergies   Allergen Reactions    Benadryl [Diphenhydramine Hcl] Nausea and Vomiting    Ibuprofen Nausea and Vomiting    Sulfamethoxazole-Trimethoprim Nausea Only    Tramadol Anxiety    Diclofenac Itching           Current Outpatient Medications   Medication Sig    potassium chloride SR (KLOR-CON 10) 10 mEq tablet TAKE 1 TABLET BY MOUTH DAILY, IF TAKES LASIX (FUROSEMIDE)    albuterol (PROVENTIL VENTOLIN) 2.5 mg /3 mL (0.083 %) nebu 3 mL by Nebulization route every four (4) hours as needed for Wheezing.  sertraline (ZOLOFT) 50 mg tablet Take 1 Tab by mouth daily.  topiramate (TOPAMAX) 25 mg tablet Take 1 Tab by mouth two (2) times daily (with meals).     cyclobenzaprine (FLEXERIL) 5 mg tablet Take 1 Tab by mouth three (3) times daily as needed for Muscle Spasm(s).  acarbose (PRECOSE) 50 mg tablet TAKE 1 TABLETS BY MOUTH 30 MINS BEFORE MEALS THREE TIMES A DAY    nystatin (MYCOSTATIN) topical cream Apply  to affected area two (2) times a day.  omeprazole (PRILOSEC) 40 mg capsule TAKE 1 CAPSULE BY MOUTH DAILY.  furosemide (LASIX) 20 mg tablet TAKE 1 TABLET BY MOUTH DAILY AS NEEDED    aspirin 81 mg chewable tablet CHEW AND SWALLOW 1 TAB BY MOUTH DAILY.  cyanocobalamin (VITAMIN B-12) 1,000 mcg tablet TAKE 1 TABLET BY MOUTH DAILY FOR VITAMIN B12 DEFICIENCY    polyethylene glycol (MIRALAX) 17 gram packet TAKE 1 PACKET BY MOUTH DAILY.  VITAMIN D3 1,000 unit tablet TAKE 1 TABLET BY MOUTH DAILY FOR VITAMIN D DEFICIENCY    meclizine (TRAVEL SICKNESS, MECLIZINE,) 25 mg chewable tablet TAKE 1 TABLET BY MOUTH THREE TIMES DAILY AS NEEDED FOR UP TO 10 DAYS FOR DIZZINESS    fluticasone (FLONASE) 50 mcg/actuation nasal spray 2 Sprays by Both Nostrils route daily.  traZODone (DESYREL) 50 mg tablet Take 50 mg by mouth nightly.  Nebulizer & Compressor machine Use as directed    butalbital-acetaminophen-caff (FIORICET) -40 mg per capsule TAKE 1 CAPSULE BY MOUTH EVERY 6 HOURS AS NEEDED FOR HEADACHE. No current facility-administered medications for this visit.         Past Medical History:   Diagnosis Date    AIDS (acquired immune deficiency syndrome) ([facility]     Asthma     Chest pain     Depression     Diarrhea     Fainting     Fatigue     Hearing loss     Hypoglycemia 2014    Hypotension 2014    Leg swelling     Memory disorder     Nausea and vomiting     Ringing in ears     SOB (shortness of breath)     Stomach pain     Swallowing difficulty     Syncope and collapse 7/24/15    [facility]    Unintentional weight change         Social History     Tobacco Use    Smoking status: Never Smoker    Smokeless tobacco: Never Used   Substance Use Topics    Alcohol use: No    Drug use: No        Review of Systems   Constitutional: Negative for chills and fever. HENT: Negative for ear pain. Eyes: Negative for pain and discharge. Respiratory: Negative for cough and hemoptysis. Cardiovascular: Negative for chest pain and claudication. Gastrointestinal: Negative for constipation and diarrhea. Genitourinary: Negative for flank pain and hematuria. Musculoskeletal: Positive for myalgias. Negative for back pain. Skin: Negative for itching and rash. Neurological: Positive for headaches. Endo/Heme/Allergies: Negative for environmental allergies. Does not bruise/bleed easily. Psychiatric/Behavioral: Positive for depression. The patient is nervous/anxious. Exam  Visit Vitals  /70   Pulse 67   Temp 98.2 °F (36.8 °C) (Oral)   Resp 16   Ht 4' 9\" (1.448 m)   Wt 175 lb 9.6 oz (79.7 kg)   SpO2 97%   BMI 38.00 kg/m²         General: Well developed, well nourished. Patient in no apparent distress   Head: Normocephalic, atraumatic, anicteric sclera  Tenderness of the temples and tmjs bilateraly   Neck Limited ROM, No thyromegally   Lungs:  Clear to auscultation bilaterally, No wheezes or rubs   Cardiac: Regular rate and rhythm with no murmurs. Abd: Bowel sounds were audible. No tenderness on palpation   Ext: No pedal edema   Skin: Supple no rash     NeurologicExam:  Mental Status: Alert and oriented to person place and time   Speech: Fluent no aphasia or dysarthria. Cranial Nerves:   II-XII Intact    Motor:  Full and symmetric strength of upper and lower ext . Normal bulk and tone. Reflexes:   Deep tendon reflexes 2+/4 and symmetric. Sensory:   Symmetric and intact    Gait:  Gait is balanced    Tremor:   No tremor noted. Cerebellar:  Coordination intact. Neurovascular: No carotid bruits.  No JVD      Lab Review  Lab Results   Component Value Date/Time    WBC 4.8 02/04/2020 09:07 AM    HCT 39.0 02/04/2020 09:07 AM    HGB 12.3 02/04/2020 09:07 AM    PLATELET 616 19/85/1411 09:07 AM     Lab Results   Component Value Date/Time    Sodium 140 02/04/2020 09:07 AM    Potassium 4.6 02/04/2020 09:07 AM    Chloride 107 (H) 02/04/2020 09:07 AM    CO2 20 02/04/2020 09:07 AM    Glucose 80 02/04/2020 09:07 AM    BUN 21 02/04/2020 09:07 AM    Creatinine 0.72 02/04/2020 09:07 AM    Calcium 8.8 02/04/2020 09:07 AM       Lab Results   Component Value Date/Time    LDL, calculated 125 (H) 02/04/2020 09:07 AM     Lab Results   Component Value Date/Time    Hemoglobin A1c <3.5 (L) 10/13/2014 05:00 AM    Hemoglobin A1c (POC) 5.1 10/02/2015 09:49 AM

## 2020-05-19 DIAGNOSIS — G43.011 INTRACTABLE MIGRAINE WITHOUT AURA AND WITH STATUS MIGRAINOSUS: ICD-10-CM

## 2020-05-19 DIAGNOSIS — Z98.84 HISTORY OF GASTRIC BYPASS: ICD-10-CM

## 2020-05-20 ENCOUNTER — TELEPHONE (OUTPATIENT)
Dept: INTERNAL MEDICINE CLINIC | Age: 60
End: 2020-05-20

## 2020-05-20 RX ORDER — OMEPRAZOLE 40 MG/1
CAPSULE, DELAYED RELEASE ORAL
Qty: 90 CAP | Refills: 2 | Status: SHIPPED | OUTPATIENT
Start: 2020-05-20 | End: 2022-04-26 | Stop reason: SDUPTHER

## 2020-05-20 RX ORDER — CHOLECALCIFEROL (VITAMIN D3) 25 MCG
TABLET ORAL
Qty: 180 TAB | Refills: 9 | Status: SHIPPED | OUTPATIENT
Start: 2020-05-20 | End: 2021-08-11

## 2020-05-20 RX ORDER — BUTALBITAL, ACETAMINOPHEN AND CAFFEINE 300; 40; 50 MG/1; MG/1; MG/1
CAPSULE ORAL
Qty: 30 CAP | Refills: 0 | Status: SHIPPED | OUTPATIENT
Start: 2020-05-20 | End: 2020-05-24 | Stop reason: CLARIF

## 2020-05-20 NOTE — TELEPHONE ENCOUNTER
5/20/20 Plan called @ 9  PA completed with PA rep Cheli PERKINS,for Butal/APAP/Cap -40 mg Claim pending review. Turn around time 24 to 48 hours. Case ID: [id number]. If questions may contact plan @ 4 [phone]. Decision will be fax to office,letter will be mail to patient.

## 2020-05-21 ENCOUNTER — DOCUMENTATION ONLY (OUTPATIENT)
Dept: INTERNAL MEDICINE CLINIC | Age: 60
End: 2020-05-21

## 2020-05-21 NOTE — PROGRESS NOTES
5/21/20 [employer] called @ 1 [phone] for status check on Buttal-Acetaminophen-Caff -40 mg caps PA rep Kev Ridley state claim still pending outcome. Ask if claim could be marked urgent, he replied yes. Please follow up when decision is received by office fax.

## 2020-05-22 ENCOUNTER — TELEPHONE (OUTPATIENT)
Dept: INTERNAL MEDICINE CLINIC | Age: 60
End: 2020-05-22

## 2020-05-22 NOTE — TELEPHONE ENCOUNTER
Hamstring Stretch    Begin your rehabilitation with exercises that develop muscle control. These help you meet basic goals, like driving a car or going back to work. Exercise as often as you’re advised. But stop right away if any exercise causes sharp or increasing pain. Icing your knee for 15 to 20 minutes after exercise can help prevent swelling and soreness.  · Lie on your back with your good knee bent. Put a towel around the back of your injured leg. Tighten your stomach muscles.  · Keeping the knee as straight as you can, slowly pull on the towel to bring your injured leg up. Raise it as far as you comfortably can.  · Hold for 30 to 60 seconds. Repeat 2 to 3 times.   Caution: If you feel tingling or pain in your back or legs, you’re not yet ready for this exercise or are pulling too aggressively.   For your safety, check with your healthcare provider before starting an exercise program.   © 7951-8527 [employer]. [address] [place] [address]. All rights reserved. This information is not intended as a substitute for professional medical care. Always follow your healthcare professional's instructions.        Seated Hamstring Stretch  The following flexibility exercise may be suggested by your therapist. Stop the exercise if it causes pain and discuss it with your physical therapist or doctor. During the exercise, be sure not to bounce.  · Sit with one leg extended and your back straight. Bend your other leg so that the sole of your foot rests against your mid-thigh.  · Reach toward your ankle. Keep your knee, neck, and back straight.  · Feel the stretch in the back of your thigh.  · Hold for 30-60 seconds. Repeat 2 times.  · Repeat __ times per day.  For your safety, check with your healthcare provider before starting an exercise program.         © 6092-4744 [employer]. [address] [place] [address]. All rights reserved. This information is not intended as a  ----- Message from Jose Boykin sent at 5/21/2020  3:20 PM EDT -----  Regarding: Dr. Marcy Lockwood: [phone]  Caller's first and last name: [address] [facility], [address]  Reason for call: Calling to complete clinical questions on prescription, \"[facility]upal[facility] [place] [facility]\""[facility]    Callback required yes/no and why: Yes, to be informed   Best contact number(s): [phone]  Details to clarify the request: N/A substitute for professional medical care. Always follow your healthcare professional's instructions.        Hamstring Curls  The following exercise helps build strong, balanced leg muscles. Make sure to adjust exercise machines as instructed by your physical therapist. He or she will tell you how many times to do the exercise.  Note: To prevent injury, always warm up and stretch before your strengthening exercises. Stop any exercise that causes pain. Discuss it with your physical therapist or doctor.  · Lying on your stomach, pull one leg up as far as you comfortably can.  · Let your leg uncurl slowly and steadily.  · Take care not to arch your back.     © 3281-6952 [employer]. [address] [place] [address], [place] [address]. All rights reserved. This information is not intended as a substitute for professional medical care. Always follow your healthcare professional's instructions.

## 2020-05-24 RX ORDER — BUTALBITAL, ACETAMINOPHEN AND CAFFEINE 50; 325; 40 MG/1; MG/1; MG/1
1 TABLET ORAL
Qty: 30 TAB | Refills: 1 | Status: SHIPPED | OUTPATIENT
Start: 2020-05-24 | End: 2020-07-25

## 2020-06-01 NOTE — PROGRESS NOTES
Chief Complaint   Patient presents with    Hand Swelling     right hand swelling since IV placement    Low Blood Sugar     in [address] last week        Patient has not been out of the country in (3-4 months) 90 -120 days, NO diarrhea, NO cough, NO chest conjestion, NO temp. Pt has not been around anyone with these symptoms. Health Maintenance reviewed. I have reviewed the patient's medical history in detail and updated the computerized patient record. 1. Have you been to the ER, urgent care clinic since your last visit? Hospitalized since your last visit?no    2. Have you seen or consulted any other health care providers outside of the [address] [place] [address] since your last visit? Include any pap smears or colon screening. No    Encouraged pt to discuss pt's wishes with spouse/partner/family and bring them in the next appt to follow thru with the Advanced Directive    Fall Risk Assessment, last 12 mths 1/24/2020   Able to walk? Yes   Fall in past 12 months? No   Fall with injury? -   Number of falls in past 12 months -   Fall Risk Score -       3 most recent PHQ Screens 2/12/2020   Little interest or pleasure in doing things Several days   Feeling down, depressed, irritable, or hopeless Several days   Total Score PHQ 2 2       Abuse Screening Questionnaire 1/24/2020   Do you ever feel afraid of your partner? N   Are you in a relationship with someone who physically or mentally threatens you? N   Is it safe for you to go home?  Y       ADL Assessment 1/24/2020   Feeding yourself No Help Needed   Getting from bed to chair No Help Needed   Getting dressed No Help Needed   Bathing or showering No Help Needed   Walk across the room (includes cane/walker) No Help Needed   Using the telphone No Help Needed   Taking your medications No Help Needed   Preparing meals No Help Needed   Managing money (expenses/bills) No Help Needed   Moderately strenuous housework (laundry) No Help Needed   Shopping for personal items (toiletries/medicines) No Help Needed   Shopping for groceries No Help Needed   Driving No Help Needed   Climbing a flight of stairs No Help Needed   Getting to places beyond walking distances No Help Needed

## 2020-06-02 ENCOUNTER — OFFICE VISIT (OUTPATIENT)
Dept: INTERNAL MEDICINE CLINIC | Age: 60
End: 2020-06-02

## 2020-06-02 VITALS
HEIGHT: 57 IN | HEART RATE: 79 BPM | RESPIRATION RATE: 16 BRPM | DIASTOLIC BLOOD PRESSURE: 79 MMHG | TEMPERATURE: 97.7 F | SYSTOLIC BLOOD PRESSURE: 122 MMHG | WEIGHT: 176 LBS | BODY MASS INDEX: 37.97 KG/M2

## 2020-06-02 DIAGNOSIS — F32.1 CURRENT MODERATE EPISODE OF MAJOR DEPRESSIVE DISORDER WITHOUT PRIOR EPISODE (HCC): ICD-10-CM

## 2020-06-02 DIAGNOSIS — E16.2 HYPOGLYCEMIA: ICD-10-CM

## 2020-06-02 DIAGNOSIS — F32.1 MODERATE MAJOR DEPRESSION (HCC): ICD-10-CM

## 2020-06-02 DIAGNOSIS — G43.001 MIGRAINE WITHOUT AURA AND WITH STATUS MIGRAINOSUS, NOT INTRACTABLE: ICD-10-CM

## 2020-06-02 DIAGNOSIS — L03.113 CELLULITIS OF ARM, RIGHT: Primary | ICD-10-CM

## 2020-06-02 RX ORDER — SERTRALINE HYDROCHLORIDE 100 MG/1
100 TABLET, FILM COATED ORAL DAILY
Qty: 30 TAB | Refills: 5 | Status: SHIPPED | OUTPATIENT
Start: 2020-06-02 | End: 2022-06-28 | Stop reason: SDUPTHER

## 2020-06-02 RX ORDER — CEPHALEXIN 500 MG/1
1 TABLET ORAL 3 TIMES DAILY
Qty: 21 TAB | Refills: 0 | Status: SHIPPED | OUTPATIENT
Start: 2020-06-02 | End: 2020-06-09

## 2020-06-02 NOTE — PROGRESS NOTES
HISTORY OF PRESENT Jimmy Skelton is a 63 y.o. female. Hand Swelling    The history is provided by the patient. This is a new problem. The current episode started more than 1 week ago. The problem occurs constantly. The problem has been gradually improving. The pain is present in the right arm. The pain is moderate (hs). Exacerbated by: multiple needle sticks during er visit. There has been a history of trauma (as above). Low Blood Sugar   This is a recurrent problem. Episode onset: 2 weeks. The problem occurs daily. Associated symptoms comments: Out of it sent to ER. Treatments tried: glucose tabs but mouth was closed so sent to the ER.   have IV placed arm has been swollen and painful since. HAs been sadness inc x few weeks, not suicidal    Patient Active Problem List   Diagnosis Code    Bilateral leg edema R60.0    Hypoglycemia E16.2    History of gastric bypass Z98.84    Migraine headache with aura G43. 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 No complaints of exertional chest pain, excessive shortness of breath or focal weakness. Minimal swelling in lower legs or dizziness with standing.   Past Surgical History:   Procedure Laterality Date    HX ABDOMINAL LAPAROSCOPY      HX CARPAL TUNNEL RELEASE Bilateral more than 10 years ago    HX  SECTION  2422,6322    HX COLONOSCOPY  2016    HX GASTRIC BYPASS  1997    x2    HX HYSTERECTOMY      HX PACEMAKER      HX TONSIL AND ADENOIDECTOMY  more than 10 years ago    NEUROLOGICAL PROCEDURE UNLISTED      Bi CTS       Physical Exam  NAD  Visit Vitals  /79   Pulse 79   Temp 97.7 °F (36.5 °C) (Oral)   Resp 16   Ht 4' 9\" (1.448 m)   Wt 176 lb (79.8 kg)   BMI 38.09 kg/m²     WD WN female NAD occa tearful  Heart RRR without murmers clicks or rubs  Lungs CTA  Abdo soft nontender  Ext some edema R forearm and ? Sl redenss    ASSESSMENT and PLAN  Encounter Diagnoses   Name Primary?  Cellulitis of arm, right Yes    Hypoglycemia     Migraine without aura and with status migrainosus, not intractable     Moderate major depression (HCC)     Current moderate episode of major depressive disorder without prior episode ([facility] [address]      Orders Placed This Encounter    cephalexin 500 mg tab    glucagon (GLUCAGEN) 1 mg injection    sertraline (ZOLOFT) 100 mg tablet     Increase antidepressant as above  Possibly some cellulitis infection from IV. Antibiotic as above  Hypoglycemia encouraged usage of sugar pill when feeling dropping low, gave them in glucagon injection kit if her sugars were to drop again and they can do oral replacement. Follow-up and Dispositions    · Return in about 6 weeks (around 7/14/2020).

## 2020-06-02 NOTE — PROGRESS NOTES
Chief Complaint   Patient presents with    Hand Swelling     right hand swelling since IV placement    Low Blood Sugar     in [facility] last week

## 2020-06-02 NOTE — PROGRESS NOTES
Fall Risk Assessment, last 12 mths 6/2/2020   Able to walk? Yes   Fall in past 12 months? No   Fall with injury? -   Number of falls in past 12 months -   Fall Risk Score -       3 most recent PHQ Screens 6/2/2020   Little interest or pleasure in doing things Not at all   Feeling down, depressed, irritable, or hopeless Not at all   Total Score PHQ 2 0       Abuse Screening Questionnaire 6/2/2020   Do you ever feel afraid of your partner? N   Are you in a relationship with someone who physically or mentally threatens you? -   Is it safe for you to go home?  Y       ADL Assessment 6/2/2020   Feeding yourself No Help Needed   Getting from bed to chair No Help Needed   Getting dressed No Help Needed   Bathing or showering No Help Needed   Walk across the room (includes cane/walker) No Help Needed   Using the telphone No Help Needed   Taking your medications No Help Needed   Preparing meals No Help Needed   Managing money (expenses/bills) No Help Needed   Moderately strenuous housework (laundry) No Help Needed   Shopping for personal items (toiletries/medicines) No Help Needed   Shopping for groceries No Help Needed   Driving No Help Needed   Climbing a flight of stairs No Help Needed   Getting to places beyond walking distances No Help Needed

## 2020-06-05 ENCOUNTER — TELEPHONE (OUTPATIENT)
Dept: INTERNAL MEDICINE CLINIC | Age: 60
End: 2020-06-05

## 2020-06-05 DIAGNOSIS — E16.2 HYPOGLYCEMIA: Primary | ICD-10-CM

## 2020-06-05 NOTE — TELEPHONE ENCOUNTER
Patient says sugar has dropped below 58 and needs dm med called in to [address] [place] [address] Street

## 2020-06-10 ENCOUNTER — TELEPHONE (OUTPATIENT)
Dept: INTERNAL MEDICINE CLINIC | Age: 60
End: 2020-06-10

## 2020-06-10 NOTE — TELEPHONE ENCOUNTER
Pt called in reference to her glucogen injection. When she picked it up from the pharmacy they told her nothing was there. Please call her at [phone].

## 2020-06-11 NOTE — TELEPHONE ENCOUNTER
Has UTI instructed to go get AB. Patient 3 months status post op bilateral knee replacement and has progressed really well in physical therapy. She has been discharged to an independent advanced home program. She is ambulating without assistive device, performing safe stair negotiation, and demonstrates great bilateral knee ROM. Please let me know if you have any questions-thanks!

## 2020-06-12 ENCOUNTER — TELEPHONE (OUTPATIENT)
Dept: INTERNAL MEDICINE CLINIC | Age: 60
End: 2020-06-12

## 2020-06-12 ENCOUNTER — VIRTUAL VISIT (OUTPATIENT)
Dept: INTERNAL MEDICINE CLINIC | Age: 60
End: 2020-06-12

## 2020-06-12 DIAGNOSIS — E16.2 HYPOGLYCEMIA: ICD-10-CM

## 2020-06-12 NOTE — PROGRESS NOTES
Deyanira Fuentes is a 63 y.o. female who was evaluated by an audio only encounter for concerns as above. Patient identification was verified prior to start of the visit. A caregiver was present when appropriate. Due to this being a TeleHealth encounter (During [facility] emergency), evaluation of the following organ systems was limited: Vitals/Constitutional/EENT/Resp/CV/GI//MS/Neuro/Skin/Heme-Lymph-Imm. Pursuant to the emergency declaration under the [facility], [facility]5 waiver authority and the [employer] Act, this Virtual Visit was conducted, with patient's (and/or legal guardian's) consent, to reduce the patient's risk of exposure to COVID-19 and provide necessary medical care. Services were provided through a synchronous discussion virtually to substitute for in-person clinic visit. I was at the office. The patient was at home. Subjective: (As above and below)     Chief Complaint   Patient presents with    Blood sugar problem     pt's blood sugar dropping every day  this AM BS was 53 and last before last it dropped to 50, pt stated this makes her feel bad all day     she is a 63y.o. year old female who presents for evaluation of hypoglycemia x 1 day. Pt reported she felt dizzy and \"could not stand up. \"   Checked her blood sugar and it was 84. Stated this morning it dropped to 53 then to 50  Hx of hypos in the past 2-3 years. Hx gastric bypass 23 years ago. Saw Dr. Laura Middleton 6/2 for hypoglycemia and was ordered Glucagon 1mg IM. Pt stated pharmacy did not have it in stock so was unable to pick it up. Endocrine  prescribed Acarbose 1 tab 30 min before a meal TID  Stated the Acarbose made her \"sick\" so she stopped it. Discussed following up with Endocrinologist Dr. Aguirre Linear symptoms are dizziness, nausea, fatigue. Drinks juice and eats a protein snack and feels better.      Hx pacemaker- was checked December 2019    Reviewed PmHx, RxHx, FmHx, SocHx, AllgHx and updated in chart. Patient Active Problem List   Diagnosis Code    Bilateral leg edema R60.0    Hypoglycemia E16.2    History of gastric bypass Z98.84    Migraine headache with aura G43. 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    Peripheral vascular disease ([facility]) I73.9    Weight gain status post gastric bypass R63.5     Review of Systems:  Gen: + fatigue, fever, chills  Eyes: no excessive tearing, itching, or discharge  Nose: no rhinorrhea, no sinus pain  Mouth: no oral lesions, no sore throat  Resp: no shortness of breath, no wheezing, no cough  CV: occasional chest heaviness/ chest pain, no paroxysmal nocturnal dyspnea  Abd: no nausea, no heartburn, no diarrhea, no constipation, no abdominal pain  Neuro: + headaches, no syncope or presyncopal episodes  Endo: no polyuria, no polydipsia  Heme: no lymphadenopathy, no easy bruising or bleeding    Allergies   Allergen Reactions    Benadryl [Diphenhydramine Hcl] Nausea and Vomiting    Ibuprofen Nausea and Vomiting    Sulfamethoxazole-Trimethoprim Nausea Only    Tramadol Anxiety    Diclofenac Itching     Current Outpatient Medications   Medication Sig    sertraline (ZOLOFT) 100 mg tablet Take 1 Tab by mouth daily.  butalbital-acetaminophen-caffeine (FIORICET, ESGIC) -40 mg per tablet Take 1 Tab by mouth every six (6) hours as needed for Headache.  omeprazole (PRILOSEC) 40 mg capsule TAKE 1 CAPSULE BY MOUTH DAILY.  Vitamin D3 25 mcg (1,000 unit) tablet TAKE 1 TABLET BY MOUTH DAILY FOR VITAMIN D DEFICIENCY    tiZANidine (ZANAFLEX) 4 mg tablet Take 1 Tab by mouth nightly.     potassium chloride SR (KLOR-CON 10) 10 mEq tablet TAKE 1 TABLET BY MOUTH DAILY, IF TAKES LASIX (FUROSEMIDE)    albuterol (PROVENTIL VENTOLIN) 2.5 mg /3 mL (0.083 %) nebu 3 mL by Nebulization route every four (4) hours as needed for Wheezing.  topiramate (TOPAMAX) 25 mg tablet Take 1 Tab by mouth two (2) times daily (with meals).  cyclobenzaprine (FLEXERIL) 5 mg tablet Take 1 Tab by mouth three (3) times daily as needed for Muscle Spasm(s).  acarbose (PRECOSE) 50 mg tablet TAKE 1 TABLETS BY MOUTH 30 MINS BEFORE MEALS THREE TIMES A DAY    nystatin (MYCOSTATIN) topical cream Apply  to affected area two (2) times a day.  aspirin 81 mg chewable tablet CHEW AND SWALLOW 1 TAB BY MOUTH DAILY.  cyanocobalamin (VITAMIN B-12) 1,000 mcg tablet TAKE 1 TABLET BY MOUTH DAILY FOR VITAMIN B12 DEFICIENCY    polyethylene glycol (MIRALAX) 17 gram packet TAKE 1 PACKET BY MOUTH DAILY.  meclizine (TRAVEL SICKNESS, MECLIZINE,) 25 mg chewable tablet TAKE 1 TABLET BY MOUTH THREE TIMES DAILY AS NEEDED FOR UP TO 10 DAYS FOR DIZZINESS    fluticasone (FLONASE) 50 mcg/actuation nasal spray 2 Sprays by Both Nostrils route daily.  traZODone (DESYREL) 50 mg tablet Take 50 mg by mouth nightly.  Nebulizer & Compressor machine Use as directed    furosemide (LASIX) 20 mg tablet TAKE 1 TABLET BY MOUTH DAILY AS NEEDED     No current facility-administered medications for this visit. Objective:     Physical Examination:   Gen: alert, oriented, no acute distress; appropriately communicating  Resp: no increased work of breathing, lungs clear to ausculation bilaterally    Assessment/ Plan:        ICD-10-CM ICD-9-CM    1. Hypoglycemia E16.2 251.2 glucagon (GLUCAGEN) 1 mg injection      DISCONTINUED: glucagon (GLUCAGEN) 1 mg injection     1. Hypoglycemia  Reordered Glucagon IM injection PRN for hypoglycemia.   - glucagon (GLUCAGEN) 1 mg injection; 1 mL by IntraMUSCular route once for 1 dose. If blood sugar drops  Indications: low blood sugar  Dispense: 1 Vial; Refill: 1    Follow up with endocrinologist for hypoglycemia.   Eat small protein snack before bedtime and when sugar drops. Use Glucagon IM for hypoglycemia. If Cp continues go to the ER. Get up slowly to prevent a fall. Follow up in 1 week. I have discussed the diagnosis with the patient and the intended plan as seen in the above orders. The patient has received an after-visit summary and questions were answered concerning future plans. If symptoms worsen, go to the ER.     Medication Side [id number] and Warnings were discussed with patient: yes  Patient Labs were reviewed: yes  Patient Past Records were reviewed:  yes    Florecita Kuo NP

## 2020-06-12 NOTE — TELEPHONE ENCOUNTER
Left message sent to [employer] because [address] [place] [address] does  Not have it in stock.

## 2020-06-12 NOTE — PROGRESS NOTES
Chief Complaint   Patient presents with    Blood sugar problem     pt's blood sugar dropping every day  this AM BS was 53 and last before last it dropped to 50, pt stated this makes her feel bad all day     Patient has not been out of the country in (3-4 months) 90 -120 days, NO diarrhea, NO cough, NO chest conjestion, NO temp. Pt has not been around anyone with these symptoms. Health Maintenance reviewed. I have reviewed the patient's medical history in detail and updated the computerized patient record. 1. Have you been to the ER, urgent care clinic since your last visit? Hospitalized since your last visit?no    2. Have you seen or consulted any other health care providers outside of the [address] [place] [address] since your last visit? Include any pap smears or colon screening. no    Encouraged pt to discuss pt's wishes with spouse/partner/family and bring them in the next appt to follow thru with the Advanced Directive    Fall Risk Assessment, last 12 mths 6/2/2020   Able to walk? Yes   Fall in past 12 months? No   Fall with injury? -   Number of falls in past 12 months -   Fall Risk Score -       3 most recent PHQ Screens 6/2/2020   Little interest or pleasure in doing things Not at all   Feeling down, depressed, irritable, or hopeless Not at all   Total Score PHQ 2 0       Abuse Screening Questionnaire 6/2/2020   Do you ever feel afraid of your partner? N   Are you in a relationship with someone who physically or mentally threatens you? -   Is it safe for you to go home?  Y       ADL Assessment 6/2/2020   Feeding yourself No Help Needed   Getting from bed to chair No Help Needed   Getting dressed No Help Needed   Bathing or showering No Help Needed   Walk across the room (includes cane/walker) No Help Needed   Using the telphone No Help Needed   Taking your medications No Help Needed   Preparing meals No Help Needed   Managing money (expenses/bills) No Help Needed   Moderately strenuous housework (laundry) No Help Needed   Shopping for personal items (toiletries/medicines) No Help Needed   Shopping for groceries No Help Needed   Driving No Help Needed   Climbing a flight of stairs No Help Needed   Getting to places beyond walking distances No Help Needed

## 2020-06-16 ENCOUNTER — TELEPHONE (OUTPATIENT)
Dept: INTERNAL MEDICINE CLINIC | Age: 60
End: 2020-06-16

## 2020-06-16 DIAGNOSIS — E16.2 HYPOGLYCEMIA: Primary | ICD-10-CM

## 2020-06-16 NOTE — TELEPHONE ENCOUNTER
Pt called in reference to wanting Dr. Talib Bella to send another medication for her sugar to [employer] [facility]. When she went to [facility] to pick it up she said it was too high and ins didn't cover it. Please call her at [phone].

## 2020-06-19 ENCOUNTER — TELEPHONE (OUTPATIENT)
Dept: INTERNAL MEDICINE CLINIC | Age: 60
End: 2020-06-19

## 2020-06-19 NOTE — TELEPHONE ENCOUNTER
Pt called stating her BS after breakfast was 53. She is scared.   After talking I called her back and she has not rechecked her BS and she will go to [address] to get RX

## 2020-07-09 RX ORDER — MECLIZINE HCL 25MG 25 MG/1
TABLET, CHEWABLE ORAL
Qty: 60 TAB | Refills: 4 | Status: SHIPPED | OUTPATIENT
Start: 2020-07-09 | End: 2020-09-28 | Stop reason: SDUPTHER

## 2020-07-24 ENCOUNTER — TELEPHONE (OUTPATIENT)
Dept: NEUROLOGY | Age: 60
End: 2020-07-24

## 2020-07-24 ENCOUNTER — OFFICE VISIT (OUTPATIENT)
Dept: NEUROLOGY | Age: 60
End: 2020-07-24

## 2020-07-24 VITALS
DIASTOLIC BLOOD PRESSURE: 70 MMHG | HEIGHT: 57 IN | TEMPERATURE: 98.4 F | SYSTOLIC BLOOD PRESSURE: 130 MMHG | OXYGEN SATURATION: 98 % | BODY MASS INDEX: 36.24 KG/M2 | WEIGHT: 168 LBS | HEART RATE: 78 BPM

## 2020-07-24 DIAGNOSIS — R52 GENERALIZED PAIN: ICD-10-CM

## 2020-07-24 DIAGNOSIS — Z98.84 HX OF GASTRIC BYPASS: ICD-10-CM

## 2020-07-24 DIAGNOSIS — G44.221 CHRONIC TENSION-TYPE HEADACHE, INTRACTABLE: ICD-10-CM

## 2020-07-24 DIAGNOSIS — Z98.84 HX OF GASTRIC BYPASS: Primary | ICD-10-CM

## 2020-07-24 DIAGNOSIS — M89.9 DISORDER OF BONE, UNSPECIFIED: ICD-10-CM

## 2020-07-24 RX ORDER — TIZANIDINE 4 MG/1
4 TABLET ORAL
Qty: 30 TAB | Refills: 3 | Status: SHIPPED | OUTPATIENT
Start: 2020-07-24 | End: 2020-07-27 | Stop reason: SDUPTHER

## 2020-07-24 NOTE — PROGRESS NOTES
Follow-up Visit    Name Maureen Dodd Age 63 y.o. MRN [id number]  1960       Chief Complaint:  Headaches    Headaches down to 3 a month from 10 a month. She is complaint with her medicaition and has no side effects from using tizanidine. She neglected to get the labs that were ordered on last visit but promises to get them done if they were reordered. She continues to suffer from generalized pain. Assesment and Plan  1. Chronic tension-type headache, intractable  She has generalized pain touching most of the muscles on the left side of her neck and shoulders. She also has pain associated with bilateral TMJ and temples. - tiZANidine (ZANAFLEX) 4 mg tablet; Take 1 Tab by mouth nightly. Dispense: 30 Tab; Refill: 3    2. Hx of gastric bypass  - VITAMIN B12; Future  - VITAMIN B6; Future  - VITAMIN B3/NICOTINIC ACID  - COPPER; Future  - FOLATE  - METABOLIC PANEL, COMPREHENSIVE  - VITAMIN D, 25 HYDROXY    3. Disorder of bone, unspecified   - VITAMIN D, 25 HYDROXY    4. Generalized pain  No clear reason by history will investigate for nutritional deficiencies  May have a rheumatologic disorder or fibromyalgia. Allergies  Benadryl [diphenhydramine hcl]; Ibuprofen; Sulfamethoxazole-trimethoprim; Tramadol; and Diclofenac     Medications  Current Outpatient Medications   Medication Sig    meclizine (Travel Sickness, meclizine,) 25 mg chewable tablet TAKE 1 TABLET BY MOUTH THREE TIMES DAILY AS NEEDED FOR UP TO 10 DAYS FOR DIZZINESS    furosemide (LASIX) 20 mg tablet TAKE 1 TABLET BY MOUTH DAILY AS NEEDED    cyanocobalamin ER 1,000 mcg tablet TAKE 1 TABLET BY MOUTH DAILY FOR VITAMIN B12 DEFICIENCY    sertraline (ZOLOFT) 100 mg tablet Take 1 Tab by mouth daily.  butalbital-acetaminophen-caffeine (FIORICET, ESGIC) -40 mg per tablet Take 1 Tab by mouth every six (6) hours as needed for Headache.  omeprazole (PRILOSEC) 40 mg capsule TAKE 1 CAPSULE BY MOUTH DAILY.     Vitamin D3 25 mcg (1,000 unit) tablet TAKE 1 TABLET BY MOUTH DAILY FOR VITAMIN D DEFICIENCY    tiZANidine (ZANAFLEX) 4 mg tablet Take 1 Tab by mouth nightly.  potassium chloride SR (KLOR-CON 10) 10 mEq tablet TAKE 1 TABLET BY MOUTH DAILY, IF TAKES LASIX (FUROSEMIDE)    albuterol (PROVENTIL VENTOLIN) 2.5 mg /3 mL (0.083 %) nebu 3 mL by Nebulization route every four (4) hours as needed for Wheezing.  topiramate (TOPAMAX) 25 mg tablet Take 1 Tab by mouth two (2) times daily (with meals).  cyclobenzaprine (FLEXERIL) 5 mg tablet Take 1 Tab by mouth three (3) times daily as needed for Muscle Spasm(s).  acarbose (PRECOSE) 50 mg tablet TAKE 1 TABLETS BY MOUTH 30 MINS BEFORE MEALS THREE TIMES A DAY    nystatin (MYCOSTATIN) topical cream Apply  to affected area two (2) times a day.  aspirin 81 mg chewable tablet CHEW AND SWALLOW 1 TAB BY MOUTH DAILY.  polyethylene glycol (MIRALAX) 17 gram packet TAKE 1 PACKET BY MOUTH DAILY.  fluticasone (FLONASE) 50 mcg/actuation nasal spray 2 Sprays by Both Nostrils route daily.  traZODone (DESYREL) 50 mg tablet Take 50 mg by mouth nightly.  Nebulizer & Compressor machine Use as directed     No current facility-administered medications for this visit. Medical History  Past Medical History:   Diagnosis Date    Asthma     Chest pain     Depression     Diarrhea     Fainting     Fatigue     Hearing loss     Hypoglycemia 2014    Hypotension 2014    Leg swelling     Memory disorder     Nausea and vomiting     Ringing in ears     SOB (shortness of breath)     Stomach pain     Swallowing difficulty     Syncope and collapse 7/24/15    RTH    Unintentional weight change        Review of Systems   Eyes: Negative for blurred vision and double vision. Respiratory: Negative for cough and shortness of breath. Cardiovascular: Negative for chest pain, palpitations and orthopnea. Gastrointestinal: Negative for nausea and vomiting.    Musculoskeletal: Positive for myalgias. Neurological: Positive for headaches. Psychiatric/Behavioral: Negative for depression. The patient is not nervous/anxious. Exam:  Visit Vitals  /70   Pulse 78   Temp 98.4 °F (36.9 °C)   Ht 4' 9\" (1.448 m)   Wt 168 lb (76.2 kg)   SpO2 98%   BMI 36.35 kg/m²        General: Well developed, well nourished. Patient in no apparent distress   Head: Normocephalic, atraumatic, anicteric sclera   Neck Normal ROM, No thyromegaly  Neck tenderness   Lungs:  Clear to auscultation    Cardiac: Regular rate and rhythm with no murmurs. Abd: Bowel sounds were audible. Ext: No pedal edema   Skin: Supple no rash     NeurologicExam:  Mental Status: Alert and oriented to person place and time   Speech: Fluent no aphasia or dysarthria. Cranial Nerves:  II - XII Intact   Motor:  Full and symmetric strength of upper and lower extremities. Normal bulk and tone. Reflexes:   Deep tendon reflexes 2+/4 and symmetric. Sensory:   Symmetric and intact with no perceived deficits . Gait:  Gait is balanced  with normal arm swing. Tremor:   No tremor noted. Cerebellar:  Coordination intact. Neurovascular: No carotid bruits.  No JVD          Lab Review  Lab Results   Component Value Date/Time    WBC 4.8 02/04/2020 09:07 AM    HCT 39.0 02/04/2020 09:07 AM    HGB 12.3 02/04/2020 09:07 AM    PLATELET 469 59/82/4033 09:07 AM       Lab Results   Component Value Date/Time    Sodium 140 02/04/2020 09:07 AM    Potassium 4.6 02/04/2020 09:07 AM    Chloride 107 (H) 02/04/2020 09:07 AM    CO2 20 02/04/2020 09:07 AM    Glucose 80 02/04/2020 09:07 AM    BUN 21 02/04/2020 09:07 AM    Creatinine 0.72 02/04/2020 09:07 AM    Calcium 8.8 02/04/2020 09:07 AM         Lab Results   Component Value Date/Time    Hemoglobin A1c <3.5 (L) 10/13/2014 05:00 AM    Hemoglobin A1c (POC) 5.1 10/02/2015 09:49 AM        Lab Results   Component Value Date/Time    Cholesterol, total 190 02/04/2020 09:07 AM    HDL Cholesterol 54 02/04/2020 09:07 AM    LDL, calculated 125 (H) 02/04/2020 09:07 AM    VLDL, calculated 11 02/04/2020 09:07 AM    Triglyceride 57 02/04/2020 09:07 AM    CHOL/HDL Ratio 2.4 10/14/2014 03:00 AM

## 2020-07-24 NOTE — TELEPHONE ENCOUNTER
----- Message from David Truong Page sent at 2020  3:39 PM EDT -----  Regarding: DR. Carlton Taylor Telephone  General/ Vendor Message  To   Subject:   Patient   ALONA   29-   ID numbers #[id number] [id number]  Caller's first and last name    Reason for call: Medication  Best contact number(s [phone]  Details to clarify the request:

## 2020-07-24 NOTE — PATIENT INSTRUCTIONS

## 2020-07-25 RX ORDER — BUTALBITAL, ACETAMINOPHEN AND CAFFEINE 50; 325; 40 MG/1; MG/1; MG/1
TABLET ORAL
Qty: 30 TAB | Refills: 0 | Status: SHIPPED | OUTPATIENT
Start: 2020-07-25 | End: 2020-09-28 | Stop reason: SDUPTHER

## 2020-07-27 RX ORDER — TIZANIDINE 4 MG/1
4 TABLET ORAL
Qty: 30 TAB | Refills: 3 | Status: SHIPPED | OUTPATIENT
Start: 2020-07-27 | End: 2020-11-24 | Stop reason: SDUPTHER

## 2020-07-27 NOTE — TELEPHONE ENCOUNTER
Spoke with medication and medicine was sent to wrong pharmacy. Corrected and prescription at [facility] cancelled.

## 2020-08-18 ENCOUNTER — CLINICAL SUPPORT (OUTPATIENT)
Dept: CARDIOLOGY CLINIC | Age: 60
End: 2020-08-18
Payer: MEDICARE

## 2020-08-18 ENCOUNTER — OFFICE VISIT (OUTPATIENT)
Dept: CARDIOLOGY CLINIC | Age: 60
End: 2020-08-18
Payer: MEDICARE

## 2020-08-18 VITALS
RESPIRATION RATE: 16 BRPM | HEIGHT: 57 IN | HEART RATE: 60 BPM | DIASTOLIC BLOOD PRESSURE: 80 MMHG | OXYGEN SATURATION: 97 % | SYSTOLIC BLOOD PRESSURE: 130 MMHG | BODY MASS INDEX: 37.49 KG/M2 | WEIGHT: 173.8 LBS

## 2020-08-18 DIAGNOSIS — I49.5 SSS (SICK SINUS SYNDROME) (HCC): ICD-10-CM

## 2020-08-18 DIAGNOSIS — Z95.0 CARDIAC PACEMAKER IN SITU: Primary | ICD-10-CM

## 2020-08-18 DIAGNOSIS — R00.1 SYMPTOMATIC BRADYCARDIA: ICD-10-CM

## 2020-08-18 DIAGNOSIS — E66.01 OBESITY, MORBID (HCC): ICD-10-CM

## 2020-08-18 DIAGNOSIS — R00.1 BRADYCARDIA ON ECG: ICD-10-CM

## 2020-08-18 DIAGNOSIS — Z95.0 CARDIAC PACEMAKER IN SITU: ICD-10-CM

## 2020-08-18 DIAGNOSIS — I49.5 SSS (SICK SINUS SYNDROME) (HCC): Primary | ICD-10-CM

## 2020-08-18 PROCEDURE — 93280 PM DEVICE PROGR EVAL DUAL: CPT | Performed by: INTERNAL MEDICINE

## 2020-08-18 PROCEDURE — 99214 OFFICE O/P EST MOD 30 MIN: CPT | Performed by: INTERNAL MEDICINE

## 2020-08-18 PROCEDURE — G9717 DOC PT DX DEP/BP F/U NT REQ: HCPCS | Performed by: INTERNAL MEDICINE

## 2020-08-18 PROCEDURE — G8427 DOCREV CUR MEDS BY ELIG CLIN: HCPCS | Performed by: INTERNAL MEDICINE

## 2020-08-18 PROCEDURE — G9899 SCRN MAM PERF RSLTS DOC: HCPCS | Performed by: INTERNAL MEDICINE

## 2020-08-18 PROCEDURE — G8417 CALC BMI ABV UP PARAM F/U: HCPCS | Performed by: INTERNAL MEDICINE

## 2020-08-18 PROCEDURE — 3017F COLORECTAL CA SCREEN DOC REV: CPT | Performed by: INTERNAL MEDICINE

## 2020-08-18 NOTE — PROGRESS NOTES
Chief Complaint   Patient presents with    Pacemaker Check     Annual follow up     Follow-up    Irregular Heart Beat     1. Have you been to the ER, urgent care clinic since your last visit? Hospitalized since your last visit? No    2. Have you seen or consulted any other health care providers outside of the [address] [place] [address] since your last visit? Include any pap smears or colon screening. No    Patient C/O fatigue. She C/O chest soreness and questions if there is a weight lifting limit.

## 2020-08-18 NOTE — PROGRESS NOTES
Subjective:      Miguel Ángel George is a 63 y.o. female is here for EP follow up. The patient denies chest pain/ shortness of breath, orthopnea, PND, LE edema, palpitations, syncope, presyncope or fatigue. Feels in usual state of health.      Patient Active Problem List    Diagnosis Date Noted    Peripheral vascular disease ([facility] 10/04/2019    Weight gain status post gastric bypass 10/04/2019    Nesidioblastosis 2019    SSS (sick sinus syndrome) ([facility] 2018    Obesity, morbid ([facility] 2018    Gastroesophageal reflux disease without esophagitis 2016    Bipolar disorder with current episode depressed ([facility] 2016    Migraine headache with aura 10/24/2014    Bilateral leg edema 10/12/2014    Hypoglycemia 10/12/2014    History of gastric bypass 10/12/2014      Sarai Duff MD  Past Medical History:   Diagnosis Date    Asthma     Chest pain     Depression     Diarrhea     Fainting     Fatigue     Hearing loss     Hypoglycemia     Hypotension     Leg swelling     Memory disorder     Nausea and vomiting     Ringing in ears     SOB (shortness of breath)     Stomach pain     Swallowing difficulty     Syncope and collapse 7/24/15    RTH    Unintentional weight change       Past Surgical History:   Procedure Laterality Date    HX ABDOMINAL LAPAROSCOPY      HX CARPAL TUNNEL RELEASE Bilateral more than 10 years ago    HX  SECTION  1943,7911    HX COLONOSCOPY  2016    HX GASTRIC BYPASS  1997    x2    HX HYSTERECTOMY      HX PACEMAKER      HX TONSIL AND ADENOIDECTOMY  more than 10 years ago    NEUROLOGICAL PROCEDURE UNLISTED      Bi CTS     Allergies   Allergen Reactions    Benadryl [Diphenhydramine Hcl] Nausea and Vomiting    Ibuprofen Nausea and Vomiting    Sulfamethoxazole-Trimethoprim Nausea Only    Tramadol Anxiety    Diclofenac Itching      Family History   Problem Relation Age of Onset    Stroke Mother     Cancer Father  Diabetes Brother     Cancer Maternal Aunt     Cancer Maternal Grandmother     Cancer Brother     Kidney Disease Brother     negative for cardiac disease  Social History     Socioeconomic History    Marital status:      Spouse name: Not on file    Number of children: 2    Years of education: Not on file    Highest education level: Not on file   Occupational History    Occupation: retired   Tobacco Use    Smoking status: Never Smoker    Smokeless tobacco: Never Used   Substance and Sexual Activity    Alcohol use: No    Drug use: No    Sexual activity: Never   Social History Narrative     has brain damage, lives with him     Current Outpatient Medications   Medication Sig    butalbital-acetaminophen-caffeine (FIORICET, ESGIC) -40 mg per tablet TAKE 1 TABLET BY MOUTH EVERY SIX HOURS AS NEEDED FOR HEADACHE.  meclizine (Travel Sickness, meclizine,) 25 mg chewable tablet TAKE 1 TABLET BY MOUTH THREE TIMES DAILY AS NEEDED FOR UP TO 10 DAYS FOR DIZZINESS    furosemide (LASIX) 20 mg tablet TAKE 1 TABLET BY MOUTH DAILY AS NEEDED    cyanocobalamin ER 1,000 mcg tablet TAKE 1 TABLET BY MOUTH DAILY FOR VITAMIN B12 DEFICIENCY    sertraline (ZOLOFT) 100 mg tablet Take 1 Tab by mouth daily.  omeprazole (PRILOSEC) 40 mg capsule TAKE 1 CAPSULE BY MOUTH DAILY.  Vitamin D3 25 mcg (1,000 unit) tablet TAKE 1 TABLET BY MOUTH DAILY FOR VITAMIN D DEFICIENCY    potassium chloride SR (KLOR-CON 10) 10 mEq tablet TAKE 1 TABLET BY MOUTH DAILY, IF TAKES LASIX (FUROSEMIDE)    albuterol (PROVENTIL VENTOLIN) 2.5 mg /3 mL (0.083 %) nebu 3 mL by Nebulization route every four (4) hours as needed for Wheezing.  topiramate (TOPAMAX) 25 mg tablet Take 1 Tab by mouth two (2) times daily (with meals).  cyclobenzaprine (FLEXERIL) 5 mg tablet Take 1 Tab by mouth three (3) times daily as needed for Muscle Spasm(s).     acarbose (PRECOSE) 50 mg tablet TAKE 1 TABLETS BY MOUTH 30 MINS BEFORE MEALS THREE TIMES A DAY    nystatin (MYCOSTATIN) topical cream Apply  to affected area two (2) times a day.  aspirin 81 mg chewable tablet CHEW AND SWALLOW 1 TAB BY MOUTH DAILY.  polyethylene glycol (MIRALAX) 17 gram packet TAKE 1 PACKET BY MOUTH DAILY.  fluticasone (FLONASE) 50 mcg/actuation nasal spray 2 Sprays by Both Nostrils route daily.  traZODone (DESYREL) 50 mg tablet Take 50 mg by mouth nightly.  Nebulizer & Compressor machine Use as directed    tiZANidine (ZANAFLEX) 4 mg tablet Take 1 Tab by mouth nightly. (Patient not taking: Reported on 8/18/2020)     No current facility-administered medications for this visit. Vitals:    08/18/20 0945   BP: 130/80   Pulse: 60   Resp: 16   SpO2: 97%   Weight: 173 lb 12.8 oz (78.8 kg)   Height: 4' 9\" (1.448 m)       I have reviewed the nurses notes, vitals, problem list, allergy list, medical history, family, social history and medications. Review of Symptoms:    General: Pt denies excessive weight gain or loss. Pt is able to conduct ADL's  HEENT: Denies blurred vision, headaches, epistaxis and difficulty swallowing. Respiratory: Denies shortness of breath, HERNADEZ, wheezing or stridor. Cardiovascular: Denies precordial pain, palpitations, edema or PND  Gastrointestinal: Denies poor appetite, indigestion, abdominal pain or blood in stool  Urinary: Denies dysuria, pyuria  Musculoskeletal: Denies pain or swelling from muscles or joints  Neurologic: Denies tremor, paresthesias, or sensory motor disturbance  Skin: Denies rash, itching or texture change. Psych: Denies depression      Physical Exam:      General: Well developed, in no acute distress. HEENT: Eyes - PERRL, no jvd  Heart:  Normal S1/S2 negative S3 or S4. Regular, no murmur, gallop or rub. Respiratory: Clear bilaterally x 4, no wheezing or rales  Extremities:  No edema, normal cap refill, no cyanosis. Musculoskeletal: No clubbing  Neuro: A&Ox3, speech clear, gait stable.    Skin: Skin color is normal. No rashes or lesions. Non diaphoretic. no ulcers  Vascular: 2+ pulses symmetric in all extremities  Psych - judgement intact and orientation is wnl       Cardiographics    Ekg: Sinus  Rhythm  -Short [place] syndrome   Maria Ines = 106    Results for orders placed or performed during the hospital encounter of 01/01/19   EKG, 12 LEAD, INITIAL   Result Value Ref Range    Ventricular Rate 60 BPM    Atrial Rate 60 BPM    P-R Interval 202 ms    QRS Duration 94 ms    Q-T Interval 406 ms    QTC Calculation (Bezet) 406 ms    Calculated P Axis 32 degrees    Calculated R Axis 18 degrees    Calculated T Axis 20 degrees    Diagnosis       Atrial-paced rhythm  When compared with ECG of 31-JAN-2016 16:07,  Electronic atrial pacemaker has replaced Sinus rhythm  Confirmed by Reuben Cheng ([id number]) on 1/2/2019 10:34:39 AM     Results for orders placed or performed in visit on 11/15/18   CARDIAC HOLTER MONITOR, 24 HOURS    Narrative    ECG Monitor/24 hours, Complete    Reason for Holter Monitor  BRADYCARDIA    Heartbeat    Slowest 41  Average 59  Fastest  121        Results:   Underlying Rhythm: Sinus bradycardia      Atrial Arrhythmias: premature atrial contractions; occasional and severe bradycardia            AV Conduction: normal    Ventricular Arrhythmias: premature ventricular contractions; rare     ST Segment Analysis:normal     Symptom Correlation:  None reported    Comment:   Sinus bradycardia with bradycardia to the 40s (while asleep).  Unchanged from monitor in 2014     Nicho Hernández MD, [facility]            Lab Results   Component Value Date/Time    WBC 4.8 02/04/2020 09:07 AM    Hemoglobin (POC) 13.2 03/28/2016 03:29 PM    HGB 12.3 02/04/2020 09:07 AM    HCT 39.0 02/04/2020 09:07 AM    PLATELET 185 71/22/0267 09:07 AM    MCV 94 02/04/2020 09:07 AM      Lab Results   Component Value Date/Time    Sodium 140 02/04/2020 09:07 AM    Potassium 4.6 02/04/2020 09:07 AM    Chloride 107 (H) 02/04/2020 09:07 AM    CO2 20 02/04/2020 09:07 AM    Anion gap 9 01/01/2019 07:28 PM    Glucose 80 02/04/2020 09:07 AM    BUN 21 02/04/2020 09:07 AM    Creatinine 0.72 02/04/2020 09:07 AM    BUN/Creatinine ratio 29 (H) 02/04/2020 09:07 AM    GFR est  02/04/2020 09:07 AM    GFR est non-AA 93 02/04/2020 09:07 AM    Calcium 8.8 02/04/2020 09:07 AM    Bilirubin, total <0.2 02/04/2020 09:07 AM    Alk. phosphatase 85 02/04/2020 09:07 AM    Protein, total 5.4 (L) 02/04/2020 09:07 AM    Albumin 3.6 (L) 02/04/2020 09:07 AM    Globulin 3.1 01/01/2019 07:28 PM    A-G Ratio 2.0 02/04/2020 09:07 AM    ALT (SGPT) 16 02/04/2020 09:07 AM      Lab Results   Component Value Date/Time    TSH 1.850 07/08/2016 04:17 PM        Assessment:           ICD-10-CM ICD-9-CM    1. SSS (sick sinus syndrome) ([facility])  I49.5 427.81 AMB POC EKG ROUTINE W/ 12 LEADS, INTER & REP   2. Obesity, morbid ([address]  E66.01 278.01    3. Bradycardia on ECG  R00.1 427.89    4. Symptomatic bradycardia  R00.1 427.89    5. Cardiac pacemaker in situ  Z95.0 V45.01      Orders Placed This Encounter    AMB POC EKG ROUTINE W/ 12 LEADS, INTER & REP     Order Specific Question:   Reason for Exam:     Answer:   routine        Plan:     Mansoor Celeste is here for annual follow up s/p dual chamber pm 12/6/18. Device check with normally functioning dual chamber PM.  She is 45.4% AP and <0.1% RVP with >8 years remaining. She has had 4 SVT episodes - asymptomatic. EKG normal sinus, normotensive. Will see her back in device clinic in 6 mo and annually with me.     Continue medical management for BMI 39, SSS and bipolar disorder. Thank you for allowing me to participate in Nikhil Zimmerman 's care.       Latricia Pfeiffer NP

## 2020-09-11 DIAGNOSIS — G43.009 MIGRAINE WITHOUT AURA AND WITHOUT STATUS MIGRAINOSUS, NOT INTRACTABLE: ICD-10-CM

## 2020-09-11 RX ORDER — TOPIRAMATE 25 MG/1
TABLET ORAL
Qty: 60 TAB | Refills: 4 | Status: SHIPPED | OUTPATIENT
Start: 2020-09-11 | End: 2020-09-28 | Stop reason: SDUPTHER

## 2020-09-28 ENCOUNTER — VIRTUAL VISIT (OUTPATIENT)
Dept: INTERNAL MEDICINE CLINIC | Age: 60
End: 2020-09-28
Payer: MEDICARE

## 2020-09-28 DIAGNOSIS — G43.009 MIGRAINE WITHOUT AURA AND WITHOUT STATUS MIGRAINOSUS, NOT INTRACTABLE: ICD-10-CM

## 2020-09-28 DIAGNOSIS — E16.2 HYPOGLYCEMIA: ICD-10-CM

## 2020-09-28 DIAGNOSIS — R05.9 COUGH: Primary | ICD-10-CM

## 2020-09-28 DIAGNOSIS — M79.604 PAIN OF RIGHT LOWER EXTREMITY: ICD-10-CM

## 2020-09-28 PROCEDURE — G9899 SCRN MAM PERF RSLTS DOC: HCPCS | Performed by: FAMILY MEDICINE

## 2020-09-28 PROCEDURE — G8427 DOCREV CUR MEDS BY ELIG CLIN: HCPCS | Performed by: FAMILY MEDICINE

## 2020-09-28 PROCEDURE — 3017F COLORECTAL CA SCREEN DOC REV: CPT | Performed by: FAMILY MEDICINE

## 2020-09-28 PROCEDURE — G8417 CALC BMI ABV UP PARAM F/U: HCPCS | Performed by: FAMILY MEDICINE

## 2020-09-28 PROCEDURE — 99214 OFFICE O/P EST MOD 30 MIN: CPT | Performed by: FAMILY MEDICINE

## 2020-09-28 PROCEDURE — G9717 DOC PT DX DEP/BP F/U NT REQ: HCPCS | Performed by: FAMILY MEDICINE

## 2020-09-28 RX ORDER — ACARBOSE 50 MG/1
TABLET ORAL
Qty: 90 TAB | Refills: 5 | Status: SHIPPED | OUTPATIENT
Start: 2020-09-28 | End: 2021-04-09

## 2020-09-28 RX ORDER — MECLIZINE HCL 25MG 25 MG/1
TABLET, CHEWABLE ORAL
Qty: 60 TAB | Refills: 4 | Status: SHIPPED | OUTPATIENT
Start: 2020-09-28 | End: 2021-06-11

## 2020-09-28 RX ORDER — BUTALBITAL, ACETAMINOPHEN AND CAFFEINE 50; 325; 40 MG/1; MG/1; MG/1
TABLET ORAL
Qty: 30 TAB | Refills: 1 | Status: SHIPPED | OUTPATIENT
Start: 2020-09-28 | End: 2020-11-24 | Stop reason: SDUPTHER

## 2020-09-28 RX ORDER — TOPIRAMATE 25 MG/1
TABLET ORAL
Qty: 60 TAB | Refills: 4 | Status: SHIPPED | OUTPATIENT
Start: 2020-09-28 | End: 2021-06-01 | Stop reason: SDUPTHER

## 2020-09-28 RX ORDER — ALBUTEROL SULFATE 0.83 MG/ML
2.5 SOLUTION RESPIRATORY (INHALATION)
Qty: 100 EACH | Refills: 1 | Status: SHIPPED | OUTPATIENT
Start: 2020-09-28

## 2020-09-28 RX ORDER — CYCLOBENZAPRINE HCL 5 MG
5 TABLET ORAL
Qty: 60 TAB | Refills: 0 | Status: SHIPPED | OUTPATIENT
Start: 2020-09-28 | End: 2021-07-12 | Stop reason: SDUPTHER

## 2020-09-28 RX ORDER — AZITHROMYCIN 250 MG/1
250 TABLET, FILM COATED ORAL SEE ADMIN INSTRUCTIONS
Qty: 6 TAB | Refills: 0 | Status: SHIPPED | OUTPATIENT
Start: 2020-09-28 | End: 2020-10-18

## 2020-09-28 NOTE — PROGRESS NOTES
Preet Taylor is a 63 y.o. female who was phone evaluated on 9/28/2020. Consent:  She and/or her healthcare decision maker is aware that this patient-initiated Telehealth encounter is a billable service, with coverage as determined by her insurance carrier. She is aware that she may receive a bill and has provided verbal consent to proceed: Yes    I was in the office while conducting this encounter. Assessment & Plan:       ICD-10-CM ICD-9-CM    1. Cough  R05 786.2 azithromycin (ZITHROMAX) 250 mg tablet   2. Pain of right lower extremity  M79.604 729.5 AMB SUPPLY ORDER      REFERRAL TO PODIATRY   3. Hypoglycemia  E16.2 251.2 acarbose (PRECOSE) 50 mg tablet   4. Migraine without aura and without status migrainosus, not intractable  G43.009 346.10 topiramate (TOPAMAX) 25 mg tablet     Gets psychiatric medications from psychiatrist.  Requests refills. Current Outpatient Medications   Medication Sig Dispense Refill    azithromycin (ZITHROMAX) 250 mg tablet Take 1 Tab by mouth See Admin Instructions. Take two tablets today then one tablet daily for next 4 days 6 Tab 0    albuterol (PROVENTIL VENTOLIN) 2.5 mg /3 mL (0.083 %) nebu 3 mL by Nebulization route every four (4) hours as needed for Wheezing. 100 Each 1    cyclobenzaprine (FLEXERIL) 5 mg tablet Take 1 Tab by mouth three (3) times daily as needed for Muscle Spasm(s). 60 Tab 0    acarbose (PRECOSE) 50 mg tablet TAKE 1 TABLETS BY MOUTH 30 MINS BEFORE MEALS THREE TIMES A DAY 90 Tab 5    meclizine (Travel Sickness, meclizine,) 25 mg chewable tablet TAKE 1 TABLET BY MOUTH THREE TIMES DAILY AS NEEDED FOR UP TO 10 DAYS FOR DIZZINESS 60 Tab 4    topiramate (TOPAMAX) 25 mg tablet TAKE ONE TABLET BY MOUTH TWICE A DAY WITH MEALS 60 Tab 4    butalbital-acetaminophen-caffeine (FIORICET, ESGIC) -40 mg per tablet TAKE 1 TABLET BY MOUTH EVERY SIX HOURS AS NEEDED FOR HEADACHE. 30 Tab 1    tiZANidine (ZANAFLEX) 4 mg tablet Take 1 Tab by mouth nightly.  27 Tab 3    furosemide (LASIX) 20 mg tablet TAKE 1 TABLET BY MOUTH DAILY AS NEEDED 60 Tab 2    cyanocobalamin ER 1,000 mcg tablet TAKE 1 TABLET BY MOUTH DAILY FOR VITAMIN B12 DEFICIENCY 90 Tab 3    sertraline (ZOLOFT) 100 mg tablet Take 1 Tab by mouth daily. 30 Tab 5    omeprazole (PRILOSEC) 40 mg capsule TAKE 1 CAPSULE BY MOUTH DAILY. 90 Cap 2    Vitamin D3 25 mcg (1,000 unit) tablet TAKE 1 TABLET BY MOUTH DAILY FOR VITAMIN D DEFICIENCY 180 Tab 9    potassium chloride SR (KLOR-CON 10) 10 mEq tablet TAKE 1 TABLET BY MOUTH DAILY, IF TAKES LASIX (FUROSEMIDE) 90 Tab 2    nystatin (MYCOSTATIN) topical cream Apply  to affected area two (2) times a day. 15 g 0    aspirin 81 mg chewable tablet CHEW AND SWALLOW 1 TAB BY MOUTH DAILY. 90 Tab 3    polyethylene glycol (MIRALAX) 17 gram packet TAKE 1 PACKET BY MOUTH DAILY. 50 Packet 5    fluticasone (FLONASE) 50 mcg/actuation nasal spray 2 Sprays by Both Nostrils route daily. 1 Bottle 5    traZODone (DESYREL) 50 mg tablet Take 50 mg by mouth nightly.  Nebulizer & Compressor machine Use as directed 1 Each 0         712  Subjective:        Agree with comments, see chief complaint. We discussed the expected course, resolution and complications of the diagnosis(es) in detail. Medication risks, benefits, costs, interactions, and alternatives were discussed as indicated. I advised her to contact the office if her condition worsens, changes or fails to improve as anticipated. She expressed understanding with the diagnosis(es) and plan. Pursuant to the emergency declaration under the [facility], [facility] waiver authority and the Transfer To and [place] General Act, this Virtual  Visit was conducted, with patient's consent, to reduce the patient's risk of exposure to COVID-19 and provide continuity of care for an established patient.      Services were provided through a video synchronous discussion virtually to substitute for in-person clinic visit. Amaury Prado MD            Subjective:   Shiv Crocker is a 63 y.o. female who complains of dry cough and dyspnea for 30 days, stable since that time. She denies a history of wheezing, sputum production and fever. Evaluation to date: seen previously and thought to have a viral URI  Had covid test at [facility]. Treatment to date: none. Patient does not smoke cigarettes. Relevant PMH: Asthma. Allergies   Allergen Reactions    Benadryl [Diphenhydramine Hcl] Nausea and Vomiting    Ibuprofen Nausea and Vomiting    Sulfamethoxazole-Trimethoprim Nausea Only    Tramadol Anxiety    Diclofenac Itching         Patient Active Problem List    Diagnosis Date Noted    Peripheral vascular disease ([facility] 10/04/2019    Weight gain status post gastric bypass 10/04/2019    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    SSS (sick sinus syndrome) ([facility] 12/06/2018    Obesity, morbid ([facility] 02/06/2018    Gastroesophageal reflux disease without esophagitis 08/24/2016    Bipolar disorder with current episode depressed ([facility] 02/01/2016    Migraine headache with aura 10/24/2014    Bilateral leg edema 10/12/2014    Hypoglycemia 10/12/2014    History of gastric bypass 10/12/2014       Allergies   Allergen Reactions    Benadryl [Diphenhydramine Hcl] Nausea and Vomiting    Ibuprofen Nausea and Vomiting    Sulfamethoxazole-Trimethoprim Nausea Only    Tramadol Anxiety    Diclofenac Itching     Social History     Tobacco Use    Smoking status: Never Smoker    Smokeless tobacco: Never Used   Substance Use Topics    Alcohol use: No        Review of Systems  Pertinent items are noted in HPI. Objective: There were no vitals taken for this visit. General:  Sounds NAD   Eyes:    Ears:    Sinuses:    Mouth:     Neck:    Heart:    Lungs:    Abdomen:       Assessment/Plan:   bronchitis and asthma  Antibiotics per orders. Discussed diagnosis and treatment of viral URIs.   Discussed the importance of avoiding unnecessary antibiotic therapy. Encounter Diagnoses   Name Primary?  Cough Yes    Pain of right lower extremity     Hypoglycemia     Migraine without aura and without status migrainosus, not intractable      Orders Placed This Encounter    AMB SUPPLY ORDER    REFERRAL TO PODIATRY    azithromycin (ZITHROMAX) 250 mg tablet    albuterol (PROVENTIL VENTOLIN) 2.5 mg /3 mL (0.083 %) nebu    cyclobenzaprine (FLEXERIL) 5 mg tablet    acarbose (PRECOSE) 50 mg tablet    meclizine (Travel Sickness, meclizine,) 25 mg chewable tablet    topiramate (TOPAMAX) 25 mg tablet    butalbital-acetaminophen-caffeine (FIORICET, ESGIC) -40 mg per tablet   .     Follow-up 2 months

## 2020-09-28 NOTE — PROGRESS NOTES
Chief Complaint   Patient presents with    Cough    Foot Pain     R/foot pain (pt limps after 2 hours of walking)   L/foot toenails [facility] Maintenance reviewed. I have reviewed the patient's medical history in detail and updated the computerized patient record. 1. Have you been to the ER, urgent care clinic since your last visit? Hospitalized since your last visit?no    2. Have you seen or consulted any other health care providers outside of the [address] [place] [address] [place] since your last visit? Include any pap smears or colon screening. No      Encouraged pt to discuss pt's wishes with spouse/partner/family and bring them in the next appt to follow thru with the Advanced Directive    Fall Risk Assessment, last 12 mths 9/28/2020   Able to walk? Yes   Fall in past 12 months? No   Fall with injury? -   Number of falls in past 12 months -   Fall Risk Score -       3 most recent PHQ Screens 9/28/2020   Little interest or pleasure in doing things More than half the days   Feeling down, depressed, irritable, or hopeless More than half the days   Total Score PHQ 2 4       Abuse Screening Questionnaire 9/28/2020   Do you ever feel afraid of your partner? N   Are you in a relationship with someone who physically or mentally threatens you? N   Is it safe for you to go home?  Y       ADL Assessment 9/28/2020   Feeding yourself No Help Needed   Getting from bed to chair No Help Needed   Getting dressed No Help Needed   Bathing or showering No Help Needed   Walk across the room (includes cane/walker) No Help Needed   Using the telphone No Help Needed   Taking your medications No Help Needed   Preparing meals No Help Needed   Managing money (expenses/bills) No Help Needed   Moderately strenuous housework (laundry) No Help Needed   Shopping for personal items (toiletries/medicines) No Help Needed   Shopping for groceries No Help Needed   Driving Help Needed   Climbing a flight of stairs No Help Needed   Getting to places beyond walking distances Help Needed

## 2020-10-14 ENCOUNTER — TELEPHONE (OUTPATIENT)
Dept: INTERNAL MEDICINE CLINIC | Age: 60
End: 2020-10-14

## 2020-10-14 NOTE — TELEPHONE ENCOUNTER
Pt called in reference to wanting to know if Dr. Kobe Marinelli can order the machine for her sugar that she wears. If her sugar drops it will let her know. Please call her at [phone].

## 2020-10-15 ENCOUNTER — VIRTUAL VISIT (OUTPATIENT)
Dept: INTERNAL MEDICINE CLINIC | Age: 60
End: 2020-10-15
Payer: MEDICARE

## 2020-10-15 DIAGNOSIS — I49.5 SSS (SICK SINUS SYNDROME) (HCC): ICD-10-CM

## 2020-10-15 DIAGNOSIS — N64.4 BREAST PAIN, LEFT: Primary | ICD-10-CM

## 2020-10-15 DIAGNOSIS — J45.909 ASTHMA, UNSPECIFIED ASTHMA SEVERITY, UNSPECIFIED WHETHER COMPLICATED, UNSPECIFIED WHETHER PERSISTENT: ICD-10-CM

## 2020-10-15 PROCEDURE — 99442 PR PHYS/QHP TELEPHONE EVALUATION 11-20 MIN: CPT | Performed by: NURSE PRACTITIONER

## 2020-10-15 NOTE — PROGRESS NOTES
Chief Complaint   Patient presents with    Breast pain     left breast pain - feels like somebody stabbing in it - last mammogram done at [facility] this year

## 2020-10-15 NOTE — PROGRESS NOTES
Soto Enriquez is a 63 y.o. female, evaluated via audio-only technology on 10/15/2020 for Breast pain (left breast pain - feels like somebody stabbing in it - last mammogram done at [facility] this year)  12  Subjective:   HISTORY OF PRESENT ILLNESS  Pt reported left stabbing breast pain x 1 month. Denied prior history of. Most recent mammogram was 1/9/2020 which showed fibroglandular dense breasts. Hx SSS- pacemaker. Stated nothing makes it better or worse. Sugar 102 yesterday. No wheezing, sputum production or fever. Discussed putting ice on it. Prior to Admission medications    Medication Sig Start Date End Date Taking? Authorizing Provider   albuterol (PROVENTIL VENTOLIN) 2.5 mg /3 mL (0.083 %) nebu 3 mL by Nebulization route every four (4) hours as needed for Wheezing. 9/28/20  Yes Demarco Ceja MD   cyclobenzaprine (FLEXERIL) 5 mg tablet Take 1 Tab by mouth three (3) times daily as needed for Muscle Spasm(s). 9/28/20  Yes Demarco Ceja MD   acarbose (PRECOSE) 50 mg tablet TAKE 1 TABLETS BY MOUTH 30 MINS BEFORE MEALS THREE TIMES A DAY 9/28/20  Yes Demarco Ceja MD   meclizine (Travel Sickness, meclizine,) 25 mg chewable tablet TAKE 1 TABLET BY MOUTH THREE TIMES DAILY AS NEEDED FOR UP TO 10 DAYS FOR DIZZINESS 9/28/20  Yes Demarco Ceja MD   topiramate (TOPAMAX) 25 mg tablet TAKE ONE TABLET BY MOUTH TWICE A DAY WITH MEALS 9/28/20  Yes Demarco Ceja MD   tiZANidine (ZANAFLEX) 4 mg tablet Take 1 Tab by mouth nightly. 7/27/20  Yes Kemi Baird MD   furosemide (LASIX) 20 mg tablet TAKE 1 TABLET BY MOUTH DAILY AS NEEDED 7/9/20  Yes Demarco Ceja MD   cyanocobalamin ER 1,000 mcg tablet TAKE 1 TABLET BY MOUTH DAILY FOR VITAMIN B12 DEFICIENCY 6/23/20  Yes Demarco Ceja MD   sertraline (ZOLOFT) 100 mg tablet Take 1 Tab by mouth daily. 6/2/20  Yes Demarco Ceja MD   omeprazole (PRILOSEC) 40 mg capsule TAKE 1 CAPSULE BY MOUTH DAILY.  5/20/20  Yes Demarco Ceja MD   Vitamin D3 25 mcg (1,000 unit) tablet TAKE 1 TABLET BY MOUTH DAILY FOR VITAMIN D DEFICIENCY 5/20/20  Yes Jaylene Prado MD   potassium chloride SR (KLOR-CON 10) 10 mEq tablet TAKE 1 TABLET BY MOUTH DAILY, IF TAKES LASIX (FUROSEMIDE) 4/17/20  Yes Jaylene Prado MD   nystatin (MYCOSTATIN) topical cream Apply  to affected area two (2) times a day. 8/27/19  Yes Jaylene Prado MD   aspirin 81 mg chewable tablet CHEW AND SWALLOW 1 TAB BY MOUTH DAILY. 7/19/19  Yes Jaylene Prado MD   polyethylene glycol (MIRALAX) 17 gram packet TAKE 1 PACKET BY MOUTH DAILY. 5/19/19  Yes Jaylene Prado MD   fluticasone (FLONASE) 50 mcg/actuation nasal spray 2 Sprays by Both Nostrils route daily. 5/4/18  Yes Jaylene Prado MD   traZODone (DESYREL) 50 mg tablet Take 50 mg by mouth nightly. Yes Provider, Historical   Nebulizer & Compressor machine Use as directed 9/15/17  Yes Jaylene Prado MD   [facility]) 250 mg tablet Take 1 Tab by mouth See Admin Instructions. Take two tablets today then one tablet daily for next 4 days 9/28/20   Jaylene Prado MD   butalbital-acetaminophen-caffeine (FIORICET, ESGIC) -40 mg per tablet TAKE 1 TABLET BY MOUTH EVERY SIX HOURS AS NEEDED FOR HEADACHE.  9/28/20   Jaylene Prado MD     Allergies   Allergen Reactions    Benadryl [Diphenhydramine Hcl] Nausea and Vomiting    Ibuprofen Nausea and Vomiting    Sulfamethoxazole-Trimethoprim Nausea Only    Tramadol Anxiety    Diclofenac Itching     Past Medical History:   Diagnosis Date    Asthma     Chest pain     Depression     Diarrhea     Fainting     Fatigue     Hearing loss     Hypoglycemia 2014    Hypotension 2014    Leg swelling     Memory disorder     Nausea and vomiting     Ringing in ears     SOB (shortness of breath)     Stomach pain     Swallowing difficulty     Syncope and collapse 7/24/15    RTH    Unintentional weight change      Past Surgical History:   Procedure Laterality Date    HX ABDOMINAL LAPAROSCOPY  2014    HX CARPAL TUNNEL RELEASE Bilateral more than 10 years ago    HX  SECTION  3151,5176    HX COLONOSCOPY  2016    HX GASTRIC BYPASS  1997    x2    HX HYSTERECTOMY  1985    HX PACEMAKER      HX TONSIL AND ADENOIDECTOMY  more than 10 years ago    NEUROLOGICAL PROCEDURE UNLISTED      Bi CTS     Family History   Problem Relation Age of Onset    Stroke Mother     Cancer Father     Diabetes Brother     Cancer Maternal Aunt     Cancer Maternal Grandmother     Cancer Brother     Kidney Disease Brother      Social History     Tobacco Use    Smoking status: Never Smoker    Smokeless tobacco: Never Used   Substance Use Topics    Alcohol use: No     Review of Systems   Constitutional: Negative for chills, fever and weight loss. Respiratory: Negative for shortness of breath. Cardiovascular: Negative for chest pain. No flowsheet data found. Assessment & Plan:       ICD-10-CM ICD-9-CM    1. Breast pain, left  N64.4 611.71 DONTRELL MAMMO LT DX INCL CAD   2. SSS (sick sinus syndrome) ([facility])  I49.5 427.81    3. Asthma, unspecified asthma severity, unspecified whether complicated, unspecified whether persistent  J45.909 493.90      1. Breast pain, left  - DONTRELL MAMMO LT DX INCL CAD; Future     2. Sick Sinus Syndrome  Follow up with cardiology within a week. 3. Asthma  Take a neb tx today and PRN tightness in chest or wheezes. Discussed applying  ice to left breast for comfort. Tylenol Arthritis 2 tabs Q8hr for pain. Advised to take a Nebulizer tx today. Advised to follow up with cardiology. Follow up in 2 weeks. Gucci Narvaez, who was evaluated through a patient-initiated, synchronous (real-time) audio only encounter, and/or her healthcare decision maker, is aware that it is a billable service, with coverage as determined by her insurance carrier. She provided verbal consent to proceed: Yes. She has not had a related appointment within my department in the past 7 days or scheduled within the next 24 hours.  I was in the office and pt was at home. Total Time: minutes: 11-20 minutes     If symptoms worsen and  necessary, call 911 or go to nearest ER.      Leanna Godinez, NP

## 2020-10-18 PROBLEM — J45.909 ASTHMA: Status: ACTIVE | Noted: 2020-10-18

## 2020-11-07 LAB
25(OH)D3+25(OH)D2 SERPL-MCNC: 33.3 NG/ML (ref 30–100)
ALBUMIN SERPL-MCNC: 3.8 G/DL (ref 3.8–4.9)
ALBUMIN/GLOB SERPL: 2.1 {RATIO} (ref 1.2–2.2)
ALP SERPL-CCNC: 99 IU/L (ref 39–117)
ALT SERPL-CCNC: 9 IU/L (ref 0–32)
AST SERPL-CCNC: 13 IU/L (ref 0–40)
BILIRUB SERPL-MCNC: <0.2 MG/DL (ref 0–1.2)
BUN SERPL-MCNC: 16 MG/DL (ref 6–24)
BUN/CREAT SERPL: 22 (ref 9–23)
CALCIUM SERPL-MCNC: 8.8 MG/DL (ref 8.7–10.2)
CHLORIDE SERPL-SCNC: 113 MMOL/L (ref 96–106)
CO2 SERPL-SCNC: 18 MMOL/L (ref 20–29)
COPPER SERPL-MCNC: 96 UG/DL (ref 72–166)
CREAT SERPL-MCNC: 0.74 MG/DL (ref 0.57–1)
FOLATE SERPL-MCNC: 11 NG/ML
GLOBULIN SER CALC-MCNC: 1.8 G/DL (ref 1.5–4.5)
GLUCOSE SERPL-MCNC: 157 MG/DL (ref 65–99)
NIACIN SERPL-MCNC: <5 NG/ML (ref 0–5)
NICOTINAMIDE SERPL-MCNC: 15.8 NG/ML (ref 5.2–72.1)
POTASSIUM SERPL-SCNC: 3.5 MMOL/L (ref 3.5–5.2)
PROT SERPL-MCNC: 5.6 G/DL (ref 6–8.5)
SODIUM SERPL-SCNC: 142 MMOL/L (ref 134–144)
VIT B12 SERPL-MCNC: >2000 PG/ML (ref 232–1245)

## 2020-11-18 ENCOUNTER — TELEPHONE (OUTPATIENT)
Dept: NEUROLOGY | Age: 60
End: 2020-11-18

## 2020-11-18 DIAGNOSIS — G44.221 CHRONIC TENSION-TYPE HEADACHE, INTRACTABLE: ICD-10-CM

## 2020-11-21 NOTE — TELEPHONE ENCOUNTER
I called the patient and went over her vitamin D being on the low side of normal and Dr. Carmine Hancock wants her to take 2000 units instead of the 1000. I also advised her to let her PCP know about this.   I rescheduled her appt to virtual to get refills on her Zanaflex

## 2020-11-24 ENCOUNTER — VIRTUAL VISIT (OUTPATIENT)
Dept: NEUROLOGY | Age: 60
End: 2020-11-24
Payer: MEDICARE

## 2020-11-24 DIAGNOSIS — Z98.84 HX OF GASTRIC BYPASS: ICD-10-CM

## 2020-11-24 DIAGNOSIS — R52 GENERALIZED PAIN: ICD-10-CM

## 2020-11-24 DIAGNOSIS — G44.221 CHRONIC TENSION-TYPE HEADACHE, INTRACTABLE: Primary | ICD-10-CM

## 2020-11-24 PROCEDURE — G9717 DOC PT DX DEP/BP F/U NT REQ: HCPCS | Performed by: PSYCHIATRY & NEUROLOGY

## 2020-11-24 PROCEDURE — G8417 CALC BMI ABV UP PARAM F/U: HCPCS | Performed by: PSYCHIATRY & NEUROLOGY

## 2020-11-24 PROCEDURE — G8428 CUR MEDS NOT DOCUMENT: HCPCS | Performed by: PSYCHIATRY & NEUROLOGY

## 2020-11-24 PROCEDURE — G9899 SCRN MAM PERF RSLTS DOC: HCPCS | Performed by: PSYCHIATRY & NEUROLOGY

## 2020-11-24 PROCEDURE — 99214 OFFICE O/P EST MOD 30 MIN: CPT | Performed by: PSYCHIATRY & NEUROLOGY

## 2020-11-24 PROCEDURE — 3017F COLORECTAL CA SCREEN DOC REV: CPT | Performed by: PSYCHIATRY & NEUROLOGY

## 2020-11-24 RX ORDER — BUTALBITAL, ACETAMINOPHEN AND CAFFEINE 50; 325; 40 MG/1; MG/1; MG/1
TABLET ORAL
Qty: 30 TAB | Refills: 3 | Status: SHIPPED | OUTPATIENT
Start: 2020-11-24 | End: 2021-03-16 | Stop reason: SDUPTHER

## 2020-11-24 RX ORDER — TIZANIDINE 4 MG/1
4 TABLET ORAL
Qty: 30 TAB | Refills: 3 | Status: SHIPPED | OUTPATIENT
Start: 2020-11-24 | End: 2021-03-16 | Stop reason: SDUPTHER

## 2020-11-24 NOTE — PROGRESS NOTES
Follow-up Visit    Name Trcaey Guerrero Age 63 y.o. MRN [id number]  1960       Chief Complaint:  Headaches    Returns for follow up. Her headaches are responsive to current treatment. Would like refills on her medications. She is not experiencing any untoward side effects and is compliant with treatment. Assesment and Plan  1. Chronic tension-type headache, intractable  She has generalized pain touching most of the muscles on the left side of her neck and shoulders. She also has pain associated with bilateral TMJ and temples. continue tizanidine and butalbital.     2. Hx of gastric bypass      3. Disorder of bone, unspecified       4. Generalized pain  May have a rheumatologic disorder or fibromyalgia. Allergies  Benadryl [diphenhydramine hcl]; Ibuprofen; Sulfamethoxazole-trimethoprim; Tramadol; and Diclofenac     Medications  Current Outpatient Medications   Medication Sig    albuterol (PROVENTIL VENTOLIN) 2.5 mg /3 mL (0.083 %) nebu 3 mL by Nebulization route every four (4) hours as needed for Wheezing.  cyclobenzaprine (FLEXERIL) 5 mg tablet Take 1 Tab by mouth three (3) times daily as needed for Muscle Spasm(s).  acarbose (PRECOSE) 50 mg tablet TAKE 1 TABLETS BY MOUTH 30 MINS BEFORE MEALS THREE TIMES A DAY    meclizine (Travel Sickness, meclizine,) 25 mg chewable tablet TAKE 1 TABLET BY MOUTH THREE TIMES DAILY AS NEEDED FOR UP TO 10 DAYS FOR DIZZINESS    topiramate (TOPAMAX) 25 mg tablet TAKE ONE TABLET BY MOUTH TWICE A DAY WITH MEALS    butalbital-acetaminophen-caffeine (FIORICET, ESGIC) -40 mg per tablet TAKE 1 TABLET BY MOUTH EVERY SIX HOURS AS NEEDED FOR HEADACHE.  tiZANidine (ZANAFLEX) 4 mg tablet Take 1 Tab by mouth nightly.  furosemide (LASIX) 20 mg tablet TAKE 1 TABLET BY MOUTH DAILY AS NEEDED    cyanocobalamin ER 1,000 mcg tablet TAKE 1 TABLET BY MOUTH DAILY FOR VITAMIN B12 DEFICIENCY    sertraline (ZOLOFT) 100 mg tablet Take 1 Tab by mouth daily.     omeprazole (PRILOSEC) 40 mg capsule TAKE 1 CAPSULE BY MOUTH DAILY.  Vitamin D3 25 mcg (1,000 unit) tablet TAKE 1 TABLET BY MOUTH DAILY FOR VITAMIN D DEFICIENCY    potassium chloride SR (KLOR-CON 10) 10 mEq tablet TAKE 1 TABLET BY MOUTH DAILY, IF TAKES LASIX (FUROSEMIDE)    nystatin (MYCOSTATIN) topical cream Apply  to affected area two (2) times a day.  aspirin 81 mg chewable tablet CHEW AND SWALLOW 1 TAB BY MOUTH DAILY.  polyethylene glycol (MIRALAX) 17 gram packet TAKE 1 PACKET BY MOUTH DAILY.  fluticasone (FLONASE) 50 mcg/actuation nasal spray 2 Sprays by Both Nostrils route daily.  traZODone (DESYREL) 50 mg tablet Take 50 mg by mouth nightly.  Nebulizer & Compressor machine Use as directed     No current facility-administered medications for this visit. Medical History  Past Medical History:   Diagnosis Date    Asthma     Chest pain     Depression     Diarrhea     Fainting     Fatigue     Hearing loss     Hypoglycemia 2014    Hypotension 2014    Leg swelling     Memory disorder     Nausea and vomiting     Ringing in ears     SOB (shortness of breath)     Stomach pain     Swallowing difficulty     Syncope and collapse 7/24/15    RTH    Unintentional weight change        Review of Systems   Eyes: Negative for blurred vision and double vision. Respiratory: Negative for cough and shortness of breath. Cardiovascular: Negative for chest pain, palpitations and orthopnea. Gastrointestinal: Negative for nausea and vomiting. Musculoskeletal: Positive for myalgias. Neurological: Positive for headaches. Psychiatric/Behavioral: Negative for depression. The patient is not nervous/anxious. Exam:  There were no vitals taken for this visit. General: Well developed, well nourished.  Patient in no apparent distress   Head: Normocephalic, atraumatic, anicteric sclera   Neck Normal ROM   Lungs:  Normal effort   Ext: No pedal edema   Skin: Supple no rash NeurologicExam:  Mental Status: Alert and oriented to person place and time   Speech: Fluent no aphasia or dysarthria. Cranial Nerves:   II-XII Intact    Motor:  Full and symmetric strength of upper and lower ext . Normal bulk. Sensory:   Symmetric and intact    Gait:  Gait is balanced    Tremor:   No tremor noted. Cerebellar:  Coordination intact. Lab Review  Lab Results   Component Value Date/Time    WBC 4.8 02/04/2020 09:07 AM    HCT 39.0 02/04/2020 09:07 AM    HGB 12.3 02/04/2020 09:07 AM    PLATELET 059 41/18/0118 09:07 AM       Lab Results   Component Value Date/Time    Sodium 142 10/29/2020 12:32 PM    Potassium 3.5 10/29/2020 12:32 PM    Chloride 113 (H) 10/29/2020 12:32 PM    CO2 18 (L) 10/29/2020 12:32 PM    Glucose 157 (H) 10/29/2020 12:32 PM    BUN 16 10/29/2020 12:32 PM    Creatinine 0.74 10/29/2020 12:32 PM    Calcium 8.8 10/29/2020 12:32 PM         Lab Results   Component Value Date/Time    Hemoglobin A1c <3.5 (L) 10/13/2014 05:00 AM    Hemoglobin A1c (POC) 5.1 10/02/2015 09:49 AM        Lab Results   Component Value Date/Time    Cholesterol, total 190 02/04/2020 09:07 AM    HDL Cholesterol 54 02/04/2020 09:07 AM    LDL, calculated 125 (H) 02/04/2020 09:07 AM    VLDL, calculated 11 02/04/2020 09:07 AM    Triglyceride 57 02/04/2020 09:07 AM    CHOL/HDL Ratio 2.4 10/14/2014 03:00 AM     Joana Meneses was seen by synchronous (real-time) audio-video technology on 12/08/20. Consent:  She and/or her healthcare decision maker is aware that this patient-initiated Telehealth encounter is a billable service, with coverage as determined by her insurance carrier. She is aware that she may receive a bill and has provided verbal consent to proceed: Yes    I was in the office while conducting this encounter.     Pursuant to the emergency declaration under the [address] [facility] waiver authority and the [employer] Appropriations Act, this Virtual  Visit was conducted, with patient's consent, to reduce the patient's risk of exposure to COVID-19 and provide continuity of care for an established patient. Services were provided through a video synchronous discussion virtually to substitute for in-person clinic visit.

## 2021-01-07 LAB — HBA1C MFR BLD HPLC: 4.8 %

## 2021-01-25 ENCOUNTER — OFFICE VISIT (OUTPATIENT)
Dept: INTERNAL MEDICINE CLINIC | Age: 61
End: 2021-01-25
Payer: MEDICARE

## 2021-01-25 VITALS
DIASTOLIC BLOOD PRESSURE: 89 MMHG | SYSTOLIC BLOOD PRESSURE: 124 MMHG | HEIGHT: 57 IN | TEMPERATURE: 97.2 F | WEIGHT: 153 LBS | OXYGEN SATURATION: 97 % | BODY MASS INDEX: 33.01 KG/M2 | HEART RATE: 86 BPM | RESPIRATION RATE: 18 BRPM

## 2021-01-25 DIAGNOSIS — Z13.31 SCREENING FOR DEPRESSION: ICD-10-CM

## 2021-01-25 DIAGNOSIS — Z98.84 HISTORY OF GASTRIC BYPASS: Chronic | ICD-10-CM

## 2021-01-25 DIAGNOSIS — F31.30 BIPOLAR AFFECTIVE DISORDER, CURRENT EPISODE DEPRESSED, CURRENT EPISODE SEVERITY UNSPECIFIED (HCC): ICD-10-CM

## 2021-01-25 DIAGNOSIS — Z13.1 SCREENING FOR DIABETES MELLITUS: ICD-10-CM

## 2021-01-25 DIAGNOSIS — I49.5 SSS (SICK SINUS SYNDROME) (HCC): ICD-10-CM

## 2021-01-25 DIAGNOSIS — R10.13 EPIGASTRIC ABDOMINAL PAIN: ICD-10-CM

## 2021-01-25 DIAGNOSIS — Z00.00 MEDICARE ANNUAL WELLNESS VISIT, SUBSEQUENT: Primary | ICD-10-CM

## 2021-01-25 PROCEDURE — 99214 OFFICE O/P EST MOD 30 MIN: CPT | Performed by: FAMILY MEDICINE

## 2021-01-25 PROCEDURE — G0439 PPPS, SUBSEQ VISIT: HCPCS | Performed by: FAMILY MEDICINE

## 2021-01-25 PROCEDURE — 3017F COLORECTAL CA SCREEN DOC REV: CPT | Performed by: FAMILY MEDICINE

## 2021-01-25 PROCEDURE — G9717 DOC PT DX DEP/BP F/U NT REQ: HCPCS | Performed by: FAMILY MEDICINE

## 2021-01-25 PROCEDURE — G9899 SCRN MAM PERF RSLTS DOC: HCPCS | Performed by: FAMILY MEDICINE

## 2021-01-25 PROCEDURE — G8427 DOCREV CUR MEDS BY ELIG CLIN: HCPCS | Performed by: FAMILY MEDICINE

## 2021-01-25 PROCEDURE — G8417 CALC BMI ABV UP PARAM F/U: HCPCS | Performed by: FAMILY MEDICINE

## 2021-01-25 RX ORDER — ALBUTEROL SULFATE 90 UG/1
1 AEROSOL, METERED RESPIRATORY (INHALATION)
Qty: 1 INHALER | Refills: 5 | Status: SHIPPED | OUTPATIENT
Start: 2021-01-25

## 2021-01-25 NOTE — PATIENT INSTRUCTIONS
Medicare Wellness Visit, Female     The best way to live healthy is to have a lifestyle where you eat a well-balanced diet, exercise regularly, limit alcohol use, and quit all forms of tobacco/nicotine, if applicable. Regular preventive services are another way to keep healthy. Preventive services (vaccines, screening tests, monitoring & exams) can help personalize your care plan, which helps you manage your own care. Screening tests can find health problems at the earliest stages, when they are easiest to treat. [facility] follows the current, evidence-based guidelines published by the [facility] ([facility]) when recommending preventive services for our patients. Because we follow these guidelines, sometimes recommendations change over time as research supports it. (For example, mammograms used to be recommended annually. Even though Medicare will still pay for an annual mammogram, the newer guidelines recommend a mammogram every two years for women of average risk). Of course, you and your doctor may decide to screen more often for some diseases, based on your risk and your co-morbidities (chronic disease you are already diagnosed with). Preventive services for you include:  - Medicare offers their members a free annual wellness visit, which is time for you and your primary care provider to discuss and plan for your preventive service needs. Take advantage of this benefit every year!  -All adults over the age of 72 should receive the recommended pneumonia vaccines. Current USPSTF guidelines recommend a series of two vaccines for the best pneumonia protection.   -All adults should have a flu vaccine yearly and a tetanus vaccine every 10 years.   -All adults age 48 and older should receive the shingles vaccines (series of two vaccines).       -All adults age 38-68 who are overweight should have a diabetes screening test once every three years.   -All adults born between [birth year] and 1964 should be screened once for Hepatitis C.  -Other screening tests and preventive services for persons with diabetes include: an eye exam to screen for diabetic retinopathy, a kidney function test, a foot exam, and stricter control over your cholesterol.   -Cardiovascular screening for adults with routine risk involves an electrocardiogram (ECG) at intervals determined by your doctor.   -Colorectal cancer screenings should be done for adults age 54-65 with no increased risk factors for colorectal cancer. There are a number of acceptable methods of screening for this type of cancer. Each test has its own benefits and drawbacks. Discuss with your doctor what is most appropriate for you during your annual wellness visit. The different tests include: colonoscopy (considered the best screening method), a fecal occult blood test, a fecal DNA test, and sigmoidoscopy.    -A bone mass density test is recommended when a woman turns 65 to screen for osteoporosis. This test is only recommended one time, as a screening. Some providers will use this same test as a disease monitoring tool if you already have osteoporosis. -Breast cancer screenings are recommended every other year for women of normal risk, age 54-69.  -Cervical cancer screenings for women over age 72 are only recommended with certain risk factors.      Here is a list of your current Health Maintenance items (your personalized list of preventive services) with a due date:  Health Maintenance Due   Topic Date Due    COVID-19 Vaccine (1 of 2) 12/29/1977    Shingles Vaccine (1 of 2) 12/29/2011    Yearly Flu Vaccine (1) 09/01/2020

## 2021-01-25 NOTE — PROGRESS NOTES
HISTORY OF PRESENT ILLNESS  Brandi Souza is a 62 y.o. female. Abdominal Pain  The history is provided by the patient. This is a recurrent problem. The current episode started more than 1 week ago. The problem occurs hourly. The problem has not changed since onset. Associated symptoms include chest pain, abdominal pain and shortness of breath. Associated symptoms comments: Epigastric . The symptoms are aggravated by drinking and eating. Nothing relieves the symptoms. She has tried water (seeing cards) for the symptoms. The treatment provided no relief. W/Up at [address] [place] [address]. No results in chart. Sounds like did CT csan  Dx unclear  covid was neg    Review of Systems   Constitutional: Positive for weight loss. Respiratory: Positive for shortness of breath. Cardiovascular: Positive for chest pain. Gastrointestinal: Positive for abdominal pain and nausea. Negative for constipation and diarrhea. Comes and goes   Genitourinary: Negative for dysuria. Current Outpatient Medications   Medication Sig Dispense Refill    butalbital-acetaminophen-caffeine (FIORICET, ESGIC) -40 mg per tablet TAKE 1 TABLET BY MOUTH EVERY SIX HOURS AS NEEDED FOR HEADACHE. 30 Tab 3    tiZANidine (ZANAFLEX) 4 mg tablet Take 1 Tab by mouth nightly. 30 Tab 3    albuterol (PROVENTIL VENTOLIN) 2.5 mg /3 mL (0.083 %) nebu 3 mL by Nebulization route every four (4) hours as needed for Wheezing. 100 Each 1    cyclobenzaprine (FLEXERIL) 5 mg tablet Take 1 Tab by mouth three (3) times daily as needed for Muscle Spasm(s).  60 Tab 0    acarbose (PRECOSE) 50 mg tablet TAKE 1 TABLETS BY MOUTH 30 MINS BEFORE MEALS THREE TIMES A DAY 90 Tab 5    meclizine (Travel Sickness, meclizine,) 25 mg chewable tablet TAKE 1 TABLET BY MOUTH THREE TIMES DAILY AS NEEDED FOR UP TO 10 DAYS FOR DIZZINESS 60 Tab 4    topiramate (TOPAMAX) 25 mg tablet TAKE ONE TABLET BY MOUTH TWICE A DAY WITH MEALS 60 Tab 4    furosemide (LASIX) 20 mg tablet TAKE 1 TABLET BY MOUTH DAILY AS NEEDED 60 Tab 2    cyanocobalamin ER 1,000 mcg tablet TAKE 1 TABLET BY MOUTH DAILY FOR VITAMIN B12 DEFICIENCY 90 Tab 3    sertraline (ZOLOFT) 100 mg tablet Take 1 Tab by mouth daily. 30 Tab 5    omeprazole (PRILOSEC) 40 mg capsule TAKE 1 CAPSULE BY MOUTH DAILY. 90 Cap 2    Vitamin D3 25 mcg (1,000 unit) tablet TAKE 1 TABLET BY MOUTH DAILY FOR VITAMIN D DEFICIENCY 180 Tab 9    potassium chloride SR (KLOR-CON 10) 10 mEq tablet TAKE 1 TABLET BY MOUTH DAILY, IF TAKES LASIX (FUROSEMIDE) 90 Tab 2    nystatin (MYCOSTATIN) topical cream Apply  to affected area two (2) times a day. 15 g 0    aspirin 81 mg chewable tablet CHEW AND SWALLOW 1 TAB BY MOUTH DAILY. 90 Tab 3    polyethylene glycol (MIRALAX) 17 gram packet TAKE 1 PACKET BY MOUTH DAILY. 50 Packet 5    fluticasone (FLONASE) 50 mcg/actuation nasal spray 2 Sprays by Both Nostrils route daily. 1 Bottle 5    traZODone (DESYREL) 50 mg tablet Take 50 mg by mouth nightly.       Nebulizer & Compressor machine Use as directed 1 Each 0     Allergies   Allergen Reactions    Benadryl [Diphenhydramine Hcl] Nausea and Vomiting    Ibuprofen Nausea and Vomiting    Sulfamethoxazole-Trimethoprim Nausea Only    Tramadol Anxiety    Diclofenac Itching     Past Surgical History:   Procedure Laterality Date    HX ABDOMINAL LAPAROSCOPY      HX CARPAL TUNNEL RELEASE Bilateral more than 10 years ago    HX  SECTION  2184,9647    HX COLONOSCOPY  2016    HX GASTRIC BYPASS  1997    x2    HX HYSTERECTOMY  1985    HX PACEMAKER      HX TONSIL AND ADENOIDECTOMY  more than 10 years ago    NEUROLOGICAL PROCEDURE UNLISTED      Bi CTS       Physical Exam  Visit Vitals  /89 (BP 1 Location: Left arm, BP Patient Position: Sitting)   Pulse 86   Temp 97.2 °F (36.2 °C) (Temporal)   Resp 18   Ht 4' 9\" (1.448 m)   Wt 153 lb (69.4 kg)   SpO2 97%   BMI 33.11 kg/m²     WD WN female NAD  Heart RRR without murmers clicks or rubs  Lungs CTA  Abdo soft nontender  Ext no edema    ASSESSMENT and PLAN  Encounter Diagnoses   Name Primary?  Epigastric abdominal pain Yes    History of gastric bypass     Bipolar affective disorder, current episode depressed, current episode severity unspecified ([facility]     SSS (sick sinus syndrome) ([facility]     Medicare annual wellness visit, subsequent     Screening for depression     Screening for diabetes mellitus      Orders Placed This Encounter    ANNUAL DEPRESSION SCREEN 0-25 MIN    METABOLIC PANEL, COMPREHENSIVE    CBC WITH AUTOMATED DIFF    LIPASE    REFERRAL TO GASTROENTEROLOGY    albuterol (PROVENTIL HFA, VENTOLIN HFA, PROAIR HFA) 90 mcg/actuation inhaler     Abdo precautions given, get  An ioinion from GI  Labs to eval          This is the Subsequent Medicare Annual Wellness Exam, performed 12 months or more after the Initial AWV or the last Subsequent AWV    I have reviewed the patient's medical history in detail and updated the computerized patient record. Depression Risk Factor Screening:     3 most recent PHQ Screens 1/25/2021   Little interest or pleasure in doing things Nearly every day   Feeling down, depressed, irritable, or hopeless Nearly every day   Total Score PHQ 2 6       Alcohol Risk Screen    Do you average more than 1 drink per night or more than 7 drinks a week:  No    On any one occasion in the past three months have you have had more than 3 drinks containing alcohol:  No        Functional Ability and Level of Safety:    Hearing: Hearing is good. Activities of Daily Living: The home contains: no safety equipment. Patient does total self care      Ambulation: with difficulty, uses a cane and walker     Fall Risk:  Fall Risk Assessment, last 12 mths 1/25/2021   Able to walk? Yes   Fall in past 12 months? 0   Do you feel unsteady? 0   Are you worried about falling 0   Number of falls in past 12 months -   Fall with injury?  -      Abuse Screen:  Patient is not abused       Cognitive Screening    Has your family/caregiver stated any concerns about your memory: no     Cognitive Screening: Normal - Clock Drawing Test    Assessment/Plan   Education and counseling provided:  Are appropriate based on today's review and evaluation    Diagnoses and all orders for this visit:    1. Epigastric abdominal pain  -     REFERRAL TO GASTROENTEROLOGY  -     METABOLIC PANEL, COMPREHENSIVE; Future  -     CBC WITH AUTOMATED DIFF; Future  -     LIPASE; Future    2. History of gastric bypass    3. Bipolar affective disorder, current episode depressed, current episode severity unspecified ([facility]    4. SSS (sick sinus syndrome) ([facility]    5. Medicare annual wellness visit, subsequent    6. Screening for depression  -     DEPRESSION SCREEN ANNUAL    7. Screening for diabetes mellitus    Other orders  -     albuterol (PROVENTIL HFA, VENTOLIN HFA, PROAIR HFA) 90 mcg/actuation inhaler; Take 1 Puff by inhalation every six (6) hours as needed for Wheezing. Health Maintenance Due     Health Maintenance Due   Topic Date Due    COVID-19 Vaccine (1 of 2) 12/29/1977    Shingrix Vaccine Age 50> (1 of 2) 12/29/2011    Flu Vaccine (1) 09/01/2020       Patient Care Team   Patient Care Team:  Charlene Michel MD as PCP - General (Family Medicine)  Charlene Michel MD as PCP - [facility] Empaneled Provider  Faustina Dakins, MD as Physician (Cardiology)  Haylee Bueno MD (Cardiology)    History     Patient Active Problem List   Diagnosis Code    Bilateral leg edema R60.0    Hypoglycemia E16.2    History of gastric bypass Z98.84    Migraine headache with aura G43. 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    Peripheral vascular disease ([facility]) I73.9    Weight gain status post gastric bypass R63.5, Z98.84    Asthma J45.909     Past Medical History:   Diagnosis Date    Asthma     Chest pain     Depression     Diarrhea     Fainting     Fatigue     Hearing loss     Hypoglycemia     Hypotension     Leg swelling     Memory disorder     Nausea and vomiting     Ringing in ears     SOB (shortness of breath)     Stomach pain     Swallowing difficulty     Syncope and collapse 7/24/15    RTH    Unintentional weight change       Past Surgical History:   Procedure Laterality Date    HX ABDOMINAL LAPAROSCOPY      HX CARPAL TUNNEL RELEASE Bilateral more than 10 years ago    HX  SECTION  [id number]    HX COLONOSCOPY  2016    HX GASTRIC BYPASS  1997    x2    HX HYSTERECTOMY  1985    HX PACEMAKER      HX TONSIL AND ADENOIDECTOMY  more than 10 years ago    NEUROLOGICAL PROCEDURE UNLISTED      Bi CTS     Current Outpatient Medications   Medication Sig Dispense Refill    albuterol (PROVENTIL HFA, VENTOLIN HFA, PROAIR HFA) 90 mcg/actuation inhaler Take 1 Puff by inhalation every six (6) hours as needed for Wheezing. 1 Inhaler 5    butalbital-acetaminophen-caffeine (FIORICET, ESGIC) -40 mg per tablet TAKE 1 TABLET BY MOUTH EVERY SIX HOURS AS NEEDED FOR HEADACHE. 30 Tab 3    tiZANidine (ZANAFLEX) 4 mg tablet Take 1 Tab by mouth nightly. 30 Tab 3    albuterol (PROVENTIL VENTOLIN) 2.5 mg /3 mL (0.083 %) nebu 3 mL by Nebulization route every four (4) hours as needed for Wheezing. 100 Each 1    cyclobenzaprine (FLEXERIL) 5 mg tablet Take 1 Tab by mouth three (3) times daily as needed for Muscle Spasm(s).  60 Tab 0    acarbose (PRECOSE) 50 mg tablet TAKE 1 TABLETS BY MOUTH 30 MINS BEFORE MEALS THREE TIMES A DAY 90 Tab 5    meclizine (Travel Sickness, meclizine,) 25 mg chewable tablet TAKE 1 TABLET BY MOUTH THREE TIMES DAILY AS NEEDED FOR UP TO 10 DAYS FOR DIZZINESS 60 Tab 4    topiramate (TOPAMAX) 25 mg tablet TAKE ONE TABLET BY MOUTH TWICE A DAY WITH MEALS 60 Tab 4    furosemide (LASIX) 20 mg tablet TAKE 1 TABLET BY MOUTH DAILY AS NEEDED 60 Tab 2    cyanocobalamin ER 1,000 mcg tablet TAKE 1 TABLET BY MOUTH DAILY FOR VITAMIN B12 DEFICIENCY 90 Tab 3    sertraline (ZOLOFT) 100 mg tablet Take 1 Tab by mouth daily. 30 Tab 5    omeprazole (PRILOSEC) 40 mg capsule TAKE 1 CAPSULE BY MOUTH DAILY. 90 Cap 2    Vitamin D3 25 mcg (1,000 unit) tablet TAKE 1 TABLET BY MOUTH DAILY FOR VITAMIN D DEFICIENCY 180 Tab 9    potassium chloride SR (KLOR-CON 10) 10 mEq tablet TAKE 1 TABLET BY MOUTH DAILY, IF TAKES LASIX (FUROSEMIDE) 90 Tab 2    nystatin (MYCOSTATIN) topical cream Apply  to affected area two (2) times a day. 15 g 0    aspirin 81 mg chewable tablet CHEW AND SWALLOW 1 TAB BY MOUTH DAILY. 90 Tab 3    polyethylene glycol (MIRALAX) 17 gram packet TAKE 1 PACKET BY MOUTH DAILY. 50 Packet 5    fluticasone (FLONASE) 50 mcg/actuation nasal spray 2 Sprays by Both Nostrils route daily. 1 Bottle 5    traZODone (DESYREL) 50 mg tablet Take 50 mg by mouth nightly.  Nebulizer & Compressor machine Use as directed 1 Each 0     Allergies   Allergen Reactions    Benadryl [Diphenhydramine Hcl] Nausea and Vomiting    Ibuprofen Nausea and Vomiting    Sulfamethoxazole-Trimethoprim Nausea Only    Tramadol Anxiety    Diclofenac Itching       Family History   Problem Relation Age of Onset    Stroke Mother     Cancer Father     Diabetes Brother     Cancer Maternal Aunt     Cancer Maternal Grandmother     Cancer Brother     Kidney Disease Brother      Social History     Tobacco Use    Smoking status: Never Smoker    Smokeless tobacco: Never Used   Substance Use Topics    Alcohol use:  No

## 2021-01-25 NOTE — PROGRESS NOTES
Chief Complaint   Patient presents with    Abdominal Pain     abd pain, nausea, fatigued, SOB, loss of appetite    Annual Wellness Visit     Clock Draw Test Done    Health Maintenance reviewed. I have reviewed the patient's medical history in detail and updated the computerized patient record. Patient has not been out of the country in (10-11 months), NO diarrhea, NO cough, NO chest conjestion, NO temp. Pt has not been around anyone with these symptoms. 1. Have you been to the ER, urgent care clinic since your last visit? Hospitalized since your last visit? yes    2. Have you seen or consulted any other health care providers outside of the [address] [place] [address] since your last visit? Include any pap smears or colon screening. Yes      Encouraged pt to discuss pt's wishes with spouse/partner/family and bring them in the next appt to follow thru with the Advanced Directive    Fall Risk Assessment, last 12 mths 1/25/2021   Able to walk? Yes   Fall in past 12 months? 0   Do you feel unsteady? 0   Are you worried about falling 0   Number of falls in past 12 months -   Fall with injury? -       3 most recent PHQ Screens 1/25/2021   Little interest or pleasure in doing things Nearly every day   Feeling down, depressed, irritable, or hopeless Nearly every day   Total Score PHQ 2 6       Abuse Screening Questionnaire 1/25/2021   Do you ever feel afraid of your partner? N   Are you in a relationship with someone who physically or mentally threatens you? N   Is it safe for you to go home?  Y       ADL Assessment 1/25/2021   Feeding yourself No Help Needed   Getting from bed to chair No Help Needed   Getting dressed No Help Needed   Bathing or showering No Help Needed   Walk across the room (includes cane/walker) No Help Needed   Using the telphone No Help Needed   Taking your medications No Help Needed   Preparing meals No Help Needed   Managing money (expenses/bills) No Help Needed   Moderately strenuous housework (laundry) No Help Needed   Shopping for personal items (toiletries/medicines) Help Needed   Shopping for groceries Help Needed   Driving Help Needed   Climbing a flight of stairs No Help Needed   Getting to places beyond walking distances Help Needed

## 2021-01-26 ENCOUNTER — TELEPHONE (OUTPATIENT)
Dept: INTERNAL MEDICINE CLINIC | Age: 61
End: 2021-01-26

## 2021-01-29 ENCOUNTER — DOCUMENTATION ONLY (OUTPATIENT)
Dept: INTERNAL MEDICINE CLINIC | Age: 61
End: 2021-01-29

## 2021-01-29 NOTE — PROGRESS NOTES
[facility] faxed us the pt's appt with them    Dr. Jeremy Beck  Friday March 26, 2021 @9:00AM    [address] [place] [address]    Office Number [phone]  Fax Number [phone]    LM on pt's house phone of above appt times

## 2021-02-23 ENCOUNTER — CLINICAL SUPPORT (OUTPATIENT)
Dept: CARDIOLOGY CLINIC | Age: 61
End: 2021-02-23
Payer: MEDICARE

## 2021-02-23 DIAGNOSIS — I49.5 SSS (SICK SINUS SYNDROME) (HCC): ICD-10-CM

## 2021-02-23 DIAGNOSIS — Z95.0 CARDIAC PACEMAKER IN SITU: Primary | ICD-10-CM

## 2021-02-23 PROCEDURE — 93280 PM DEVICE PROGR EVAL DUAL: CPT | Performed by: INTERNAL MEDICINE

## 2021-03-16 ENCOUNTER — TELEPHONE (OUTPATIENT)
Dept: NEUROLOGY | Age: 61
End: 2021-03-16

## 2021-03-16 ENCOUNTER — VIRTUAL VISIT (OUTPATIENT)
Dept: NEUROLOGY | Age: 61
End: 2021-03-16
Payer: MEDICARE

## 2021-03-16 DIAGNOSIS — F41.9 ANXIETY: ICD-10-CM

## 2021-03-16 DIAGNOSIS — G44.221 CHRONIC TENSION-TYPE HEADACHE, INTRACTABLE: Primary | ICD-10-CM

## 2021-03-16 DIAGNOSIS — R42 DIZZINESS: ICD-10-CM

## 2021-03-16 PROCEDURE — 99443 PR PHYS/QHP TELEPHONE EVALUATION 21-30 MIN: CPT | Performed by: PSYCHIATRY & NEUROLOGY

## 2021-03-16 RX ORDER — BUTALBITAL, ACETAMINOPHEN AND CAFFEINE 50; 325; 40 MG/1; MG/1; MG/1
TABLET ORAL
Qty: 30 TAB | Refills: 5 | Status: SHIPPED | OUTPATIENT
Start: 2021-03-16 | End: 2022-06-27 | Stop reason: SDUPTHER

## 2021-03-16 RX ORDER — ACETAMINOPHEN AND CODEINE PHOSPHATE 120; 12 MG/5ML; MG/5ML
SOLUTION ORAL
COMMUNITY
Start: 2020-03-26 | End: 2021-06-11 | Stop reason: ALTCHOICE

## 2021-03-16 RX ORDER — TIZANIDINE 4 MG/1
4 TABLET ORAL
Qty: 30 TAB | Refills: 5 | Status: SHIPPED
Start: 2021-03-16 | End: 2021-06-11 | Stop reason: ALTCHOICE

## 2021-03-16 NOTE — PROGRESS NOTES
Follow-up Visit    Name Yahir Denson Age 62 y.o. MRN [id number]  1960       Chief Complaint:  Headaches    Returns for a follow up visit would like refills on her medications. Her headaches increased in number because she had been seeing Dr. Patti Smalls who would not fill her medications. She would like to restart taking tizanidine and the butalbital which seemed to significantly decrease her headache frequency. They are also effective to abort headaches when they did start. Assesment and Plan  1. Chronic tension-type headache, intractable  Restart tizanidine  Restart butalbital  Follow-up in 6 months. 2.  Anxiety  Continue Zoloft    3. Dizziness  Continue meclizine as needed        Allergies  Benadryl [diphenhydramine hcl], Ibuprofen, Sulfamethoxazole-trimethoprim, Tramadol, and Diclofenac     Medications  Current Outpatient Medications   Medication Sig    acetaminophen-codeine (TYLENOL-CODEINE #3) 300 mg-30 mg /12.5 mL solution 1 tab, PO, every 4 hours, PRN: for pain, 0 Refills, Dispense: 24, tab    albuterol (PROVENTIL HFA, VENTOLIN HFA, PROAIR HFA) 90 mcg/actuation inhaler Take 1 Puff by inhalation every six (6) hours as needed for Wheezing.  butalbital-acetaminophen-caffeine (FIORICET, ESGIC) -40 mg per tablet TAKE 1 TABLET BY MOUTH EVERY SIX HOURS AS NEEDED FOR HEADACHE.  tiZANidine (ZANAFLEX) 4 mg tablet Take 1 Tab by mouth nightly.  albuterol (PROVENTIL VENTOLIN) 2.5 mg /3 mL (0.083 %) nebu 3 mL by Nebulization route every four (4) hours as needed for Wheezing.  cyclobenzaprine (FLEXERIL) 5 mg tablet Take 1 Tab by mouth three (3) times daily as needed for Muscle Spasm(s).     acarbose (PRECOSE) 50 mg tablet TAKE 1 TABLETS BY MOUTH 30 MINS BEFORE MEALS THREE TIMES A DAY    meclizine (Travel Sickness, meclizine,) 25 mg chewable tablet TAKE 1 TABLET BY MOUTH THREE TIMES DAILY AS NEEDED FOR UP TO 10 DAYS FOR DIZZINESS    topiramate (TOPAMAX) 25 mg tablet TAKE ONE TABLET BY MOUTH TWICE A DAY WITH MEALS    furosemide (LASIX) 20 mg tablet TAKE 1 TABLET BY MOUTH DAILY AS NEEDED    cyanocobalamin ER 1,000 mcg tablet TAKE 1 TABLET BY MOUTH DAILY FOR VITAMIN B12 DEFICIENCY    sertraline (ZOLOFT) 100 mg tablet Take 1 Tab by mouth daily.  omeprazole (PRILOSEC) 40 mg capsule TAKE 1 CAPSULE BY MOUTH DAILY.  Vitamin D3 25 mcg (1,000 unit) tablet TAKE 1 TABLET BY MOUTH DAILY FOR VITAMIN D DEFICIENCY    potassium chloride SR (KLOR-CON 10) 10 mEq tablet TAKE 1 TABLET BY MOUTH DAILY, IF TAKES LASIX (FUROSEMIDE)    nystatin (MYCOSTATIN) topical cream Apply  to affected area two (2) times a day.  aspirin 81 mg chewable tablet CHEW AND SWALLOW 1 TAB BY MOUTH DAILY.  polyethylene glycol (MIRALAX) 17 gram packet TAKE 1 PACKET BY MOUTH DAILY.  fluticasone (FLONASE) 50 mcg/actuation nasal spray 2 Sprays by Both Nostrils route daily.  traZODone (DESYREL) 50 mg tablet Take 50 mg by mouth nightly.  Nebulizer & Compressor machine Use as directed     No current facility-administered medications for this visit. Medical History  Past Medical History:   Diagnosis Date    Asthma     Chest pain     Depression     Diarrhea     Fainting     Fatigue     Hearing loss     Hypoglycemia 2014    Hypotension 2014    Leg swelling     Memory disorder     Nausea and vomiting     Ringing in ears     SOB (shortness of breath)     Stomach pain     Swallowing difficulty     Syncope and collapse 7/24/15    RTH    Unintentional weight change        Review of Systems   Eyes: Negative for blurred vision and double vision. Respiratory: Negative for cough and shortness of breath. Cardiovascular: Negative for chest pain, palpitations and orthopnea. Gastrointestinal: Negative for nausea and vomiting. Musculoskeletal: Positive for myalgias. Neurological: Positive for headaches. Psychiatric/Behavioral: Negative for depression. The patient is not nervous/anxious.           Lab Review  Lab Results   Component Value Date/Time    WBC 4.8 02/04/2020 09:07 AM    HCT 39.0 02/04/2020 09:07 AM    HGB 12.3 02/04/2020 09:07 AM    PLATELET 245 98/93/2313 09:07 AM       Lab Results   Component Value Date/Time    Sodium 142 10/29/2020 12:32 PM    Potassium 3.5 10/29/2020 12:32 PM    Chloride 113 (H) 10/29/2020 12:32 PM    CO2 18 (L) 10/29/2020 12:32 PM    Glucose 157 (H) 10/29/2020 12:32 PM    BUN 16 10/29/2020 12:32 PM    Creatinine 0.74 10/29/2020 12:32 PM    Calcium 8.8 10/29/2020 12:32 PM         Lab Results   Component Value Date/Time    Hemoglobin A1c <3.5 (L) 10/13/2014 05:00 AM    Hemoglobin A1c (POC) 5.1 10/02/2015 09:49 AM        Lab Results   Component Value Date/Time    Cholesterol, total 190 02/04/2020 09:07 AM    HDL Cholesterol 54 02/04/2020 09:07 AM    LDL, calculated 125 (H) 02/04/2020 09:07 AM    VLDL, calculated 11 02/04/2020 09:07 AM    Triglyceride 57 02/04/2020 09:07 AM    CHOL/HDL Ratio 2.4 10/14/2014 03:00 AM     I was in the office while conducting this encounter. Consent:  She and/or her healthcare decision maker is aware that this patient-initiated Telehealth encounter is a billable service, with coverage as determined by her insurance carrier. She is aware that she may receive a bill and has provided verbal consent to proceed: Yes    This virtual visit was conducted telephone encounter only. -  I affirm this is a Patient Initiated Episode with an Established Patient who has not had a related appointment within my department in the past 7 days or scheduled within the next 24 hours. Note: this encounter is not billable if this call serves to triage the patient into an appointment for the relevant concern. Total Time: minutes: 21-30 minutes.

## 2021-03-18 NOTE — TELEPHONE ENCOUNTER
----- Message from Laura Downs sent at 3/17/2021  1:02 PM EDT -----  Regarding: Dr. Terrell  first and last name: Pt      Reason for call: status of refill of Tylenol w/ codeine      Callback required yes/no and why: Yes      Best contact number(s): [phone]      Details to clarify the request: Pt would like to know when her script for Tylenol with Codeine was called in. I notified pt that per the message from Liz, Dr. Mariah Poe called the Tylenol in instead of the Butalbital. She said the pharmacy does not have that prescription. Please advise.       Laura Downs

## 2021-03-19 RX ORDER — ACETAMINOPHEN AND CODEINE PHOSPHATE 120; 12 MG/5ML; MG/5ML
SOLUTION ORAL
OUTPATIENT
Start: 2021-03-19

## 2021-03-23 RX ORDER — FUROSEMIDE 20 MG/1
TABLET ORAL
Qty: 60 TAB | Refills: 1 | Status: SHIPPED | OUTPATIENT
Start: 2021-03-23 | End: 2021-04-22

## 2021-04-07 DIAGNOSIS — E16.2 HYPOGLYCEMIA: ICD-10-CM

## 2021-04-09 RX ORDER — ACARBOSE 50 MG/1
TABLET ORAL
Qty: 90 TAB | Refills: 4 | Status: SHIPPED | OUTPATIENT
Start: 2021-04-09 | End: 2022-04-26 | Stop reason: SDUPTHER

## 2021-05-10 RX ORDER — POTASSIUM CHLORIDE 750 MG/1
TABLET, FILM COATED, EXTENDED RELEASE ORAL
Qty: 90 TAB | Refills: 1 | Status: SHIPPED | OUTPATIENT
Start: 2021-05-10 | End: 2021-09-13

## 2021-06-01 DIAGNOSIS — G43.009 MIGRAINE WITHOUT AURA AND WITHOUT STATUS MIGRAINOSUS, NOT INTRACTABLE: ICD-10-CM

## 2021-06-01 RX ORDER — TOPIRAMATE 50 MG/1
TABLET, FILM COATED ORAL
Qty: 60 TABLET | Refills: 2 | Status: SHIPPED | OUTPATIENT
Start: 2021-06-01 | End: 2021-09-13

## 2021-06-11 ENCOUNTER — OFFICE VISIT (OUTPATIENT)
Dept: INTERNAL MEDICINE CLINIC | Age: 61
End: 2021-06-11
Payer: MEDICARE

## 2021-06-11 DIAGNOSIS — E66.01 OBESITY, MORBID (HCC): ICD-10-CM

## 2021-06-11 DIAGNOSIS — L60.8 DEFORMITY OF TOENAIL: ICD-10-CM

## 2021-06-11 DIAGNOSIS — R06.09 DYSPNEA ON EXERTION: Primary | ICD-10-CM

## 2021-06-11 DIAGNOSIS — G43.109 MIGRAINE WITH VERTIGO: ICD-10-CM

## 2021-06-11 DIAGNOSIS — F31.31 BIPOLAR AFFECTIVE DISORDER, CURRENTLY DEPRESSED, MILD (HCC): ICD-10-CM

## 2021-06-11 PROCEDURE — 3017F COLORECTAL CA SCREEN DOC REV: CPT | Performed by: FAMILY MEDICINE

## 2021-06-11 PROCEDURE — G9717 DOC PT DX DEP/BP F/U NT REQ: HCPCS | Performed by: FAMILY MEDICINE

## 2021-06-11 PROCEDURE — G9899 SCRN MAM PERF RSLTS DOC: HCPCS | Performed by: FAMILY MEDICINE

## 2021-06-11 PROCEDURE — 99214 OFFICE O/P EST MOD 30 MIN: CPT | Performed by: FAMILY MEDICINE

## 2021-06-11 PROCEDURE — G8420 CALC BMI NORM PARAMETERS: HCPCS | Performed by: FAMILY MEDICINE

## 2021-06-11 PROCEDURE — G8427 DOCREV CUR MEDS BY ELIG CLIN: HCPCS | Performed by: FAMILY MEDICINE

## 2021-06-11 RX ORDER — FUROSEMIDE 20 MG/1
20 TABLET ORAL
Qty: 60 TABLET | Refills: 2 | Status: SHIPPED | OUTPATIENT
Start: 2021-06-11 | End: 2022-03-21

## 2021-06-11 RX ORDER — MECLIZINE HCL 25MG 25 MG/1
TABLET, CHEWABLE ORAL
Qty: 60 TABLET | Refills: 3 | Status: SHIPPED | OUTPATIENT
Start: 2021-06-11 | End: 2021-10-21

## 2021-06-11 RX ORDER — DOXYCYCLINE 100 MG/1
100 TABLET ORAL 2 TIMES DAILY
Qty: 14 TABLET | Refills: 0 | Status: SHIPPED | OUTPATIENT
Start: 2021-06-11 | End: 2021-06-18

## 2021-06-11 RX ORDER — MECLIZINE HCL 25MG 25 MG/1
TABLET, CHEWABLE ORAL
Qty: 60 TABLET | Refills: 3 | Status: SHIPPED | OUTPATIENT
Start: 2021-06-11 | End: 2021-06-11 | Stop reason: SDUPTHER

## 2021-06-11 NOTE — PROGRESS NOTES
Subjective:   Pushpa Macnera is a 62 y.o. female who complains of congestion, sore throat, cough described as productive of no sputum, right ear pressure and wheezing for 4 days, gradually worsening since that time. She denies a history of fevers and diarrhea. Dyspnea all the time non smoker  Evaluation to date: none. Treatment to date: none. Patient does not smoke cigarettes. Relevant PMH: Asthma.   Allergies   Allergen Reactions    Benadryl [Diphenhydramine Hcl] Nausea and Vomiting    Ibuprofen Nausea and Vomiting    Sulfamethoxazole-Trimethoprim Nausea Only    Tramadol Anxiety    Diclofenac Itching         Patient Active Problem List    Diagnosis Date Noted    Moderate asthma 10/18/2020    Peripheral vascular disease (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 12/06/2018    Obesity, morbid ([facility] 02/06/2018    Gastroesophageal reflux disease without esophagitis 08/24/2016    Bipolar disorder with current episode depressed ([facility] [address] 02/01/2016    Migraine headache with aura 10/24/2014    Bilateral leg edema 10/12/2014    Hypoglycemia 10/12/2014    History of gastric bypass 10/12/2014     Allergies   Allergen Reactions    Benadryl [Diphenhydramine Hcl] Nausea and Vomiting    Ibuprofen Nausea and Vomiting    Sulfamethoxazole-Trimethoprim Nausea Only    Tramadol Anxiety    Diclofenac Itching     Past Medical History:   Diagnosis Date    Asthma     Chest pain     Depression     Diarrhea     Fainting     Fatigue     Hearing loss     Hypoglycemia 2014    Hypotension 2014    Leg swelling     Memory disorder     Nausea and vomiting     Ringing in ears     SOB (shortness of breath)     Stomach pain     Swallowing difficulty     Syncope and collapse 7/24/15    RTH    Unintentional weight change      Social History     Tobacco Use    Smoking status: Never Smoker    Smokeless tobacco: Never Used   Substance Use Topics    Alcohol use: No        Review of Systems  Pertinent items are noted in HPI. Objective:     Visit Vitals  /89 (BP 1 Location: Left arm, BP Patient Position: Sitting, BP Cuff Size: Adult)   Pulse 85   Temp 98.1 °F (36.7 °C) (Oral)   Resp 18   Ht 5' 9\" (1.753 m)   Wt 155 lb (70.3 kg)   SpO2 96%   BMI 22.89 kg/m²     General:  alert, cooperative, no distress morbid obese   Eyes: negative   Ears: normal TM's and external ear canals AU   Sinuses: Normal paranasal sinuses without tenderness   Mouth:  Lips, mucosa, and tongue normal. Teeth and gums normal   Neck: supple, symmetrical, trachea midline and no adenopathy. Heart: S1 and S2 normal, no murmurs noted. Lungs: clear to auscultation bilaterally   Abdomen: soft, non-tender. Bowel sounds normal. No masses,  no organomegaly   Toenails very thickened    Assessment/Plan:   asthma  Antibiotics per orders. Encounter Diagnoses   Name Primary?  Dyspnea on exertion Yes    Obesity, morbid (HCC)     Deformity of toenail     Migraine with vertigo     Bipolar affective disorder, currently depressed, mild ([facility] [address]        Orders Placed This Encounter    XR CHEST PA LAT     Standing Status:   Future     Standing Expiration Date:   7/13/2022    REFERRAL TO PODIATRY     Referral Priority:   Routine     Referral Type:   Consultation     Referral Reason:   Specialty Services Required     Referred to Provider:   Manuelito Lemos DPM     Number of Visits Requested:   1    meclizine (Travel Sickness, meclizine,) 25 mg chewable tablet     Sig: CHEW AND SWALLOW 1 TABLET BY MOUTH THREE TIMES DAILY AS NEEDED FOR UP TO 10 DAYS FOR DIZZINESS     Dispense:  60 Tablet     Refill:  3    doxycycline (ADOXA) 100 mg tablet     Sig: Take 1 Tablet by mouth two (2) times a day for 7 days. Dispense:  14 Tablet     Refill:  0    furosemide (LASIX) 20 mg tablet     Sig: Take 1 Tablet by mouth nightly.  As needed     Dispense:  60 Tablet     Refill:  2 Follow-up and Dispositions    · Return in about 4 weeks (around 7/9/2021) for routine follow up.

## 2021-06-11 NOTE — PROGRESS NOTES
Chief Complaint   Patient presents with    Cough     Patient has not been out of the country in (14 months), NO diarrhea, NO cough, NO chest conjestion, NO temp. Pt has not been around anyone with these symptoms. Health Maintenance reviewed. I have reviewed the patient's medical history in detail and updated the computerized patient record. 1. Have you been to the ER, urgent care clinic since your last visit? No  Hospitalized since your last visit?  no    2. Have you seen or consulted any other health care providers outside of the [address] [place] [address] since your last visit? no Include any pap smears or colon screening. Encouraged pt to discuss pt's wishes with spouse/partner/family and bring them in the next appt to follow thru with the Advanced Directive    @  [address], last 12 mths 1/25/2021   Able to walk? Yes   Fall in past 12 months? 0   Do you feel unsteady? 0   Are you worried about falling 0   Number of falls in past 12 months -   Fall with injury? -       3 most recent PHQ Screens 6/11/2021   Little interest or pleasure in doing things Several days   Feeling down, depressed, irritable, or hopeless Several days   Total Score PHQ 2 2       Abuse Screening Questionnaire 1/25/2021   Do you ever feel afraid of your partner? N   Are you in a relationship with someone who physically or mentally threatens you? N   Is it safe for you to go home?  Y       ADL Assessment 6/11/2021   Feeding yourself No Help Needed   Getting from bed to chair No Help Needed   Getting dressed No Help Needed   Bathing or showering No Help Needed   Walk across the room (includes cane/walker) No Help Needed   Using the telphone No Help Needed   Taking your medications No Help Needed   Preparing meals No Help Needed   Managing money (expenses/bills) No Help Needed   Moderately strenuous housework (laundry) No Help Needed   Shopping for personal items (toiletries/medicines) Help Needed   Shopping for groceries Help Needed   Driving Help Needed   Climbing a flight of stairs No Help Needed   Getting to places beyond walking distances Help Needed

## 2021-07-06 ENCOUNTER — DOCUMENTATION ONLY (OUTPATIENT)
Dept: INTERNAL MEDICINE CLINIC | Age: 61
End: 2021-07-06

## 2021-07-06 NOTE — PROGRESS NOTES
3:05PM Pt came in wanting an appt today with Dr. Anjel Bennett. Pt stated that her both legs and feet were in a lot of pain. Offered pt an appt today @4PM, pt started crying, walking around and falling on the chairs and floor in lobby stating she could not wait until 4PM, she hadn't sleep in two weeks, walking the floor  and asked another patient in the lobby for her phone to call 911. Nurse went outside to talk to pt's cousin who brought her and he stated he would take pt to the ER. Put was assisted up from the chair,  put in the W/C, took her to the [facility] and pt refused to get in. Brought pt back into the lobby and called 911. /69  63  20  95% on RA. Pt's R/arm shaking and pt stated that bottoms of her feet were burning. 3:35PM Ambulance arrived, report given.

## 2021-07-07 ENCOUNTER — TELEPHONE (OUTPATIENT)
Dept: PHARMACY | Age: 61
End: 2021-07-07

## 2021-07-07 NOTE — TELEPHONE ENCOUNTER
Nikhil Stanley MD, from below, chart reviewed d/t [employer]/insurance report of diabetes medication with no statin dispensed. - Appears patient does NOT have diabetes; per chart review, acarbose prescribed d/t reactive hypoglycemia post gastric bypass. · If appropriate, please consider using ICD-10 code R73.09 (other abnormal glucose) with upcoming 7/12/21 appt to complete/close this gap and close any further statin therapy reminders to you or patient this year. Please let me know if I can further assist.    Thank you,  Severiano Dauer, PharmD, [address]  Direct: [phone]  Department, toll free: [phone], option 7   ==============================================================  POPULATION HEALTH CLINICAL PHARMACY: STATIN THERAPY REVIEW  Identified statin use in persons with diabetes care gap per [employer] Records dated: 6/20/21. Last Visit: 6/11/21    Patient found in Outcomes MTM and is not currently eligible for CMR/TIP    ASSESSMENT  DIABETES ADHERENCE  Per Insurance Records through 6/20/21: no info reported? Per Outcomes [facility] Records:   - Acarbose 50mg TID 30 minutes before meals filled on 2/3/21, 3/22/21 and 4/15/21 for 30 day supply. - Appears possible refill gaps, however, patient may not take TID if not eating 3 meals?     Lab Results   Component Value Date/Time    Hemoglobin A1c <3.5 (L) 10/13/2014 05:00 AM    Hemoglobin A1c (POC) 5.1 10/02/2015 09:49 AM     STATIN GAP IDENTIFIED    Per chart review:  - Per 5/13/19 endo OV note, acarbose started: Reactive hypoglycemia/nesidioblastosis/post gastric bypass hypoglycemia  - No DM diagnosis    Lab Results   Component Value Date/Time    Cholesterol, total 190 02/04/2020 09:07 AM    HDL Cholesterol 54 02/04/2020 09:07 AM    LDL, calculated 125 (H) 02/04/2020 09:07 AM    VLDL, calculated 11 02/04/2020 09:07 AM    Triglyceride 57 02/04/2020 09:07 AM    CHOL/HDL Ratio 2.4 10/14/2014 03:00 AM     ALT (SGPT)   Date Value Ref Range Status   10/29/2020 9 0 - 32 IU/L Final     AST (SGOT)   Date Value Ref Range Status   10/29/2020 13 0 - 40 IU/L Final     The 10-year ASCVD risk score (Tomi Dupont et al., 2013) is: 3.8%    Values used to calculate the score:      Age: 62 years      Sex: Female      Is Non- : Yes      Diabetic: No      Tobacco smoker: No      Systolic Blood Pressure: 710 mmHg      Is BP treated: No      HDL Cholesterol: 54 mg/dL      Total Cholesterol: 190 mg/dL

## 2021-07-12 ENCOUNTER — OFFICE VISIT (OUTPATIENT)
Dept: INTERNAL MEDICINE CLINIC | Age: 61
End: 2021-07-12
Payer: MEDICARE

## 2021-07-12 VITALS
HEIGHT: 57 IN | RESPIRATION RATE: 18 BRPM | WEIGHT: 155 LBS | TEMPERATURE: 98.1 F | HEART RATE: 85 BPM | DIASTOLIC BLOOD PRESSURE: 89 MMHG | SYSTOLIC BLOOD PRESSURE: 120 MMHG | OXYGEN SATURATION: 96 % | BODY MASS INDEX: 33.44 KG/M2

## 2021-07-12 VITALS
SYSTOLIC BLOOD PRESSURE: 103 MMHG | TEMPERATURE: 97.8 F | OXYGEN SATURATION: 95 % | HEIGHT: 57 IN | BODY MASS INDEX: 33.66 KG/M2 | WEIGHT: 156 LBS | DIASTOLIC BLOOD PRESSURE: 74 MMHG | HEART RATE: 81 BPM

## 2021-07-12 DIAGNOSIS — E66.01 OBESITY, MORBID (HCC): ICD-10-CM

## 2021-07-12 DIAGNOSIS — I73.9 PERIPHERAL VASCULAR DISEASE (HCC): ICD-10-CM

## 2021-07-12 DIAGNOSIS — R60.0 BILATERAL LEG EDEMA: ICD-10-CM

## 2021-07-12 DIAGNOSIS — I49.5 SSS (SICK SINUS SYNDROME) (HCC): ICD-10-CM

## 2021-07-12 DIAGNOSIS — M79.7 FIBROMYALGIA AFFECTING LOWER LEG: Primary | ICD-10-CM

## 2021-07-12 PROCEDURE — G8417 CALC BMI ABV UP PARAM F/U: HCPCS | Performed by: FAMILY MEDICINE

## 2021-07-12 PROCEDURE — G9899 SCRN MAM PERF RSLTS DOC: HCPCS | Performed by: FAMILY MEDICINE

## 2021-07-12 PROCEDURE — G8427 DOCREV CUR MEDS BY ELIG CLIN: HCPCS | Performed by: FAMILY MEDICINE

## 2021-07-12 PROCEDURE — G9717 DOC PT DX DEP/BP F/U NT REQ: HCPCS | Performed by: FAMILY MEDICINE

## 2021-07-12 PROCEDURE — 99214 OFFICE O/P EST MOD 30 MIN: CPT | Performed by: FAMILY MEDICINE

## 2021-07-12 PROCEDURE — 3017F COLORECTAL CA SCREEN DOC REV: CPT | Performed by: FAMILY MEDICINE

## 2021-07-12 RX ORDER — CYCLOBENZAPRINE HCL 5 MG
5 TABLET ORAL
Qty: 60 TABLET | Refills: 1 | Status: SHIPPED
Start: 2021-07-12 | End: 2021-09-22

## 2021-07-12 NOTE — PROGRESS NOTES
Chief Complaint   Patient presents with    Leg Pain     ER FU     Patient has not been out of the country in (14 months), NO diarrhea, NO cough, NO chest conjestion, NO temp. Pt has not been around anyone with these symptoms. Health Maintenance reviewed. I have reviewed the patient's medical history in detail and updated the computerized patient record. 1. Have you been to the ER, urgent care clinic since your last visit? Yes  Hospitalized since your last visit? yes    2. Have you seen or consulted any other health care providers outside of the [address] [place] [address] since your last visit? Yes Include any pap smears or colon screening. Encouraged pt to discuss pt's wishes with spouse/partner/family and bring them in the next appt to follow thru with the Advanced Directive    @  [address], last 12 mths 1/25/2021   Able to walk? Yes   Fall in past 12 months? 0   Do you feel unsteady? 0   Are you worried about falling 0   Number of falls in past 12 months -   Fall with injury? -       3 most recent PHQ Screens 7/12/2021   Little interest or pleasure in doing things Several days   Feeling down, depressed, irritable, or hopeless Several days   Total Score PHQ 2 2       Abuse Screening Questionnaire 7/12/2021   Do you ever feel afraid of your partner? N   Are you in a relationship with someone who physically or mentally threatens you? N   Is it safe for you to go home?  Y       ADL Assessment 7/12/2021   Feeding yourself No Help Needed   Getting from bed to chair No Help Needed   Getting dressed No Help Needed   Bathing or showering No Help Needed   Walk across the room (includes cane/walker) No Help Needed   Using the telphone No Help Needed   Taking your medications No Help Needed   Preparing meals No Help Needed   Managing money (expenses/bills) No Help Needed   Moderately strenuous housework (laundry) No Help Needed   Shopping for personal items (toiletries/medicines) Help Needed Shopping for groceries -   Driving Help Needed   Climbing a flight of stairs No Help Needed   Getting to places beyond walking distances Help Needed

## 2021-07-12 NOTE — PROGRESS NOTES
Subjective:     Chief Complaint   Patient presents with    Leg Pain     ER FU        She  is a 62y.o. year old female who presents for evaluation of Bilat lower leg pain x 2 weeks, Grad improving with MR. NO injury, pain sever and went to ER. W/U done cause of pain unclear. US neg for blockage, Notes swelling Bi    Labs from today were reviewed  no, labs done previously were reviewed  yes, Labs done in ER were reviewed  yes, Additional labs are ordered  no,          ROS:  Reports taking blood pressure medications without side affects. No complaints of exertional chest pain, usu shortness of breath no focal weakness. Some swelling in lower legs. Current Outpatient Medications   Medication Sig Dispense Refill    cyclobenzaprine (FLEXERIL) 5 mg tablet Take 1 Tablet by mouth three (3) times daily as needed for Muscle Spasm(s). 60 Tablet 1    meclizine (Travel Sickness, meclizine,) 25 mg chewable tablet CHEW AND SWALLOW 1 TABLET BY MOUTH THREE TIMES DAILY AS NEEDED FOR UP TO 10 DAYS FOR DIZZINESS 60 Tablet 3    furosemide (LASIX) 20 mg tablet Take 1 Tablet by mouth nightly. As needed 60 Tablet 2    topiramate (TOPAMAX) 50 mg tablet TAKE ONE TABLET BY MOUTH TWICE A DAY WITH MEALS 60 Tablet 2    potassium chloride SR (KLOR-CON 10) 10 mEq tablet TAKE 1 TABLET BY MOUTH DAILY, IF TAKES LASIX (FUROSEMIDE) 90 Tab 1    acarbose (PRECOSE) 50 mg tablet TAKE 1 TABLET BY MOUTH THREE TIMES A DAY 30 MINUTES BEFORE MEALS 90 Tab 4    butalbital-acetaminophen-caffeine (FIORICET, ESGIC) -40 mg per tablet TAKE 1 TABLET BY MOUTH EVERY SIX HOURS AS NEEDED FOR HEADACHE. 30 Tab 5    albuterol (PROVENTIL HFA, VENTOLIN HFA, PROAIR HFA) 90 mcg/actuation inhaler Take 1 Puff by inhalation every six (6) hours as needed for Wheezing. 1 Inhaler 5    albuterol (PROVENTIL VENTOLIN) 2.5 mg /3 mL (0.083 %) nebu 3 mL by Nebulization route every four (4) hours as needed for Wheezing.  100 Each 1    cyanocobalamin ER 1,000 mcg tablet TAKE 1 TABLET BY MOUTH DAILY FOR VITAMIN B12 DEFICIENCY 90 Tab 3    sertraline (ZOLOFT) 100 mg tablet Take 1 Tab by mouth daily. 30 Tab 5    omeprazole (PRILOSEC) 40 mg capsule TAKE 1 CAPSULE BY MOUTH DAILY. 90 Cap 2    Vitamin D3 25 mcg (1,000 unit) tablet TAKE 1 TABLET BY MOUTH DAILY FOR VITAMIN D DEFICIENCY 180 Tab 9    nystatin (MYCOSTATIN) topical cream Apply  to affected area two (2) times a day. 15 g 0    aspirin 81 mg chewable tablet CHEW AND SWALLOW 1 TAB BY MOUTH DAILY. 90 Tab 3    polyethylene glycol (MIRALAX) 17 gram packet TAKE 1 PACKET BY MOUTH DAILY. 50 Packet 5    fluticasone (FLONASE) 50 mcg/actuation nasal spray 2 Sprays by Both Nostrils route daily. 1 Bottle 5    traZODone (DESYREL) 50 mg tablet Take 50 mg by mouth nightly.  Nebulizer & Compressor machine Use as directed 1 Each 0     Allergies   Allergen Reactions    Benadryl [Diphenhydramine Hcl] Nausea and Vomiting    Ibuprofen Nausea and Vomiting    Sulfamethoxazole-Trimethoprim Nausea Only    Tramadol Anxiety    Diclofenac Itching      Social History     Socioeconomic History    Marital status:      Spouse name: Not on file    Number of children: 2    Years of education: Not on file    Highest education level: Not on file   Occupational History    Occupation: retired   Tobacco Use    Smoking status: Never Smoker    Smokeless tobacco: Never Used   Substance and Sexual Activity    Alcohol use: No    Drug use: No    Sexual activity: Never   Social History Narrative     has brain damage, lives with him     Social Determinants of Health     Financial Resource Strain:     Difficulty of Paying Living Expenses:    Food Insecurity:     Worried About Running Out of Food in the Last Year:     [address] in the Last Year:    Transportation Needs:     Lack of Transportation (Medical):      Lack of Transportation (Non-Medical):    Physical Activity:     Days of Exercise per Week:     Minutes of Exercise per Session:    Stress:     Feeling of Stress :    Social Connections:     Frequency of Communication with Friends and Family:     Frequency of Social Gatherings with Friends and Family:     Attends Zoroastrian Services:     Active Member of Clubs or Organizations:     Attends Club or Organization Meetings:     Marital Status:       Family History   Problem Relation Age of Onset    Stroke Mother     Cancer Father     Diabetes Brother     Cancer Maternal Aunt     Cancer Maternal Grandmother     Cancer Brother     Kidney Disease Brother       Past Surgical History:   Procedure Laterality Date    HX ABDOMINAL LAPAROSCOPY      HX CARPAL TUNNEL RELEASE Bilateral more than 10 years ago    HX  SECTION  [id number]    HX COLONOSCOPY  2016    HX GASTRIC BYPASS  1997    x2    HX HYSTERECTOMY  1985    HX PACEMAKER      HX TONSIL AND ADENOIDECTOMY  more than 10 years ago    NEUROLOGICAL PROCEDURE UNLISTED      Bi CTS      Past Medical History:   Diagnosis Date    Asthma     Chest pain     Depression     Diarrhea     Fainting     Fatigue     Hearing loss     Hypoglycemia     Hypotension     Leg swelling     Memory disorder     Nausea and vomiting     Ringing in ears     SOB (shortness of breath)     Stomach pain     Swallowing difficulty     Syncope and collapse 7/24/15    RTH    Unintentional weight change             Objective:     Physical Examination:  Visit Vitals  /74 (BP 1 Location: Left arm, BP Patient Position: Sitting)   Pulse 81   Temp 97.8 °F (36.6 °C)   Ht 4' 9\" (1.448 m)   Wt 156 lb (70.8 kg)   SpO2 95%   BMI 33.76 kg/m²   -    - General: pleasant, no distress, good eye contact  - Mental status:  Normal mood, behavior, speech, dress, motor activity and thought processes  - Cardiovascular: regular, normal rate, normal S1 and S2, no murmurs/rubs/gallops   - Respiratory: clear to auscultation bilaterally  - Psychiatric: normal mood and affect  -       Labs/Procedures:    No results found for any visits on 07/12/21. Assessment/ Plan:       ICD-10-CM ICD-9-CM    1. Fibromyalgia affecting lower leg  M79.7 729.1 AMB SUPPLY ORDER   2. Peripheral vascular disease (HCC)  I73.9 443.9    3. Obesity, morbid ([facility] [address]  E66.01 278.01    4. Bilateral leg edema  R60.0 782.3 AMB SUPPLY ORDER   5. SSS (sick sinus syndrome) ([facility])  I49.5 427.81      Orders Placed This Encounter    AMB SUPPLY ORDER     PT to do lymphedema PT and fibromyalgia PT [facility]    cyclobenzaprine (FLEXERIL) 5 mg tablet     Sig: Take 1 Tablet by mouth three (3) times daily as needed for Muscle Spasm(s). Dispense:  60 Tablet     Refill:  1       I have discussed the diagnosis with the patient and the intended plan as seen in the above orders. The patient has received an after-visit summary and questions were answered concerning future plans. I have discussed medication side effects and warnings with the patient as well. Follow-up and Dispositions    · Return in about 2 months (around 9/12/2021) for routine follow up.

## 2021-07-13 NOTE — TELEPHONE ENCOUNTER
Note reviewed/doned by PCP and patient attended 7/12 PCP OV.  Will close encounter at this time.    =========================================================   For [facility] in place: No   Recommendation Provided To: Provider: 1 via Note to Provider    Gap Closed?: No   Total # of Interventions Recommended: 1   Total # of Interventions Accepted: 0   Intervention Accepted By: Provider: 0   Time Spent (min): 15

## 2021-09-13 ENCOUNTER — TELEPHONE (OUTPATIENT)
Dept: PHARMACY | Age: 61
End: 2021-09-13

## 2021-09-13 DIAGNOSIS — G43.009 MIGRAINE WITHOUT AURA AND WITHOUT STATUS MIGRAINOSUS, NOT INTRACTABLE: ICD-10-CM

## 2021-09-13 RX ORDER — POTASSIUM CHLORIDE 750 MG/1
TABLET, FILM COATED, EXTENDED RELEASE ORAL
Qty: 90 TABLET | Refills: 0 | Status: SHIPPED | OUTPATIENT
Start: 2021-09-13 | End: 2021-11-15

## 2021-09-13 RX ORDER — TOPIRAMATE 50 MG/1
TABLET, FILM COATED ORAL
Qty: 60 TABLET | Refills: 1 | Status: SHIPPED | OUTPATIENT
Start: 2021-09-13 | End: 2021-11-17

## 2021-09-13 NOTE — TELEPHONE ENCOUNTER
Cesar Salazar MD - from below - 9/15/21 appt with you; chart reviewed d/t [employer]/insurance report of diabetes medication with no statin dispensed. - Appears patient does NOT have diabetes; per chart review, acarbose prescribed d/t reactive hypoglycemia post gastric bypass. ? If appropriate, please consider using ICD-10 code R73.09 (other abnormal glucose) with upcoming 9/15/21 appt to complete/close this gap and close any further statin therapy reminders to you or patient this year. Thank you,  Ji Redmond, PharmD, [address] [facility]  Department, toll free: [phone], option 2  ==============================================================  POPULATION HEALTH CLINICAL PHARMACY: STATIN THERAPY REVIEW  Identified statin use in persons with diabetes care gap per [facility] Records dated: 9/7/21.     Last Visit: 7/12/21    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  - No DM diagnosis    Lab Results   Component Value Date/Time    Cholesterol, total 190 02/04/2020 09:07 AM    HDL Cholesterol 54 02/04/2020 09:07 AM    LDL, calculated 125 (H) 02/04/2020 09:07 AM    VLDL, calculated 11 02/04/2020 09:07 AM    Triglyceride 57 02/04/2020 09:07 AM    CHOL/HDL Ratio 2.4 10/14/2014 03:00 AM     ALT (SGPT)   Date Value Ref Range Status   10/29/2020 9 0 - 32 IU/L Final     AST (SGOT)   Date Value Ref Range Status   10/29/2020 13 0 - 40 IU/L Final     The 10-year ASCVD risk score (Bouchra Monge et al., 2013) is: 2.4%    Values used to calculate the score:      Age: 62 years      Sex: Female      Is Non- : Yes      Diabetic: No      Tobacco smoker: No      Systolic Blood Pressure: 871 mmHg      Is BP treated: No      HDL Cholesterol: 54 mg/dL      Total Cholesterol: 190 mg/dL     Future Appointments   Date Time Provider Department [place]   9/15/2021  9:45 AM Khanh Zaragoza MD [facility] BS AMB   9/16/2021  8:40 AM Jerel Perry MD [facility] BS AMB

## 2021-09-17 NOTE — TELEPHONE ENCOUNTER
Noted no show for 9/15 appt; will close encounter.    =========================================================   For Pharmacy Admin Tracking Only     Gap Closed?: No   Time Spent (min): 15

## 2021-09-22 ENCOUNTER — OFFICE VISIT (OUTPATIENT)
Dept: NEUROLOGY | Age: 61
End: 2021-09-22
Payer: MEDICARE

## 2021-09-22 VITALS
HEART RATE: 73 BPM | RESPIRATION RATE: 18 BRPM | DIASTOLIC BLOOD PRESSURE: 64 MMHG | BODY MASS INDEX: 33.66 KG/M2 | TEMPERATURE: 98.2 F | SYSTOLIC BLOOD PRESSURE: 98 MMHG | HEIGHT: 57 IN | WEIGHT: 156 LBS | OXYGEN SATURATION: 98 %

## 2021-09-22 DIAGNOSIS — G43.009 MIGRAINE WITHOUT AURA AND WITHOUT STATUS MIGRAINOSUS, NOT INTRACTABLE: ICD-10-CM

## 2021-09-22 DIAGNOSIS — Z98.84 HX OF GASTRIC BYPASS: ICD-10-CM

## 2021-09-22 DIAGNOSIS — F41.9 ANXIETY: ICD-10-CM

## 2021-09-22 DIAGNOSIS — G44.221 CHRONIC TENSION-TYPE HEADACHE, INTRACTABLE: Primary | ICD-10-CM

## 2021-09-22 PROCEDURE — G9717 DOC PT DX DEP/BP F/U NT REQ: HCPCS | Performed by: PSYCHIATRY & NEUROLOGY

## 2021-09-22 PROCEDURE — G8427 DOCREV CUR MEDS BY ELIG CLIN: HCPCS | Performed by: PSYCHIATRY & NEUROLOGY

## 2021-09-22 PROCEDURE — 99214 OFFICE O/P EST MOD 30 MIN: CPT | Performed by: PSYCHIATRY & NEUROLOGY

## 2021-09-22 PROCEDURE — 3017F COLORECTAL CA SCREEN DOC REV: CPT | Performed by: PSYCHIATRY & NEUROLOGY

## 2021-09-22 PROCEDURE — G8417 CALC BMI ABV UP PARAM F/U: HCPCS | Performed by: PSYCHIATRY & NEUROLOGY

## 2021-09-22 PROCEDURE — G9899 SCRN MAM PERF RSLTS DOC: HCPCS | Performed by: PSYCHIATRY & NEUROLOGY

## 2021-09-22 RX ORDER — TIZANIDINE 4 MG/1
4 TABLET ORAL 2 TIMES DAILY
Qty: 60 TABLET | Refills: 5 | Status: SHIPPED | OUTPATIENT
Start: 2021-09-22 | End: 2022-01-31 | Stop reason: SDUPTHER

## 2021-09-22 NOTE — PROGRESS NOTES
Chief Complaint   Patient presents with    Follow-up     states that she is here for her headaches and is having about 10 in a month.  her pcp is retiring in December.

## 2021-09-22 NOTE — PROGRESS NOTES
Follow-up Visit    Name Charles Lockwood Age 62 y.o. MRN [id number]  1960       Chief Complaint:  Headaches  Returns for follow up. She is compliant with medications. She is happy with the muscle relaxer and the topiramate. She is wondering if her headaches are casued by the her mood swings. She is seeing a psychiatrist.        Dia Encarnacion and Plan  1. Chronic tension-type headache, intractable  Restart tizanidine  Restart butalbital  Follow-up in 6 months. 2.  Anxiety  Continue Zoloft    3. Dizziness  Continue meclizine as needed    4. Migraines  Continue topiramate (also for bipolar)    Allergies  Benadryl [diphenhydramine hcl], Ibuprofen, Sulfamethoxazole-trimethoprim, Tramadol, and Diclofenac     Medications  Current Outpatient Medications   Medication Sig    topiramate (TOPAMAX) 50 mg tablet TAKE ONE TABLET BY MOUTH TWICE A DAY WITH MEALS    potassium chloride SR (KLOR-CON 10) 10 mEq tablet TAKE 1 TABLET BY MOUTH DAILY, IF TAKES LASIX (FUROSEMIDE)    cholecalciferol (VITAMIN D3) (1000 Units /25 mcg) tablet TAKE 1 TABLET BY MOUTH DAILY FOR VITAMIN D DEFICIENCY    cyanocobalamin ER 1,000 mcg tablet TAKE 1 TABLET BY MOUTH DAILY FOR VITAMIN B12 DEFICIENCY    cyclobenzaprine (FLEXERIL) 5 mg tablet Take 1 Tablet by mouth three (3) times daily as needed for Muscle Spasm(s).  meclizine (Travel Sickness, meclizine,) 25 mg chewable tablet CHEW AND SWALLOW 1 TABLET BY MOUTH THREE TIMES DAILY AS NEEDED FOR UP TO 10 DAYS FOR DIZZINESS    furosemide (LASIX) 20 mg tablet Take 1 Tablet by mouth nightly. As needed    acarbose (PRECOSE) 50 mg tablet TAKE 1 TABLET BY MOUTH THREE TIMES A DAY 30 MINUTES BEFORE MEALS    albuterol (PROVENTIL HFA, VENTOLIN HFA, PROAIR HFA) 90 mcg/actuation inhaler Take 1 Puff by inhalation every six (6) hours as needed for Wheezing.  albuterol (PROVENTIL VENTOLIN) 2.5 mg /3 mL (0.083 %) nebu 3 mL by Nebulization route every four (4) hours as needed for Wheezing.     sertraline (ZOLOFT) 100 mg tablet Take 1 Tab by mouth daily.  omeprazole (PRILOSEC) 40 mg capsule TAKE 1 CAPSULE BY MOUTH DAILY.  aspirin 81 mg chewable tablet CHEW AND SWALLOW 1 TAB BY MOUTH DAILY.  polyethylene glycol (MIRALAX) 17 gram packet TAKE 1 PACKET BY MOUTH DAILY.  fluticasone (FLONASE) 50 mcg/actuation nasal spray 2 Sprays by Both Nostrils route daily.  traZODone (DESYREL) 50 mg tablet Take 50 mg by mouth nightly.  Nebulizer & Compressor machine Use as directed    butalbital-acetaminophen-caffeine (FIORICET, ESGIC) -40 mg per tablet TAKE 1 TABLET BY MOUTH EVERY SIX HOURS AS NEEDED FOR HEADACHE. (Patient not taking: Reported on 9/22/2021)    nystatin (MYCOSTATIN) topical cream Apply  to affected area two (2) times a day. (Patient not taking: Reported on 9/22/2021)     No current facility-administered medications for this visit. Medical History  Past Medical History:   Diagnosis Date    Asthma     Chest pain     Depression     Diarrhea     Fainting     Fatigue     Hearing loss     Hypoglycemia 2014    Hypotension 2014    Leg swelling     Memory disorder     Nausea and vomiting     Ringing in ears     SOB (shortness of breath)     Stomach pain     Swallowing difficulty     Syncope and collapse 7/24/15    RTH    Unintentional weight change        Review of Systems   Eyes: Negative for blurred vision and double vision. Respiratory: Negative for cough and shortness of breath. Cardiovascular: Negative for chest pain, palpitations and orthopnea. Gastrointestinal: Negative for nausea and vomiting. Musculoskeletal: Positive for myalgias. Neurological: Positive for headaches. Psychiatric/Behavioral: Negative for depression. The patient is not nervous/anxious. General: Well developed, well nourished.  Patient in no distress   Head: Normocephalic, atraumatic, anicteric sclera   Neck Normal ROM, No thyromegally   Lungs:  Clear to auscultation bilaterally Cardiac: Regular rate and rhythm with no murmurs. Abd: Bowel sounds were audible. Ext: No pedal edema   Skin: Supple no rash     NeurologicExam:  Mental Status: Alert and oriented to person place and time   Speech: Fluent no aphasia or dysarthria. Cranial Nerves:  II - XII Intact   Motor:  Full and symmetric strength of upper and lower extremities  Normal bulk and tone. Reflexes:   Deep tendon reflexes 2+/4 and symmetric. Sensory:   Symmetric and intact with no deficits    Gait:  Gait is balanced and fluid with normal arm swing. Tremor:   No tremor noted. Cerebellar:  Coordination intact. Neurovascular: No carotid bruits.  No JVD     Lab Review  Lab Results   Component Value Date/Time    WBC 4.8 02/04/2020 09:07 AM    HCT 39.0 02/04/2020 09:07 AM    HGB 12.3 02/04/2020 09:07 AM    PLATELET 902 86/47/3113 09:07 AM       Lab Results   Component Value Date/Time    Sodium 142 10/29/2020 12:32 PM    Potassium 3.5 10/29/2020 12:32 PM    Chloride 113 (H) 10/29/2020 12:32 PM    CO2 18 (L) 10/29/2020 12:32 PM    Glucose 157 (H) 10/29/2020 12:32 PM    BUN 16 10/29/2020 12:32 PM    Creatinine 0.74 10/29/2020 12:32 PM    Calcium 8.8 10/29/2020 12:32 PM         Lab Results   Component Value Date/Time    Hemoglobin A1c <3.5 (L) 10/13/2014 05:00 AM    Hemoglobin A1c (POC) 5.1 10/02/2015 09:49 AM        Lab Results   Component Value Date/Time    Cholesterol, total 190 02/04/2020 09:07 AM    HDL Cholesterol 54 02/04/2020 09:07 AM    LDL, calculated 125 (H) 02/04/2020 09:07 AM    VLDL, calculated 11 02/04/2020 09:07 AM    Triglyceride 57 02/04/2020 09:07 AM    CHOL/HDL Ratio 2.4 10/14/2014 03:00 AM

## 2021-10-13 ENCOUNTER — DOCUMENTATION ONLY (OUTPATIENT)
Dept: INTERNAL MEDICINE CLINIC | Age: 61
End: 2021-10-13

## 2021-10-13 NOTE — PROGRESS NOTES
Per order of Meghan Dove MD and faxed request from Dr Michael Ford at [facility], faxed last colonoscopy report and last office visit notes to [phone] - confirmation of receipt received  Pepe Ayoub LPN  37/49/3267  9:83 PM

## 2021-10-19 DIAGNOSIS — G43.109 MIGRAINE WITH VERTIGO: ICD-10-CM

## 2021-10-21 RX ORDER — MECLIZINE HCL 25MG 25 MG/1
TABLET, CHEWABLE ORAL
Qty: 60 TABLET | Refills: 2 | Status: SHIPPED | OUTPATIENT
Start: 2021-10-21 | End: 2021-11-04

## 2021-10-25 PROBLEM — E11.9 TYPE 2 DIABETES MELLITUS (HCC): Status: ACTIVE | Noted: 2021-10-25

## 2021-10-26 ENCOUNTER — TELEPHONE (OUTPATIENT)
Dept: CARDIOLOGY CLINIC | Age: 61
End: 2021-10-26

## 2021-10-26 NOTE — TELEPHONE ENCOUNTER
Verified patient with 2 identifiers   Advised patient to keep appt for tomorrow. Advised to call 911 if sx worsen before tomorrow. Patient advised understanding.

## 2021-10-26 NOTE — TELEPHONE ENCOUNTER
10-26-21 @ 9:34am - pt called sick today, no transp, needs resched, have been experiencing chest pains and left arm pain for abt a week also intermitted chest pains, , pt rsched to 10-27-21 @ 10:45am, pt advised will have nurse to contact her to discuss, patient also advised to go to ed if gets worst before her appt on 10-27-21. Please call to discuss patients symptoms.      Thanks

## 2021-11-03 DIAGNOSIS — G43.109 MIGRAINE WITH VERTIGO: ICD-10-CM

## 2021-11-04 RX ORDER — MECLIZINE HCL 25MG 25 MG/1
TABLET, CHEWABLE ORAL
Qty: 60 TABLET | Refills: 1 | Status: SHIPPED | OUTPATIENT
Start: 2021-11-04 | End: 2022-02-27

## 2021-11-08 ENCOUNTER — TELEPHONE (OUTPATIENT)
Dept: PHARMACY | Age: 61
End: 2021-11-08

## 2021-11-08 NOTE — TELEPHONE ENCOUNTER
Gilda Juan MD, from below, appt with you 11/9/21  - Chart reviewed d/t insurer-identified statin use gap: diabetes rx claims and no statin rx claim  - Appears patient does NOT have diabetes; per chart review, acarbose prescribed d/t reactive hypoglycemia post gastric bypass. · If appropriate, please consider using ICD-10 code R73.09 (other abnormal glucose) with upcoming 9/15/21 appt to complete/close this gap and close any further statin therapy reminders to you or patient this year. Thank you,  Polly Finnegan, PharmD, [facility], toll free: [phone], option 2  ==============================================================  [facility]: STATIN THERAPY REVIEW  Identified statin use in persons with diabetes care gap per [facility] Records dated: 10/24/21.     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  - No DM diagnosis    Lab Results   Component Value Date/Time    Cholesterol, total 190 02/04/2020 09:07 AM    HDL Cholesterol 54 02/04/2020 09:07 AM    LDL, calculated 125 (H) 02/04/2020 09:07 AM    VLDL, calculated 11 02/04/2020 09:07 AM    Triglyceride 57 02/04/2020 09:07 AM    CHOL/HDL Ratio 2.4 10/14/2014 03:00 AM     ALT (SGPT)   Date Value Ref Range Status   10/29/2020 9 0 - 32 IU/L Final     AST (SGOT)   Date Value Ref Range Status   10/29/2020 13 0 - 40 IU/L Final     The 10-year ASCVD risk score (Kaylyn Canseco, et al., 2013) is: 4.9%    Values used to calculate the score:      Age: 62 years      Sex: Female      Is Non- : Yes      Diabetic: Yes      Tobacco smoker: No      Systolic Blood Pressure: 98 mmHg      Is BP treated: No      HDL Cholesterol: 54 mg/dL      Total Cholesterol: 190 mg/dL     Future Appointments   Date Time Provider Iris Gannon 11/9/2021  2:30 PM Amanda Brown MD [facility] BS AMB   3/22/2022  2:40 PM Amber Laureano MD [facility] BS AMB

## 2021-11-09 ENCOUNTER — OFFICE VISIT (OUTPATIENT)
Dept: INTERNAL MEDICINE CLINIC | Age: 61
End: 2021-11-09
Payer: MEDICARE

## 2021-11-09 VITALS
WEIGHT: 152 LBS | DIASTOLIC BLOOD PRESSURE: 93 MMHG | HEIGHT: 57 IN | OXYGEN SATURATION: 97 % | SYSTOLIC BLOOD PRESSURE: 136 MMHG | HEART RATE: 83 BPM | BODY MASS INDEX: 32.79 KG/M2 | TEMPERATURE: 98.4 F | RESPIRATION RATE: 20 BRPM

## 2021-11-09 DIAGNOSIS — F31.30 BIPOLAR AFFECTIVE DISORDER, CURRENT EPISODE DEPRESSED, CURRENT EPISODE SEVERITY UNSPECIFIED (HCC): ICD-10-CM

## 2021-11-09 DIAGNOSIS — K02.9 DENTAL CARIES: Primary | ICD-10-CM

## 2021-11-09 DIAGNOSIS — E66.01 OBESITY, MORBID (HCC): ICD-10-CM

## 2021-11-09 DIAGNOSIS — J45.909 ASTHMA, UNSPECIFIED ASTHMA SEVERITY, UNSPECIFIED WHETHER COMPLICATED, UNSPECIFIED WHETHER PERSISTENT: ICD-10-CM

## 2021-11-09 PROCEDURE — 3017F COLORECTAL CA SCREEN DOC REV: CPT | Performed by: FAMILY MEDICINE

## 2021-11-09 PROCEDURE — G8417 CALC BMI ABV UP PARAM F/U: HCPCS | Performed by: FAMILY MEDICINE

## 2021-11-09 PROCEDURE — G9899 SCRN MAM PERF RSLTS DOC: HCPCS | Performed by: FAMILY MEDICINE

## 2021-11-09 PROCEDURE — G8427 DOCREV CUR MEDS BY ELIG CLIN: HCPCS | Performed by: FAMILY MEDICINE

## 2021-11-09 PROCEDURE — G9717 DOC PT DX DEP/BP F/U NT REQ: HCPCS | Performed by: FAMILY MEDICINE

## 2021-11-09 PROCEDURE — 99213 OFFICE O/P EST LOW 20 MIN: CPT | Performed by: FAMILY MEDICINE

## 2021-11-09 RX ORDER — PENICILLIN V POTASSIUM 500 MG/1
500 TABLET, FILM COATED ORAL 3 TIMES DAILY
Qty: 30 TABLET | Refills: 0 | Status: SHIPPED | OUTPATIENT
Start: 2021-11-09 | End: 2021-11-19

## 2021-11-09 RX ORDER — LIDOCAINE HYDROCHLORIDE 20 MG/ML
15 SOLUTION OROPHARYNGEAL AS NEEDED
Qty: 1 EACH | Refills: 5 | Status: SHIPPED | OUTPATIENT
Start: 2021-11-09 | End: 2022-09-23 | Stop reason: ALTCHOICE

## 2021-11-09 RX ORDER — ACETAMINOPHEN AND CODEINE PHOSPHATE 300; 30 MG/1; MG/1
30 TABLET ORAL
COMMUNITY
Start: 2021-10-19 | End: 2021-11-24 | Stop reason: ALTCHOICE

## 2021-11-09 NOTE — PROGRESS NOTES
Subjective:     Chief Complaint   Patient presents with    Dental Pain     mouth pain X 3 months - waiting for an oral surgeon at [facility] - unable to eat solid foods due to mouth pain    Blood Pressure Check        She  is a 62y.o. year old female who presents for evaluation of dental pain x 3 mo. Saw dentist but not able to fully remove the decayed tooth and referred to [facility]. Trying top Ab T#3 not helping financial issues. Seeing [facility]. Very upset with the dental issues but otherwise no complaints. Has appointment coming up next week. ROS:  Reports taking blood pressure medications without side affects. No complaints of exertional chest pain, excessive shortness of breath or focal weakness. Minimal swelling in lower legs or dizziness with standing.         Allergies   Allergen Reactions    Benadryl [Diphenhydramine Hcl] Nausea and Vomiting    Ibuprofen Nausea and Vomiting    Sulfamethoxazole-Trimethoprim Nausea Only    Tramadol Anxiety    Diclofenac Itching      Social History     Socioeconomic History    Marital status:     Number of children: 2   Occupational History    Occupation: retired   Tobacco Use    Smoking status: Never Smoker    Smokeless tobacco: Never Used   Substance and Sexual Activity    Alcohol use: No    Drug use: No    Sexual activity: Never   Social History Narrative     has brain damage, lives with him      Family History   Problem Relation Age of Onset    Stroke Mother     Cancer Father     Diabetes Brother     Cancer Maternal Aunt     Cancer Maternal Grandmother     Cancer Brother     Kidney Disease Brother       Past Surgical History:   Procedure Laterality Date    HX ABDOMINAL LAPAROSCOPY      HX CARPAL TUNNEL RELEASE Bilateral more than 10 years ago    HX  SECTION  3432,2534    HX COLONOSCOPY  2016    HX GASTRIC BYPASS  1997    x2    HX HYSTERECTOMY      HX PACEMAKER      HX TONSIL AND ADENOIDECTOMY  more than 10 years ago    NEUROLOGICAL PROCEDURE UNLISTED      Bi CTS      Past Medical History:   Diagnosis Date    Asthma     Chest pain     Depression     Diarrhea     Fainting     Fatigue     Hearing loss     Hypoglycemia 2014    Hypotension 2014    Leg swelling     Memory disorder     Nausea and vomiting     Ringing in ears     SOB (shortness of breath)     Stomach pain     Swallowing difficulty     Syncope and collapse 7/24/15    RTH    Unintentional weight change             Objective:     Physical Examination:  Visit Vitals  BP (!) 136/93 (BP 1 Location: Left arm, BP Patient Position: Sitting, BP Cuff Size: Adult)   Pulse 83   Temp 98.4 °F (36.9 °C) (Temporal)   Resp 20   Ht 4' 9\" (1.448 m)   Wt 152 lb (68.9 kg)   SpO2 97%   BMI 32.89 kg/m²   - Decayed tooth lower left molar  - General: pleasant, no distress, good eye contact occa tearful  - Mental status:  depressedmood, behavior,        Labs/Procedures:    No results found for any visits on 11/09/21. Assessment/ Plan:       ICD-10-CM ICD-9-CM    1. Dental caries  K02.9 521.00 penicillin v potassium (VEETID) 500 mg tablet      lidocaine (XYLOCAINE) 2 % solution   2. Bipolar affective disorder, current episode depressed, current episode severity unspecified ([facility] [address]  F31.30 296.50    3. Obesity, morbid ([facility] [address]  E66.01 278.01    4. Asthma, unspecified asthma severity, unspecified whether complicated, unspecified whether persistent  J45.909 493.90      Orders Placed This Encounter    penicillin v potassium (VEETID) 500 mg tablet     Sig: Take 1 Tablet by mouth three (3) times daily for 10 days. Dispense:  30 Tablet     Refill:  0    acetaminophen-codeine (TYLENOL #3) 300-30 mg per tablet     Sig: Take 30 mg by mouth four (4) times daily as needed.  lidocaine (XYLOCAINE) 2 % solution     Sig: Take 15 mL by mouth as needed for Pain.      Dispense:  1 Each     Refill:  5         I have discussed the diagnosis with the patient and the intended plan as seen in the above orders. The patient has received an after-visit summary and questions were answered concerning future plans. I have discussed medication side effects and warnings with the patient as well.     Follow-up 3 months

## 2021-11-09 NOTE — PROGRESS NOTES
Chief Complaint   Patient presents with    Dental Pain     mouth pain X 3 months - waiting for an oral surgeon at [facility] - unable to eat solid foods due to mouth pain    Blood Pressure Check     Fall Risk Assessment, last 12 mths 1/25/2021   Able to walk? Yes   Fall in past 12 months? 0   Do you feel unsteady? 0   Are you worried about falling 0   Number of falls in past 12 months -   Fall with injury? -       3 most recent PHQ Screens 11/9/2021   Little interest or pleasure in doing things Several days   Feeling down, depressed, irritable, or hopeless Several days   Total Score PHQ 2 2       Abuse Screening Questionnaire 11/9/2021   Do you ever feel afraid of your partner? N   Are you in a relationship with someone who physically or mentally threatens you? N   Is it safe for you to go home?  Y       ADL Assessment 11/9/2021   Feeding yourself No Help Needed   Getting from bed to chair No Help Needed   Getting dressed No Help Needed   Bathing or showering No Help Needed   Walk across the room (includes cane/walker) No Help Needed   Using the telphone No Help Needed   Taking your medications No Help Needed   Preparing meals No Help Needed   Managing money (expenses/bills) No Help Needed   Moderately strenuous housework (laundry) Help Needed   Shopping for personal items (toiletries/medicines) Help Needed   Shopping for groceries No Help Needed   Driving Help Needed   Climbing a flight of stairs Help Needed   Getting to places beyond walking distances Help Needed

## 2021-11-10 NOTE — TELEPHONE ENCOUNTER
Noted provider reviewed/doned encounter. Patient attended [facility] yesterday, but appears was more urgent/acute concern re dental pain.  Will close this encounter.    =========================================================   For Pharmacy [address] in place: No   Recommendation Provided To: Provider: 1 via Note to Provider    Gap Closed?: No   Intervention Accepted By: Provider: 0   Time Spent (min): 15

## 2021-11-13 NOTE — TELEPHONE ENCOUNTER
----- Message from Yumiko Pedersen sent at 2018  3:00 PM EDT -----  [address] [facility] [address] the pts nurse navigator stated [address] will not cover the Rx for Lancet and the One touch test stripes and she is requesting to know if you could call the Rx in to [facility]. Ms Joyce Ann number is [phone] and [facility] number [phone] and fax [phone]. Yes

## 2021-11-15 DIAGNOSIS — G43.009 MIGRAINE WITHOUT AURA AND WITHOUT STATUS MIGRAINOSUS, NOT INTRACTABLE: ICD-10-CM

## 2021-11-15 RX ORDER — POTASSIUM CHLORIDE 750 MG/1
TABLET, FILM COATED, EXTENDED RELEASE ORAL
Qty: 90 TABLET | Refills: 0 | Status: SHIPPED | OUTPATIENT
Start: 2021-11-15 | End: 2022-06-28 | Stop reason: SDUPTHER

## 2021-11-17 RX ORDER — TOPIRAMATE 50 MG/1
TABLET, FILM COATED ORAL
Qty: 180 TABLET | Refills: 3 | Status: SHIPPED | OUTPATIENT
Start: 2021-11-17 | End: 2022-08-31 | Stop reason: SDUPTHER

## 2021-11-24 ENCOUNTER — OFFICE VISIT (OUTPATIENT)
Dept: INTERNAL MEDICINE CLINIC | Age: 61
End: 2021-11-24
Payer: MEDICARE

## 2021-11-24 VITALS
DIASTOLIC BLOOD PRESSURE: 84 MMHG | SYSTOLIC BLOOD PRESSURE: 115 MMHG | WEIGHT: 153 LBS | OXYGEN SATURATION: 98 % | HEIGHT: 57 IN | BODY MASS INDEX: 33.01 KG/M2 | HEART RATE: 80 BPM | TEMPERATURE: 98.4 F | RESPIRATION RATE: 16 BRPM

## 2021-11-24 DIAGNOSIS — E78.2 MIXED HYPERLIPIDEMIA: ICD-10-CM

## 2021-11-24 DIAGNOSIS — J45.41 MODERATE PERSISTENT ASTHMA WITH ACUTE EXACERBATION: ICD-10-CM

## 2021-11-24 DIAGNOSIS — E16.2 HYPOGLYCEMIA: ICD-10-CM

## 2021-11-24 DIAGNOSIS — J01.00 ACUTE MAXILLARY SINUSITIS, RECURRENCE NOT SPECIFIED: ICD-10-CM

## 2021-11-24 DIAGNOSIS — K02.9 DENTAL CARIES: Primary | ICD-10-CM

## 2021-11-24 PROBLEM — E11.9 TYPE 2 DIABETES MELLITUS (HCC): Status: ACTIVE | Noted: 2021-11-24

## 2021-11-24 PROBLEM — E11.9 TYPE 2 DIABETES MELLITUS (HCC): Status: RESOLVED | Noted: 2021-11-24 | Resolved: 2021-11-24

## 2021-11-24 PROCEDURE — G8417 CALC BMI ABV UP PARAM F/U: HCPCS | Performed by: FAMILY MEDICINE

## 2021-11-24 PROCEDURE — G9899 SCRN MAM PERF RSLTS DOC: HCPCS | Performed by: FAMILY MEDICINE

## 2021-11-24 PROCEDURE — G9717 DOC PT DX DEP/BP F/U NT REQ: HCPCS | Performed by: FAMILY MEDICINE

## 2021-11-24 PROCEDURE — G8427 DOCREV CUR MEDS BY ELIG CLIN: HCPCS | Performed by: FAMILY MEDICINE

## 2021-11-24 PROCEDURE — 99214 OFFICE O/P EST MOD 30 MIN: CPT | Performed by: FAMILY MEDICINE

## 2021-11-24 PROCEDURE — 3017F COLORECTAL CA SCREEN DOC REV: CPT | Performed by: FAMILY MEDICINE

## 2021-11-24 RX ORDER — AMOXICILLIN 500 MG/1
500 TABLET, FILM COATED ORAL 3 TIMES DAILY
Qty: 21 TABLET | Refills: 0 | Status: SHIPPED | OUTPATIENT
Start: 2021-11-24 | End: 2021-12-01

## 2021-11-24 NOTE — PROGRESS NOTES
Subjective:   Brandi Souza is a 62 y.o. female who complains of congestion, sore throat, cough described as dry, nocturnal and wheezing dyspnea for 3 days, stable since that time. She denies a history of fevers and diarrhea. Dental pain better with lidocaine has appointment to have her teeth removed. Evaluation to date: none. Treatment to date: none. Patient does not smoke cigarettes. Relevant PMH: No pertinent additional PMH.   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    SSS (sick sinus syndrome) ([facility] [address] 12/06/2018    Obesity, morbid ([address] 02/06/2018    Gastroesophageal reflux disease without esophagitis 08/24/2016    Bipolar disorder with current episode depressed ([address] 02/01/2016    Migraine headache with aura 10/24/2014    Bilateral leg edema 10/12/2014    Hypoglycemia 10/12/2014    History of gastric bypass 10/12/2014     Allergies   Allergen Reactions    Benadryl [Diphenhydramine Hcl] Nausea and Vomiting    Ibuprofen Nausea and Vomiting    Sulfamethoxazole-Trimethoprim Nausea Only    Tramadol Anxiety    Diclofenac Itching     Past Medical History:   Diagnosis Date    Asthma     Chest pain     Depression     Diarrhea     Fainting     Fatigue     Hearing loss     Hypoglycemia 2014    Hypotension 2014    Leg swelling     Memory disorder     Nausea and vomiting     Ringing in ears     SOB (shortness of breath)     Stomach pain     Swallowing difficulty     Syncope and collapse 7/24/15    RTH    Unintentional weight change      Social History     Tobacco Use    Smoking status: Never Smoker    Smokeless tobacco: Never Used   Substance Use Topics    Alcohol use: No        Review of Systems  Pertinent items are noted in HPI. Objective:     Visit Vitals  /84 (BP 1 Location: Left arm, BP Patient Position: Sitting, BP Cuff Size: Adult)   Pulse 80   Temp 98.4 °F (36.9 °C) (Temporal)   Resp 16   Ht 4' 9\" (1.448 m)   Wt 153 lb (69.4 kg)   SpO2 98%   BMI 33.11 kg/m²     General:  fatigued, cooperative, no distress   Eyes: negative   Ears:    Sinuses:    Mouth:     Neck: {   Heart: S1 and S2 normal, no murmurs noted. Lungs: clear to auscultation bilaterally   Abdomen: soft, non-tender. Bowel sounds normal. No masses,  no organomegaly      Assessment/Plan:   sinusitis  Antibiotics per orders. RTC prn. Encounter Diagnoses   Name Primary?  Dental caries Yes    Acute maxillary sinusitis, recurrence not specified     Moderate persistent asthma with acute exacerbation     Hypoglycemia     Mixed hyperlipidemia      Orders Placed This Encounter    METABOLIC PANEL, BASIC    LIPID PANEL    amoxicillin 500 mg tab   .

## 2021-12-22 LAB
BUN SERPL-MCNC: 17 MG/DL (ref 6–24)
BUN/CREAT SERPL: 19 (ref 9–23)
CALCIUM SERPL-MCNC: 8.8 MG/DL (ref 8.7–10.2)
CHLORIDE SERPL-SCNC: 113 MMOL/L (ref 96–106)
CHOLEST SERPL-MCNC: 175 MG/DL (ref 100–199)
CO2 SERPL-SCNC: 17 MMOL/L (ref 20–29)
CREAT SERPL-MCNC: 0.89 MG/DL (ref 0.57–1)
GLUCOSE SERPL-MCNC: 87 MG/DL (ref 65–99)
HDLC SERPL-MCNC: 54 MG/DL
IMP & REVIEW OF LAB RESULTS: NORMAL
LDLC SERPL CALC-MCNC: 112 MG/DL (ref 0–99)
POTASSIUM SERPL-SCNC: 4.4 MMOL/L (ref 3.5–5.2)
SODIUM SERPL-SCNC: 143 MMOL/L (ref 134–144)
TRIGL SERPL-MCNC: 46 MG/DL (ref 0–149)
VLDLC SERPL CALC-MCNC: 9 MG/DL (ref 5–40)

## 2021-12-28 ENCOUNTER — TELEPHONE (OUTPATIENT)
Dept: INTERNAL MEDICINE CLINIC | Age: 61
End: 2021-12-28

## 2021-12-28 NOTE — TELEPHONE ENCOUNTER
Called patient - she states that she was feeling bad over the Alex Holiday, went to the ER yesterday and tested positive for Covid - symptoms are vomiting - cough - no taste - no smell - severe body aches - sweats and chills (does not own a thermometer) - was sent home from the ER with nausea medication but nothing for her body aches or cough - virtual apt scheduled with Dr Tara Richmond for tomorrow AM to discuss patients symptoms and possible finding some relief - patient has been quarantining at home since her diagnosis on 12/27/2021  Everett Pate LPN  87/69/7110  2:80 PM

## 2021-12-28 NOTE — TELEPHONE ENCOUNTER
----- Message from Liz Driscoll sent at 12/28/2021  9:33 AM EST -----  Subject: Message to Provider    QUESTIONS  Information for Provider? pt of Dr. Laura Middleton? tested positive for covid on   12/27. Please call pt @ [phone]  ---------------------------------------------------------------------------  --------------  Khari Campos INFO  What is the best way for the office to contact you? OK to leave message on   voicemail  Preferred Call Back Phone Number? [phone]  ---------------------------------------------------------------------------  --------------  SCRIPT ANSWERS  Relationship to Patient?  Self

## 2021-12-29 ENCOUNTER — VIRTUAL VISIT (OUTPATIENT)
Dept: INTERNAL MEDICINE CLINIC | Age: 61
End: 2021-12-29
Payer: MEDICARE

## 2021-12-29 DIAGNOSIS — J45.41 MODERATE PERSISTENT ASTHMA WITH ACUTE EXACERBATION: ICD-10-CM

## 2021-12-29 DIAGNOSIS — U07.1 COVID-19: Primary | ICD-10-CM

## 2021-12-29 PROBLEM — E11.9 TYPE 2 DIABETES MELLITUS (HCC): Status: ACTIVE | Noted: 2021-12-29

## 2021-12-29 PROCEDURE — G9717 DOC PT DX DEP/BP F/U NT REQ: HCPCS | Performed by: FAMILY MEDICINE

## 2021-12-29 PROCEDURE — 99214 OFFICE O/P EST MOD 30 MIN: CPT | Performed by: FAMILY MEDICINE

## 2021-12-29 PROCEDURE — G9899 SCRN MAM PERF RSLTS DOC: HCPCS | Performed by: FAMILY MEDICINE

## 2021-12-29 PROCEDURE — 3017F COLORECTAL CA SCREEN DOC REV: CPT | Performed by: FAMILY MEDICINE

## 2021-12-29 PROCEDURE — G8417 CALC BMI ABV UP PARAM F/U: HCPCS | Performed by: FAMILY MEDICINE

## 2021-12-29 PROCEDURE — G8427 DOCREV CUR MEDS BY ELIG CLIN: HCPCS | Performed by: FAMILY MEDICINE

## 2021-12-29 RX ORDER — CODEINE PHOSPHATE AND GUAIFENESIN 10; 100 MG/5ML; MG/5ML
5 SOLUTION ORAL
Qty: 120 ML | Refills: 0 | Status: SHIPPED | OUTPATIENT
Start: 2021-12-29 | End: 2022-01-03

## 2021-12-29 RX ORDER — CODEINE PHOSPHATE AND GUAIFENESIN 10; 100 MG/5ML; MG/5ML
5 SOLUTION ORAL
Qty: 120 ML | Refills: 0 | Status: SHIPPED | OUTPATIENT
Start: 2021-12-29 | End: 2021-12-29 | Stop reason: SDUPTHER

## 2021-12-29 NOTE — PROGRESS NOTES
Sol Emanuel, who was evaluated through a synchronous (real-time) audio-video encounter, and/or her healthcare decision maker, is aware that it is a billable service, with coverage as determined by her insurance carrier. She provided verbal consent to proceed: Yes, and patient identification was verified. This visit was conducted pursuant to the emergency declaration under the [facility], [address] [place] [address] authority and the [employer] General Act. A caregiver was present when appropriate. Ability to conduct physical exam was limited. The patient was located in a state where the provider was credentialed to provide care. --Ofelia Johnson MD on 12/31/2021 at 1:58 PM          Subjective:   Sol Emanuel is a 62 y.o. female who complains of congestion, dry cough, headache, chills, bilateral ear fullness and dyspnea  for < 7 days, stable since that time. Went to ER Covid +  She denies a history of fevers, wheezing and loss of smell. Fully vaccinated  Evaluation to date: [facility] Dx with covid. Treatment to date: methylprednisone, can't take N/V  Patient does not smoke cigarettes. Relevant PMH: Asthma.   No hypoglycemia  Diabetes is diet-controlled    Lab Results   Component Value Date/Time    Hemoglobin A1c <3.5 (L) 10/13/2014 05:00 AM    Hemoglobin A1c, External 4.8 01/07/2021 12:00 AM    Glucose 87 12/21/2021 11:58 AM    Glucose (POC) 162 (H) 12/06/2018 08:32 AM    Glucose POC 70 05/13/2019 10:55 AM    LDL, calculated 112 (H) 12/21/2021 11:58 AM    LDL, calculated 125 (H) 02/04/2020 09:07 AM    Creatinine 0.89 12/21/2021 11:58 AM         Allergies   Allergen Reactions    Benadryl [Diphenhydramine Hcl] Nausea and Vomiting    Ibuprofen Nausea and Vomiting    Sulfamethoxazole-Trimethoprim Nausea Only    Tramadol Anxiety    Diclofenac Itching         Patient Active Problem List    Diagnosis Date Noted    Type 2 diabetes mellitus 12/29/2021    Fibromyalgia affecting lower leg 07/12/2021    Moderate asthma 10/18/2020    Peripheral vascular disease ([facility] 10/04/2019    Weight gain status post gastric bypass 10/04/2019    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    SSS (sick sinus syndrome) ([facility] 12/06/2018    Obesity, morbid ([facility] 02/06/2018    Gastroesophageal reflux disease without esophagitis 08/24/2016    Bipolar disorder with current episode depressed ([facility] 02/01/2016    Migraine headache with aura 10/24/2014    Bilateral leg edema 10/12/2014    Hypoglycemia 10/12/2014    History of gastric bypass 10/12/2014       Allergies   Allergen Reactions    Benadryl [Diphenhydramine Hcl] Nausea and Vomiting    Ibuprofen Nausea and Vomiting    Sulfamethoxazole-Trimethoprim Nausea Only    Tramadol Anxiety    Diclofenac Itching     Past Medical History:   Diagnosis Date    Asthma     Chest pain     Depression     Diarrhea     Fainting     Fatigue     Hearing loss     Hypoglycemia 2014    Hypotension 2014    Leg swelling     Memory disorder     Nausea and vomiting     Ringing in ears     SOB (shortness of breath)     Stomach pain     Swallowing difficulty     Syncope and collapse 7/24/15    [facility]    Unintentional weight change      Social History     Tobacco Use    Smoking status: Never Smoker    Smokeless tobacco: Never Used   Substance Use Topics    Alcohol use: No        Review of Systems  Pertinent items are noted in HPI. Objective: There were no vitals taken for this visit. General:  alert, fatigued, cooperative   Eyes:    Ears:    Sinuses:    Mouth:     Neck:    Heart:    Lungs:    Abdomen:       Assessment/Plan:   viral upper respiratory illness  Covid positive  Discussed COVID diagnosis and possible treatments. Discussed warning signs to look for and when to go to the ER. Discussed mode of transmission and quarantine. Discussed need to be out of work for least 7 to 10 days.   Work note if needed will be provided. Suggested symptomatic OTC remedies. RTC prn. Discussed diagnosis and treatment of viral URIs. Discussed the importance of avoiding unnecessary antibiotic therapy. Encounter Diagnoses   Name Primary?  COVID-19 Yes    Moderate persistent asthma with acute exacerbation      Orders Placed This Encounter    DISCONTD: guaiFENesin-codeine (ROBITUSSIN AC) 100-10 mg/5 mL solution    guaiFENesin-codeine (ROBITUSSIN AC) 100-10 mg/5 mL solution   . Current Outpatient Medications   Medication Sig Dispense Refill    guaiFENesin-codeine (ROBITUSSIN AC) 100-10 mg/5 mL solution Take 5 mL by mouth three (3) times daily as needed for Cough for up to 5 days. Max Daily Amount: 15 mL. 120 mL 0    topiramate (TOPAMAX) 50 mg tablet TAKE ONE TABLET BY MOUTH TWICE A DAY WITH MEALS 180 Tablet 3    potassium chloride SR (KLOR-CON 10) 10 mEq tablet TAKE 1 TABLET BY MOUTH DAILY, IF TAKES LASIX (FUROSEMIDE) 90 Tablet 0    lidocaine (XYLOCAINE) 2 % solution Take 15 mL by mouth as needed for Pain. 1 Each 5    meclizine (Travel Sickness, meclizine,) 25 mg chewable tablet CHEW AND SWALLOW 1 TABLET BY MOUTH THREE TIMES DAILY AS NEEDED FOR UP TO 10 DAYS FOR DIZZINESS 60 Tablet 1    tiZANidine (ZANAFLEX) 4 mg tablet Take 1 Tablet by mouth two (2) times a day. 60 Tablet 5    cholecalciferol (VITAMIN D3) (1000 Units /25 mcg) tablet TAKE 1 TABLET BY MOUTH DAILY FOR VITAMIN D DEFICIENCY 180 Tablet 1    cyanocobalamin ER 1,000 mcg tablet TAKE 1 TABLET BY MOUTH DAILY FOR VITAMIN B12 DEFICIENCY 90 Tablet 5    furosemide (LASIX) 20 mg tablet Take 1 Tablet by mouth nightly. As needed 60 Tablet 2    acarbose (PRECOSE) 50 mg tablet TAKE 1 TABLET BY MOUTH THREE TIMES A DAY 30 MINUTES BEFORE MEALS 90 Tab 4    butalbital-acetaminophen-caffeine (FIORICET, ESGIC) -40 mg per tablet TAKE 1 TABLET BY MOUTH EVERY SIX HOURS AS NEEDED FOR HEADACHE.  30 Tab 5    albuterol (PROVENTIL HFA, VENTOLIN HFA, PROAIR HFA) 90 mcg/actuation inhaler Take 1 Puff by inhalation every six (6) hours as needed for Wheezing. 1 Inhaler 5    albuterol (PROVENTIL VENTOLIN) 2.5 mg /3 mL (0.083 %) nebu 3 mL by Nebulization route every four (4) hours as needed for Wheezing. 100 Each 1    sertraline (ZOLOFT) 100 mg tablet Take 1 Tab by mouth daily. 30 Tab 5    omeprazole (PRILOSEC) 40 mg capsule TAKE 1 CAPSULE BY MOUTH DAILY. 90 Cap 2    nystatin (MYCOSTATIN) topical cream Apply  to affected area two (2) times a day. 15 g 0    aspirin 81 mg chewable tablet CHEW AND SWALLOW 1 TAB BY MOUTH DAILY. 90 Tab 3    polyethylene glycol (MIRALAX) 17 gram packet TAKE 1 PACKET BY MOUTH DAILY. 50 Packet 5    fluticasone (FLONASE) 50 mcg/actuation nasal spray 2 Sprays by Both Nostrils route daily. 1 Bottle 5    traZODone (DESYREL) 50 mg tablet Take 50 mg by mouth nightly.  Nebulizer & Compressor machine Use as directed 1 Each 0     Follow-up and Dispositions    · Return if symptoms worsen or fail to improve.

## 2021-12-29 NOTE — PROGRESS NOTES
Chief Complaint   Patient presents with    Concern For [id number] (Coronavirus)     Patient is aware that this is a Virtual Visit or Phone Call Only doctor's visit. Patient has not been out of the country in (14 months), NO diarrhea, NO cough, NO chest conjestion, NO temp. Pt has not been around anyone with these symptoms. Health Maintenance reviewed. I have reviewed the patient's medical history in detail and updated the computerized patient record. 1. Have you been to the ER, urgent care clinic since your last visit? no Hospitalized since your last visit? no     2. Have you seen or consulted any other health care providers outside of the [address] [place] [address] since your last visit? No Include any pap smears or colon screening. Encouraged pt to discuss pt's wishes with spouse/partner/family and bring them in the next appt to follow thru with the Advanced Directive      Fall Risk Assessment, last 12 mths 1/25/2021   Able to walk? Yes   Fall in past 12 months? 0   Do you feel unsteady? 0   Are you worried about falling 0   Number of falls in past 12 months -   Fall with injury? -       3 most recent PHQ Screens 12/29/2021   Little interest or pleasure in doing things Several days   Feeling down, depressed, irritable, or hopeless Several days   Total Score PHQ 2 2       Abuse Screening Questionnaire 11/9/2021   Do you ever feel afraid of your partner? N   Are you in a relationship with someone who physically or mentally threatens you? N   Is it safe for you to go home?  Y       ADL Assessment 11/9/2021   Feeding yourself No Help Needed   Getting from bed to chair No Help Needed   Getting dressed No Help Needed   Bathing or showering No Help Needed   Walk across the room (includes cane/walker) No Help Needed   Using the telphone No Help Needed   Taking your medications No Help Needed   Preparing meals No Help Needed   Managing money (expenses/bills) No Help Needed   Moderately strenuous housework (laundry) Help Needed   Shopping for personal items (toiletries/medicines) Help Needed   Shopping for groceries No Help Needed   Driving Help Needed   Climbing a flight of stairs Help Needed   Getting to places beyond walking distances Help Needed

## 2021-12-31 PROBLEM — E11.65 HYPERGLYCEMIA DUE TO TYPE 2 DIABETES MELLITUS (HCC): Status: ACTIVE | Noted: 2021-12-31

## 2022-01-07 ENCOUNTER — VIRTUAL VISIT (OUTPATIENT)
Dept: INTERNAL MEDICINE CLINIC | Age: 62
End: 2022-01-07
Payer: MEDICARE

## 2022-01-07 DIAGNOSIS — F31.30 BIPOLAR AFFECTIVE DISORDER, CURRENT EPISODE DEPRESSED, CURRENT EPISODE SEVERITY UNSPECIFIED (HCC): ICD-10-CM

## 2022-01-07 DIAGNOSIS — U07.1 COVID-19: Primary | ICD-10-CM

## 2022-01-07 PROCEDURE — 3017F COLORECTAL CA SCREEN DOC REV: CPT | Performed by: FAMILY MEDICINE

## 2022-01-07 PROCEDURE — G8427 DOCREV CUR MEDS BY ELIG CLIN: HCPCS | Performed by: FAMILY MEDICINE

## 2022-01-07 PROCEDURE — G8417 CALC BMI ABV UP PARAM F/U: HCPCS | Performed by: FAMILY MEDICINE

## 2022-01-07 PROCEDURE — G9717 DOC PT DX DEP/BP F/U NT REQ: HCPCS | Performed by: FAMILY MEDICINE

## 2022-01-07 PROCEDURE — 99213 OFFICE O/P EST LOW 20 MIN: CPT | Performed by: FAMILY MEDICINE

## 2022-01-07 NOTE — PROGRESS NOTES
Cheryl Garcia is a 62 y.o. female who was phone evaluated on 1/7/2022. Consent:  She and/or her healthcare decision maker is aware that this patient-initiated Telehealth encounter is a billable service, with coverage as determined by her insurance carrier. She is aware that she may receive a bill and has provided verbal consent to proceed: Yes    I was in the office while conducting this encounter. Assessment & Plan:   Diagnoses and all orders for this visit:    1. COVID-19  -     NOVEL CORONAVIRUS (COVID-19)    2. Bipolar affective disorder, current episode depressed, current episode severity unspecified ([facility]    Spent over 10 days since her symptoms started she can get out of quarantine. Okay to repeat her Covid test although it is unnecessary. Orders Placed This Encounter    NOVEL CORONAVIRUS (COVID-19) ([facility])     Scheduling Instructions:      1) Due to current limited availability of the COVID-19 PCR test, tests will be prioritized and may not be completed.              2) Order only if the test result will change clinical management or necessary for a return to mission-critical employment decision.              3) Print and instruct patient to adhere to CDC home isolation program. (Link Above)              4) Set up or refer patient for a monitoring program.              5) Have patient sign up for and leverage impokt (if not previously done). Order Specific Question:   Is this test for diagnosis or screening? Answer:   Diagnosis of ill patient     Order Specific Question:   Symptomatic for COVID-19 as defined by CDC? Answer:   Yes     Order Specific Question:   Date of Symptom Onset     Answer:   1/7/2022     Order Specific Question:   Hospitalized for COVID-19? Answer:   No     Order Specific Question:   Admitted to ICU for COVID-19? Answer:   No     Order Specific Question:   Employed in healthcare setting?      Answer:   No     Order Specific Question:   Resident in a congregate (group) care setting? Answer:   No     Order Specific Question:   Pregnant? Answer:   No     Order Specific Question:   Previously tested for COVID-19? Answer:   Yes       Follow-up 3 months  712  Subjective:      Reports that she is still very tired with a slight cough after cathie COVID-19. Symptoms started before [place], went to the emergency room and [facility] ER tested her for COVID-19 and was positive on December 27, 2021. Not too much in the way of shortness of breath symptoms currently. Would like to be retested, asks if she can get out of quarantine. Mood is stable. Has had 2 Covid vaccinations. Current Outpatient Medications   Medication Sig Dispense Refill    topiramate (TOPAMAX) 50 mg tablet TAKE ONE TABLET BY MOUTH TWICE A DAY WITH MEALS 180 Tablet 3    potassium chloride SR (KLOR-CON 10) 10 mEq tablet TAKE 1 TABLET BY MOUTH DAILY, IF TAKES LASIX (FUROSEMIDE) 90 Tablet 0    lidocaine (XYLOCAINE) 2 % solution Take 15 mL by mouth as needed for Pain. 1 Each 5    meclizine (Travel Sickness, meclizine,) 25 mg chewable tablet CHEW AND SWALLOW 1 TABLET BY MOUTH THREE TIMES DAILY AS NEEDED FOR UP TO 10 DAYS FOR DIZZINESS 60 Tablet 1    tiZANidine (ZANAFLEX) 4 mg tablet Take 1 Tablet by mouth two (2) times a day. 60 Tablet 5    cholecalciferol (VITAMIN D3) (1000 Units /25 mcg) tablet TAKE 1 TABLET BY MOUTH DAILY FOR VITAMIN D DEFICIENCY 180 Tablet 1    cyanocobalamin ER 1,000 mcg tablet TAKE 1 TABLET BY MOUTH DAILY FOR VITAMIN B12 DEFICIENCY 90 Tablet 5    furosemide (LASIX) 20 mg tablet Take 1 Tablet by mouth nightly. As needed 60 Tablet 2    acarbose (PRECOSE) 50 mg tablet TAKE 1 TABLET BY MOUTH THREE TIMES A DAY 30 MINUTES BEFORE MEALS 90 Tab 4    butalbital-acetaminophen-caffeine (FIORICET, ESGIC) -40 mg per tablet TAKE 1 TABLET BY MOUTH EVERY SIX HOURS AS NEEDED FOR HEADACHE.  30 Tab 5    albuterol (PROVENTIL HFA, VENTOLIN HFA, PROAIR HFA) 90 mcg/actuation inhaler Take 1 Puff by inhalation every six (6) hours as needed for Wheezing. 1 Inhaler 5    albuterol (PROVENTIL VENTOLIN) 2.5 mg /3 mL (0.083 %) nebu 3 mL by Nebulization route every four (4) hours as needed for Wheezing. 100 Each 1    sertraline (ZOLOFT) 100 mg tablet Take 1 Tab by mouth daily. 30 Tab 5    omeprazole (PRILOSEC) 40 mg capsule TAKE 1 CAPSULE BY MOUTH DAILY. 90 Cap 2    nystatin (MYCOSTATIN) topical cream Apply  to affected area two (2) times a day. 15 g 0    aspirin 81 mg chewable tablet CHEW AND SWALLOW 1 TAB BY MOUTH DAILY. 90 Tab 3    polyethylene glycol (MIRALAX) 17 gram packet TAKE 1 PACKET BY MOUTH DAILY. 50 Packet 5    fluticasone (FLONASE) 50 mcg/actuation nasal spray 2 Sprays by Both Nostrils route daily. 1 Bottle 5    traZODone (DESYREL) 50 mg tablet Take 50 mg by mouth nightly.       Nebulizer & Compressor machine Use as directed 1 Each 0     Allergies   Allergen Reactions    Benadryl [Diphenhydramine Hcl] Nausea and Vomiting    Ibuprofen Nausea and Vomiting    Sulfamethoxazole-Trimethoprim Nausea Only    Tramadol Anxiety    Diclofenac Itching     Social History     Socioeconomic History    Marital status:      Spouse name: Not on file    Number of children: 2    Years of education: Not on file    Highest education level: Not on file   Occupational History    Occupation: retired   Tobacco Use    Smoking status: Never Smoker    Smokeless tobacco: Never Used   Substance and Sexual Activity    Alcohol use: No    Drug use: No    Sexual activity: Never   Other Topics Concern    Not on file   Social History Narrative     has brain damage, lives with him     Social Determinants of Health     Financial Resource Strain:     Difficulty of Paying Living Expenses: Not on file   Food Insecurity:     Worried About Running Out of Food in the Last Year: Not on file    Debora of Food in the Last Year: Not on file   Transportation Needs:     Lack of Transportation (Medical): Not on file    Lack of Transportation (Non-Medical): Not on file   Physical Activity:     Days of Exercise per Week: Not on file    Minutes of Exercise per Session: Not on file   Stress:     Feeling of Stress : Not on file   Social Connections:     Frequency of Communication with Friends and Family: Not on file    Frequency of Social Gatherings with Friends and Family: Not on file    Attends Jehovah's witness Services: Not on file    Active Member of [address] [place] [address] [employer] or Organizations: Not on file    Attends Club or Organization Meetings: Not on file    Marital Status: Not on file   Intimate Partner Violence:     Fear of Current or Ex-Partner: Not on file    Emotionally Abused: Not on file    Physically Abused: Not on file    Sexually Abused: Not on file   Housing Stability:     Unable to Pay for Housing in the Last Year: Not on file    Number of [place] in the Last Year: Not on file    Unstable Housing in the Last Year: Not on file     Sounds no acute distress      We discussed the expected course, resolution and complications of the diagnosis(es) in detail. Medication risks, benefits, costs, interactions, and alternatives were discussed as indicated. I advised her to contact the office if her condition worsens, changes or fails to improve as anticipated. She expressed understanding with the diagnosis(es) and plan. Pursuant to the emergency declaration under the [facility], [facility] waiver authority and the [employer] General Act, this Virtual  Visit was conducted, with patient's consent, to reduce the patient's risk of exposure to COVID-19 and provide continuity of care for an established patient. Services were provided through a video synchronous discussion virtually to substitute for in-person clinic visit.     Tushar Atkinson MD    \

## 2022-01-07 NOTE — PROGRESS NOTES
Chief Complaint   Patient presents with    Concern For COVID-19 (Coronavirus)     pt would like a covid test to be negative     Patient is aware that this is a Virtual Visit or Phone Call Only doctor's visit. Patient has not been out of the country in (14 months), NO diarrhea, NO cough, NO chest conjestion, NO temp. Pt has not been around anyone with these symptoms. Health Maintenance reviewed. I have reviewed the patient's medical history in detail and updated the computerized patient record. 1. Have you been to the ER, urgent care clinic since your last visit? no  Hospitalized since your last visit?  no    2. Have you seen or consulted any other health care providers outside of the [address] [place] [address] since your last visit? noInclude any pap smears or colon screening. Encouraged pt to discuss pt's wishes with spouse/partner/family and bring them in the next appt to follow thru with the Advanced Directive      Fall Risk Assessment, last 12 mths 1/25/2021   Able to walk? Yes   Fall in past 12 months? 0   Do you feel unsteady? 0   Are you worried about falling 0   Number of falls in past 12 months -   Fall with injury? -       3 most recent PHQ Screens 12/29/2021   Little interest or pleasure in doing things Several days   Feeling down, depressed, irritable, or hopeless Several days   Total Score PHQ 2 2       Abuse Screening Questionnaire 11/9/2021   Do you ever feel afraid of your partner? N   Are you in a relationship with someone who physically or mentally threatens you? N   Is it safe for you to go home?  Y       ADL Assessment 11/9/2021   Feeding yourself No Help Needed   Getting from bed to chair No Help Needed   Getting dressed No Help Needed   Bathing or showering No Help Needed   Walk across the room (includes cane/walker) No Help Needed   Using the telphone No Help Needed   Taking your medications No Help Needed   Preparing meals No Help Needed   Managing money (expenses/bills) No Help Needed   Moderately strenuous housework (laundry) Help Needed   Shopping for personal items (toiletries/medicines) Help Needed   Shopping for groceries No Help Needed   Driving Help Needed   Climbing a flight of stairs Help Needed   Getting to places beyond walking distances Help Needed

## 2022-01-31 DIAGNOSIS — G44.221 CHRONIC TENSION-TYPE HEADACHE, INTRACTABLE: ICD-10-CM

## 2022-01-31 NOTE — TELEPHONE ENCOUNTER
Pt called for refill of:    Requested Prescriptions     Pending Prescriptions Disp Refills    tiZANidine (ZANAFLEX) 4 mg tablet 60 Tablet 5     Sig: Take 1 Tablet by mouth two (2) times a day.      Please send to [address] [facility].

## 2022-01-31 NOTE — TELEPHONE ENCOUNTER
Last office visit: 9/22/2021 angelica walton    Last refill: 9/22/2021    Next appointment: 3/23/2022    Please review and fill if warranted.

## 2022-02-02 RX ORDER — TIZANIDINE 4 MG/1
4 TABLET ORAL 2 TIMES DAILY
Qty: 60 TABLET | Refills: 5 | Status: SHIPPED | OUTPATIENT
Start: 2022-02-02 | End: 2022-07-12 | Stop reason: SDUPTHER

## 2022-02-18 ENCOUNTER — OFFICE VISIT (OUTPATIENT)
Dept: INTERNAL MEDICINE CLINIC | Age: 62
End: 2022-02-18
Payer: MEDICARE

## 2022-02-18 VITALS
BODY MASS INDEX: 32.79 KG/M2 | SYSTOLIC BLOOD PRESSURE: 122 MMHG | HEART RATE: 80 BPM | DIASTOLIC BLOOD PRESSURE: 85 MMHG | RESPIRATION RATE: 16 BRPM | TEMPERATURE: 97.8 F | WEIGHT: 152 LBS | HEIGHT: 57 IN | OXYGEN SATURATION: 97 %

## 2022-02-18 DIAGNOSIS — F31.30 BIPOLAR AFFECTIVE DISORDER, CURRENT EPISODE DEPRESSED, CURRENT EPISODE SEVERITY UNSPECIFIED (HCC): ICD-10-CM

## 2022-02-18 DIAGNOSIS — E16.2 HYPOGLYCEMIA: ICD-10-CM

## 2022-02-18 DIAGNOSIS — I49.5 SSS (SICK SINUS SYNDROME) (HCC): ICD-10-CM

## 2022-02-18 DIAGNOSIS — K21.9 GASTROESOPHAGEAL REFLUX DISEASE WITHOUT ESOPHAGITIS: ICD-10-CM

## 2022-02-18 DIAGNOSIS — Z00.00 MEDICARE ANNUAL WELLNESS VISIT, SUBSEQUENT: Primary | ICD-10-CM

## 2022-02-18 DIAGNOSIS — Z13.31 SCREENING FOR DEPRESSION: ICD-10-CM

## 2022-02-18 DIAGNOSIS — I73.9 PERIPHERAL VASCULAR DISEASE (HCC): ICD-10-CM

## 2022-02-18 DIAGNOSIS — G43.111 INTRACTABLE MIGRAINE WITH AURA WITH STATUS MIGRAINOSUS: ICD-10-CM

## 2022-02-18 DIAGNOSIS — Z98.84 HISTORY OF GASTRIC BYPASS: Chronic | ICD-10-CM

## 2022-02-18 PROBLEM — E11.65 HYPERGLYCEMIA DUE TO TYPE 2 DIABETES MELLITUS (HCC): Status: RESOLVED | Noted: 2021-12-31 | Resolved: 2022-02-18

## 2022-02-18 PROBLEM — E66.01 OBESITY, MORBID (HCC): Status: RESOLVED | Noted: 2018-02-06 | Resolved: 2022-02-18

## 2022-02-18 PROCEDURE — G9717 DOC PT DX DEP/BP F/U NT REQ: HCPCS | Performed by: FAMILY MEDICINE

## 2022-02-18 PROCEDURE — G0439 PPPS, SUBSEQ VISIT: HCPCS | Performed by: FAMILY MEDICINE

## 2022-02-18 PROCEDURE — G8427 DOCREV CUR MEDS BY ELIG CLIN: HCPCS | Performed by: FAMILY MEDICINE

## 2022-02-18 PROCEDURE — G8417 CALC BMI ABV UP PARAM F/U: HCPCS | Performed by: FAMILY MEDICINE

## 2022-02-18 PROCEDURE — 99214 OFFICE O/P EST MOD 30 MIN: CPT | Performed by: FAMILY MEDICINE

## 2022-02-18 PROCEDURE — 3017F COLORECTAL CA SCREEN DOC REV: CPT | Performed by: FAMILY MEDICINE

## 2022-02-18 RX ORDER — BUPROPION HYDROCHLORIDE 150 MG/1
150 TABLET ORAL DAILY
COMMUNITY
Start: 2021-09-30 | End: 2022-03-30

## 2022-02-18 RX ORDER — TRAZODONE HYDROCHLORIDE 50 MG/1
100 TABLET ORAL
Qty: 60 TABLET | Refills: 5 | Status: SHIPPED | OUTPATIENT
Start: 2022-02-18 | End: 2022-09-23 | Stop reason: SDUPTHER

## 2022-02-18 RX ORDER — ARIPIPRAZOLE 2 MG/1
1 TABLET ORAL DAILY
COMMUNITY
Start: 2021-08-26

## 2022-02-18 RX ORDER — SUMATRIPTAN 100 MG/1
100 TABLET, FILM COATED ORAL
Qty: 10 TABLET | Refills: 5 | Status: SHIPPED | OUTPATIENT
Start: 2022-02-18 | End: 2022-02-18

## 2022-02-18 NOTE — PROGRESS NOTES
This is the Subsequent Medicare Annual Wellness Exam, performed 12 months or more after the Initial AWV or the last Subsequent AWV    I have reviewed the patient's medical history in detail and updated the computerized patient record. Bad migraine sent to ER, given IN meds pain better but still throbbing. Seeing new neurologist. On prophylaxis, no avcute Rx. Seeing Psych abilfy working ok  No foot pain    Visit Vitals  /85 (BP 1 Location: Left arm, BP Patient Position: Sitting, BP Cuff Size: Adult)   Pulse 80   Temp 97.8 °F (36.6 °C) (Temporal)   Resp 16   Ht 4' 9\" (1.448 m)   Wt 152 lb (68.9 kg)   SpO2 97%   BMI 32.89 kg/m²     Well developed well nourished no acute distress. Pupils equal round react to light, extraocular muscles intact. blinking  Cranial nerves II through XII are intact. Heart regular rate and rhythm without clicks murmurs rubs  Lungs are clear to auscultation  Abdomen soft. Extremities, motor 5 out of 5 bilaterally, reflexes 2+ equal bilaterally. Assessment/Plan   Education and counseling provided:  Diabetes screening test  NOT DM, angelic sotelo      ICD-10-CM ICD-9-CM    1. Medicare annual wellness visit, subsequent  Z00.00 V70.0    2. Body mass index 32.0-32.9, adult  Z68.32 V85.32    3. Screening for depression  Z13.31 V79.0 DEPRESSION SCREEN ANNUAL   4. Intractable migraine with aura with status migrainosus  G43. 111 346.03 SUMAtriptan (IMITREX) 100 mg tablet   5. Peripheral vascular disease (HCC)  I73.9 443.9    6. SSS (sick sinus syndrome) ([facility])  I49.5 427.81    7. Hypoglycemia  U19.1 765.6 METABOLIC PANEL, BASIC   8. History of gastric bypass  Z98.84 V45.86    9. Gastroesophageal reflux disease without esophagitis  K21.9 530.81    10.  Bipolar affective disorder, current episode depressed, [facility] episode severity unspecified ([facility]  F31.30 296.50      Orders Placed This Encounter   [address] 0-93 MIN    METABOLIC PANEL, BASIC     Standing Status:   Future Standing Expiration Date:   8/21/2022    ARIPiprazole (ABILIFY) 2 mg tablet     Sig: Take 1 Tablet by mouth daily.  buPROPion XL (WELLBUTRIN XL) 150 mg tablet     Sig: Take 150 mg by mouth daily.  SUMAtriptan (IMITREX) 100 mg tablet     Sig: Take 1 Tablet by mouth once as needed for Migraine for up to 1 dose. May repeat in 2 hours, max 2 daily     Dispense:  10 Tablet     Refill:  5    traZODone (DESYREL) 50 mg tablet     Sig: Take 2 Tablets by mouth nightly. Dispense:  60 Tablet     Refill:  5     Discussed possible side affects, precautions, and drug interactions and possible benefits of the medication(s). Current Outpatient Medications   Medication Sig Dispense Refill    ARIPiprazole (ABILIFY) 2 mg tablet Take 1 Tablet by mouth daily.  buPROPion XL (WELLBUTRIN XL) 150 mg tablet Take 150 mg by mouth daily.  SUMAtriptan (IMITREX) 100 mg tablet Take 1 Tablet by mouth once as needed for Migraine for up to 1 dose. May repeat in 2 hours, max 2 daily 10 Tablet 5    traZODone (DESYREL) 50 mg tablet Take 2 Tablets by mouth nightly. 60 Tablet 5    tiZANidine (ZANAFLEX) 4 mg tablet Take 1 Tablet by mouth two (2) times a day. 60 Tablet 5    topiramate (TOPAMAX) 50 mg tablet TAKE ONE TABLET BY MOUTH TWICE A DAY WITH MEALS 180 Tablet 3    potassium chloride SR (KLOR-CON 10) 10 mEq tablet TAKE 1 TABLET BY MOUTH DAILY, IF TAKES LASIX (FUROSEMIDE) 90 Tablet 0    lidocaine (XYLOCAINE) 2 % solution Take 15 mL by mouth as needed for Pain.  1 Each 5    meclizine (Travel Sickness, meclizine,) 25 mg chewable tablet CHEW AND SWALLOW 1 TABLET BY MOUTH THREE TIMES DAILY AS NEEDED FOR UP TO 10 DAYS FOR DIZZINESS 60 Tablet 1    cholecalciferol (VITAMIN D3) (1000 Units /25 mcg) tablet TAKE 1 TABLET BY MOUTH DAILY FOR VITAMIN D DEFICIENCY 180 Tablet 1    cyanocobalamin ER 1,000 mcg tablet TAKE 1 TABLET BY MOUTH DAILY FOR VITAMIN B12 DEFICIENCY 90 Tablet 5    furosemide (LASIX) 20 mg tablet Take 1 Tablet by mouth nightly. As needed 60 Tablet 2    acarbose (PRECOSE) 50 mg tablet TAKE 1 TABLET BY MOUTH THREE TIMES A DAY 30 MINUTES BEFORE MEALS 90 Tab 4    butalbital-acetaminophen-caffeine (FIORICET, ESGIC) -40 mg per tablet TAKE 1 TABLET BY MOUTH EVERY SIX HOURS AS NEEDED FOR HEADACHE. 30 Tab 5    albuterol (PROVENTIL HFA, VENTOLIN HFA, PROAIR HFA) 90 mcg/actuation inhaler Take 1 Puff by inhalation every six (6) hours as needed for Wheezing. 1 Inhaler 5    albuterol (PROVENTIL VENTOLIN) 2.5 mg /3 mL (0.083 %) nebu 3 mL by Nebulization route every four (4) hours as needed for Wheezing. 100 Each 1    sertraline (ZOLOFT) 100 mg tablet Take 1 Tab by mouth daily. 30 Tab 5    omeprazole (PRILOSEC) 40 mg capsule TAKE 1 CAPSULE BY MOUTH DAILY. 90 Cap 2    nystatin (MYCOSTATIN) topical cream Apply  to affected area two (2) times a day. 15 g 0    aspirin 81 mg chewable tablet CHEW AND SWALLOW 1 TAB BY MOUTH DAILY. 90 Tab 3    polyethylene glycol (MIRALAX) 17 gram packet TAKE 1 PACKET BY MOUTH DAILY. 50 Packet 5    fluticasone (FLONASE) 50 mcg/actuation nasal spray 2 Sprays by Both Nostrils route daily. 1 Bottle 5    Nebulizer & Compressor machine Use as directed 1 Each 0           Depression Risk Factor Screening     3 most recent PHQ Screens 2/18/2022   Little interest or pleasure in doing things Several days   Feeling down, depressed, irritable, or hopeless Several days   Total Score PHQ 2 2       Alcohol & Drug Abuse Risk Screen    Do you average more than 1 drink per night or more than 7 drinks a week:  No    On any one occasion in the past three months have you have had more than 3 drinks containing alcohol:  No          Functional Ability and Level of Safety    Hearing: Hearing is good. Activities of Daily Living: The home contains: no safety equipment.   Patient does total self care      Ambulation: with mild difficulty and c/o dyspnea for a while     Fall Risk:  Fall Risk Assessment, last 12 mths 1/25/2021   Able to walk? Yes   Fall in past 12 months? 0   Do you feel unsteady? 0   Are you worried about falling 0   Number of falls in past 12 months -   Fall with injury? -      Abuse Screen:  Patient is not abused       Cognitive Screening    Has your family/caregiver stated any concerns about your memory: no     Cognitive Screening: Abnormal - Clock Drawing Test    Health Maintenance Due     Health Maintenance Due   Topic Date Due    MICROALBUMIN Q1  Never done    Eye Exam Retinal or Dilated  Never done    Shingrix Vaccine Age 50> (1 of 2) Never done    Foot Exam Q1  06/01/2016    DTaP/Tdap/Td series (2 - Tdap) 06/23/2021    Flu Vaccine (1) 09/01/2021    COVID-19 Vaccine (3 - Booster for Moderna series) 11/12/2021    A1C test (Diabetic or Prediabetic)  01/07/2022       Patient Care Team   Patient Care Team:  Too Henriquez MD as PCP - General (Family Medicine)  [facility]  aDrron Carcamo MD as Physician (Cardiology)  Maryann Lees MD (Cardiology)  Neurology [facility] has new 1  Psych new 1    History     Patient Active Problem List   Diagnosis Code    Bilateral leg edema R60.0    Hypoglycemia E16.2    History of gastric bypass Z98.84    Migraine headache with aura G43. 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    Peripheral vascular disease ([facility]) I73.9    Weight gain status post gastric bypass R63.5, Z98.84    Moderate asthma J45.909    Fibromyalgia affecting lower leg M79.7    Hyperglycemia due to type 2 diabetes mellitus ([facility] E11.65     Past Medical History:   Diagnosis Date    Asthma     Chest pain     Depression     Diarrhea     Fainting     Fatigue     Hearing loss     Hypoglycemia 2014    Hypotension 2014    Leg swelling     Memory disorder     Nausea and vomiting     Ringing in ears     SOB (shortness of breath)     Stomach pain     Swallowing difficulty     Syncope and collapse 7/24/15    RTH    Unintentional weight change       Past Surgical History:   Procedure Laterality Date    HX ABDOMINAL LAPAROSCOPY      HX CARPAL TUNNEL RELEASE Bilateral more than 10 years ago    HX  SECTION  6720,6331    HX COLONOSCOPY  2016    HX GASTRIC BYPASS  1997    x2    HX HYSTERECTOMY  1985    HX PACEMAKER      HX TONSIL AND ADENOIDECTOMY  more than 10 years ago    NEUROLOGICAL PROCEDURE UNLISTED      Bi CTS     Current Outpatient Medications   Medication Sig Dispense Refill    tiZANidine (ZANAFLEX) 4 mg tablet Take 1 Tablet by mouth two (2) times a day. 60 Tablet 5    topiramate (TOPAMAX) 50 mg tablet TAKE ONE TABLET BY MOUTH TWICE A DAY WITH MEALS 180 Tablet 3    potassium chloride SR (KLOR-CON 10) 10 mEq tablet TAKE 1 TABLET BY MOUTH DAILY, IF TAKES LASIX (FUROSEMIDE) 90 Tablet 0    lidocaine (XYLOCAINE) 2 % solution Take 15 mL by mouth as needed for Pain. 1 Each 5    meclizine (Travel Sickness, meclizine,) 25 mg chewable tablet CHEW AND SWALLOW 1 TABLET BY MOUTH THREE TIMES DAILY AS NEEDED FOR UP TO 10 DAYS FOR DIZZINESS 60 Tablet 1    cholecalciferol (VITAMIN D3) (1000 Units /25 mcg) tablet TAKE 1 TABLET BY MOUTH DAILY FOR VITAMIN D DEFICIENCY 180 Tablet 1    cyanocobalamin ER 1,000 mcg tablet TAKE 1 TABLET BY MOUTH DAILY FOR VITAMIN B12 DEFICIENCY 90 Tablet 5    furosemide (LASIX) 20 mg tablet Take 1 Tablet by mouth nightly. As needed 60 Tablet 2    acarbose (PRECOSE) 50 mg tablet TAKE 1 TABLET BY MOUTH THREE TIMES A DAY 30 MINUTES BEFORE MEALS 90 Tab 4    butalbital-acetaminophen-caffeine (FIORICET, ESGIC) -40 mg per tablet TAKE 1 TABLET BY MOUTH EVERY SIX HOURS AS NEEDED FOR HEADACHE.  30 Tab 5    albuterol (PROVENTIL HFA, VENTOLIN HFA, PROAIR HFA) 90 mcg/actuation inhaler Take 1 Puff by inhalation every six (6) hours as needed for Wheezing. 1 Inhaler 5    albuterol (PROVENTIL VENTOLIN) 2.5 mg /3 mL (0.083 %) nebu 3 mL by Nebulization route every four (4) hours as needed for Wheezing. 100 Each 1    sertraline (ZOLOFT) 100 mg tablet Take 1 Tab by mouth daily. 30 Tab 5    omeprazole (PRILOSEC) 40 mg capsule TAKE 1 CAPSULE BY MOUTH DAILY. 90 Cap 2    nystatin (MYCOSTATIN) topical cream Apply  to affected area two (2) times a day. 15 g 0    aspirin 81 mg chewable tablet CHEW AND SWALLOW 1 TAB BY MOUTH DAILY. 90 Tab 3    polyethylene glycol (MIRALAX) 17 gram packet TAKE 1 PACKET BY MOUTH DAILY. 50 Packet 5    fluticasone (FLONASE) 50 mcg/actuation nasal spray 2 Sprays by Both Nostrils route daily. 1 Bottle 5    traZODone (DESYREL) 50 mg tablet Take 50 mg by mouth nightly.       Nebulizer & Compressor machine Use as directed 1 Each 0     Allergies   Allergen Reactions    Benadryl [Diphenhydramine Hcl] Nausea and Vomiting    Ibuprofen Nausea and Vomiting    Sulfamethoxazole-Trimethoprim Nausea Only    Tramadol Anxiety    Diclofenac Itching       Family History   Problem Relation Age of Onset    Stroke Mother     Cancer Father     Diabetes Brother     Cancer Maternal Aunt     Cancer Maternal Grandmother     Cancer Brother     Kidney Disease Brother      Social History     Tobacco Use    Smoking status: Never Smoker    Smokeless tobacco: Never Used   Substance Use Topics    Alcohol use: No         Yuko Ordonez MD

## 2022-02-18 NOTE — PROGRESS NOTES
Chief Complaint   Patient presents with    Migraine     ER FU     Clock Draw Test Done    Patient has not been out of the country in (14 months), NO diarrhea, NO cough, NO chest conjestion, NO temp. Pt has not been around anyone with these symptoms. Health Maintenance reviewed. I have reviewed the patient's medical history in detail and updated the computerized patient record. 1. Have you been to the ER, urgent care clinic since your last visit? yes  Hospitalized since your last visit?  no    2. Have you seen or consulted any other health care providers outside of the [address] [place] [address] since your last visit? no  Include any pap smears or colon screening. Encouraged pt to discuss pt's wishes with spouse/partner/family and bring them in the next appt to follow thru with the Advanced Directive    @  [address], last 12 mths 1/25/2021   Able to walk? Yes   Fall in past 12 months? 0   Do you feel unsteady? 0   Are you worried about falling 0   Number of falls in past 12 months -   Fall with injury? -       3 most recent PHQ Screens 2/18/2022   Little interest or pleasure in doing things Several days   Feeling down, depressed, irritable, or hopeless Several days   Total Score PHQ 2 2       Abuse Screening Questionnaire 11/9/2021   Do you ever feel afraid of your partner? N   Are you in a relationship with someone who physically or mentally threatens you? N   Is it safe for you to go home?  Y       ADL Assessment 11/9/2021   Feeding yourself No Help Needed   Getting from bed to chair No Help Needed   Getting dressed No Help Needed   Bathing or showering No Help Needed   Walk across the room (includes cane/walker) No Help Needed   Using the telphone No Help Needed   Taking your medications No Help Needed   Preparing meals No Help Needed   Managing money (expenses/bills) No Help Needed   Moderately strenuous housework (laundry) Help Needed   Shopping for personal items (toiletries/medicines) Help Needed   Shopping for groceries No Help Needed   Driving Help Needed   Climbing a flight of stairs Help Needed   Getting to places beyond walking distances Help Needed

## 2022-02-18 NOTE — PATIENT INSTRUCTIONS
Medicare Wellness Visit, Female     The best way to live healthy is to have a lifestyle where you eat a well-balanced diet, exercise regularly, limit alcohol use, and quit all forms of tobacco/nicotine, if applicable. Regular preventive services are another way to keep healthy. Preventive services (vaccines, screening tests, monitoring & exams) can help personalize your care plan, which helps you manage your own care. Screening tests can find health problems at the earliest stages, when they are easiest to treat. [facility] follows the current, evidence-based guidelines published by the [facility] Keegan Terry ([facility]) when recommending preventive services for our patients. Because we follow these guidelines, sometimes recommendations change over time as research supports it. (For example, mammograms used to be recommended annually. Even though Medicare will still pay for an annual mammogram, the newer guidelines recommend a mammogram every two years for women of average risk). Of course, you and your doctor may decide to screen more often for some diseases, based on your risk and your co-morbidities (chronic disease you are already diagnosed with). Preventive services for you include:  - Medicare offers their members a free annual wellness visit, which is time for you and your primary care provider to discuss and plan for your preventive service needs. Take advantage of this benefit every year!  -All adults over the age of 72 should receive the recommended pneumonia vaccines. Current USPSTF guidelines recommend a series of two vaccines for the best pneumonia protection.   -All adults should have a flu vaccine yearly and a tetanus vaccine every 10 years.   -All adults age 48 and older should receive the shingles vaccines (series of two vaccines).       -All adults age 38-68 who are overweight should have a diabetes screening test once every three years.   -All adults born between [birth year] and 1964 should be screened once for Hepatitis C.  -Other screening tests and preventive services for persons with diabetes include: an eye exam to screen for diabetic retinopathy, a kidney function test, a foot exam, and stricter control over your cholesterol.   -Cardiovascular screening for adults with routine risk involves an electrocardiogram (ECG) at intervals determined by your doctor.   -Colorectal cancer screenings should be done for adults age 54-65 with no increased risk factors for colorectal cancer. There are a number of acceptable methods of screening for this type of cancer. Each test has its own benefits and drawbacks. Discuss with your doctor what is most appropriate for you during your annual wellness visit. The different tests include: colonoscopy (considered the best screening method), a fecal occult blood test, a fecal DNA test, and sigmoidoscopy.    -A bone mass density test is recommended when a woman turns 65 to screen for osteoporosis. This test is only recommended one time, as a screening. Some providers will use this same test as a disease monitoring tool if you already have osteoporosis. -Breast cancer screenings are recommended every other year for women of normal risk, age 54-69.  -Cervical cancer screenings for women over age 72 are only recommended with certain risk factors.      Here is a list of your current Health Maintenance items (your personalized list of preventive services) with a due date:  Health Maintenance Due   Topic Date Due    Albumin Urine Test  Never done    Eye Exam  Never done    Shingles Vaccine (1 of 2) Never done    Diabetic Foot Care  06/01/2016    DTaP/Tdap/Td  (2 - Tdap) 06/23/2021    Yearly Flu Vaccine (1) 09/01/2021    COVID-19 Vaccine (3 - Booster for Moderna series) 11/12/2021    Hemoglobin A1C    01/07/2022

## 2022-02-23 LAB
BUN SERPL-MCNC: 20 MG/DL (ref 8–27)
BUN/CREAT SERPL: 33 (ref 12–28)
CALCIUM SERPL-MCNC: 8.7 MG/DL (ref 8.7–10.3)
CHLORIDE SERPL-SCNC: 109 MMOL/L (ref 96–106)
CO2 SERPL-SCNC: 19 MMOL/L (ref 20–29)
CREAT SERPL-MCNC: 0.61 MG/DL (ref 0.57–1)
GLUCOSE SERPL-MCNC: 71 MG/DL (ref 65–99)
POTASSIUM SERPL-SCNC: 4.4 MMOL/L (ref 3.5–5.2)
SODIUM SERPL-SCNC: 142 MMOL/L (ref 134–144)

## 2022-02-23 NOTE — PROGRESS NOTES
Send normal/stable results letter. Your results are normal/stable. If not signed up, consider getting my chart to get your results on-line. We can help you to sign up.   Drink more free water

## 2022-02-26 DIAGNOSIS — G43.109 MIGRAINE WITH VERTIGO: ICD-10-CM

## 2022-02-27 RX ORDER — MECLIZINE HCL 25MG 25 MG/1
TABLET, CHEWABLE ORAL
Qty: 60 TABLET | Refills: 0 | Status: SHIPPED | OUTPATIENT
Start: 2022-02-27 | End: 2022-03-21 | Stop reason: SDUPTHER

## 2022-03-03 ENCOUNTER — OFFICE VISIT (OUTPATIENT)
Dept: CARDIOLOGY CLINIC | Age: 62
End: 2022-03-03
Payer: MEDICARE

## 2022-03-03 ENCOUNTER — OFFICE VISIT (OUTPATIENT)
Dept: CARDIOLOGY CLINIC | Age: 62
End: 2022-03-03

## 2022-03-03 VITALS
DIASTOLIC BLOOD PRESSURE: 104 MMHG | BODY MASS INDEX: 34.09 KG/M2 | OXYGEN SATURATION: 97 % | HEIGHT: 57 IN | HEART RATE: 73 BPM | RESPIRATION RATE: 18 BRPM | WEIGHT: 158 LBS | SYSTOLIC BLOOD PRESSURE: 138 MMHG

## 2022-03-03 DIAGNOSIS — Z95.0 CARDIAC PACEMAKER IN SITU: ICD-10-CM

## 2022-03-03 DIAGNOSIS — R06.09 DOE (DYSPNEA ON EXERTION): ICD-10-CM

## 2022-03-03 DIAGNOSIS — I49.5 SSS (SICK SINUS SYNDROME) (HCC): ICD-10-CM

## 2022-03-03 DIAGNOSIS — Z95.0 CARDIAC PACEMAKER IN SITU: Primary | ICD-10-CM

## 2022-03-03 DIAGNOSIS — I49.5 SSS (SICK SINUS SYNDROME) (HCC): Primary | ICD-10-CM

## 2022-03-03 DIAGNOSIS — R07.2 PRECORDIAL PAIN: ICD-10-CM

## 2022-03-03 DIAGNOSIS — R55 SYNCOPE, UNSPECIFIED SYNCOPE TYPE: ICD-10-CM

## 2022-03-03 DIAGNOSIS — E66.01 OBESITY, MORBID (HCC): ICD-10-CM

## 2022-03-03 PROCEDURE — G8417 CALC BMI ABV UP PARAM F/U: HCPCS | Performed by: NURSE PRACTITIONER

## 2022-03-03 PROCEDURE — G9717 DOC PT DX DEP/BP F/U NT REQ: HCPCS | Performed by: NURSE PRACTITIONER

## 2022-03-03 PROCEDURE — 93280 PM DEVICE PROGR EVAL DUAL: CPT | Performed by: INTERNAL MEDICINE

## 2022-03-03 PROCEDURE — 93000 ELECTROCARDIOGRAM COMPLETE: CPT | Performed by: NURSE PRACTITIONER

## 2022-03-03 PROCEDURE — 3017F COLORECTAL CA SCREEN DOC REV: CPT | Performed by: NURSE PRACTITIONER

## 2022-03-03 PROCEDURE — 99214 OFFICE O/P EST MOD 30 MIN: CPT | Performed by: NURSE PRACTITIONER

## 2022-03-03 PROCEDURE — G8427 DOCREV CUR MEDS BY ELIG CLIN: HCPCS | Performed by: NURSE PRACTITIONER

## 2022-03-03 NOTE — PROGRESS NOTES
Subjective:      Real Born is a 62 y.o. female is here for a symptom based appt. She reports 1 episode of syncope yesterday precipitated by midsternal CP. She was at the store, feeling fine prior. Had onset of not feeling well, then passed out. She has been having episodes of midsternal CP, not exertionally related. She is having more HERNADEZ. She is having episodes of migraines. She denies orthopnea, PND, LE edema, palpitations, or fatigue.        Patient Active Problem List    Diagnosis Date Noted    BMI 32.0-32.9,adult 2022    Fibromyalgia affecting lower leg 2021    Moderate asthma 10/18/2020    Peripheral vascular disease ([address] 10/04/2019    Weight gain status post gastric bypass 10/04/2019    Nesidioblastosis 2019    SSS (sick sinus syndrome) ([address] 2018    Gastroesophageal reflux disease without esophagitis 2016    Bipolar disorder with current episode depressed ([address] 2016    Migraine headache with aura 10/24/2014    Bilateral leg edema 10/12/2014    Hypoglycemia 10/12/2014    History of gastric bypass 10/12/2014      Jelena Morley MD  Past Medical History:   Diagnosis Date    Asthma     Chest pain     Depression     Diabetes ([address]     Diarrhea     Essential hypertension     Fainting     Fatigue     Hearing loss     Hypoglycemia     Hypotension     Leg swelling     Memory disorder     Murmur     Nausea and vomiting     Pacemaker     Ringing in ears     SOB (shortness of breath)     Stomach pain     Swallowing difficulty     Syncope and collapse 7/24/15    RTH    Unintentional weight change       Past Surgical History:   Procedure Laterality Date    HX ABDOMINAL LAPAROSCOPY      HX CARPAL TUNNEL RELEASE Bilateral more than 10 years ago    HX  SECTION  5590,3501    HX COLONOSCOPY  2016    HX GASTRIC BYPASS  1997    x2    HX HYSTERECTOMY      HX PACEMAKER      HX TONSIL AND ADENOIDECTOMY  more than 10 years ago    NEUROLOGICAL PROCEDURE UNLISTED      Bi CTS     Allergies   Allergen Reactions    Benadryl [Diphenhydramine Hcl] Nausea and Vomiting    Ibuprofen Nausea and Vomiting    Sulfamethoxazole-Trimethoprim Nausea Only    Tramadol Anxiety    Diclofenac Itching    Sulfamethoxazole Nausea Only    Zinc Acetate Other (comments)      Family History   Problem Relation Age of Onset    Stroke Mother     Cancer Father     Diabetes Brother     Cancer Maternal Aunt     Cancer Maternal Grandmother     Cancer Brother     Kidney Disease Brother     negative for cardiac disease  Social History     Socioeconomic History    Marital status:     Number of children: 2   Occupational History    Occupation: retired   Tobacco Use    Smoking status: Never Smoker    Smokeless tobacco: Never Used   Vaping Use    Vaping Use: Never used   Substance and Sexual Activity    Alcohol use: No    Drug use: No    Sexual activity: Never   Social History Narrative     has brain damage, lives with him     Current Outpatient Medications   Medication Sig    meclizine (Travel Sickness, meclizine,) 25 mg chewable tablet CHEW AND SWALLOW 1 TABLET BY MOUTH THREE TIMES DAILY AS NEEDED FOR UP TO 10 DAYS FOR DIZZINESS    ARIPiprazole (ABILIFY) 2 mg tablet Take 1 Tablet by mouth daily.  buPROPion XL (WELLBUTRIN XL) 150 mg tablet Take 150 mg by mouth daily.  traZODone (DESYREL) 50 mg tablet Take 2 Tablets by mouth nightly.  tiZANidine (ZANAFLEX) 4 mg tablet Take 1 Tablet by mouth two (2) times a day.  topiramate (TOPAMAX) 50 mg tablet TAKE ONE TABLET BY MOUTH TWICE A DAY WITH MEALS    potassium chloride SR (KLOR-CON 10) 10 mEq tablet TAKE 1 TABLET BY MOUTH DAILY, IF TAKES LASIX (FUROSEMIDE)    lidocaine (XYLOCAINE) 2 % solution Take 15 mL by mouth as needed for Pain.     cholecalciferol (VITAMIN D3) (1000 Units /25 mcg) tablet TAKE 1 TABLET BY MOUTH DAILY FOR VITAMIN D DEFICIENCY    cyanocobalamin ER 1,000 mcg tablet TAKE 1 TABLET BY MOUTH DAILY FOR VITAMIN B12 DEFICIENCY    furosemide (LASIX) 20 mg tablet Take 1 Tablet by mouth nightly. As needed    acarbose (PRECOSE) 50 mg tablet TAKE 1 TABLET BY MOUTH THREE TIMES A DAY 30 MINUTES BEFORE MEALS    butalbital-acetaminophen-caffeine (FIORICET, ESGIC) -40 mg per tablet TAKE 1 TABLET BY MOUTH EVERY SIX HOURS AS NEEDED FOR HEADACHE.  albuterol (PROVENTIL HFA, VENTOLIN HFA, PROAIR HFA) 90 mcg/actuation inhaler Take 1 Puff by inhalation every six (6) hours as needed for Wheezing.  albuterol (PROVENTIL VENTOLIN) 2.5 mg /3 mL (0.083 %) nebu 3 mL by Nebulization route every four (4) hours as needed for Wheezing.  sertraline (ZOLOFT) 100 mg tablet Take 1 Tab by mouth daily.  omeprazole (PRILOSEC) 40 mg capsule TAKE 1 CAPSULE BY MOUTH DAILY.  nystatin (MYCOSTATIN) topical cream Apply  to affected area two (2) times a day.  aspirin 81 mg chewable tablet CHEW AND SWALLOW 1 TAB BY MOUTH DAILY.  polyethylene glycol (MIRALAX) 17 gram packet TAKE 1 PACKET BY MOUTH DAILY.  fluticasone (FLONASE) 50 mcg/actuation nasal spray 2 Sprays by Both Nostrils route daily.  Nebulizer & Compressor machine Use as directed     No current facility-administered medications for this visit. Vitals:    03/03/22 1022 03/03/22 1043 03/03/22 1044   BP: (!) 144/100 (!) 138/102 (!) 138/104   Pulse: 69 66 73   Resp: 18     SpO2: 97%     Weight: 158 lb (71.7 kg)     Height: 4' 9\" (1.448 m)         I have reviewed the nurses notes, vitals, problem list, allergy list, medical history, family, social history and medications. Review of Symptoms:    General: Pt denies excessive weight gain or loss. Pt is able to conduct ADL's  HEENT: Denies blurred vision, +headaches, denies epistaxis and difficulty swallowing. Respiratory: + shortness of breath, HERNADEZ, denies wheezing or stridor.   Cardiovascular: +precordial pain, denies palpitations, edema or PND  Gastrointestinal: Denies poor appetite, indigestion, abdominal pain or blood in stool  Urinary: Denies dysuria, pyuria  Musculoskeletal: Denies pain or swelling from muscles or joints  Neurologic: Denies tremor, paresthesias, +syncope  Skin: Denies rash, itching or texture change. Psych: Denies depression      Physical Exam:      General: Well developed, in no acute distress. HEENT: Eyes - PERRL, no jvd  Heart:  Normal S1/S2 negative S3 or S4. Regular, no murmur, gallop or rub. Respiratory: Clear bilaterally x 4, no wheezing or rales  Extremities:  No edema, normal cap refill, no cyanosis. Musculoskeletal: No clubbing  Neuro: A&Ox3, speech clear, gait stable. Skin: Skin color is normal. No rashes or lesions. Non diaphoretic. no ulcers  Vascular: 2+ pulses symmetric in all extremities  Psych - judgement intact and orientation is wnl       Cardiographics    Ekg: Sinus  Rhythm HR 69  No significant changes c/w ECG 8/18/20.     Results for orders placed or performed during the hospital encounter of 01/01/19   EKG, 12 LEAD, INITIAL   Result Value Ref Range    Ventricular Rate 60 BPM    Atrial Rate 60 BPM    P-R Interval 202 ms    QRS Duration 94 ms    Q-T Interval 406 ms    QTC Calculation (Bezet) 406 ms    Calculated P Axis 32 degrees    Calculated R Axis 18 degrees    Calculated T Axis 20 degrees    Diagnosis       Atrial-paced rhythm  When compared with ECG of 31-JAN-2016 16:07,  Electronic atrial pacemaker has replaced Sinus rhythm  Confirmed by Keri Dick ([id number]) on 1/2/2019 10:34:39 AM     Results for orders placed or performed in visit on 11/15/18   CARDIAC HOLTER MONITOR, 24 HOURS    Narrative    ECG Monitor/24 hours, Complete    Reason for Holter Monitor  BRADYCARDIA    Heartbeat    Slowest 41  Average 59  Fastest  121        Results:   Underlying Rhythm: Sinus bradycardia      Atrial Arrhythmias: premature atrial contractions; occasional and severe bradycardia            AV Conduction: normal    Ventricular Arrhythmias: premature ventricular contractions; rare     ST Segment Analysis:normal     Symptom Correlation:  None reported    Comment:   Sinus bradycardia with bradycardia to the 40s (while asleep). Unchanged from monitor in 2014     Nithin Chin MD, [facility]            Lab Results   Component Value Date/Time    WBC 4.8 02/04/2020 09:07 AM    Hemoglobin (POC) 13.2 03/28/2016 03:29 PM    HGB 12.3 02/04/2020 09:07 AM    HCT 39.0 02/04/2020 09:07 AM    PLATELET 241 65/72/4647 09:07 AM    MCV 94 02/04/2020 09:07 AM      Lab Results   Component Value Date/Time    Sodium 142 02/22/2022 10:53 AM    Potassium 4.4 02/22/2022 10:53 AM    Chloride 109 (H) 02/22/2022 10:53 AM    CO2 19 (L) 02/22/2022 10:53 AM    Anion gap 9 01/01/2019 07:28 PM    Glucose 71 02/22/2022 10:53 AM    BUN 20 02/22/2022 10:53 AM    Creatinine 0.61 02/22/2022 10:53 AM    BUN/Creatinine ratio 33 (H) 02/22/2022 10:53 AM    GFR est  02/22/2022 10:53 AM    GFR est non-AA 99 02/22/2022 10:53 AM    Calcium 8.7 02/22/2022 10:53 AM    Bilirubin, total <0.2 10/29/2020 12:32 PM    Alk. phosphatase 99 10/29/2020 12:32 PM    Protein, total 5.6 (L) 10/29/2020 12:32 PM    Albumin 3.8 10/29/2020 12:32 PM    Globulin 3.1 01/01/2019 07:28 PM    A-G Ratio 2.1 10/29/2020 12:32 PM    ALT (SGPT) 9 10/29/2020 12:32 PM      Lab Results   Component Value Date/Time    TSH 1.850 07/08/2016 04:17 PM        Assessment:           ICD-10-CM ICD-9-CM    1. SSS (sick sinus syndrome) (HCC)  I49.5 427.81 AMB POC EKG ROUTINE W/ 12 LEADS, INTER & REP   2. Cardiac pacemaker in situ  Z95.0 V45.01    3. Obesity, morbid ([address]  E66.01 278.01      Orders Placed This Encounter    AMB POC EKG ROUTINE W/ 12 LEADS, INTER & REP     Order Specific Question:   Reason for Exam:     Answer:   ROUTINE        Plan:     Lenore Short is here for an EP appt after having syncopal episode yesterday precipitated by CP. She has been having episodes of CP over the past month, HERNADEZ, and migraines.  She has hx of a dual chamber pm 12/6/18. Device check today shows normally functioning dual chamber PM. She is 40% AP and <0.1% RVP with 7 years remaining battery life. She has had 1 ST episode 1/7/22 lasting 43 sec. Device did not show any arrhythmias that correlated with her syncope. EKG normal sinus. BP with DBP elevated. I will obtain an echo and lexiscan stress test.        Continue medical management for BMI 34, SSS and bipolar disorder. F/U dependent on results. Thank you for allowing me to participate in [facility] 's care.       Rahat Castillo NP

## 2022-03-03 NOTE — LETTER
3/3/2022    Patient: Bruno Melton   YOB: 1960   Date of Visit: 3/3/2022     Edis Jacques MD  [address] [address]  [address] [facility] [address]    Dear Edis Jacques MD,      Thank you for referring Ms. Sylvia Mckeon to [address] [place] [address] for evaluation. My notes for this consultation are attached. If you have questions, please do not hesitate to call me. I look forward to following your patient along with you.       Sincerely,    Giacomo Guaman NP

## 2022-03-16 ENCOUNTER — ANCILLARY PROCEDURE (OUTPATIENT)
Dept: CARDIOLOGY CLINIC | Age: 62
End: 2022-03-16
Payer: MEDICARE

## 2022-03-16 VITALS
SYSTOLIC BLOOD PRESSURE: 138 MMHG | DIASTOLIC BLOOD PRESSURE: 86 MMHG | HEIGHT: 57 IN | WEIGHT: 154 LBS | BODY MASS INDEX: 33.22 KG/M2

## 2022-03-16 DIAGNOSIS — R07.2 PRECORDIAL PAIN: ICD-10-CM

## 2022-03-16 DIAGNOSIS — E66.01 OBESITY, MORBID (HCC): ICD-10-CM

## 2022-03-16 DIAGNOSIS — I49.5 SSS (SICK SINUS SYNDROME) (HCC): ICD-10-CM

## 2022-03-16 DIAGNOSIS — R55 SYNCOPE, UNSPECIFIED SYNCOPE TYPE: ICD-10-CM

## 2022-03-16 DIAGNOSIS — R06.09 DOE (DYSPNEA ON EXERTION): ICD-10-CM

## 2022-03-16 DIAGNOSIS — Z95.0 CARDIAC PACEMAKER IN SITU: ICD-10-CM

## 2022-03-16 PROCEDURE — 78452 HT MUSCLE IMAGE SPECT MULT: CPT | Performed by: INTERNAL MEDICINE

## 2022-03-16 PROCEDURE — 93015 CV STRESS TEST SUPVJ I&R: CPT | Performed by: INTERNAL MEDICINE

## 2022-03-16 PROCEDURE — A9502 TC99M TETROFOSMIN: HCPCS | Performed by: INTERNAL MEDICINE

## 2022-03-17 LAB
NUC STRESS EJECTION FRACTION: 65 %
STRESS BASELINE DIAS BP: 86 MMHG
STRESS BASELINE HR: 71 BPM
STRESS BASELINE ST DEPRESSION: 0 MM
STRESS BASELINE SYS BP: 138 MMHG
STRESS PEAK DIAS BP: 90 MMHG
STRESS PEAK SYS BP: 140 MMHG
STRESS PERCENT HR ACHIEVED: 73 %
STRESS POST PEAK HR: 116 BPM
STRESS RATE PRESSURE PRODUCT: NORMAL BPM*MMHG
STRESS TARGET HR: 160 BPM

## 2022-03-19 PROBLEM — Z98.84 WEIGHT GAIN STATUS POST GASTRIC BYPASS: Status: ACTIVE | Noted: 2019-10-04

## 2022-03-19 PROBLEM — I73.9 PERIPHERAL VASCULAR DISEASE (HCC): Status: ACTIVE | Noted: 2019-10-04

## 2022-03-19 PROBLEM — I49.5 SSS (SICK SINUS SYNDROME) (HCC): Status: ACTIVE | Noted: 2018-12-06

## 2022-03-19 PROBLEM — E16.9 NESIDIOBLASTOSIS: Status: ACTIVE | Noted: 2019-05-13

## 2022-03-19 PROBLEM — M79.7 FIBROMYALGIA AFFECTING LOWER LEG: Status: ACTIVE | Noted: 2021-07-12

## 2022-03-19 PROBLEM — R63.5 WEIGHT GAIN STATUS POST GASTRIC BYPASS: Status: ACTIVE | Noted: 2019-10-04

## 2022-03-20 PROBLEM — J45.909 MODERATE ASTHMA: Status: ACTIVE | Noted: 2020-10-18

## 2022-03-21 ENCOUNTER — TELEPHONE (OUTPATIENT)
Dept: PHARMACY | Age: 62
End: 2022-03-21

## 2022-03-21 ENCOUNTER — TELEPHONE (OUTPATIENT)
Dept: CARDIOLOGY CLINIC | Age: 62
End: 2022-03-21

## 2022-03-21 ENCOUNTER — OFFICE VISIT (OUTPATIENT)
Dept: INTERNAL MEDICINE CLINIC | Age: 62
End: 2022-03-21
Payer: MEDICARE

## 2022-03-21 VITALS
DIASTOLIC BLOOD PRESSURE: 89 MMHG | OXYGEN SATURATION: 97 % | TEMPERATURE: 97.8 F | RESPIRATION RATE: 16 BRPM | WEIGHT: 152 LBS | BODY MASS INDEX: 32.79 KG/M2 | HEART RATE: 76 BPM | HEIGHT: 57 IN | SYSTOLIC BLOOD PRESSURE: 139 MMHG

## 2022-03-21 DIAGNOSIS — Z98.84 HISTORY OF GASTRIC BYPASS: Chronic | ICD-10-CM

## 2022-03-21 DIAGNOSIS — G43.109 MIGRAINE WITH VERTIGO: ICD-10-CM

## 2022-03-21 DIAGNOSIS — E05.90 HYPERTHYROIDISM: ICD-10-CM

## 2022-03-21 DIAGNOSIS — D50.0 IRON DEFICIENCY ANEMIA DUE TO CHRONIC BLOOD LOSS: ICD-10-CM

## 2022-03-21 DIAGNOSIS — M79.669 PAIN OF LOWER LEG, UNSPECIFIED LATERALITY: Primary | ICD-10-CM

## 2022-03-21 DIAGNOSIS — E53.8 FOLATE DEFICIENCY: ICD-10-CM

## 2022-03-21 DIAGNOSIS — R73.09 OTHER ABNORMAL GLUCOSE: ICD-10-CM

## 2022-03-21 DIAGNOSIS — E55.9 VITAMIN D DEFICIENCY: ICD-10-CM

## 2022-03-21 DIAGNOSIS — G43.111 INTRACTABLE MIGRAINE WITH AURA WITH STATUS MIGRAINOSUS: ICD-10-CM

## 2022-03-21 DIAGNOSIS — M54.30 SCIATICA, UNSPECIFIED LATERALITY: ICD-10-CM

## 2022-03-21 DIAGNOSIS — E53.8 VITAMIN B 12 DEFICIENCY: ICD-10-CM

## 2022-03-21 DIAGNOSIS — H53.8 BLURRED VISION: ICD-10-CM

## 2022-03-21 DIAGNOSIS — M79.7 FIBROMYALGIA AFFECTING LOWER LEG: ICD-10-CM

## 2022-03-21 DIAGNOSIS — E83.52 HYPERCALCEMIA: ICD-10-CM

## 2022-03-21 PROBLEM — E11.9 TYPE 2 DIABETES MELLITUS (HCC): Status: ACTIVE | Noted: 2022-03-21

## 2022-03-21 PROBLEM — E11.9 TYPE 2 DIABETES MELLITUS (HCC): Status: RESOLVED | Noted: 2022-03-21 | Resolved: 2022-03-21

## 2022-03-21 PROCEDURE — G8427 DOCREV CUR MEDS BY ELIG CLIN: HCPCS | Performed by: FAMILY MEDICINE

## 2022-03-21 PROCEDURE — G8417 CALC BMI ABV UP PARAM F/U: HCPCS | Performed by: FAMILY MEDICINE

## 2022-03-21 PROCEDURE — 99214 OFFICE O/P EST MOD 30 MIN: CPT | Performed by: FAMILY MEDICINE

## 2022-03-21 PROCEDURE — G9717 DOC PT DX DEP/BP F/U NT REQ: HCPCS | Performed by: FAMILY MEDICINE

## 2022-03-21 PROCEDURE — 3017F COLORECTAL CA SCREEN DOC REV: CPT | Performed by: FAMILY MEDICINE

## 2022-03-21 RX ORDER — GABAPENTIN 100 MG/1
100 CAPSULE ORAL 3 TIMES DAILY
Qty: 90 CAPSULE | Refills: 0 | Status: SHIPPED | OUTPATIENT
Start: 2022-03-21 | End: 2022-04-26 | Stop reason: SDUPTHER

## 2022-03-21 RX ORDER — POLYETHYLENE GLYCOL 3350 17 G/17G
17 POWDER, FOR SOLUTION ORAL DAILY
Qty: 50 PACKET | Refills: 5 | Status: SHIPPED | OUTPATIENT
Start: 2022-03-21

## 2022-03-21 RX ORDER — MECLIZINE HCL 25MG 25 MG/1
TABLET, CHEWABLE ORAL
Qty: 60 TABLET | Refills: 1 | Status: SHIPPED
Start: 2022-03-21 | End: 2022-04-26 | Stop reason: ALTCHOICE

## 2022-03-21 NOTE — TELEPHONE ENCOUNTER
----- Message from Michael Stone NP sent at 3/20/2022 10:05 PM EDT -----  Please advise patient stress test without evidence of flow-limiting blockages in the arteries of the heart.

## 2022-03-21 NOTE — PROGRESS NOTES
Chief Complaint   Patient presents with    Leg Pain     R/leg pain wakes up at night     Patient has not been out of the country in (14 months), NO diarrhea, NO cough, NO chest conjestion, NO temp. Pt has not been around anyone with these symptoms. Health Maintenance reviewed. I have reviewed the patient's medical history in detail and updated the computerized patient record. 1. Have you been to the ER, urgent care clinic since your last visit? No  Hospitalized since your last visit?  no    2. Have you seen or consulted any other health care providers outside of the [address] [place] [address] since your last visit? No  Include any pap smears or colon screening. Encouraged pt to discuss pt's wishes with spouse/partner/family and bring them in the next appt to follow thru with the Advanced Directive    @  [address], last 12 mths 1/25/2021   Able to walk? Yes   Fall in past 12 months? 0   Do you feel unsteady? 0   Are you worried about falling 0   Number of falls in past 12 months -   Fall with injury? -       3 most recent PHQ Screens 3/3/2022   Little interest or pleasure in doing things Not at all   Feeling down, depressed, irritable, or hopeless Not at all   Total Score PHQ 2 0       Abuse Screening Questionnaire 3/3/2022   Do you ever feel afraid of your partner? N   Are you in a relationship with someone who physically or mentally threatens you? N   Is it safe for you to go home?  Y       ADL Assessment 11/9/2021   Feeding yourself No Help Needed   Getting from bed to chair No Help Needed   Getting dressed No Help Needed   Bathing or showering No Help Needed   Walk across the room (includes cane/walker) No Help Needed   Using the telphone No Help Needed   Taking your medications No Help Needed   Preparing meals No Help Needed   Managing money (expenses/bills) No Help Needed   Moderately strenuous housework (laundry) Help Needed   Shopping for personal items (toiletries/medicines) Help Needed   Shopping for groceries No Help Needed   Driving Help Needed   Climbing a flight of stairs Help Needed   Getting to places beyond walking distances Help Needed

## 2022-03-21 NOTE — PROGRESS NOTES
HISTORY OF PRESENT Bolivar Cerna is a 62 y.o. female. Leg Pain   The history is provided by the patient. This is a chronic problem. Episode onset: 1 mo. The problem occurs hourly. The pain is present in the left upper leg, left lower leg, right upper leg and right lower leg (low back). The pain is at a severity of 10/10 (at night). The pain is severe. Associated symptoms include tingling and back pain. Pertinent negatives include no numbness. The symptoms are aggravated by lying down. Treatments tried: tazodone. The treatment provided no relief. There has been no history of extremity trauma. WENT to ER could not find a reason. Seeing ards for CP dyspnea    Review of Systems   Constitutional: Negative for fever. Respiratory: Positive for shortness of breath. Cardiovascular: Positive for chest pain. Musculoskeletal: Positive for back pain. Neurological: Positive for tingling. Negative for numbness. Patient Active Problem List   Diagnosis Code    Bilateral leg edema R60.0    Hypoglycemia E16.2    History of gastric bypass Z98.84    Migraine headache with aura G43. 109    Bipolar disorder with current episode depressed ([facility] F31.30    Gastroesophageal reflux disease without esophagitis K21.9    SSS (sick sinus syndrome) ([facility]) I49.5    Nesidioblastosis D13.7    Peripheral vascular disease ([facility]) I73.9    Weight gain status post gastric bypass R63.5, Z98.84    Moderate asthma J45.909    Fibromyalgia affecting lower leg M79.7    BMI 32.0-32.9,adult Z68.32    Type 2 diabetes mellitus E11.9     Social History     Socioeconomic History    Marital status:      Spouse name: Not on file    Number of children: 2    Years of education: Not on file    Highest education level: Not on file   Occupational History    Occupation: retired   Tobacco Use    Smoking status: Never Smoker    Smokeless tobacco: Never Used   Vaping Use    Vaping Use: Never used   Substance and Sexual Activity  Alcohol use: No    Drug use: No    Sexual activity: Never   Other Topics Concern    Not on file   Social History Narrative     has brain damage, lives with him     Social Determinants of Health     Financial Resource Strain:     Difficulty of Paying Living Expenses: Not on file   Food Insecurity:     Worried About Running Out of Food in the Last Year: Not on file    Debora of Food in the Last Year: Not on file   Transportation Needs:     Lack of Transportation (Medical): Not on file    Lack of Transportation (Non-Medical): Not on file   Physical Activity:     Days of Exercise per Week: Not on file    Minutes of Exercise per Session: Not on file   Stress:     Feeling of Stress : Not on file   Social Connections:     Frequency of Communication with Friends and Family: Not on file    Frequency of Social Gatherings with Friends and Family: Not on file    Attends Temple Services: Not on file    Active Member of [address] [place] [address] or Organizations: Not on file    Attends Club or Organization Meetings: Not on file    Marital Status: Not on file   Intimate Partner Violence:     Fear of Current or Ex-Partner: Not on file    Emotionally Abused: Not on file    Physically Abused: Not on file    Sexually Abused: Not on file   Housing Stability:     Unable to Pay for Housing in the Last Year: Not on file    Number of [place] in the Last Year: Not on file    Unstable Housing in the Last Year: Not on file       Physical Exam  Visit Vitals  /89 (BP 1 Location: Left arm, BP Patient Position: Sitting, BP Cuff Size: Adult)   Pulse 76   Temp 97.8 °F (36.6 °C) (Temporal)   Resp 16   Ht 4' 9\" (1.448 m)   Wt 152 lb (68.9 kg)   SpO2 97%   BMI 32.89 kg/m²     Well developed well nourished no acute distress. Pupils equal round react to light, extraocular muscles intact. Cranial nerves II through XII are intact.   Neck unremarkable  Heart regular rate and rhythm without clicks murmurs rubs  Lungs are clear to auscultation  Abdomen soft. Extremities, motor 5 out of 5 bilaterally, reflexes 2+ equal bilaterally. Sl edema  Tender to palp  ASSESSMENT and PLAN  Encounter Diagnoses   Name Primary?  Pain of lower leg, unspecified laterality Yes    Blurred vision     Intractable migraine with aura with status migrainosus     Fibromyalgia affecting lower leg     Migraine with vertigo     Sciatica, unspecified laterality     History of gastric bypass     Iron deficiency anemia due to chronic blood loss     Vitamin B 12 deficiency     Vitamin D deficiency     Hypercalcemia     Folate deficiency     Hyperthyroidism      Orders Placed This Encounter    METABOLIC PANEL, COMPREHENSIVE    CBC W/O DIFF    RA + CCP ABS    FERRITIN    TSH 3RD GENERATION    FOLATE    ZINC    COPPER    MAGNESIUM    VITAMIN B12    PTH INTACT    VITAMIN D, 25 HYDROXY    REFERRAL TO OPHTHALMOLOGY    gabapentin (NEURONTIN) 100 mg capsule    meclizine (Travel Sickness, meclizine,) 25 mg chewable tablet    polyethylene glycol (MIRALAX) 17 gram packet     Work-up as above  Trial of gabapentin for suspected neuropathy versus fibromyalgia versus PAD. Discussed possible side affects, precautions, and drug interactions and possible benefits of the medication(s). Follow-up and Dispositions    · Return in about 4 weeks (around 4/18/2022) for routine follow up.

## 2022-03-21 NOTE — TELEPHONE ENCOUNTER
Asia Pritchett MD, from below, appt with you 3/21/22  - Chart reviewed d/t insurer-identified gap: diabetes rx claims and no statin rx claim - appears patient does NOT have diabetes; per chart review, acarbose prescribed d/t reactive hypoglycemia post gastric bypass.     If appropriate, please consider using the following CMS allowable diagnosis within visit encounter:   Other abnormal glucose (R73.09)     - This will complete/close this insurer-identified gap for this calendar year    Thank you,  Vilma Zepeda, PharmD, [address] [facility], toll free: [phone], option 2    ==============================================================  [facility]: STATIN THERAPY REVIEW  Identified statin use in persons with diabetes care gap per [address] GAP IDENTIFIED    Lab Results   Component Value Date/Time    Cholesterol, total 175 12/21/2021 11:58 AM    HDL Cholesterol 54 12/21/2021 11:58 AM    LDL, calculated 112 (H) 12/21/2021 11:58 AM    LDL, calculated 125 (H) 02/04/2020 09:07 AM    VLDL, calculated 9 12/21/2021 11:58 AM    VLDL, calculated 11 02/04/2020 09:07 AM    Triglyceride 46 12/21/2021 11:58 AM    CHOL/HDL Ratio 2.4 10/14/2014 03:00 AM     ALT (SGPT)   Date Value Ref Range Status   10/29/2020 9 0 - 32 IU/L Final     AST (SGOT)   Date Value Ref Range Status   10/29/2020 13 0 - 40 IU/L Final     The 10-year ASCVD risk score (Eleno Tristan et al., 2013) is: 5.7%    Values used to calculate the score:      Age: 62 years      Sex: Female      Is Non- : Yes      Diabetic: No      Tobacco smoker: No      Systolic Blood Pressure: 293 mmHg      Is BP treated: No      HDL Cholesterol: 54 mg/dL      Total Cholesterol: 175 mg/dL     Per chart review:  - Per 5/13/19 endo OV note, acarbose started: Reactive hypoglycemia/nesidioblastosis/post gastric bypass hypoglycemia  - No DM diagnosis    PLAN  - Continue to monitor   - Consider CMS allowable diagnosis code to close payor-identified statin gap    Future Appointments   Date Time Provider Iris Teressa   3/21/2022 10:15 AM Isael Sanabria MD TP BS AMB   3/23/2022  2:40 PM Alirio Sellers MD NEU BS AMB   3/25/2022  1:30 PM ECHO, RCAM RCAMB BS AMB   4/18/2022 10:00 AM Isael Sanabria MD TP BS AMB   9/6/2022 10:15 AM PACEMAKER, RCAM RCAMB BS AMB

## 2022-03-22 PROBLEM — R73.09 OTHER ABNORMAL GLUCOSE: Status: ACTIVE | Noted: 2022-03-22

## 2022-03-22 NOTE — TELEPHONE ENCOUNTER
Noted other abnormal glucose dx with yesterday visit, thank you for reviewing!    =========================================================   For Pharmacy Admin Tracking Only     CPA in place: No   Recommendation Provided To: Provider: 1 via Note to Provider    Gap Closed?: Yes   Intervention Accepted By: Provider: 1   Time Spent (min): 15

## 2022-03-23 ENCOUNTER — OFFICE VISIT (OUTPATIENT)
Dept: NEUROLOGY | Age: 62
End: 2022-03-23
Payer: MEDICARE

## 2022-03-23 VITALS
HEIGHT: 57 IN | BODY MASS INDEX: 35.94 KG/M2 | WEIGHT: 166.6 LBS | DIASTOLIC BLOOD PRESSURE: 66 MMHG | OXYGEN SATURATION: 97 % | HEART RATE: 78 BPM | TEMPERATURE: 97.6 F | SYSTOLIC BLOOD PRESSURE: 111 MMHG

## 2022-03-23 DIAGNOSIS — G62.9 NEUROPATHY: ICD-10-CM

## 2022-03-23 DIAGNOSIS — E55.9 VITAMIN D DEFICIENCY, UNSPECIFIED: ICD-10-CM

## 2022-03-23 DIAGNOSIS — G44.221 CHRONIC TENSION-TYPE HEADACHE, INTRACTABLE: ICD-10-CM

## 2022-03-23 DIAGNOSIS — D51.3 OTHER DIETARY VITAMIN B12 DEFICIENCY ANEMIA: ICD-10-CM

## 2022-03-23 DIAGNOSIS — G43.019 INTRACTABLE MIGRAINE WITHOUT AURA AND WITHOUT STATUS MIGRAINOSUS: ICD-10-CM

## 2022-03-23 DIAGNOSIS — G43.109 MIGRAINE WITH VERTIGO: ICD-10-CM

## 2022-03-23 DIAGNOSIS — G93.9 BRAIN STEM LESION: ICD-10-CM

## 2022-03-23 DIAGNOSIS — R42 DIZZINESS: ICD-10-CM

## 2022-03-23 DIAGNOSIS — G43.719 CHRONIC MIGRAINE WITHOUT AURA, INTRACTABLE, WITHOUT STATUS MIGRAINOSUS: Primary | ICD-10-CM

## 2022-03-23 PROBLEM — E11.9 TYPE 2 DIABETES MELLITUS (HCC): Status: ACTIVE | Noted: 2022-03-23

## 2022-03-23 PROCEDURE — 99215 OFFICE O/P EST HI 40 MIN: CPT | Performed by: PSYCHIATRY & NEUROLOGY

## 2022-03-23 PROCEDURE — 3017F COLORECTAL CA SCREEN DOC REV: CPT | Performed by: PSYCHIATRY & NEUROLOGY

## 2022-03-23 PROCEDURE — G8417 CALC BMI ABV UP PARAM F/U: HCPCS | Performed by: PSYCHIATRY & NEUROLOGY

## 2022-03-23 PROCEDURE — G9717 DOC PT DX DEP/BP F/U NT REQ: HCPCS | Performed by: PSYCHIATRY & NEUROLOGY

## 2022-03-23 PROCEDURE — G8427 DOCREV CUR MEDS BY ELIG CLIN: HCPCS | Performed by: PSYCHIATRY & NEUROLOGY

## 2022-03-23 RX ORDER — GALCANEZUMAB 120 MG/ML
120 INJECTION, SOLUTION SUBCUTANEOUS
Qty: 1 EACH | Refills: 3 | Status: SHIPPED | OUTPATIENT
Start: 2022-03-23 | End: 2022-07-04 | Stop reason: SDUPTHER

## 2022-03-23 RX ORDER — RIZATRIPTAN BENZOATE 10 MG/1
TABLET, ORALLY DISINTEGRATING ORAL
Qty: 9 TABLET | Refills: 2 | Status: SHIPPED | OUTPATIENT
Start: 2022-03-23 | End: 2022-07-04 | Stop reason: SDUPTHER

## 2022-03-23 RX ORDER — GALCANEZUMAB 120 MG/ML
120 INJECTION, SOLUTION SUBCUTANEOUS ONCE
Qty: 1 EACH | Refills: 0 | Status: SHIPPED | COMMUNITY
Start: 2022-03-23 | End: 2022-03-23

## 2022-03-24 PROBLEM — R73.09 OTHER ABNORMAL GLUCOSE: Status: ACTIVE | Noted: 2022-03-22

## 2022-03-24 PROBLEM — E11.9 TYPE 2 DIABETES MELLITUS (HCC): Status: ACTIVE | Noted: 2022-03-21

## 2022-03-24 PROBLEM — E11.9 TYPE 2 DIABETES MELLITUS (HCC): Status: RESOLVED | Noted: 2022-03-21 | Resolved: 2022-03-21

## 2022-03-24 PROBLEM — E11.9 TYPE 2 DIABETES MELLITUS (HCC): Status: ACTIVE | Noted: 2022-03-23

## 2022-03-24 LAB — RHEUMATOID FACT SERPL-ACNC: <10 IU/ML (ref 0–15)

## 2022-03-30 ENCOUNTER — TELEPHONE (OUTPATIENT)
Dept: NEUROLOGY | Age: 62
End: 2022-03-30

## 2022-04-02 LAB
25(OH)D3+25(OH)D2 SERPL-MCNC: 28.9 NG/ML (ref 30–100)
ALBUMIN SERPL-MCNC: 3.7 G/DL (ref 3.8–4.9)
ALBUMIN/GLOB SERPL: 1.9 {RATIO} (ref 1.2–2.2)
ALP SERPL-CCNC: 107 IU/L (ref 44–121)
ALT SERPL-CCNC: 11 IU/L (ref 0–32)
AST SERPL-CCNC: 19 IU/L (ref 0–40)
BILIRUB SERPL-MCNC: 0.2 MG/DL (ref 0–1.2)
BUN SERPL-MCNC: 23 MG/DL (ref 8–27)
BUN/CREAT SERPL: 28 (ref 12–28)
CALCIUM SERPL-MCNC: 8.5 MG/DL (ref 8.7–10.3)
CCP IGA+IGG SERPL IA-ACNC: 5 UNITS (ref 0–19)
CHLORIDE SERPL-SCNC: 109 MMOL/L (ref 96–106)
CO2 SERPL-SCNC: 19 MMOL/L (ref 20–29)
CREAT SERPL-MCNC: 0.82 MG/DL (ref 0.57–1)
EGFR: 82 ML/MIN/1.73
ERYTHROCYTE [DISTWIDTH] IN BLOOD BY AUTOMATED COUNT: 11.7 % (ref 11.7–15.4)
FERRITIN SERPL-MCNC: 20 NG/ML (ref 15–150)
FOLATE SERPL-MCNC: 16.1 NG/ML
GLOBULIN SER CALC-MCNC: 1.9 G/DL (ref 1.5–4.5)
GLUCOSE SERPL-MCNC: 70 MG/DL (ref 65–99)
HCT VFR BLD AUTO: 39.5 % (ref 34–46.6)
HGB BLD-MCNC: 13 G/DL (ref 11.1–15.9)
MAGNESIUM SERPL-MCNC: 2.4 MG/DL (ref 1.6–2.3)
MCH RBC QN AUTO: 30.5 PG (ref 26.6–33)
MCHC RBC AUTO-ENTMCNC: 32.9 G/DL (ref 31.5–35.7)
MCV RBC AUTO: 93 FL (ref 79–97)
PLATELET # BLD AUTO: 325 X10E3/UL (ref 150–450)
POTASSIUM SERPL-SCNC: 4.8 MMOL/L (ref 3.5–5.2)
PROT SERPL-MCNC: 5.6 G/DL (ref 6–8.5)
PTH-INTACT SERPL-MCNC: 39 PG/ML (ref 15–65)
RBC # BLD AUTO: 4.26 X10E6/UL (ref 3.77–5.28)
RHEUMATOID FACT SERPL-ACNC: <10 IU/ML (ref 0–15)
SODIUM SERPL-SCNC: 143 MMOL/L (ref 134–144)
TSH SERPL DL<=0.005 MIU/L-ACNC: 3.66 UIU/ML (ref 0.45–4.5)
VIT B12 SERPL-MCNC: 1570 PG/ML (ref 232–1245)
WBC # BLD AUTO: 4.8 X10E3/UL (ref 3.4–10.8)
ZINC SERPL-MCNC: 67 UG/DL (ref 44–115)

## 2022-04-05 NOTE — PROGRESS NOTES
Send normal/stable results letter. Your results are normal/stable. If not signed up, consider getting my chart to get your results on-line. We can help you to sign up. Calcium and vitamin D are hand below, please make sure you are taking a calcium with vitamin D tablet twice a day. Drinking more milk will help as well.

## 2022-04-07 ENCOUNTER — VIRTUAL VISIT (OUTPATIENT)
Dept: INTERNAL MEDICINE CLINIC | Age: 62
End: 2022-04-07
Payer: MEDICARE

## 2022-04-07 DIAGNOSIS — J45.901 MODERATE ASTHMA WITH ACUTE EXACERBATION, UNSPECIFIED WHETHER PERSISTENT: ICD-10-CM

## 2022-04-07 DIAGNOSIS — R05.9 COUGHING: Primary | ICD-10-CM

## 2022-04-07 PROCEDURE — 99213 OFFICE O/P EST LOW 20 MIN: CPT | Performed by: FAMILY MEDICINE

## 2022-04-07 PROCEDURE — 3017F COLORECTAL CA SCREEN DOC REV: CPT | Performed by: FAMILY MEDICINE

## 2022-04-07 PROCEDURE — G8417 CALC BMI ABV UP PARAM F/U: HCPCS | Performed by: FAMILY MEDICINE

## 2022-04-07 PROCEDURE — G9717 DOC PT DX DEP/BP F/U NT REQ: HCPCS | Performed by: FAMILY MEDICINE

## 2022-04-07 PROCEDURE — G8427 DOCREV CUR MEDS BY ELIG CLIN: HCPCS | Performed by: FAMILY MEDICINE

## 2022-04-07 RX ORDER — AZITHROMYCIN 250 MG/1
250 TABLET, FILM COATED ORAL SEE ADMIN INSTRUCTIONS
Qty: 6 TABLET | Refills: 0 | Status: SHIPPED | OUTPATIENT
Start: 2022-04-07 | End: 2022-04-26

## 2022-04-07 NOTE — PROGRESS NOTES
Chief Complaint   Patient presents with    Sinus Pain     Patient is aware that this is a Virtual Visit or Phone Call Only doctor's visit. Patient has not been out of the country in (14 months), NO diarrhea, NO cough, NO chest conjestion, NO temp. Pt has not been around anyone with these symptoms. Health Maintenance reviewed. I have reviewed the patient's medical history in detail and updated the computerized patient record. 1. Have you been to the ER, urgent care clinic since your last visit? no Hospitalized since your last visit?  no    2. Have you seen or consulted any other health care providers outside of the [address] [place] [address] since your last visit? No  Include any pap smears or colon screening. Encouraged pt to discuss pt's wishes with spouse/partner/family and bring them in the next appt to follow thru with the Advanced Directive      Fall Risk Assessment, last 12 mths 1/25/2021   Able to walk? Yes   Fall in past 12 months? 0   Do you feel unsteady? 0   Are you worried about falling 0   Number of falls in past 12 months -   Fall with injury? -       3 most recent PHQ Screens 3/23/2022   Little interest or pleasure in doing things Not at all   Feeling down, depressed, irritable, or hopeless Not at all   Total Score PHQ 2 0       Abuse Screening Questionnaire 3/3/2022   Do you ever feel afraid of your partner? N   Are you in a relationship with someone who physically or mentally threatens you? N   Is it safe for you to go home?  Y       ADL Assessment 11/9/2021   Feeding yourself No Help Needed   Getting from bed to chair No Help Needed   Getting dressed No Help Needed   Bathing or showering No Help Needed   Walk across the room (includes cane/walker) No Help Needed   Using the telphone No Help Needed   Taking your medications No Help Needed   Preparing meals No Help Needed   Managing money (expenses/bills) No Help Needed   Moderately strenuous housework (laundry) Help Needed Shopping for personal items (toiletries/medicines) Help Needed   Shopping for groceries No Help Needed   Driving Help Needed   Climbing a flight of stairs Help Needed   Getting to places beyond walking distances Help Needed

## 2022-04-07 NOTE — PROGRESS NOTES
Fiona Gonzalez is a 62 y.o. female who was phone evaluated on 4/7/2022. Consent:  She and/or her healthcare decision maker is aware that this patient-initiated Telehealth encounter is a billable service, with coverage as determined by her insurance carrier. She is aware that she may receive a bill and has provided verbal consent to proceed: Yes    I was at home while conducting this encounter. Assessment & Plan:   Diagnoses and all orders for this visit:    1. Coughing    15 min      Follow-up and Dispositions    · Return in about 4 weeks (around 5/5/2022). 712  Subjective: We discussed the expected course, resolution and complications of the diagnosis(es) in detail. Medication risks, benefits, costs, interactions, and alternatives were discussed as indicated. I advised her to contact the office if her condition worsens, changes or fails to improve as anticipated. She expressed understanding with the diagnosis(es) and plan. Pursuant to the emergency declaration under the [address] [place] [address], [facility] waiver authority and the [employer] Act, this Virtual  Visit was conducted, with patient's consent, to reduce the patient's risk of exposure to COVID-19 and provide continuity of care for an established patient. Services were provided through a video synchronous discussion virtually to substitute for in-person clinic visit. Valentin Pérez MD          Subjective:   Fiona Gonzalez is a 62 y.o. female who complains of congestion, post nasal drip and cough described as dry for 4 days, unchanged since that time. She denies a history of fevers and wheezing. Evaluation to date: none. Treatment to date: 3/3 covid vaccines, OTC products. Patient does not smoke cigarettes. Relevant PMH: Asthma.   Allergies   Allergen Reactions    Benadryl [Diphenhydramine Hcl] Nausea and Vomiting    Ibuprofen Nausea and Vomiting    Sulfamethoxazole-Trimethoprim Nausea Only    Tramadol Anxiety    Diclofenac Itching    Sulfamethoxazole Nausea Only    Zinc Acetate Other (comments)         Patient Active Problem List    Diagnosis Date Noted    Type 2 diabetes mellitus 03/23/2022    Other abnormal glucose 03/22/2022    BMI 32.0-32.9,adult 02/18/2022    Hyperglycemia due to type 2 diabetes mellitus ([facility] 12/31/2021    Fibromyalgia affecting lower leg 07/12/2021    Moderate asthma 10/18/2020    Peripheral vascular disease ([facility] 10/04/2019    Weight gain status post gastric bypass 10/04/2019    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 1 Each 3    rizatriptan (Maxalt-MLT) 10 mg disintegrating tablet 1 tab at onset of migraine; repeat 1 tab in 2 hours if HA remains; Limit: 2 tabs in 24 hours, max 3 days/ week. 30 Day Rx 9 Tablet 2    gabapentin (NEURONTIN) 100 mg capsule Take 1 Capsule by mouth three (3) times daily. Max Daily Amount: 300 mg. 90 Capsule 0    meclizine (Travel Sickness, meclizine,) 25 mg chewable tablet CHEW AND SWALLOW 1 TABLET BY MOUTH THREE TIMES DAILY AS NEEDED FOR UP TO 10 DAYS FOR DIZZINESS 60 Tablet 1    polyethylene glycol (MIRALAX) 17 gram packet Take 1 Packet by mouth daily. 50 Packet 5    furosemide (LASIX) 20 mg tablet TAKE 1 TABLET BY MOUTH NIGHTLY AS NEEDED 60 Tablet 5    ARIPiprazole (ABILIFY) 2 mg tablet Take 1 Tablet by mouth daily.  traZODone (DESYREL) 50 mg tablet Take 2 Tablets by mouth nightly.  60 Tablet 5    tiZANidine (ZANAFLEX) 4 mg tablet Take 1 Tablet by mouth two (2) times a day. 60 Tablet 5    topiramate (TOPAMAX) 50 mg tablet TAKE ONE TABLET BY MOUTH TWICE A DAY WITH MEALS 180 Tablet 3    potassium chloride SR (KLOR-CON 10) 10 mEq tablet TAKE 1 TABLET BY MOUTH DAILY, IF TAKES LASIX (FUROSEMIDE) 90 Tablet 0    lidocaine (XYLOCAINE) 2 % solution Take 15 mL by mouth as needed for Pain. 1 Each 5    cholecalciferol (VITAMIN D3) (1000 Units /25 mcg) tablet TAKE 1 TABLET BY MOUTH DAILY FOR VITAMIN D DEFICIENCY 180 Tablet 1    cyanocobalamin ER 1,000 mcg tablet TAKE 1 TABLET BY MOUTH DAILY FOR VITAMIN B12 DEFICIENCY 90 Tablet 5    acarbose (PRECOSE) 50 mg tablet TAKE 1 TABLET BY MOUTH THREE TIMES A DAY 30 MINUTES BEFORE MEALS 90 Tab 4    butalbital-acetaminophen-caffeine (FIORICET, ESGIC) -40 mg per tablet TAKE 1 TABLET BY MOUTH EVERY SIX HOURS AS NEEDED FOR HEADACHE. 30 Tab 5    albuterol (PROVENTIL HFA, VENTOLIN HFA, PROAIR HFA) 90 mcg/actuation inhaler Take 1 Puff by inhalation every six (6) hours as needed for Wheezing. 1 Inhaler 5    albuterol (PROVENTIL VENTOLIN) 2.5 mg /3 mL (0.083 %) nebu 3 mL by Nebulization route every four (4) hours as needed for Wheezing. 100 Each 1    sertraline (ZOLOFT) 100 mg tablet Take 1 Tab by mouth daily. 30 Tab 5    omeprazole (PRILOSEC) 40 mg capsule TAKE 1 CAPSULE BY MOUTH DAILY. 90 Cap 2    nystatin (MYCOSTATIN) topical cream Apply  to affected area two (2) times a day. 15 g 0    aspirin 81 mg chewable tablet CHEW AND SWALLOW 1 TAB BY MOUTH DAILY. 90 Tab 3    fluticasone (FLONASE) 50 mcg/actuation nasal spray 2 Sprays by Both Nostrils route daily.  1 Bottle 5    Nebulizer & Compressor machine Use as directed 1 Each 0     Allergies   Allergen Reactions    Benadryl [Diphenhydramine Hcl] Nausea and Vomiting    Ibuprofen Nausea and Vomiting    Sulfamethoxazole-Trimethoprim Nausea Only    Tramadol Anxiety    Diclofenac Itching    Sulfamethoxazole Nausea Only    Zinc Acetate Other (comments)     Social History     Tobacco Use    Smoking status: Never Smoker    Smokeless tobacco: Never Used   Substance Use Topics    Alcohol use: No        Review of Systems  Pertinent items are noted in HPI. Objective: There were no vitals taken for this visit. General:     Eyes:    Ears:    Sinuses:    Mouth:     Neck:    Heart:    Lungs:    Abdomen:       Assessment/Plan:   sinusitis  Antibiotics per orders. RTC prn. Discussed diagnosis and treatment of viral URIs. Discussed the importance of avoiding unnecessary antibiotic therapy. Encounter Diagnoses   Name Primary?  Coughing Yes    Moderate asthma with acute exacerbation, unspecified whether persistent      Orders Placed This Encounter    NOVEL CORONAVIRUS (COVID-19) ([facility] Default)    azithromycin (ZITHROMAX) 250 mg tablet   . ICD-10-CM ICD-9-CM    1. Coughing  R05.9 786.2 NOVEL CORONAVIRUS (COVID-19)   2. Moderate asthma with acute exacerbation, unspecified whether persistent  J45.901 493.92      Orders Placed This Encounter    NOVEL CORONAVIRUS (COVID-19) ([facility] Default)     Scheduling Instructions:      1) Due to current limited availability of the COVID-19 PCR test, tests will be prioritized and may not be completed.              2) Order only if the test result will change clinical management or necessary for a return to mission-critical employment decision.              3) Print and instruct patient to adhere to CDC home isolation program. (Link Above)              4) Set up or refer patient for a monitoring program.              5) Have patient sign up for and leverage MyChart (if not previously done). Order Specific Question:   Is this test for diagnosis or screening? Answer:   Diagnosis of ill patient     Order Specific Question:   Symptomatic for COVID-19 as defined by CDC?      Answer:   Yes     Order Specific Question:   Date of Symptom Onset     Answer:   4/3/2022     Order Specific Question: Hospitalized for COVID-19? Answer:   No     Order Specific Question:   Admitted to ICU for COVID-19? Answer:   No     Order Specific Question:   Employed in healthcare setting? Answer:   No     Order Specific Question:   Resident in a congregate (group) care setting? Answer:   No     Order Specific Question:   Pregnant? Answer:   No     Order Specific Question:   Previously tested for COVID-19? Answer:   Yes     Follow-up and Dispositions    · Return in about 4 weeks (around 5/5/2022).

## 2022-04-09 ENCOUNTER — TELEPHONE (OUTPATIENT)
Dept: INTERNAL MEDICINE CLINIC | Age: 62
End: 2022-04-09

## 2022-04-09 LAB
SARS-COV-2, NAA 2 DAY TAT: NORMAL
SARS-COV-2, NAA: NOT DETECTED

## 2022-04-11 PROBLEM — E11.65 HYPERGLYCEMIA DUE TO TYPE 2 DIABETES MELLITUS (HCC): Status: ACTIVE | Noted: 2021-12-31

## 2022-04-13 ENCOUNTER — TELEPHONE (OUTPATIENT)
Dept: NEUROLOGY | Age: 62
End: 2022-04-13

## 2022-04-14 NOTE — TELEPHONE ENCOUNTER
Working on the prior authorization now  [facility]: [id number] - PA Case ID: [id number] - Rx #: [id number] help?  Call us at [phone]  Status  Sent to [employer]  Drug  Emgality 120MG/ML auto-injectors (migraine)  Form  [employer] Electronic PA Form  Lisa Darling Info  [phone]

## 2022-04-18 ENCOUNTER — TELEPHONE (OUTPATIENT)
Dept: NEUROLOGY | Age: 62
End: 2022-04-18

## 2022-04-19 ENCOUNTER — TELEPHONE (OUTPATIENT)
Dept: NEUROLOGY | Age: 62
End: 2022-04-19

## 2022-04-19 NOTE — TELEPHONE ENCOUNTER
Pt is hard to understand. Wants us to call [address]. States [facility] has been trying to get a hold of us for a month for her shot. Could not get the patient to clear up if [facility] needed the PA or a new script. Pt wants us to call them to find out what is needed.

## 2022-04-24 NOTE — TELEPHONE ENCOUNTER
Emgality has been approved. I tried to call the pharmacy but no answer.   This should be done for the patient

## 2022-04-25 ENCOUNTER — DOCUMENTATION ONLY (OUTPATIENT)
Dept: INTERNAL MEDICINE CLINIC | Age: 62
End: 2022-04-25

## 2022-04-25 NOTE — PROGRESS NOTES
Faxed to Jessi Rao at every age to the [address] [place] [address] [phone] with confirmation pt's documentation applying for meals on wheels.

## 2022-04-26 ENCOUNTER — OFFICE VISIT (OUTPATIENT)
Dept: INTERNAL MEDICINE CLINIC | Age: 62
End: 2022-04-26
Payer: MEDICARE

## 2022-04-26 VITALS
HEIGHT: 57 IN | DIASTOLIC BLOOD PRESSURE: 76 MMHG | OXYGEN SATURATION: 97 % | TEMPERATURE: 98.7 F | WEIGHT: 164 LBS | HEART RATE: 77 BPM | BODY MASS INDEX: 35.38 KG/M2 | RESPIRATION RATE: 18 BRPM | SYSTOLIC BLOOD PRESSURE: 139 MMHG

## 2022-04-26 DIAGNOSIS — F32.1 MODERATE MAJOR DEPRESSION (HCC): ICD-10-CM

## 2022-04-26 DIAGNOSIS — E16.2 HYPOGLYCEMIA: ICD-10-CM

## 2022-04-26 DIAGNOSIS — K21.9 GASTROESOPHAGEAL REFLUX DISEASE, UNSPECIFIED WHETHER ESOPHAGITIS PRESENT: ICD-10-CM

## 2022-04-26 DIAGNOSIS — M79.669 PAIN OF LOWER LEG, UNSPECIFIED LATERALITY: ICD-10-CM

## 2022-04-26 DIAGNOSIS — J02.9 PHARYNGITIS, UNSPECIFIED ETIOLOGY: Primary | ICD-10-CM

## 2022-04-26 DIAGNOSIS — M54.30 SCIATICA, UNSPECIFIED LATERALITY: ICD-10-CM

## 2022-04-26 DIAGNOSIS — J30.1 SEASONAL ALLERGIC RHINITIS DUE TO POLLEN: ICD-10-CM

## 2022-04-26 DIAGNOSIS — M79.7 FIBROMYALGIA AFFECTING LOWER LEG: ICD-10-CM

## 2022-04-26 PROCEDURE — 3017F COLORECTAL CA SCREEN DOC REV: CPT | Performed by: FAMILY MEDICINE

## 2022-04-26 PROCEDURE — 99214 OFFICE O/P EST MOD 30 MIN: CPT | Performed by: FAMILY MEDICINE

## 2022-04-26 PROCEDURE — G8427 DOCREV CUR MEDS BY ELIG CLIN: HCPCS | Performed by: FAMILY MEDICINE

## 2022-04-26 PROCEDURE — G9717 DOC PT DX DEP/BP F/U NT REQ: HCPCS | Performed by: FAMILY MEDICINE

## 2022-04-26 PROCEDURE — G8417 CALC BMI ABV UP PARAM F/U: HCPCS | Performed by: FAMILY MEDICINE

## 2022-04-26 RX ORDER — ACARBOSE 50 MG/1
TABLET ORAL
Qty: 90 TABLET | Refills: 4 | Status: SHIPPED | OUTPATIENT
Start: 2022-04-26

## 2022-04-26 RX ORDER — GABAPENTIN 100 MG/1
100 CAPSULE ORAL 3 TIMES DAILY
Qty: 90 CAPSULE | Refills: 1 | Status: SHIPPED | OUTPATIENT
Start: 2022-04-26 | End: 2022-06-04

## 2022-04-26 RX ORDER — HYDROXYZINE HYDROCHLORIDE 10 MG/5ML
10 SYRUP ORAL
Qty: 150 ML | Refills: 0 | Status: SHIPPED | OUTPATIENT
Start: 2022-04-26 | End: 2022-05-03

## 2022-04-26 RX ORDER — OMEPRAZOLE 40 MG/1
40 CAPSULE, DELAYED RELEASE ORAL DAILY
Qty: 90 CAPSULE | Refills: 1 | Status: SHIPPED | OUTPATIENT
Start: 2022-04-26

## 2022-04-26 NOTE — PROGRESS NOTES
Chief Complaint   Patient presents with    Sore Throat     sore throat, cough, wheezing at night and nose running     Patient has not been out of the country in (14 months), NO diarrhea, NO cough, NO chest conjestion, NO temp. Pt has not been around anyone with these symptoms. Health Maintenance reviewed. I have reviewed the patient's medical history in detail and updated the computerized patient record. 1. Have you been to the ER, urgent care clinic since your last visit? no  Hospitalized since your last visit? no     2. Have you seen or consulted any other health care providers outside of the [address] [place] [address] since your last visit? No  Include any pap smears or colon screening. Encouraged pt to discuss pt's wishes with spouse/partner/family and bring them in the next appt to follow thru with the Advanced Directive    @  [address], last 12 mths 1/25/2021   Able to walk? Yes   Fall in past 12 months? 0   Do you feel unsteady? 0   Are you worried about falling 0   Number of falls in past 12 months -   Fall with injury? -       3 most recent PHQ Screens 3/23/2022   Little interest or pleasure in doing things Not at all   Feeling down, depressed, irritable, or hopeless Not at all   Total Score PHQ 2 0       Abuse Screening Questionnaire 3/3/2022   Do you ever feel afraid of your partner? N   Are you in a relationship with someone who physically or mentally threatens you? N   Is it safe for you to go home?  Y       ADL Assessment 11/9/2021   Feeding yourself No Help Needed   Getting from bed to chair No Help Needed   Getting dressed No Help Needed   Bathing or showering No Help Needed   Walk across the room (includes cane/walker) No Help Needed   Using the telphone No Help Needed   Taking your medications No Help Needed   Preparing meals No Help Needed   Managing money (expenses/bills) No Help Needed   Moderately strenuous housework (laundry) Help Needed   Shopping for personal items (toiletries/medicines) Help Needed   Shopping for groceries No Help Needed   Driving Help Needed   Climbing a flight of stairs Help Needed   Getting to places beyond walking distances Help Needed       COVID-19 test was taken, pt did not have any difficulties swabbing themselves. Pt tolerated well and all protective standard protocols were taken.

## 2022-04-26 NOTE — PROGRESS NOTES
Subjective:   Dede Chiu is a 62 y.o. female who complains of sneezing and sore throat for 1 days, stable since that time. She denies a history of fevers and cough. Needs refills of her medicines including gabapentin which is working reasonably well for her pain. Mood okay sees psychiatry. Evaluation to date: none. Treatment to date: none  Patient does not smoke cigarettes.   Relevant PMH: DM.  Allergies   Allergen Reactions    Benadryl [Diphenhydramine Hcl] Nausea and Vomiting    Ibuprofen Nausea and Vomiting    Sulfamethoxazole-Trimethoprim Nausea Only    Tramadol Anxiety    Diclofenac Itching    Sulfamethoxazole Nausea Only    Zinc Acetate Other (comments)         Patient Active Problem List    Diagnosis Date Noted    Type 2 diabetes mellitus 03/23/2022    Other abnormal glucose 03/22/2022    BMI 32.0-32.9,adult 02/18/2022    Hyperglycemia due to type 2 diabetes mellitus ([address] 12/31/2021    Fibromyalgia affecting lower leg 07/12/2021    Moderate asthma 10/18/2020    Peripheral vascular disease ([address] 10/04/2019    Weight gain status post gastric bypass 10/04/2019    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    SSS (sick sinus syndrome) ([address] 12/06/2018    Gastroesophageal reflux disease without esophagitis 08/24/2016    Bipolar disorder with current episode depressed ([address] 02/01/2016    Migraine headache with aura 10/24/2014    Hypoglycemia 10/12/2014    History of gastric bypass 10/12/2014     Allergies   Allergen Reactions    Benadryl [Diphenhydramine Hcl] Nausea and Vomiting    Ibuprofen Nausea and Vomiting    Sulfamethoxazole-Trimethoprim Nausea Only    Tramadol Anxiety    Diclofenac Itching    Sulfamethoxazole Nausea Only    Zinc Acetate Other (comments)     Past Medical History:   Diagnosis Date    Asthma     Chest pain     Depression     Diabetes ([address]     Diarrhea     Essential hypertension     Fainting     Fatigue     Hearing loss     Hypoglycemia 2014    Hypotension 2014    Leg swelling     Memory disorder     Murmur     Nausea and vomiting     Pacemaker     Ringing in ears     SOB (shortness of breath)     Stomach pain     Swallowing difficulty     Syncope and collapse 7/24/15    RTH    Unintentional weight change      Social History     Tobacco Use    Smoking status: Never Smoker    Smokeless tobacco: Never Used   Substance Use Topics    Alcohol use: No        Review of Systems  Pertinent items are noted in HPI. Objective:     Visit Vitals  /76 (BP 1 Location: Left arm, BP Patient Position: Sitting, BP Cuff Size: Adult)   Pulse 77   Temp 98.7 °F (37.1 °C) (Temporal)   Resp 18   Ht 4' 9\" (1.448 m)   Wt 164 lb (74.4 kg)   SpO2 97%   BMI 35.49 kg/m²     WD WN female NAD  Throat unremarkable  Heart RRR without murmers clicks or rubs  Lungs CTA  Abdo soft nontender  Ext no edema    Assessment/Plan:   viral pharyngitis and allergic rhinitis  Need to rule out COVID    Discussed the importance of avoiding unnecessary abx therapy. Suspect allergic cause of her symptoms  Medications refilled  Discussed possible side affects, precautions, and drug interactions and possible benefits of the medication(s). Encounter Diagnoses   Name Primary?  Pharyngitis, unspecified etiology Yes    Seasonal allergic rhinitis due to pollen     Pain of lower leg, unspecified laterality     Fibromyalgia affecting lower leg     Sciatica, unspecified laterality     Moderate major depression (HCC)     Hypoglycemia     Gastroesophageal reflux disease, unspecified whether esophagitis present      Orders Placed This Encounter    NOVEL CORONAVIRUS (COVID-19) ([facility] Default)    hydrOXYzine (ATARAX) 10 mg/5 mL syrup    gabapentin (NEURONTIN) 100 mg capsule    omeprazole (PRILOSEC) 40 mg capsule    acarbose (PRECOSE) 50 mg tablet   . Follow-up and Dispositions    · Return in about 2 months (around 6/26/2022) for routine follow up.

## 2022-04-28 LAB
INPATIENT: NORMAL
SARS-COV-2, NAA 2 DAY TAT: NORMAL
SARS-COV-2, NAA: NOT DETECTED

## 2022-05-06 NOTE — PROGRESS NOTES
Neurology Consult Note      HISTORY PROVIDED BY: patient    Chief Complaint:   Chief Complaint   Patient presents with    Follow-up    Headache      Subjective:    Chuck Schmidt is a 62 y.o. right handed female who presents in the office for continuation of care for headache  This  is a 71-year-old right-handed female with history of asthma, depression, diabetes mellitus, hypertension, hearing loss, coronary artery disease, pacemaker in situ, former patient of Dr. Porfirio Bell establishing care with me. Patient says frequency of headache is variable, headache is throbbing in nature mostly frontal, occasional sharp pain coming from the back of the head. She says it is associated  with dizziness, blurry vision, nausea. No aggravating or relieving factors. Frequency now is between 2 and 4/ week. Patient noted that she has been having numbness and tingling sensation of the extremitie, was in the hands and legs  As patient has been tried on different medication for headache, I will start patient on Emgality for headache prophylaxis.   I work patient up for neuropathy  Review of Systems - General ROS: positive for  - fatigue and sleep disturbance  Psychological ROS: positive for - anxiety, concentration difficulties, depression, memory difficulties and sleep disturbances  Ophthalmic ROS: positive for - blurry vision and photophobia  ENT ROS: positive for - headaches, vertigo and visual changes  Allergy and Immunology ROS: positive for -see chart  Hematological and Lymphatic ROS: negative  Endocrine ROS: negative  Respiratory ROS: no cough, shortness of breath, or wheezing  Cardiovascular ROS: no chest pain or dyspnea on exertion  Gastrointestinal ROS: no abdominal pain, change in bowel habits, or black or bloody stools  Genito-Urinary ROS: no dysuria, trouble voiding, or hematuria  Musculoskeletal ROS: positive for - joint pain and muscular weakness  Neurological ROS: positive for - dizziness, headaches, impaired coordination/balance, memory loss, numbness/tingling, visual changes and weakness  Dermatological ROS: negative  Due to patient still dizziness, fainting spells, and headache, I will obtain MRI of the brain without gadolinium    Past Medical History:   Diagnosis Date    Asthma     Chest pain     Depression     Diabetes ([address]     Diarrhea     Essential hypertension     Fainting     Fatigue     Hearing loss     Hypoglycemia     Hypotension     Leg swelling     Memory disorder     Murmur     Nausea and vomiting     Pacemaker     Ringing in ears     SOB (shortness of breath)     Stomach pain     Swallowing difficulty     Syncope and collapse 7/24/15    RTH    Unintentional weight change       Past Surgical History:   Procedure Laterality Date    HX ABDOMINAL LAPAROSCOPY      HX CARPAL TUNNEL RELEASE Bilateral more than 10 years ago    HX  SECTION  0100,8740    HX COLONOSCOPY  2016    HX GASTRIC BYPASS  1997    x2    HX HYSTERECTOMY      HX PACEMAKER      HX TONSIL AND ADENOIDECTOMY  more than 10 years ago    NEUROLOGICAL PROCEDURE UNLISTED      Bi CTS      Social History     Socioeconomic History    Marital status:      Spouse name: Not on file    Number of children: 2    Years of education: Not on file    Highest education level: Not on file   Occupational History    Occupation: retired   Tobacco Use    Smoking status: Never Smoker    Smokeless tobacco: Never Used   Vaping Use    Vaping Use: Never used   Substance and Sexual Activity    Alcohol use: No    Drug use: No    Sexual activity: Never   Other Topics Concern    Not on file   Social History Narrative     has brain damage, lives with him     Social Determinants of Health     Financial Resource Strain:     Difficulty of Paying Living Expenses: Not on file   Food Insecurity:     Worried About [address] in the Last Year: Not on file    [address] in the Last Year: Not on file   Transportation Needs:     Lack of Transportation (Medical): Not on file    Lack of Transportation (Non-Medical): Not on file   Physical Activity:     Days of Exercise per Week: Not on file    Minutes of Exercise per Session: Not on file   Stress:     Feeling of Stress : Not on file   Social Connections:     Frequency of Communication with Friends and Family: Not on file    Frequency of Social Gatherings with Friends and Family: Not on file    Attends Church Services: Not on file    Active Member of [address] [place] [address] [employer] or Organizations: Not on file    Attends Club or Organization Meetings: Not on file    Marital Status: Not on file   Intimate Partner Violence:     Fear of Current or Ex-Partner: Not on file    Emotionally Abused: Not on file    Physically Abused: Not on file    Sexually Abused: Not on file   Housing Stability:     Unable to Pay for Housing in the Last Year: Not on file    Number of [place] in the Last Year: Not on file    Unstable Housing in the Last Year: Not on file     Family History   Problem Relation Age of Onset    Stroke Mother     Cancer Father     Diabetes Brother     Cancer Maternal Aunt     Cancer Maternal Grandmother     Cancer Brother     Kidney Disease Brother          Objective:   ROS  As per HPI   Allergies   Allergen Reactions    Benadryl [Diphenhydramine Hcl] Nausea and Vomiting    Ibuprofen Nausea and Vomiting    Sulfamethoxazole-Trimethoprim Nausea Only    Tramadol Anxiety    Diclofenac Itching    Sulfamethoxazole Nausea Only    Zinc Acetate Other (comments)        Meds:  Outpatient Medications Prior to Visit   Medication Sig Dispense Refill    polyethylene glycol (MIRALAX) 17 gram packet Take 1 Packet by mouth daily. 50 Packet 5    furosemide (LASIX) 20 mg tablet TAKE 1 TABLET BY MOUTH NIGHTLY AS NEEDED 60 Tablet 5    gabapentin (NEURONTIN) 100 mg capsule Take 1 Capsule by mouth three (3) times daily.  Max Daily Amount: 300 mg. 90 Capsule 0    meclizine (Travel Sickness, meclizine,) 25 mg chewable tablet CHEW AND SWALLOW 1 TABLET BY MOUTH THREE TIMES DAILY AS NEEDED FOR UP TO 10 DAYS FOR DIZZINESS 60 Tablet 1    ARIPiprazole (ABILIFY) 2 mg tablet Take 1 Tablet by mouth daily.  buPROPion XL (WELLBUTRIN XL) 150 mg tablet Take 150 mg by mouth daily.  traZODone (DESYREL) 50 mg tablet Take 2 Tablets by mouth nightly. 60 Tablet 5    tiZANidine (ZANAFLEX) 4 mg tablet Take 1 Tablet by mouth two (2) times a day. 60 Tablet 5    topiramate (TOPAMAX) 50 mg tablet TAKE ONE TABLET BY MOUTH TWICE A DAY WITH MEALS 180 Tablet 3    potassium chloride SR (KLOR-CON 10) 10 mEq tablet TAKE 1 TABLET BY MOUTH DAILY, IF TAKES LASIX (FUROSEMIDE) 90 Tablet 0    lidocaine (XYLOCAINE) 2 % solution Take 15 mL by mouth as needed for Pain. 1 Each 5    cholecalciferol (VITAMIN D3) (1000 Units /25 mcg) tablet TAKE 1 TABLET BY MOUTH DAILY FOR VITAMIN D DEFICIENCY 180 Tablet 1    cyanocobalamin ER 1,000 mcg tablet TAKE 1 TABLET BY MOUTH DAILY FOR VITAMIN B12 DEFICIENCY 90 Tablet 5    acarbose (PRECOSE) 50 mg tablet TAKE 1 TABLET BY MOUTH THREE TIMES A DAY 30 MINUTES BEFORE MEALS 90 Tab 4    butalbital-acetaminophen-caffeine (FIORICET, ESGIC) -40 mg per tablet TAKE 1 TABLET BY MOUTH EVERY SIX HOURS AS NEEDED FOR HEADACHE. 30 Tab 5    albuterol (PROVENTIL HFA, VENTOLIN HFA, PROAIR HFA) 90 mcg/actuation inhaler Take 1 Puff by inhalation every six (6) hours as needed for Wheezing. 1 Inhaler 5    albuterol (PROVENTIL VENTOLIN) 2.5 mg /3 mL (0.083 %) nebu 3 mL by Nebulization route every four (4) hours as needed for Wheezing. 100 Each 1    sertraline (ZOLOFT) 100 mg tablet Take 1 Tab by mouth daily. 30 Tab 5    omeprazole (PRILOSEC) 40 mg capsule TAKE 1 CAPSULE BY MOUTH DAILY. 90 Cap 2    nystatin (MYCOSTATIN) topical cream Apply  to affected area two (2) times a day. 15 g 0    aspirin 81 mg chewable tablet CHEW AND SWALLOW 1 TAB BY MOUTH DAILY.  90 Tab 3    fluticasone (FLONASE) 50 mcg/actuation nasal spray 2 Sprays by Both Nostrils route daily. 1 Bottle 5    Nebulizer & Compressor machine Use as directed 1 Each 0     No facility-administered medications prior to visit. Imaging:  MRI Results (most recent):  Results from Hospital Encounter encounter on 02/03/17    MRI LUMB SPINE WO CONT    Narrative  EXAM:  MRI LUMB SPINE WO CONT    INDICATION:  Lumbar radiculopathy. M 55. 16. Low back and radicular left lower  extremity pain. COMPARISON: None    TECHNIQUE: MR imaging of the lumbar spine was performed using the following  sequences: sagittal T1, T2, STIR;  axial T1, T2.    CONTRAST:  None. FINDINGS:    Conus position, morphology and signal and normal. There is normal vertebral body  height and bone signal. There is anatomic alignment. No paraspinal soft tissue  mass is shown. Lower thoracic spine: No disc herniation or canal-foraminal stenosis. T12-L1: Normal disc and facets. L1-L2:  Normal disc and facets. L2-L3:  Normal disc height. Minimal diffuse disc bulging. No facet arthropathy  or canal stenosis. Equivocal left foraminal narrowing. L3-L4:  Mild disc space narrowing. Left far lateral annular tear. Minimal  diffuse disc bulging with superimposed broad-based left far lateral disc  protrusion extending into left neural foramen. No facet arthropathy or canal  stenosis. Mild to moderate left foraminal narrowing. L4-L5:  Normal disc height. Minimal diffuse disc bulging and left facet  osteoarthrosis. No canal or substantial foraminal stenosis . L5-S1:  Normal disc height. Minimal-mild diffuse disc bulging, slightly  accentuated in right foraminal region. Mild right facet osteoarthrosis. No canal  stenosis. Minimal right foraminal narrowing. Impression  IMPRESSION:  Mild broad-based left far lateral disc protrusion with annular tear at L3-4  level.      CT Results (most recent):  Results from Abstract encounter on 11/05/18    CT ABD TONIA SHANNON       Reviewed records in [employer] and media tab today    Lab Review   Results for orders placed or performed in visit on 03/23/22   RHEUMATOID FACTOR, QL   Result Value Ref Range    Rheumatoid factor <10.0 <14.0 IU/mL        Exam:  Visit Vitals  /66   Pulse 78   Temp 97.6 °F (36.4 °C) (Temporal)   Ht 4' 9\" (1.448 m)   Wt 166 lb 9.6 oz (75.6 kg)   SpO2 97%   BMI 36.05 kg/m²     General:  Alert, cooperative, no distress. Head:  Normocephalic, without obvious abnormality, atraumatic. Respiratory:  Heart:   Non labored breathing  Regular rate and rhythm, no murmurs   Neck:   2+ carotids, no bruits   Extremities: Warm, no cyanosis or edema. Pulses: 2+ radial pulses. Neurologic:  MS: Alert and oriented x 4, speech intact. Language intact, able to name, repeat, and follow all commands. Attention and fund of knowledge appropriate. Recent and remote memory intact. Cranial Nerves:  II: visual fields Full to confrontation   II: pupils Equal, round, reactive to light   II: optic disc No papilledema   III,VII: ptosis none   III,IV,VI: extraocular muscles  EOMI, no nystagmus or diplopia   V: facial light touch sensation  normal   VII: facial muscle function   symmetric   VIII: hearing intact   IX: soft palate elevation  normal   XI: trapezius strength  5/5   XI: sternocleidomastoid strength 5/5   XII: tongue  Midline     Motor: normal bulk and tone, no tremor              Strength: 5/5 throughout, no PD  Sensory: Decreased sensation to LT, PP, temperature and vibration bilateral lower extremity, upper extremity up to the level  Coordination: FTN and HTS intact, SWAPNIL intact,Romberg positive  Gait: normal gait, unable to heel, toe, and tandem walk  Reflexes: 1+ symmetric  Plantar: Withdrawal           Assessment/Plan       ICD-10-CM ICD-9-CM    1. Chronic migraine without aura, intractable, without status migrainosus  G43.719 346.71    2.  Intractable migraine without aura and without status migrainosus  G43.019 346.11    3. Chronic tension-type headache, intractable  G44.221 339.12 ALDOLASE      RHEUMATOID FACTOR, QL      VITAMIN B12      LEONEL, DIRECT, W/REFLEX      CK      VITAMIN D, 25 HYDROXY      RHEUMATOID FACTOR, QL   4. Dizziness  R42 780.4 MRI BRAIN W WO CONT      ALDOLASE      RHEUMATOID FACTOR, QL      VITAMIN B12      LEONEL, DIRECT, W/REFLEX      CK      VITAMIN D, 25 HYDROXY      RHEUMATOID FACTOR, QL   5. Neuropathy  G62.9 355.9 EMG NCV MOTOR WITH F/WAVE PER NERVE      ALDOLASE      RHEUMATOID FACTOR, QL      LEONEL, DIRECT, W/REFLEX      CK      VITAMIN D, 25 HYDROXY      RHEUMATOID FACTOR, QL   6. Migraine with vertigo  G43.109 346.80 MRI BRAIN W WO CONT     780.4    7. Brain stem lesion  G93.9 348.89 MRI BRAIN W WO CONT   8. Other dietary vitamin B12 deficiency anemia   D51.3 281.1 VITAMIN B12   9. Vitamin D deficiency, unspecified   E55.9 268.9 VITAMIN D, 25 HYDROXY   Plan:  Emgality 120 mg subcu q. monthly  Maxalt MLT 10 mg p.o. as needed for severe headache  Zanaflex 4 mg p.o. nightly  MRI of the brain with and without gadolinium  Blood for autoimmune work-up, vitamin D, vitamin B12, ESR, CK, aldolase  EMG/nerve conduction study lower extremities    Follow-up and Dispositions    · Return in about 3 months (around 6/23/2022).              Signed:  Radha Nicolas MD  3/23/2022

## 2022-06-04 DIAGNOSIS — G43.109 MIGRAINE WITH VERTIGO: ICD-10-CM

## 2022-06-05 RX ORDER — MECLIZINE HCL 25MG 25 MG/1
TABLET, CHEWABLE ORAL
Qty: 60 TABLET | Refills: 0 | Status: SHIPPED | OUTPATIENT
Start: 2022-06-05 | End: 2022-06-07

## 2022-06-06 DIAGNOSIS — G43.109 MIGRAINE WITH VERTIGO: ICD-10-CM

## 2022-06-07 RX ORDER — MECLIZINE HCL 25MG 25 MG/1
TABLET, CHEWABLE ORAL
Qty: 60 TABLET | Refills: 0 | Status: SHIPPED | OUTPATIENT
Start: 2022-06-07 | End: 2022-06-28 | Stop reason: SDUPTHER

## 2022-06-23 NOTE — MR AVS SNAPSHOT
Problem List Items Addressed This Visit          Other    COVID-19 virus infection - Primary     Discussed possible treatment with Paxlovid  Due to patient's lack of risk factors, we decided not to do the Paxlovid  Pt is vaccinated and boosted, non-smoker, not obese  Pt can take Vitamin D, Vitamin C, and Zinc OTC  Pt instructed to reach out if developing any SOB  Visit Information Date & Time Provider Department Dept. Phone Encounter #  
 5/9/2017  2:45 PM Guadalupe James MD [facility] Jamie Alex [id number] Follow-up Instructions Return in about 3 months (around 8/9/2017) for routine follow up. Upcoming Health Maintenance Date Due INFLUENZA AGE 9 TO ADULT 8/1/2017 PAP AKA CERVICAL CYTOLOGY 12/3/2018 BREAST CANCER SCRN MAMMOGRAM 3/8/2019 DTaP/Tdap/Td series (2 - Td) 6/23/2021 COLONOSCOPY 8/3/2026 Allergies as of 5/9/2017  Review Complete On: 5/9/2017 By: Guadalupe James MD  
  
 Severity Noted Reaction Type Reactions Tylenol [Acetaminophen] High 10/02/2015    Anaphylaxis, Hives Benadryl [Diphenhydramine Hcl] Medium 12/03/2015    Nausea and Vomiting Ibuprofen  10/12/2014    Nausea and Vomiting Current Immunizations  Reviewed on 10/2/2015 Name Date DTaP 6/23/2011 Hep B Vaccine 10/16/2016, 7/20/2016 Hep B Vaccine (Adult)  Incomplete Influenza Vaccine 10/13/2016, 9/9/2015, 9/30/2014, 9/13/2013, 3/28/2013, 11/14/2011, 1/13/2011, 1/13/2011, 12/17/2009, 12/17/2009, 1/8/2009, 1/8/2009, 11/8/2007, 11/8/2007 Influenza Vaccine (Quad) PF 9/24/2014 Pneumococcal Polysaccharide (PPSV-23) 1/8/2009 Tdap 6/23/2011 Not reviewed this visit You Were Diagnosed With   
  
 Codes Comments Routine general medical examination at a health care facility    -  Primary ICD-10-CM: Z00.00 ICD-9-CM: V70.0 Gastroesophageal reflux disease without esophagitis     ICD-10-CM: K21.9 ICD-9-CM: 530.81 Bilateral leg edema     ICD-10-CM: R60.0 ICD-9-CM: 782.3 Mourning     ICD-10-CM: F45.22 ICD-9-CM: 309.0 Bipolar affective disorder, current episode depressed, current episode severity unspecified ([facility]     ICD-10-CM: F31.30 ICD-9-CM: 296.50 Screening for alcoholism     ICD-10-CM: Z13.89 ICD-9-CM: V79.1 Screening for depression     ICD-10-CM: Z13.89 ICD-9-CM: V79.0 Screening for ischemic heart disease     ICD-10-CM: Z13.6 ICD-9-CM: V81.0 History of gastric bypass     ICD-10-CM: Z98.890 ICD-9-CM: V45.86 Encounter for immunization     ICD-10-CM: F22 ICD-9-CM: V03.89 Vitals BP Pulse Temp Resp Height(growth percentile) Weight(growth percentile) 100/70 (BP 1 Location: Left arm, BP Patient Position: Sitting) 74 96.3 °F (35.7 °C) (Oral) 16 4' 9\" (1.448 m) 160 lb (72.6 kg) SpO2 BMI OB Status Smoking Status 97% 34.62 kg/m2 Hysterectomy Never Smoker BMI and BSA Data Body Mass Index Body Surface Area  
 34.62 kg/m 2 1.71 m 2 Preferred Pharmacy Pharmacy Name Phone [address] [facility], [address] [phone] Your Updated Medication List  
  
   
This list is accurate as of: 5/9/17  4:03 PM.  Always use your most recent med list.  
  
  
  
  
 cholecalciferol 1,000 unit tablet Commonly known as:  VITAMIN D3 Take 2 Tabs by mouth daily. Indications: VITAMIN D DEFICIENCY  
  
 cyanocobalamin 1,000 mcg tablet Commonly known as:  VITAMIN B-12 Take 1 Tab by mouth daily. Indications: PREVENTION OF VITAMIN B12 DEFICIENCY  
  
 esomeprazole 40 mg capsule Commonly known as:  Maximiano Poster Take 1 Cap by mouth daily. Indications: gastroesophageal reflux disease  
  
 fluticasone 50 mcg/actuation nasal spray Commonly known as:  Ginny Berenice 2 Sprays by Both Nostrils route daily. furosemide 20 mg tablet Commonly known as:  LASIX Take 1 Tab by mouth daily. As needed  
  
 potassium chloride 10 mEq tablet Commonly known as:  KLOR-CON Take 1 Tab by mouth daily. If takes lasix  
  
 topiramate 100 mg tablet Commonly known as:  TOPAMAX Take 1 Tab by mouth daily. Indications: MOOD  
  
 ZOLOFT 50 mg tablet Generic drug:  sertraline Take  by mouth daily. Prescriptions Sent to Pharmacy  Refills  
 furosemide (LASIX) 20 mg tablet 5  
 Sig: Take 1 Tab by mouth daily. As needed Class: Normal  
 Pharmacy: [address] [place] [address] Ph #: [phone] Route: Oral  
 potassium chloride (KLOR-CON) 10 mEq tablet 5 Sig: Take 1 Tab by mouth daily. If takes lasix Class: Normal  
 Pharmacy: [address] [place] [address] Ph #: [phone] Route: Oral  
 esomeprazole (NEXIUM) 40 mg capsule 3 Sig: Take 1 Cap by mouth daily. Indications: gastroesophageal reflux disease Class: Normal  
 Pharmacy: [address] [place] [address] Ph #: [phone] Route: Oral  
 cholecalciferol (VITAMIN D3) 1,000 unit tablet 3 Sig: Take 2 Tabs by mouth daily. Indications: VITAMIN D DEFICIENCY Class: Normal  
 Pharmacy: [address] [place] [address] Ph #: [phone] Route: Oral  
  
We Performed the Following ADMIN HEPATITIS B VACCINE [ [facility]] CBC W/O DIFF [40865 CPT(R)] [address] [[id number] HCPCS] HEPATITIS B VACCINE, ADULT DOSAGE, IM [49523 CPT(R)] LIPID PANEL [24278 CPT(R)] METABOLIC PANEL, BASIC [39460 CPT(R)] Follow-up Instructions Return in about 3 months (around 8/9/2017) for routine follow up. Patient Instructions Medicare Wellness Visit, Female The best way to live healthy is to have a healthy lifestyle by eating a well-balanced diet, exercising regularly, limiting alcohol and stopping smoking. Regular physical exams and screening tests are another way to keep healthy. Preventive exams provided by your health care provider can find health problems before they become diseases or illnesses. Preventive services including immunizations, screening tests, monitoring and exams can help you take care of your own health. All people over age 72 should have a pneumovax  and and a prevnar shot to prevent pneumonia.  These are once in a lifetime unless you and your provider decide differently. All people over 65 should have a yearly flu shot and a tetanus vaccine every 10 years. A bone mass density to screen for osteoporosis or thinning of the bones should be done every 2 years after 65. Screening for diabetes mellitus with a blood sugar test should be done every year. Glaucoma is a disease of the eye due to increased ocular pressure that can lead to blindness and it should be done every year by an eye professional. 
 
Cardiovascular screening tests that check for elevated lipids (fatty part of blood) which can lead to heart disease and strokes should be done every 5 years. Colorectal screening that evaluates for blood or polyps in your colon should be done yearly as a stool test or every five years as a flexible sigmoidoscope or every 10 years as a colonoscopy up to age 76. Breast cancer screening with a mammogram is recommended biennially  for women age 54-69. Screening for cervical cancer with a pap smear and pelvic exam is recommended for women after age 72 years every 2 years up to age 79 or when the provider and patient decide to stop. If there is a history of cervical abnormalities or other increased risk for cancer then the test is recommended yearly. Hepatitis C screening is also recommended for anyone born between [birth year] through [address]. A shingles vaccine is also recommended once in a lifetime after age 61. Your Medicare Wellness Exam is recommended annually. Here is a list of your current Health Maintenance items with a due date: There are no preventive care reminders to display for this patient. Introducing \[facility]\"" & HEALTH SERVICES! Raysa Lyons introduces [employer] patient portal. Now you can access parts of your medical record, email your doctor's office, and request medication refills online. 1. In your internet browser, go to [email or web address] 2. Click on the First Time User? Click Here link in the Sign In box. You will see the New Member Sign Up page. 3. Enter your [employer] Access Code exactly as it appears below. You will not need to use this code after youve completed the sign-up process. If you do not sign up before the expiration date, you must request a new code. · [employer] Access Code: [email or web address] Expires: 8/7/2017  2:41 PM 
 
4. Enter the last four digits of your Social Security Number (xxxx) and Date of Birth (mm/dd/yyyy) as indicated and click Submit. You will be taken to the next sign-up page. 5. Create a [employer]ide[employer] ID. This will be your [employer] login ID and cannot be changed, so think of one that is secure and easy to remember. 6. Create a [employer] password. You can change your password at any time. 7. Enter your Password Reset Question and Answer. This can be used at a later time if you forget your password. 8. Enter your e-mail address. You will receive e-mail notification when new information is available in [address] 9. Click Sign Up. You can now view and download portions of your medical record. 10. Click the Download Summary menu link to download a portable copy of your medical information. If you have questions, please visit the Frequently Asked Questions section of the [employer] website. Remember, [employer] is NOT to be used for urgent needs. For medical emergencies, dial 911. Now available from your iPhone and Android! Please provide this summary of care documentation to your next provider. Your primary care clinician is listed as Pippa Mancera. If you have any questions after today's visit, please call [phone].

## 2022-06-27 ENCOUNTER — VIRTUAL VISIT (OUTPATIENT)
Dept: NEUROLOGY | Age: 62
End: 2022-06-27
Payer: MEDICARE

## 2022-06-27 DIAGNOSIS — G43.109 MIGRAINE WITH VERTIGO: ICD-10-CM

## 2022-06-27 DIAGNOSIS — G43.019 INTRACTABLE MIGRAINE WITHOUT AURA AND WITHOUT STATUS MIGRAINOSUS: ICD-10-CM

## 2022-06-27 DIAGNOSIS — G44.221 CHRONIC TENSION-TYPE HEADACHE, INTRACTABLE: ICD-10-CM

## 2022-06-27 DIAGNOSIS — G43.009 MIGRAINE WITHOUT AURA AND WITHOUT STATUS MIGRAINOSUS, NOT INTRACTABLE: Primary | ICD-10-CM

## 2022-06-27 DIAGNOSIS — E55.9 VITAMIN D DEFICIENCY: ICD-10-CM

## 2022-06-27 DIAGNOSIS — G43.719 CHRONIC MIGRAINE WITHOUT AURA, INTRACTABLE, WITHOUT STATUS MIGRAINOSUS: ICD-10-CM

## 2022-06-27 DIAGNOSIS — G62.9 NEUROPATHY: ICD-10-CM

## 2022-06-27 PROCEDURE — 99214 OFFICE O/P EST MOD 30 MIN: CPT | Performed by: PSYCHIATRY & NEUROLOGY

## 2022-06-27 PROCEDURE — G8427 DOCREV CUR MEDS BY ELIG CLIN: HCPCS | Performed by: PSYCHIATRY & NEUROLOGY

## 2022-06-27 PROCEDURE — 3017F COLORECTAL CA SCREEN DOC REV: CPT | Performed by: PSYCHIATRY & NEUROLOGY

## 2022-06-27 PROCEDURE — G9717 DOC PT DX DEP/BP F/U NT REQ: HCPCS | Performed by: PSYCHIATRY & NEUROLOGY

## 2022-06-27 PROCEDURE — G8417 CALC BMI ABV UP PARAM F/U: HCPCS | Performed by: PSYCHIATRY & NEUROLOGY

## 2022-06-27 RX ORDER — ERGOCALCIFEROL 1.25 MG/1
50000 CAPSULE ORAL
Qty: 4 CAPSULE | Refills: 4 | Status: SHIPPED | OUTPATIENT
Start: 2022-06-27 | End: 2022-10-05 | Stop reason: SDUPTHER

## 2022-06-27 RX ORDER — BUTALBITAL, ACETAMINOPHEN AND CAFFEINE 50; 325; 40 MG/1; MG/1; MG/1
TABLET ORAL
Qty: 30 TABLET | Refills: 5 | Status: SHIPPED
Start: 2022-06-27 | End: 2022-10-05

## 2022-06-27 NOTE — PROGRESS NOTES
Neurology Progress Note  Kavitha Cintron was seen by synchronous (real-time) audio-video technology on 22. Consent:  She  is aware that this patient-initiated Telehealth encounter is a billable service, with coverage as determined by her insurance carrier. She is aware that she may receive a bill and has provided verbal consent to proceed: Yes    I was in the office while conducting this encounter. Pursuant to the emergency declaration under the [facility] [place] [address] [facility] authority and the [employer] Act, this Virtual  Visit was conducted, with patient's consent, to reduce the patient's risk of exposure to COVID-19 and provide continuity of care for an established patient. Services were provided through a video synchronous discussion virtually to substitute for in-person clinic visit. NAME:  Kavitha Cintron   :   1960   MRN:   [id number]     Date/Time:  2022  Subjective:     Kavitha Cintron is a 62 y.o. female here today for follow up for migraine headache, numbness and tingling sensation  Patient says frequency of headache is variable, headache is throbbing in nature mostly frontal, occasional sharp pain coming from the back of the head. She says it is associated  with dizziness, blurry vision, nausea. No aggravating or relieving factors. Frequency of headache is about the same. Patient was not able to get Emgality. Patient has been tried and failed the following:  Depakote  Amitriptyline  Verapamil  Currently on Topamax  Patient will definitely benefit from CGRP  I will reorder Emgality. .  Patient noted that she is still having numbness and tingling sensation of the extremities i.e. in the hands and legs  Patient was scheduled for EMG/nerve conduction but did not follow through. I will reorder EMG/nerve conduction study to evaluate for neuropathy.   Review of Systems - General ROS: positive for  - fatigue and sleep disturbance  Psychological ROS: positive for - anxiety, concentration difficulties, depression, memory difficulties and sleep disturbances  Ophthalmic ROS: positive for - blurry vision and photophobia  ENT ROS: positive for - headaches, vertigo and visual changes  Allergy and Immunology ROS: positive for -see chart  Hematological and Lymphatic ROS: negative  Endocrine ROS: negative  Respiratory ROS: no cough, shortness of breath, or wheezing  Cardiovascular ROS: no chest pain or dyspnea on exertion  Gastrointestinal ROS: no abdominal pain, change in bowel habits, or black or bloody stools  Genito-Urinary ROS: no dysuria, trouble voiding, or hematuria  Musculoskeletal ROS: positive for - joint pain and muscular weakness  Neurological ROS: positive for - dizziness, headaches, impaired coordination/balance, memory loss, numbness/tingling, visual changes and weakness  Dermatological ROS: negative    Medications reviewed:  Current Outpatient Medications   Medication Sig Dispense Refill    butalbital-acetaminophen-caffeine (FIORICET, ESGIC) -40 mg per tablet TAKE 1 TABLET BY MOUTH EVERY SIX HOURS AS NEEDED FOR HEADACHE. 30 Tablet 5    ergocalciferol (Vitamin D2) 1,250 mcg (50,000 unit) capsule Take 1 Capsule by mouth every seven (7) days. 4 Capsule 4    meclizine (ANTIVERT) 25 mg chewable tablet CHEW AND SWALLOW 1 TABLET BY MOUTH THREE TIMES DAILY AS NEEDED FOR UP TO 10 DAYS FOR DIZZINESS 60 Tablet 0    gabapentin (NEURONTIN) 100 mg capsule TAKE 1 CAPSULE BY MOUTH THREE TIMES DAILY. MAX DAILY AMOUNT: 300 MG. 90 Capsule 0    omeprazole (PRILOSEC) 40 mg capsule Take 1 Capsule by mouth daily. 90 Capsule 1    acarbose (PRECOSE) 50 mg tablet TAKE 1 TABLET BY MOUTH THREE TIMES A DAY 30 MINUTES BEFORE MEALS 90 Tablet 4    galcanezumab-gnlm (Emgality Syringe) 120 mg/mL syrg 120 mg by SubCUTAneous route every thirty (30) days.  1 Each 3    rizatriptan (Maxalt-MLT) 10 mg disintegrating tablet 1 tab at onset of migraine; repeat 1 tab in 2 hours if HA remains; Limit: 2 tabs in 24 hours, max 3 days/ week. 30 Day Rx 9 Tablet 2    polyethylene glycol (MIRALAX) 17 gram packet Take 1 Packet by mouth daily. 50 Packet 5    furosemide (LASIX) 20 mg tablet TAKE 1 TABLET BY MOUTH NIGHTLY AS NEEDED 60 Tablet 5    ARIPiprazole (ABILIFY) 2 mg tablet Take 1 Tablet by mouth daily.  traZODone (DESYREL) 50 mg tablet Take 2 Tablets by mouth nightly. 60 Tablet 5    tiZANidine (ZANAFLEX) 4 mg tablet Take 1 Tablet by mouth two (2) times a day. 60 Tablet 5    topiramate (TOPAMAX) 50 mg tablet TAKE ONE TABLET BY MOUTH TWICE A DAY WITH MEALS 180 Tablet 3    potassium chloride SR (KLOR-CON 10) 10 mEq tablet TAKE 1 TABLET BY MOUTH DAILY, IF TAKES LASIX (FUROSEMIDE) 90 Tablet 0    lidocaine (XYLOCAINE) 2 % solution Take 15 mL by mouth as needed for Pain. 1 Each 5    cholecalciferol (VITAMIN D3) (1000 Units /25 mcg) tablet TAKE 1 TABLET BY MOUTH DAILY FOR VITAMIN D DEFICIENCY 180 Tablet 1    cyanocobalamin ER 1,000 mcg tablet TAKE 1 TABLET BY MOUTH DAILY FOR VITAMIN B12 DEFICIENCY 90 Tablet 5    albuterol (PROVENTIL HFA, VENTOLIN HFA, PROAIR HFA) 90 mcg/actuation inhaler Take 1 Puff by inhalation every six (6) hours as needed for Wheezing. 1 Inhaler 5    albuterol (PROVENTIL VENTOLIN) 2.5 mg /3 mL (0.083 %) nebu 3 mL by Nebulization route every four (4) hours as needed for Wheezing. 100 Each 1    sertraline (ZOLOFT) 100 mg tablet Take 1 Tab by mouth daily. 30 Tab 5    nystatin (MYCOSTATIN) topical cream Apply  to affected area two (2) times a day. 15 g 0    aspirin 81 mg chewable tablet CHEW AND SWALLOW 1 TAB BY MOUTH DAILY. 90 Tab 3    fluticasone (FLONASE) 50 mcg/actuation nasal spray 2 Sprays by Both Nostrils route daily. 1 Bottle 5    Nebulizer & Compressor machine Use as directed 1 Each 0        Objective:   Vitals: There were no vitals filed for this visit.       Lab Data Reviewed:  Lab Results   Component Value Date/Time    WBC 4.8 03/30/2022 09:09 AM    HCT 39.5 03/30/2022 09:09 AM    HGB 13.0 03/30/2022 09:09 AM    PLATELET 934 15/01/2377 09:09 AM       Lab Results   Component Value Date/Time    Sodium 143 03/30/2022 09:09 AM    Potassium 4.8 03/30/2022 09:09 AM    Chloride 109 (H) 03/30/2022 09:09 AM    CO2 19 (L) 03/30/2022 09:09 AM    Glucose 70 03/30/2022 09:09 AM    BUN 23 03/30/2022 09:09 AM    Creatinine 0.82 03/30/2022 09:09 AM    Calcium 8.5 (L) 03/30/2022 09:09 AM       No components found for: TROPQUANT    No results found for: LEONEL      Lab Results   Component Value Date/Time    Hemoglobin A1c <3.5 (L) 10/13/2014 05:00 AM    Hemoglobin A1c (POC) 5.1 10/02/2015 09:49 AM    Hemoglobin A1c, External 4.8 01/07/2021 12:00 AM        Lab Results   Component Value Date/Time    Vitamin B12 1,570 (H) 03/30/2022 09:09 AM    Folate 16.1 03/30/2022 09:09 AM       No results found for: LEONEL, ANARX, ANAIGG, XBANA    Lab Results   Component Value Date/Time    Cholesterol, total 175 12/21/2021 11:58 AM    HDL Cholesterol 54 12/21/2021 11:58 AM    LDL, calculated 112 (H) 12/21/2021 11:58 AM    LDL, calculated 125 (H) 02/04/2020 09:07 AM    VLDL, calculated 9 12/21/2021 11:58 AM    VLDL, calculated 11 02/04/2020 09:07 AM    Triglyceride 46 12/21/2021 11:58 AM    CHOL/HDL Ratio 2.4 10/14/2014 03:00 AM         CT Results (recent):  Results from Abstract encounter on 11/05/18    CT ABD PELV W CONT      MRI Results (recent):  Results from Hospital Encounter encounter on 02/03/17    MRI LUMB SPINE WO CONT    Narrative  EXAM:  MRI LUMB SPINE WO CONT    INDICATION:  Lumbar radiculopathy. M 55. 16. Low back and radicular left lower  extremity pain. COMPARISON: None    TECHNIQUE: MR imaging of the lumbar spine was performed using the following  sequences: sagittal T1, T2, STIR;  axial T1, T2.    CONTRAST:  None.     FINDINGS:    Conus position, morphology and signal and normal. There is normal vertebral body  height and bone signal. There is anatomic alignment. No paraspinal soft tissue  mass is shown. Lower thoracic spine: No disc herniation or canal-foraminal stenosis. T12-L1: Normal disc and facets. L1-L2:  Normal disc and facets. L2-L3:  Normal disc height. Minimal diffuse disc bulging. No facet arthropathy  or canal stenosis. Equivocal left foraminal narrowing. L3-L4:  Mild disc space narrowing. Left far lateral annular tear. Minimal  diffuse disc bulging with superimposed broad-based left far lateral disc  protrusion extending into left neural foramen. No facet arthropathy or canal  stenosis. Mild to moderate left foraminal narrowing. L4-L5:  Normal disc height. Minimal diffuse disc bulging and left facet  osteoarthrosis. No canal or substantial foraminal stenosis . L5-S1:  Normal disc height. Minimal-mild diffuse disc bulging, slightly  accentuated in right foraminal region. Mild right facet osteoarthrosis. No canal  stenosis. Minimal right foraminal narrowing. Impression  IMPRESSION:  Mild broad-based left far lateral disc protrusion with annular tear at L3-4  level. IR Results (recent):  No results found for this or any previous visit. VAS/US Results (recent):  Results from Hospital Encounter encounter on 10/22/18    DUPLEX LOWER EXT VENOUS BILAT    Narrative  INDICATION:  Leg swelling, pain, DVT suspected    Duplex venous Doppler examination of the bilateral leg was performed from the  inguinal ligament to the mid-calf. Deep venous structures were well imaged and  appeared compressible throughout. Both wave form and color flow analysis  demonstrated normal flow patterns. Augmentation was demonstrable. Impression  IMPRESSION:  NORMAL DUPLEX VENOUS DOPPLER EXAMINATION. PHYSICAL EXAM:  General:    Alert, cooperative, no distress, appears stated age. Head:   Normocephalic, without obvious abnormality, atraumatic. Eyes:   Conjunctivae/corneas clear.     Nose:  Nares normal.   Throat: Lips,and tongue normal.    Neck:  Symmetrical,  no adenopathy, thyroid:     no JVD. Back:    Symmetric,    Lungs:   Deferred  Chest wall:   No Accessory muscle use. Heart:   Deferred. Abdomen:    Not distended. Extremities: Extremities normal, atraumatic, No cyanosis. No edema. No clubbing  Skin:      No rashes or lesions. Not Jaundiced  Lymph nodes: Cervical, supraclavicular normal.  Psych:  Good insight. Not depressed. Not anxious or agitated. NEUROLOGICAL EXAM:  Appearance: The patient is well developed, well nourished, provides a coherent history and is in no acute distress. Mental Status: Oriented to time, place and person. Mood and affect appropriate. Cranial Nerves:   Intact visual fields. EOM's full, no nystagmus, no ptosis. Facial movement is symmetric. Hearing is normal bilaterally. . Tongue is midline. Motor:   Moves all extremities. Normal bulk  No fasciculations. Reflexes:   Deferred. Sensory:   Deferred. Gait:  Normal gait. Tremor:   No tremor noted. Cerebellar:  No cerebellar signs present. Assesment  1. Chronic tension-type headache, intractable    - butalbital-acetaminophen-caffeine (FIORICET, ESGIC) -40 mg per tablet; TAKE 1 TABLET BY MOUTH EVERY SIX HOURS AS NEEDED FOR HEADACHE. Dispense: 30 Tablet; Refill: 5    2. Migraine without aura and without status migrainosus, not intractable  Emgality    3. Chronic migraine without aura, intractable, without status migrainosus  Emgality    4. Intractable migraine without aura and without status migrainosus  Emgality    5. Migraine with vertigo  Emgality    6. Neuropathy  EMG/nerve conduction study    7. Vitamin D deficiency  Replace vitamin D    ___________________________________________________  PLAN: Medication and plan discussed with patient      ICD-10-CM ICD-9-CM    1. Migraine without aura and without status migrainosus, not intractable  G43.009 346.10    2.  Chronic tension-type headache, intractable  G44.221 339.12 butalbital-acetaminophen-caffeine (FIORICET, ESGIC) -40 mg per tablet   3. Chronic migraine without aura, intractable, without status migrainosus  G43.719 346.71    4. Intractable migraine without aura and without status migrainosus  G43.019 346.11    5. Migraine with vertigo  G43.109 346.80      780.4    6. Neuropathy  G62.9 355.9    7. Vitamin D deficiency  E55.9 268.9      Follow-up and Dispositions    · Return in about 6 months (around 12/27/2022).              ___________________________________________________    Attending Physician: Milton Dumont MD

## 2022-06-27 NOTE — PROGRESS NOTES
Chief Complaint   Patient presents with    Migraine     Follow up      1. Have you been to the ER, urgent care clinic since your last visit? Hospitalized since your last visit? No     2. Have you seen or consulted any other health care providers outside of the [address] [place] [address] since your last visit? Include any pap smears or colon screening.  No

## 2022-06-28 ENCOUNTER — OFFICE VISIT (OUTPATIENT)
Dept: INTERNAL MEDICINE CLINIC | Age: 62
End: 2022-06-28
Payer: MEDICARE

## 2022-06-28 VITALS
RESPIRATION RATE: 18 BRPM | HEIGHT: 57 IN | OXYGEN SATURATION: 97 % | HEART RATE: 86 BPM | DIASTOLIC BLOOD PRESSURE: 88 MMHG | WEIGHT: 173 LBS | SYSTOLIC BLOOD PRESSURE: 130 MMHG | TEMPERATURE: 98 F | BODY MASS INDEX: 37.32 KG/M2

## 2022-06-28 DIAGNOSIS — Z98.84 HISTORY OF GASTRIC BYPASS: ICD-10-CM

## 2022-06-28 DIAGNOSIS — M79.7 FIBROMYALGIA AFFECTING LOWER LEG: ICD-10-CM

## 2022-06-28 DIAGNOSIS — M79.669 PAIN OF LOWER LEG, UNSPECIFIED LATERALITY: ICD-10-CM

## 2022-06-28 DIAGNOSIS — M53.86 SCIATICA ASSOCIATED WITH DISORDER OF LUMBAR SPINE: ICD-10-CM

## 2022-06-28 DIAGNOSIS — F32.1 MODERATE MAJOR DEPRESSION (HCC): ICD-10-CM

## 2022-06-28 DIAGNOSIS — I89.0 LYMPHEDEMA: Primary | ICD-10-CM

## 2022-06-28 DIAGNOSIS — G43.109 MIGRAINE WITH VERTIGO: ICD-10-CM

## 2022-06-28 PROCEDURE — 3017F COLORECTAL CA SCREEN DOC REV: CPT | Performed by: FAMILY MEDICINE

## 2022-06-28 PROCEDURE — G8417 CALC BMI ABV UP PARAM F/U: HCPCS | Performed by: FAMILY MEDICINE

## 2022-06-28 PROCEDURE — G9717 DOC PT DX DEP/BP F/U NT REQ: HCPCS | Performed by: FAMILY MEDICINE

## 2022-06-28 PROCEDURE — G8427 DOCREV CUR MEDS BY ELIG CLIN: HCPCS | Performed by: FAMILY MEDICINE

## 2022-06-28 PROCEDURE — 99214 OFFICE O/P EST MOD 30 MIN: CPT | Performed by: FAMILY MEDICINE

## 2022-06-28 RX ORDER — GABAPENTIN 100 MG/1
100 CAPSULE ORAL 3 TIMES DAILY
Qty: 90 CAPSULE | Refills: 1 | Status: SHIPPED | OUTPATIENT
Start: 2022-06-28 | End: 2022-08-04

## 2022-06-28 RX ORDER — MECLIZINE HCL 25MG 25 MG/1
25 TABLET, CHEWABLE ORAL
Qty: 60 TABLET | Refills: 1 | Status: SHIPPED | OUTPATIENT
Start: 2022-06-28 | End: 2022-08-12 | Stop reason: SDUPTHER

## 2022-06-28 RX ORDER — FUROSEMIDE 20 MG/1
20 TABLET ORAL
Qty: 90 TABLET | Refills: 1 | Status: SHIPPED | OUTPATIENT
Start: 2022-06-28 | End: 2022-09-23 | Stop reason: SDUPTHER

## 2022-06-28 RX ORDER — LOPERAMIDE HCL 2 MG
1000 TABLET ORAL DAILY
Qty: 90 TABLET | Refills: 3 | Status: SHIPPED | OUTPATIENT
Start: 2022-06-28

## 2022-06-28 RX ORDER — SERTRALINE HYDROCHLORIDE 100 MG/1
100 TABLET, FILM COATED ORAL DAILY
Qty: 30 TABLET | Refills: 5 | Status: SHIPPED | OUTPATIENT
Start: 2022-06-28 | End: 2022-09-23 | Stop reason: SDUPTHER

## 2022-06-28 RX ORDER — MELATONIN
1000 DAILY
Qty: 90 TABLET | Refills: 3 | Status: SHIPPED | OUTPATIENT
Start: 2022-06-28

## 2022-06-28 RX ORDER — POTASSIUM CHLORIDE 750 MG/1
10 TABLET, FILM COATED, EXTENDED RELEASE ORAL DAILY
Qty: 90 TABLET | Refills: 1 | Status: SHIPPED | OUTPATIENT
Start: 2022-06-28 | End: 2022-09-23 | Stop reason: SDUPTHER

## 2022-06-28 NOTE — PROGRESS NOTES
Identified pt with two pt identifiers(name and ). Reviewed record in preparation for visit and have obtained necessary documentation. Chief Complaint   Patient presents with    Follow-up        Vitals:    22 1315   BP: 130/88   Pulse: 86   Resp: 18   Temp: 98 °F (36.7 °C)   TempSrc: Temporal   SpO2: 97%   Weight: 173 lb (78.5 kg)   Height: 4' 9\" (1.448 m)   PainSc:   0 - No pain       Health Maintenance Due   Topic    MICROALBUMIN Q1     Eye Exam Retinal or Dilated     Pneumococcal 0-64 years (2 - PCV)    Shingrix Vaccine Age 50> (1 of 2)    Foot Exam Q1     DTaP/Tdap/Td series (2 - Tdap)    A1C test (Diabetic or Prediabetic)        1. \"Have you been to the ER, urgent care clinic since your last visit? Hospitalized since your last visit? \" No    2. \"Have you seen or consulted any other health care providers outside of the [address] [place] [address] since your last visit? \" No     3. For patients over 45: Has the patient had a colonoscopy? Yes - no Care Gap present     If the patient is female:    4. For patients over 40: Has the patient had a mammogram? Yes - no Care Gap present    5. For patients over 21: Has the patient had a pap smear? Yes - no Care Gap present    Current Outpatient Medications   Medication Instructions    acarbose (PRECOSE) 50 mg tablet TAKE 1 TABLET BY MOUTH THREE TIMES A DAY 30 MINUTES BEFORE MEALS    albuterol (PROVENTIL HFA, VENTOLIN HFA, PROAIR HFA) 90 mcg/actuation inhaler 1 Puff, Inhalation, EVERY 6 HOURS AS NEEDED    albuterol (PROVENTIL VENTOLIN) 2.5 mg, Nebulization, EVERY 4 HOURS AS NEEDED    ARIPiprazole (ABILIFY) 2 mg tablet 1 Tablet, Oral, DAILY    aspirin 81 mg chewable tablet CHEW AND SWALLOW 1 TAB BY MOUTH DAILY.  butalbital-acetaminophen-caffeine (FIORICET, ESGIC) -40 mg per tablet TAKE 1 TABLET BY MOUTH EVERY SIX HOURS AS NEEDED FOR HEADACHE.     cholecalciferol (VITAMIN D3) (1000 Units /25 mcg) tablet TAKE 1 TABLET BY MOUTH DAILY FOR VITAMIN D DEFICIENCY    cyanocobalamin ER 1,000 mcg tablet TAKE 1 TABLET BY MOUTH DAILY FOR VITAMIN B12 DEFICIENCY    Emgality Syringe 120 mg, SubCUTAneous, EVERY 30 DAYS    ergocalciferol (VITAMIN D2) 50,000 Units, Oral, EVERY 7 DAYS    fluticasone (FLONASE) 50 mcg/actuation nasal spray 2 Sprays, Both Nostrils, DAILY    furosemide (LASIX) 20 mg, Oral, EVERY BEDTIME, As needed    gabapentin (NEURONTIN) 100 mg capsule TAKE 1 CAPSULE BY MOUTH THREE TIMES DAILY. MAX DAILY AMOUNT: 300 MG.  lidocaine (XYLOCAINE) 2 % solution 15 mL, Oral, AS NEEDED    meclizine (ANTIVERT) 25 mg chewable tablet CHEW AND SWALLOW 1 TABLET BY MOUTH THREE TIMES DAILY AS NEEDED FOR UP TO 10 DAYS FOR DIZZINESS    Nebulizer & Compressor machine Use as directed    nystatin (MYCOSTATIN) topical cream Topical, 2 TIMES DAILY    omeprazole (PRILOSEC) 40 mg, Oral, DAILY    polyethylene glycol (MIRALAX) 17 g, Oral, DAILY    potassium chloride SR (KLOR-CON 10) 10 mEq tablet TAKE 1 TABLET BY MOUTH DAILY, IF TAKES LASIX (FUROSEMIDE)    rizatriptan (Maxalt-MLT) 10 mg disintegrating tablet 1 tab at onset of migraine; repeat 1 tab in 2 hours if HA remains; Limit: 2 tabs in 24 hours, max 3 days/ week.   30 Day Rx    sertraline (ZOLOFT) 100 mg, Oral, DAILY    tiZANidine (ZANAFLEX) 4 mg, Oral, 2 TIMES DAILY    topiramate (TOPAMAX) 50 mg tablet TAKE ONE TABLET BY MOUTH TWICE A DAY WITH MEALS    traZODone (DESYREL) 100 mg, Oral, EVERY BEDTIME       Allergies   Allergen Reactions    Benadryl [Diphenhydramine Hcl] Nausea and Vomiting    Ibuprofen Nausea and Vomiting    Sulfamethoxazole-Trimethoprim Nausea Only    Tramadol Anxiety    Diclofenac Itching    Sulfamethoxazole Nausea Only    Zinc Acetate Other (comments)       Immunization History   Administered Date(s) Administered    COVID-19, MODERNA BLUE border, Primary or Immunocompromised, (age 18y+), IM, 100 mcg/0.5mL 05/15/2021, 06/12/2021, 11/06/2021    DTaP 06/23/2011    Hep B Vaccine 07/20/2016, 10/16/2016    Hep B Vaccine (Adult) 05/09/2017    Influenza Vaccine 11/08/2007, 11/08/2007, 01/08/2009, 01/08/2009, 12/17/2009, 12/17/2009, 01/13/2011, 01/13/2011, 11/14/2011, 09/13/2013, 09/30/2014, 09/09/2015, 10/13/2016, 01/19/2021    Influenza Vaccine [employer]) PF (>6 Mo Flulaval, Fluarix, and >3 Yrs Afluria, Fluzone 25157) 09/24/2014, 12/22/2017, 10/07/2019    Pneumococcal Polysaccharide (PPSV-23) 01/08/2009    Tdap 06/23/2011       Past Medical History:   Diagnosis Date    Asthma     Chest pain     Depression     Diabetes [facility] [address]     Diarrhea     Essential hypertension     Fainting     Fatigue     Hearing loss     Hypoglycemia 2014    Hypotension 2014    Leg swelling     Memory disorder     Murmur     Nausea and vomiting     Pacemaker     Ringing in ears     SOB (shortness of breath)     Stomach pain     Swallowing difficulty     Syncope and collapse 7/24/15    RTH    Unintentional weight change

## 2022-06-29 ENCOUNTER — TELEPHONE (OUTPATIENT)
Dept: INTERNAL MEDICINE CLINIC | Age: 62
End: 2022-06-29

## 2022-06-29 NOTE — TELEPHONE ENCOUNTER
She said the medication you prescribed her hasn't really helped much, she said she still didn't rest good last night. Said she might need something else.       [phone]

## 2022-07-04 RX ORDER — GALCANEZUMAB 120 MG/ML
120 INJECTION, SOLUTION SUBCUTANEOUS
Qty: 1 EACH | Refills: 3 | Status: SHIPPED | OUTPATIENT
Start: 2022-07-04 | End: 2022-10-05 | Stop reason: SDUPTHER

## 2022-07-04 RX ORDER — RIZATRIPTAN BENZOATE 10 MG/1
TABLET, ORALLY DISINTEGRATING ORAL
Qty: 9 TABLET | Refills: 2 | Status: SHIPPED
Start: 2022-07-04 | End: 2022-10-05

## 2022-07-06 ENCOUNTER — OFFICE VISIT (OUTPATIENT)
Dept: INTERNAL MEDICINE CLINIC | Age: 62
End: 2022-07-06
Payer: MEDICARE

## 2022-07-06 VITALS
BODY MASS INDEX: 37.54 KG/M2 | HEIGHT: 57 IN | DIASTOLIC BLOOD PRESSURE: 67 MMHG | HEART RATE: 94 BPM | SYSTOLIC BLOOD PRESSURE: 130 MMHG | TEMPERATURE: 98.5 F | RESPIRATION RATE: 18 BRPM | WEIGHT: 174 LBS | OXYGEN SATURATION: 98 %

## 2022-07-06 DIAGNOSIS — Z98.84 WEIGHT GAIN STATUS POST GASTRIC BYPASS: ICD-10-CM

## 2022-07-06 DIAGNOSIS — J45.901 MODERATE ASTHMA WITH ACUTE EXACERBATION, UNSPECIFIED WHETHER PERSISTENT: ICD-10-CM

## 2022-07-06 DIAGNOSIS — R05.9 COUGH IN ADULT: Primary | ICD-10-CM

## 2022-07-06 DIAGNOSIS — R63.5 WEIGHT GAIN STATUS POST GASTRIC BYPASS: ICD-10-CM

## 2022-07-06 PROBLEM — E11.65 HYPERGLYCEMIA DUE TO TYPE 2 DIABETES MELLITUS (HCC): Status: RESOLVED | Noted: 2021-12-31 | Resolved: 2022-07-06

## 2022-07-06 PROCEDURE — G8417 CALC BMI ABV UP PARAM F/U: HCPCS | Performed by: FAMILY MEDICINE

## 2022-07-06 PROCEDURE — G9717 DOC PT DX DEP/BP F/U NT REQ: HCPCS | Performed by: FAMILY MEDICINE

## 2022-07-06 PROCEDURE — 99213 OFFICE O/P EST LOW 20 MIN: CPT | Performed by: FAMILY MEDICINE

## 2022-07-06 PROCEDURE — 3017F COLORECTAL CA SCREEN DOC REV: CPT | Performed by: FAMILY MEDICINE

## 2022-07-06 PROCEDURE — G8427 DOCREV CUR MEDS BY ELIG CLIN: HCPCS | Performed by: FAMILY MEDICINE

## 2022-07-06 RX ORDER — AZITHROMYCIN 250 MG/1
250 TABLET, FILM COATED ORAL SEE ADMIN INSTRUCTIONS
Qty: 6 TABLET | Refills: 0 | Status: SHIPPED | OUTPATIENT
Start: 2022-07-06 | End: 2022-09-23 | Stop reason: ALTCHOICE

## 2022-07-06 RX ORDER — CODEINE PHOSPHATE AND GUAIFENESIN 10; 100 MG/5ML; MG/5ML
5 SOLUTION ORAL
Qty: 60 ML | Refills: 0 | Status: SHIPPED | OUTPATIENT
Start: 2022-07-06 | End: 2022-07-11

## 2022-07-06 RX ORDER — PREDNISONE 20 MG/1
40 TABLET ORAL
Qty: 10 TABLET | Refills: 0 | Status: SHIPPED | OUTPATIENT
Start: 2022-07-06 | End: 2022-07-11

## 2022-07-06 RX ORDER — AMOXICILLIN 500 MG/1
CAPSULE ORAL
COMMUNITY
Start: 2022-07-01 | End: 2022-09-23 | Stop reason: ALTCHOICE

## 2022-07-06 NOTE — PROGRESS NOTES
Subjective:   Kavitha Cintron is a 62 y.o. female who complains of congestion, dry cough and fevers up to 100 degrees for 7 days, stable since that time. Some dyspnea and wheezing  She denies a history of rash and diarrheA. Evaluation to date: ER did test - covid neg  Treatment to date: aMOX  Patient does not smoke cigarettes. Relevant PMH: Asthma.   Allergies   Allergen Reactions    Benadryl [Diphenhydramine Hcl] Nausea and Vomiting    Ibuprofen Nausea and Vomiting    Sulfamethoxazole-Trimethoprim Nausea Only    Tramadol Anxiety    Diclofenac Itching    Sulfamethoxazole Nausea Only    Zinc Acetate Other (comments)         Patient Active Problem List    Diagnosis Date Noted    Other abnormal glucose 03/22/2022    BMI 32.0-32.9,adult 02/18/2022    Fibromyalgia affecting lower leg 07/12/2021    Moderate asthma 10/18/2020    Peripheral vascular disease ([facility] [address] 10/04/2019    Weight gain status post gastric bypass 10/04/2019    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    SSS (sick sinus syndrome) ([facility] [address] 12/06/2018    Gastroesophageal reflux disease without esophagitis 08/24/2016    Bipolar disorder with current episode depressed ([address] 02/01/2016    Migraine headache with aura 10/24/2014    Hypoglycemia 10/12/2014    History of gastric bypass 10/12/2014         Past Medical History:   Diagnosis Date    Asthma     Chest pain     Depression     Diabetes ([address]     Diarrhea     Essential hypertension     Fainting     Fatigue     Hearing loss     Hypoglycemia 2014    Hypotension 2014    Leg swelling     Memory disorder     Murmur     Nausea and vomiting     Pacemaker     Ringing in ears     SOB (shortness of breath)     Stomach pain     Swallowing difficulty     Syncope and collapse 7/24/15    RTH    Unintentional weight change      Social History     Tobacco Use    Smoking status: Never Smoker    Smokeless tobacco: Never Used   Substance Use Topics    Alcohol use: No        Review of Systems  Pertinent items are noted in HPI. Objective:     Visit Vitals  /67 (BP 1 Location: Left upper arm, BP Patient Position: At rest, BP Cuff Size: Large adult)   Pulse 94   Temp 98.5 °F (36.9 °C) (Oral)   Resp 18   Ht 4' 9\" (1.448 m)   Wt 174 lb (78.9 kg)   SpO2 98%   BMI 37.65 kg/m²     General:  fatigued, cooperative, no distress, morbidly obese, COUGHING   Eyes: negative   Ears: normal TM's and external ear canals AU   Sinuses: Normal paranasal sinuses without tenderness   Mouth:     Neck: supple, symmetrical, trachea midline and no adenopathy. Heart: S1 and S2 normal, no murmurs noted. Lungs: clear to auscultation bilaterally   Abdomen: soft, non-tender. Bowel sounds normal. No masses,  no organomegaly      Assessment/Plan:   asthma  Antibiotics per orders. RTC prn. Discussed diagnosis and treatment of viral URIs. Discussed the importance of avoiding unnecessary antibiotic therapy. Encounter Diagnoses   Name Primary?  Cough in adult Yes    Weight gain status post gastric bypass     Moderate asthma with acute exacerbation, unspecified whether persistent      Orders Placed This Encounter    amoxicillin (AMOXIL) 500 mg capsule    azithromycin (ZITHROMAX) 250 mg tablet    predniSONE (DELTASONE) 20 mg tablet    guaiFENesin-codeine (ROBITUSSIN AC) 100-10 mg/5 mL solution   . Orders Placed This Encounter    amoxicillin (AMOXIL) 500 mg capsule        Follow-up and Dispositions    · Return if symptoms worsen or fail to improve.

## 2022-07-06 NOTE — PROGRESS NOTES
Rob Rainey is a 62 y.o. female  Chief Complaint   Patient presents with   [facility] Follow Up     1. Have you been to the ER, urgent care clinic since your last visit? Hospitalized since your last visit?no    2. Have you seen or consulted any other health care providers outside of the [address] [place] [address] since your last visit? Include any pap smears or colon screening.  No  Health Maintenance   Topic Date Due    MICROALBUMIN Q1  Never done    Eye Exam Retinal or Dilated  Never done    Pneumococcal 0-64 years (2 - PCV) 01/08/2010    Shingrix Vaccine Age 50> (1 of 2) Never done    Foot Exam Q1  06/01/2016    DTaP/Tdap/Td series (2 - Tdap) 06/23/2021    A1C test (Diabetic or Prediabetic)  01/07/2022    Flu Vaccine (1) 09/01/2022    Breast Cancer Screen Mammogram  11/04/2022    Lipid Screen  12/21/2022    Medicare Yearly Exam  02/19/2023    Depression Monitoring  06/28/2023    Colorectal Cancer Screening Combo  08/03/2026    Hepatitis C Screening  Completed    COVID-19 Vaccine  Completed     Visit Vitals  /67 (BP 1 Location: Left upper arm, BP Patient Position: At rest, BP Cuff Size: Large adult)   Pulse 94   Temp 98.5 °F (36.9 °C) (Oral)   Resp 18   Ht 4' 9\" (1.448 m)   Wt 174 lb (78.9 kg)   SpO2 98%   BMI 37.65 kg/m²

## 2022-07-12 DIAGNOSIS — G44.221 CHRONIC TENSION-TYPE HEADACHE, INTRACTABLE: ICD-10-CM

## 2022-07-12 NOTE — TELEPHONE ENCOUNTER
VOICEMAIL    Pt called requesting refill of:    Requested Prescriptions     Pending Prescriptions Disp Refills    tiZANidine (ZANAFLEX) 4 mg tablet 60 Tablet 5     Sig: Take 1 Tablet by mouth two (2) times a day.      Please send to [facility]    LOV: 6/27/2022  NOV: Visit date not found

## 2022-07-13 RX ORDER — TIZANIDINE 4 MG/1
4 TABLET ORAL 2 TIMES DAILY
Qty: 60 TABLET | Refills: 5 | Status: SHIPPED
Start: 2022-07-13 | End: 2022-09-23 | Stop reason: ALTCHOICE

## 2022-07-13 NOTE — TELEPHONE ENCOUNTER
Voicemail:    Patient called again about refill. States she has no more refills left and is running out.   [phone]

## 2022-07-14 ENCOUNTER — TELEPHONE (OUTPATIENT)
Dept: NEUROLOGY | Age: 62
End: 2022-07-14

## 2022-07-15 NOTE — TELEPHONE ENCOUNTER
Returned call,verified pt with two pt identifiers, pt advised she was needing a refill on Tizanidine. Verified it was sent on 7/13/22. Pt verbalized understanding.

## 2022-08-12 ENCOUNTER — OFFICE VISIT (OUTPATIENT)
Dept: INTERNAL MEDICINE CLINIC | Age: 62
End: 2022-08-12
Payer: MEDICARE

## 2022-08-12 VITALS
BODY MASS INDEX: 37.97 KG/M2 | DIASTOLIC BLOOD PRESSURE: 89 MMHG | RESPIRATION RATE: 20 BRPM | HEART RATE: 83 BPM | WEIGHT: 176 LBS | TEMPERATURE: 98.2 F | OXYGEN SATURATION: 94 % | HEIGHT: 57 IN | SYSTOLIC BLOOD PRESSURE: 139 MMHG

## 2022-08-12 DIAGNOSIS — G43.109 MIGRAINE WITH VERTIGO: ICD-10-CM

## 2022-08-12 DIAGNOSIS — M77.8 SHOULDER TENDONITIS, LEFT: Primary | ICD-10-CM

## 2022-08-12 PROCEDURE — 3017F COLORECTAL CA SCREEN DOC REV: CPT | Performed by: FAMILY MEDICINE

## 2022-08-12 PROCEDURE — G9717 DOC PT DX DEP/BP F/U NT REQ: HCPCS | Performed by: FAMILY MEDICINE

## 2022-08-12 PROCEDURE — G8427 DOCREV CUR MEDS BY ELIG CLIN: HCPCS | Performed by: FAMILY MEDICINE

## 2022-08-12 PROCEDURE — G8417 CALC BMI ABV UP PARAM F/U: HCPCS | Performed by: FAMILY MEDICINE

## 2022-08-12 PROCEDURE — 20610 DRAIN/INJ JOINT/BURSA W/O US: CPT | Performed by: FAMILY MEDICINE

## 2022-08-12 PROCEDURE — 99213 OFFICE O/P EST LOW 20 MIN: CPT | Performed by: FAMILY MEDICINE

## 2022-08-12 RX ORDER — TRIAMCINOLONE ACETONIDE 40 MG/ML
80 INJECTION, SUSPENSION INTRA-ARTICULAR; INTRAMUSCULAR ONCE
Qty: 8 ML | Refills: 0
Start: 2022-08-12 | End: 2022-08-12 | Stop reason: CLARIF

## 2022-08-12 RX ORDER — TRIAMCINOLONE ACETONIDE 40 MG/ML
80 INJECTION, SUSPENSION INTRA-ARTICULAR; INTRAMUSCULAR ONCE
Status: COMPLETED | OUTPATIENT
Start: 2022-08-12 | End: 2022-08-12

## 2022-08-12 RX ORDER — MECLIZINE HCL 25MG 25 MG/1
25 TABLET, CHEWABLE ORAL
Qty: 60 TABLET | Refills: 1 | Status: SHIPPED | OUTPATIENT
Start: 2022-08-12

## 2022-08-12 RX ADMIN — TRIAMCINOLONE ACETONIDE 80 MG: 40 INJECTION, SUSPENSION INTRA-ARTICULAR; INTRAMUSCULAR at 14:39

## 2022-08-12 NOTE — PROGRESS NOTES
Chief Complaint   Patient presents with    Shoulder Pain     L/shoulder pain     Patient has not been out of the country in (14 months), NO diarrhea, NO cough, NO chest conjestion, NO temp. Pt has not been around anyone with these symptoms. Health Maintenance reviewed. I have reviewed the patient's medical history in detail and updated the computerized patient record. 1. Have you been to the ER, urgent care clinic since your last visit? No  Hospitalized since your last visit?  no    2. Have you seen or consulted any other health care providers outside of the [address] [place] [address] since your last visit? no  Include any pap smears or colon screening. Encouraged pt to discuss pt's wishes with spouse/partner/family and bring them in the next appt to follow thru with the Advanced Directive    @  [address], last 12 mths 1/25/2021   Able to walk? Yes   Fall in past 12 months? 0   Do you feel unsteady? 0   Are you worried about falling 0   Number of falls in past 12 months -   Fall with injury?  -       3 most recent PHQ Screens 7/6/2022   Little interest or pleasure in doing things Not at all   Feeling down, depressed, irritable, or hopeless Not at all   Total Score PHQ 2 0   Trouble falling or staying asleep, or sleeping too much Not at all   Feeling tired or having little energy Not at all   Poor appetite, weight loss, or overeating Not at all   Feeling bad about yourself - or that you are a failure or have let yourself or your family down Not at all   Trouble concentrating on things such as school, work, reading, or watching TV Not at all   Moving or speaking so slowly that other people could have noticed; or the opposite being so fidgety that others notice Not at all   Thoughts of being better off dead, or hurting yourself in some way Not at all   PHQ 9 Score 0   How difficult have these problems made it for you to do your work, take care of your home and get along with others Not difficult at all       Abuse Screening Questionnaire 7/6/2022   Do you ever feel afraid of your partner? N   Are you in a relationship with someone who physically or mentally threatens you? N   Is it safe for you to go home? Y       ADL Assessment 11/9/2021   Feeding yourself No Help Needed   Getting from bed to chair No Help Needed   Getting dressed No Help Needed   Bathing or showering No Help Needed   Walk across the room (includes cane/walker) No Help Needed   Using the telphone No Help Needed   Taking your medications No Help Needed   Preparing meals No Help Needed   Managing money (expenses/bills) No Help Needed   Moderately strenuous housework (laundry) Help Needed   Shopping for personal items (toiletries/medicines) Help Needed   Shopping for groceries No Help Needed   Driving Help Needed   Climbing a flight of stairs Help Needed   Getting to places beyond walking distances Help Needed       [facility] PRIMARY CARE  OFFICE PROCEDURE PROGRESS NOTE        Chart reviewed for the following:   Pedro Luis Clark LPN, have reviewed the History, Physical and updated the Allergic reactions for [employer]     TIME OUT performed immediately prior to start of procedure:   IMonique LPN, have performed the following reviews on [employer] prior to the start of the procedure:            * Patient was identified by name and date of birth   * Agreement on procedure being performed was verified  * Risks and Benefits explained to the patient by Tracey Frost. Boogie Valverde M.D.   * Procedure site verified and marked as necessary  * Patient was positioned for comfort  * Consent was signed and verified     Time: 2:45 PM      Date of procedure: 8/12/2022    Procedure performed by:  Salbador Connors MD    Provider assisted by:  Diane Espinoza LPN     Patient assisted by: Bryon Espinoza LPN     How tolerated by patient:  Well    Post Procedural Pain Scale: 9/10 pain    Comments: None

## 2022-08-14 NOTE — PROGRESS NOTES
Nick Tirado (: 1960) is a 62 y.o. female, established patient, here for evaluation of the following chief complaint(s):  Shoulder Pain (L/shoulder pain)       ASSESSMENT/PLAN:  Below is the assessment and plan developed based on review of pertinent history, physical exam, labs, studies, and medications. 1. Shoulder tendonitis, left  -     DRAIN/INJECT LARGE JOINT/BURSA  -     REFERRAL TO ORTHOPEDIC SURGERY  2. Migraine with vertigo  -     meclizine (ANTIVERT) 25 mg chewable tablet; Take 1 Tablet by mouth three (3) times daily as needed for Dizziness., Normal, Disp-60 Tablet, R-1    Options discussed. Discussed possible side affects and benefits of the procedure and/or medications. The patient agrees to undergo the procedure. Consent obtained. Sterile procedure followed. There were no complications and the procedure was well tolerated. Instructed patient to call me if it is ineffective or if there are any complications like increasing pain, redness or swelling. The left shoulder was assessed and landmarks palpated and marked. The shoulder was prepped with alcohol. Syringe was prepared with 2 cc of kenalog and 8cc of lidocaine. The shoulder was injected using a posterior approach. Patient was notified of signs to look for in terms of post injection complications, like increasing redness or swelling or pain. SUBJECTIVE/OBJECTIVE:  HPI  Left shoulder pain times weeks to months. No specific injury. Review of Systems  Not performed    Physical Exam  Visit Vitals  /89 (BP 1 Location: Right arm, BP Patient Position: Sitting, BP Cuff Size: Adult)   Pulse 83   Temp 98.2 °F (36.8 °C) (Temporal)   Resp 20   Ht 4' 9\" (1.448 m)   Wt 176 lb (79.8 kg)   SpO2 94%   BMI 38.09 kg/m²     Shoulders positive impingement grossly normal      An electronic signature was used to authenticate this note.   -- Wilson Durham MD

## 2022-08-26 ENCOUNTER — TELEPHONE (OUTPATIENT)
Dept: NEUROLOGY | Age: 62
End: 2022-08-26

## 2022-08-29 ENCOUNTER — TELEPHONE (OUTPATIENT)
Dept: NEUROLOGY | Age: 62
End: 2022-08-29

## 2022-08-29 NOTE — TELEPHONE ENCOUNTER
[employer]       PA Case: [id number], Status: Approved, Coverage Starts on: 7/1/2022 12:00:00 AM, Coverage Ends on: 8/29/2023 12:00:00 AM.    Faxed to:  [address], [address] [facility]

## 2022-08-30 ENCOUNTER — TELEPHONE (OUTPATIENT)
Dept: NEUROLOGY | Age: 62
End: 2022-08-30

## 2022-08-30 DIAGNOSIS — G44.221 CHRONIC TENSION-TYPE HEADACHE, INTRACTABLE: ICD-10-CM

## 2022-08-30 NOTE — TELEPHONE ENCOUNTER
Called pt,verified pt with two pt identifiers,  pt advised she received her Emgality injection yesterday. She stated she is still having the headaches. I advised the injection is not going to work that fast. She will need to give it time to build up in her system/body. Possibly up to 3 months to work best. Nancy Donato over her med list and advised to use Rizatriptan as needed for the severe migraines. Use the tizanidine as directed, topamax will help also-pt stated she will be getting a refill on this med. Advised pt we will need to give time for her medications to work. Pt verbalized understanding.

## 2022-08-30 NOTE — TELEPHONE ENCOUNTER
Pt called to refill her tizanidine script then asked for dosage to be increased due to it not working as well as she would like. She did get her emgality shots yesterday. Please call to discuss dosage increase.  [phone]

## 2022-08-31 ENCOUNTER — TELEPHONE (OUTPATIENT)
Dept: NEUROLOGY | Age: 62
End: 2022-08-31

## 2022-08-31 DIAGNOSIS — G43.009 MIGRAINE WITHOUT AURA AND WITHOUT STATUS MIGRAINOSUS, NOT INTRACTABLE: ICD-10-CM

## 2022-08-31 NOTE — TELEPHONE ENCOUNTER
Patient called stating that her Rizatriptan, Tizanidine, and Topamax have not been sent to pharmacy to be refilled yet. She stated she needs them refilled asap. Please advise.

## 2022-08-31 NOTE — TELEPHONE ENCOUNTER
The Rizatriptan was sent on 7/4/22 with 2 refills  Tizanidine sent on 7/13/22 with 5 refills  Topamax needed to be sent in.       Sent refill for Topamax to  for approval.

## 2022-09-01 RX ORDER — TOPIRAMATE 50 MG/1
50 TABLET, FILM COATED ORAL 2 TIMES DAILY WITH MEALS
Qty: 180 TABLET | Refills: 1 | Status: SHIPPED | OUTPATIENT
Start: 2022-09-01 | End: 2022-10-05 | Stop reason: SDUPTHER

## 2022-09-23 ENCOUNTER — TELEPHONE (OUTPATIENT)
Dept: INTERNAL MEDICINE CLINIC | Age: 62
End: 2022-09-23

## 2022-09-23 ENCOUNTER — OFFICE VISIT (OUTPATIENT)
Dept: INTERNAL MEDICINE CLINIC | Age: 62
End: 2022-09-23
Payer: MEDICARE

## 2022-09-23 VITALS
HEART RATE: 79 BPM | TEMPERATURE: 98.2 F | HEIGHT: 57 IN | OXYGEN SATURATION: 97 % | RESPIRATION RATE: 18 BRPM | SYSTOLIC BLOOD PRESSURE: 121 MMHG | DIASTOLIC BLOOD PRESSURE: 82 MMHG | BODY MASS INDEX: 39.48 KG/M2 | WEIGHT: 183 LBS

## 2022-09-23 DIAGNOSIS — I89.0 LYMPHEDEMA: ICD-10-CM

## 2022-09-23 DIAGNOSIS — M25.511 CHRONIC PAIN OF BOTH SHOULDERS: Primary | ICD-10-CM

## 2022-09-23 DIAGNOSIS — M79.7 FIBROMYALGIA AFFECTING LOWER LEG: ICD-10-CM

## 2022-09-23 DIAGNOSIS — Z23 ENCOUNTER FOR IMMUNIZATION: ICD-10-CM

## 2022-09-23 DIAGNOSIS — F32.1 MODERATE MAJOR DEPRESSION (HCC): ICD-10-CM

## 2022-09-23 DIAGNOSIS — R73.9 ELEVATED BLOOD SUGAR: ICD-10-CM

## 2022-09-23 DIAGNOSIS — G89.29 CHRONIC PAIN OF BOTH SHOULDERS: Primary | ICD-10-CM

## 2022-09-23 DIAGNOSIS — F31.30 BIPOLAR AFFECTIVE DISORDER, CURRENT EPISODE DEPRESSED, CURRENT EPISODE SEVERITY UNSPECIFIED (HCC): ICD-10-CM

## 2022-09-23 DIAGNOSIS — M25.512 CHRONIC PAIN OF BOTH SHOULDERS: Primary | ICD-10-CM

## 2022-09-23 DIAGNOSIS — G43.109 MIGRAINE WITH AURA AND WITHOUT STATUS MIGRAINOSUS, NOT INTRACTABLE: ICD-10-CM

## 2022-09-23 PROCEDURE — 20610 DRAIN/INJ JOINT/BURSA W/O US: CPT | Performed by: FAMILY MEDICINE

## 2022-09-23 PROCEDURE — 90656 IIV3 VACC NO PRSV 0.5 ML IM: CPT | Performed by: FAMILY MEDICINE

## 2022-09-23 PROCEDURE — G9717 DOC PT DX DEP/BP F/U NT REQ: HCPCS | Performed by: FAMILY MEDICINE

## 2022-09-23 PROCEDURE — 99214 OFFICE O/P EST MOD 30 MIN: CPT | Performed by: FAMILY MEDICINE

## 2022-09-23 PROCEDURE — G0008 ADMIN INFLUENZA VIRUS VAC: HCPCS | Performed by: FAMILY MEDICINE

## 2022-09-23 PROCEDURE — 3017F COLORECTAL CA SCREEN DOC REV: CPT | Performed by: FAMILY MEDICINE

## 2022-09-23 PROCEDURE — G8417 CALC BMI ABV UP PARAM F/U: HCPCS | Performed by: FAMILY MEDICINE

## 2022-09-23 PROCEDURE — G8427 DOCREV CUR MEDS BY ELIG CLIN: HCPCS | Performed by: FAMILY MEDICINE

## 2022-09-23 RX ORDER — TRAZODONE HYDROCHLORIDE 50 MG/1
100 TABLET ORAL
Qty: 180 TABLET | Refills: 3 | Status: SHIPPED | OUTPATIENT
Start: 2022-09-23

## 2022-09-23 RX ORDER — SERTRALINE HYDROCHLORIDE 100 MG/1
100 TABLET, FILM COATED ORAL DAILY
Qty: 90 TABLET | Refills: 3 | Status: SHIPPED | OUTPATIENT
Start: 2022-09-23

## 2022-09-23 RX ORDER — FUROSEMIDE 20 MG/1
20 TABLET ORAL
Qty: 90 TABLET | Refills: 1 | Status: SHIPPED | OUTPATIENT
Start: 2022-09-23

## 2022-09-23 RX ORDER — LIDOCAINE HYDROCHLORIDE 30 MG/G
CREAM TOPICAL 2 TIMES DAILY
Qty: 85 G | Refills: 0 | Status: SHIPPED | OUTPATIENT
Start: 2022-09-23 | End: 2022-09-23 | Stop reason: ALTCHOICE

## 2022-09-23 RX ORDER — TRIAMCINOLONE ACETONIDE 40 MG/ML
80 INJECTION, SUSPENSION INTRA-ARTICULAR; INTRAMUSCULAR ONCE
Status: COMPLETED | OUTPATIENT
Start: 2022-09-23 | End: 2022-09-23

## 2022-09-23 RX ORDER — POTASSIUM CHLORIDE 750 MG/1
10 TABLET, FILM COATED, EXTENDED RELEASE ORAL DAILY
Qty: 90 TABLET | Refills: 1 | Status: SHIPPED | OUTPATIENT
Start: 2022-09-23

## 2022-09-23 RX ORDER — GABAPENTIN 100 MG/1
100 CAPSULE ORAL 3 TIMES DAILY
Qty: 90 CAPSULE | Refills: 1 | Status: SHIPPED
Start: 2022-09-23 | End: 2022-10-12 | Stop reason: SINTOL

## 2022-09-23 RX ORDER — METHOCARBAMOL 500 MG/1
500 TABLET, FILM COATED ORAL 4 TIMES DAILY
Qty: 60 TABLET | Refills: 1 | Status: SHIPPED
Start: 2022-09-23 | End: 2022-10-12 | Stop reason: ALTCHOICE

## 2022-09-23 RX ADMIN — TRIAMCINOLONE ACETONIDE 80 MG: 40 INJECTION, SUSPENSION INTRA-ARTICULAR; INTRAMUSCULAR at 09:12

## 2022-09-23 NOTE — LETTER
9/23/2022 4:11 PM    Ms. Sophronia Dubin  [address]. [id number]        Dear Ms. Butler Letters you will find an order for Ada .[phone].         Sincerely,      Anup Mendoza MD

## 2022-09-23 NOTE — PROGRESS NOTES
Ofe Yousif (: 1960) is a 62 y.o. female, established patient, here for evaluation of the following chief complaint(s):  Arm Pain (L/R arm pain)       ASSESSMENT/PLAN:  Below is the assessment and plan developed based on review of pertinent history, physical exam, labs, studies, and medications. 1. Chronic pain of both shoulders  -     METABOLIC PANEL, BASIC; Future  -     CBC WITH AUTOMATED DIFF; Future  -     gabapentin (NEURONTIN) 100 mg capsule; Take 1 Capsule by mouth three (3) times daily. Max Daily Amount: 300 mg., Normal, Disp-90 Capsule, R-1  -     XR SHOULDER LT AP/LAT MIN 2 V; Future  -     XR SHOULDER RT AP/LAT MIN 2 V; Future  -     DRAIN/INJECT LARGE JOINT/BURSA  -     triamcinolone acetonide (KENALOG-40) 40 mg/mL injection 80 mg; 80 mg, IntraBURSal, ONCE, 1 dose, On 22 at 1000  -     REFERRAL TO ORTHOPEDIC SURGERY  2. Fibromyalgia affecting lower leg  3. Lymphedema  -     furosemide (LASIX) 20 mg tablet; Take 1 Tablet by mouth nightly. As needed, Normal, Disp-90 Tablet, R-1  -     potassium chloride SR (KLOR-CON 10) 10 mEq tablet; Take 1 Tablet by mouth daily. , Normal, Disp-90 Tablet, R-1  4. Moderate major depression (HCC)  -     sertraline (ZOLOFT) 100 mg tablet; Take 1 Tablet by mouth daily. , Normal, Disp-90 Tablet, R-3  5. Migraine with aura and without status migrainosus, not intractable  -     traZODone (DESYREL) 50 mg tablet; Take 2 Tablets by mouth nightly., Normal, Disp-180 Tablet, R-3  6. Bipolar affective disorder, current episode depressed, current episode severity unspecified ([facility] [address]    Reassurance that the pain would eventually resolve probably some tendinitis despite the ineffectiveness of previous injection she wants 1 done of the right shoulder. Options discussed. Discussed possible side affects and benefits of the procedure and/or medications. The patient agrees to undergo the procedure. Consent obtained. Sterile procedure followed.  There were no complications and the procedure was well tolerated. Instructed patient to call me if it is ineffective or if there are any complications like increasing pain, redness or swelling. The right shoulder was assessed and landmarks palpated and marked. The shoulder was prepped with alcohol. Syringe was prepared with 2 cc of kenalog and 7cc of lidocaine. The shoulder was injected using a posterior approach. Patient was notified of signs to look for in terms of post injection complications, like increasing redness or swelling or pain. Consider shoulder arthritis, recommend some PT    Return in about 3 months (around 12/23/2022) for routine follow up. SUBJECTIVE/OBJECTIVE:  HPI  Arm and bilateral shoulder pain. The right arm is a newer pain, the left one has been hurting before. Did do an injection into the left shoulder previously she says it was minimally effective. No fall trauma no motions. No evidence of rheumatoid arthritis. Reviewed labs done previously.     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    Peripheral vascular disease ([facility]) I73.9    Weight gain status post gastric bypass R63.5, Z98.84    Moderate asthma J45.909    Fibromyalgia affecting lower leg M79.7    BMI 32.0-32.9,adult Z68.32    Other abnormal glucose R73.09       Allergies   Allergen Reactions    Benadryl [Diphenhydramine Hcl] Nausea and Vomiting    Ibuprofen Nausea and Vomiting    Sulfamethoxazole-Trimethoprim Nausea Only    Tramadol Anxiety    Diclofenac Itching    Sulfamethoxazole Nausea Only    Zinc Acetate Other (comments)     Social History     Socioeconomic History    Marital status:      Spouse name: Not on file    Number of children: 2    Years of education: Not on file    Highest education level: Not on file   Occupational History    Occupation: retired   Tobacco Use    Smoking status: Never    Smokeless tobacco: Never   Vaping Use    Vaping Use: Never used   Substance and Sexual Activity    Alcohol use: No    Drug use: No    Sexual activity: Never   Other Topics Concern    Not on file   Social History Narrative     has brain damage, lives with him     Social Determinants of Health     Financial Resource Strain: Low Risk     Difficulty of Paying Living Expenses: Not hard at all   Food Insecurity: No Food Insecurity    Worried About Running Out of Food in the Last Year: Never true    Ran Out of Food in the Last Year: Never true   Transportation Needs: Not on file   Physical Activity: Not on file   Stress: Not on file   Social Connections: Not on file   Intimate Partner Violence: Not on file   Housing Stability: Not on file         Review of Systems    See HPI    Physical Exam    Visit Vitals  /82 (BP 1 Location: Left arm, BP Patient Position: Sitting, BP Cuff Size: Adult)   Pulse 79   Temp 98.2 °F (36.8 °C) (Temporal)   Resp 18   Ht 4' 9\" (1.448 m)   Wt 183 lb (83 kg)   SpO2 97%   BMI 39.60 kg/m²     Shoulders grossly normal other than some pain with testing range of motion. Some tearfulness with the pain. Impingement signs are positive    An electronic signature was used to authenticate this note.   -- Esa Wells MD

## 2022-09-28 ENCOUNTER — TELEPHONE (OUTPATIENT)
Dept: INTERNAL MEDICINE CLINIC | Age: 62
End: 2022-09-28

## 2022-09-28 DIAGNOSIS — E16.2 HYPOGLYCEMIA: Primary | ICD-10-CM

## 2022-09-29 LAB
BASOPHILS # BLD AUTO: 0 X10E3/UL (ref 0–0.2)
BASOPHILS NFR BLD AUTO: 1 %
BUN SERPL-MCNC: 20 MG/DL (ref 8–27)
BUN/CREAT SERPL: 26 (ref 12–28)
CALCIUM SERPL-MCNC: 8.8 MG/DL (ref 8.7–10.3)
CHLORIDE SERPL-SCNC: 107 MMOL/L (ref 96–106)
CO2 SERPL-SCNC: 18 MMOL/L (ref 20–29)
CREAT SERPL-MCNC: 0.77 MG/DL (ref 0.57–1)
EGFR: 88 ML/MIN/1.73
EOSINOPHIL # BLD AUTO: 0 X10E3/UL (ref 0–0.4)
EOSINOPHIL NFR BLD AUTO: 0 %
ERYTHROCYTE [DISTWIDTH] IN BLOOD BY AUTOMATED COUNT: 14 % (ref 11.7–15.4)
GLUCOSE SERPL-MCNC: 80 MG/DL (ref 70–99)
HCT VFR BLD AUTO: 42.1 % (ref 34–46.6)
HGB BLD-MCNC: 13.5 G/DL (ref 11.1–15.9)
IMM GRANULOCYTES # BLD AUTO: 0 X10E3/UL (ref 0–0.1)
IMM GRANULOCYTES NFR BLD AUTO: 1 %
LYMPHOCYTES # BLD AUTO: 1.8 X10E3/UL (ref 0.7–3.1)
LYMPHOCYTES NFR BLD AUTO: 22 %
MCH RBC QN AUTO: 28.4 PG (ref 26.6–33)
MCHC RBC AUTO-ENTMCNC: 32.1 G/DL (ref 31.5–35.7)
MCV RBC AUTO: 88 FL (ref 79–97)
MONOCYTES # BLD AUTO: 0.8 X10E3/UL (ref 0.1–0.9)
MONOCYTES NFR BLD AUTO: 10 %
NEUTROPHILS # BLD AUTO: 5.5 X10E3/UL (ref 1.4–7)
NEUTROPHILS NFR BLD AUTO: 66 %
PLATELET # BLD AUTO: 332 X10E3/UL (ref 150–450)
POTASSIUM SERPL-SCNC: 4.2 MMOL/L (ref 3.5–5.2)
RBC # BLD AUTO: 4.76 X10E6/UL (ref 3.77–5.28)
SODIUM SERPL-SCNC: 140 MMOL/L (ref 134–144)
WBC # BLD AUTO: 8.1 X10E3/UL (ref 3.4–10.8)

## 2022-10-05 ENCOUNTER — VIRTUAL VISIT (OUTPATIENT)
Dept: NEUROLOGY | Age: 62
End: 2022-10-05
Payer: MEDICARE

## 2022-10-05 DIAGNOSIS — G43.009 MIGRAINE WITHOUT AURA AND WITHOUT STATUS MIGRAINOSUS, NOT INTRACTABLE: ICD-10-CM

## 2022-10-05 DIAGNOSIS — M25.512 CHRONIC PAIN OF BOTH SHOULDERS: ICD-10-CM

## 2022-10-05 DIAGNOSIS — G43.019 INTRACTABLE MIGRAINE WITHOUT AURA AND WITHOUT STATUS MIGRAINOSUS: Primary | ICD-10-CM

## 2022-10-05 DIAGNOSIS — G89.29 CHRONIC PAIN OF BOTH SHOULDERS: ICD-10-CM

## 2022-10-05 DIAGNOSIS — M25.511 CHRONIC PAIN OF BOTH SHOULDERS: ICD-10-CM

## 2022-10-05 DIAGNOSIS — R42 DIZZINESS: ICD-10-CM

## 2022-10-05 DIAGNOSIS — E55.9 VITAMIN D DEFICIENCY, UNSPECIFIED: ICD-10-CM

## 2022-10-05 DIAGNOSIS — G43.109 MIGRAINE WITH VERTIGO: ICD-10-CM

## 2022-10-05 DIAGNOSIS — G44.221 CHRONIC TENSION-TYPE HEADACHE, INTRACTABLE: ICD-10-CM

## 2022-10-05 DIAGNOSIS — G43.719 CHRONIC MIGRAINE WITHOUT AURA, INTRACTABLE, WITHOUT STATUS MIGRAINOSUS: ICD-10-CM

## 2022-10-05 PROCEDURE — G9717 DOC PT DX DEP/BP F/U NT REQ: HCPCS | Performed by: PSYCHIATRY & NEUROLOGY

## 2022-10-05 PROCEDURE — 3017F COLORECTAL CA SCREEN DOC REV: CPT | Performed by: PSYCHIATRY & NEUROLOGY

## 2022-10-05 PROCEDURE — G8417 CALC BMI ABV UP PARAM F/U: HCPCS | Performed by: PSYCHIATRY & NEUROLOGY

## 2022-10-05 PROCEDURE — G8427 DOCREV CUR MEDS BY ELIG CLIN: HCPCS | Performed by: PSYCHIATRY & NEUROLOGY

## 2022-10-05 PROCEDURE — 99214 OFFICE O/P EST MOD 30 MIN: CPT | Performed by: PSYCHIATRY & NEUROLOGY

## 2022-10-05 RX ORDER — TOPIRAMATE 50 MG/1
50 TABLET, FILM COATED ORAL 2 TIMES DAILY WITH MEALS
Qty: 180 TABLET | Refills: 3 | Status: SHIPPED | OUTPATIENT
Start: 2022-10-05

## 2022-10-05 RX ORDER — GALCANEZUMAB 120 MG/ML
120 INJECTION, SOLUTION SUBCUTANEOUS
Qty: 1 EACH | Refills: 11 | Status: SHIPPED | OUTPATIENT
Start: 2022-10-05

## 2022-10-05 RX ORDER — ERGOCALCIFEROL 1.25 MG/1
50000 CAPSULE ORAL
Qty: 12 CAPSULE | Refills: 4 | Status: SHIPPED | OUTPATIENT
Start: 2022-10-05

## 2022-10-05 NOTE — PROGRESS NOTES
Neurology Progress Note  Janes Payton was seen by synchronous (real-time) audio-video technology on 10/05/22. Consent:  She and/or her healthcare decision maker is aware that this patient-initiated Telehealth encounter is a billable service, with coverage as determined by her insurance carrier. She is aware that she may receive a bill and has provided verbal consent to proceed: Yes    I was in the office while conducting this encounter. Pursuant to the emergency declaration under the [facility] [place] [address] waiver authority and the [employer] and [employer] Act, this Virtual  Visit was conducted, with patient's consent, to reduce the patient's risk of exposure to COVID-19 and provide continuity of care for an established patient. Services were provided through a video synchronous discussion virtually to substitute for in-person clinic visit. NAME:  Janes Payton   :   1960   MRN:   [id number]     Date/Time:  10/5/2022  Subjective:   Janes Payton is a 62 y.o. female here today for follow up for migraine headache, numbness and tingling sensation  Patient says she still experiencing more like cluster headaches, at the time of visit, patient says she has been having headache for 4 days nonstop. She has tried different medication including over-the-counter. She noted that Valentino Cunas is not helping much and she is allergic to triptans  Headache is throbbing in nature mostly frontal, occasional sharp pain coming from the back of the head. She says it is associated  with dizziness, blurry vision, nausea. No aggravating or relieving factors. Patient says she is on Emgality which is somehow cut down the frequency of headache but the cluster type of headache still goes on. I will start patient on Nurtec, as this will reduce frequency of cluster migraine.   Nurtec ODT 75 mg every other day, then 75 mg p.o. as needed for severe headache not to exceed 1 dose in 24 hours. To recap:  Patient has been tried and failed the following:  Depakote  Amitriptyline  Verapamil  Currently on Topamax  Patient will definitely benefit from CGRP  I will reorder Emgality. .  Patient noted that she is still having numbness and tingling sensation of the extremities i.e. in the hands and legs  Patient never followed through with EMG/nerve conduction study  Review of Systems - General ROS: positive for  - fatigue and sleep disturbance  Psychological ROS: positive for - anxiety, concentration difficulties, depression, memory difficulties and sleep disturbances  Ophthalmic ROS: positive for - blurry vision and photophobia  ENT ROS: positive for - headaches, vertigo and visual changes  Allergy and Immunology ROS: positive for -see chart  Hematological and Lymphatic ROS: negative  Endocrine ROS: negative  Respiratory ROS: no cough, shortness of breath, or wheezing  Cardiovascular ROS: no chest pain or dyspnea on exertion  Gastrointestinal ROS: no abdominal pain, change in bowel habits, or black or bloody stools  Genito-Urinary ROS: no dysuria, trouble voiding, or hematuria  Musculoskeletal ROS: positive for - joint pain and muscular weakness  Neurological ROS: positive for - dizziness, headaches, impaired coordination/balance, memory loss, numbness/tingling, visual changes and weakness  Dermatological ROS: negati           Medications reviewed:  Current Outpatient Medications   Medication Sig Dispense Refill    gabapentin (NEURONTIN) 100 mg capsule Take 1 Capsule by mouth three (3) times daily. Max Daily Amount: 300 mg. 90 Capsule 1    traZODone (DESYREL) 50 mg tablet Take 2 Tablets by mouth nightly. 180 Tablet 3    furosemide (LASIX) 20 mg tablet Take 1 Tablet by mouth nightly. As needed 90 Tablet 1    potassium chloride SR (KLOR-CON 10) 10 mEq tablet Take 1 Tablet by mouth daily. 90 Tablet 1    sertraline (ZOLOFT) 100 mg tablet Take 1 Tablet by mouth daily.  90 Tablet 3 methocarbamoL (ROBAXIN) 500 mg tablet Take 1 Tablet by mouth four (4) times daily. 60 Tablet 1    topiramate (TOPAMAX) 50 mg tablet Take 1 Tablet by mouth two (2) times daily (with meals). 180 Tablet 1    meclizine (ANTIVERT) 25 mg chewable tablet Take 1 Tablet by mouth three (3) times daily as needed for Dizziness. 60 Tablet 1    galcanezumab-gnlm (Emgality Syringe) 120 mg/mL syrg 120 mg by SubCUTAneous route every thirty (30) days. 1 Each 3    rizatriptan (Maxalt-MLT) 10 mg disintegrating tablet 1 tab at onset of migraine; repeat 1 tab in 2 hours if HA remains; Limit: 2 tabs in 24 hours, max 3 days/ week. 30 Day Rx 9 Tablet 2    cyanocobalamin ER 1,000 mcg tablet Take 1 Tablet by mouth daily. 90 Tablet 3    cholecalciferol (VITAMIN D3) (1000 Units /25 mcg) tablet Take 1 Tablet by mouth daily. 90 Tablet 3    butalbital-acetaminophen-caffeine (FIORICET, ESGIC) -40 mg per tablet TAKE 1 TABLET BY MOUTH EVERY SIX HOURS AS NEEDED FOR HEADACHE. 30 Tablet 5    omeprazole (PRILOSEC) 40 mg capsule Take 1 Capsule by mouth daily. 90 Capsule 1    acarbose (PRECOSE) 50 mg tablet TAKE 1 TABLET BY MOUTH THREE TIMES A DAY 30 MINUTES BEFORE MEALS 90 Tablet 4    polyethylene glycol (MIRALAX) 17 gram packet Take 1 Packet by mouth daily. 50 Packet 5    ARIPiprazole (ABILIFY) 2 mg tablet Take 1 Tablet by mouth daily. albuterol (PROVENTIL HFA, VENTOLIN HFA, PROAIR HFA) 90 mcg/actuation inhaler Take 1 Puff by inhalation every six (6) hours as needed for Wheezing. 1 Inhaler 5    albuterol (PROVENTIL VENTOLIN) 2.5 mg /3 mL (0.083 %) nebu 3 mL by Nebulization route every four (4) hours as needed for Wheezing. 100 Each 1    nystatin (MYCOSTATIN) topical cream Apply  to affected area two (2) times a day. 15 g 0    aspirin 81 mg chewable tablet CHEW AND SWALLOW 1 TAB BY MOUTH DAILY. 90 Tab 3    fluticasone (FLONASE) 50 mcg/actuation nasal spray 2 Sprays by Both Nostrils route daily.  1 Bottle 5    Nebulizer & Compressor machine Use as directed 1 Each 0    ergocalciferol (Vitamin D2) 1,250 mcg (50,000 unit) capsule Take 1 Capsule by mouth every seven (7) days. 4 Capsule 4        Objective:   Vitals: There were no vitals filed for this visit. Lab Data Reviewed:  Lab Results   Component Value Date/Time    WBC 8.1 09/28/2022 08:22 AM    HCT 42.1 09/28/2022 08:22 AM    HGB 13.5 09/28/2022 08:22 AM    PLATELET 483 39/33/0990 08:22 AM       Lab Results   Component Value Date/Time    Sodium 140 09/28/2022 08:22 AM    Potassium 4.2 09/28/2022 08:22 AM    Chloride 107 (H) 09/28/2022 08:22 AM    CO2 18 (L) 09/28/2022 08:22 AM    Glucose 80 09/28/2022 08:22 AM    BUN 20 09/28/2022 08:22 AM    Creatinine 0.77 09/28/2022 08:22 AM    Calcium 8.8 09/28/2022 08:22 AM       No components found for: TROPQUANT    No results found for: LEONEL      Lab Results   Component Value Date/Time    Hemoglobin A1c <3.5 (L) 10/13/2014 05:00 AM    Hemoglobin A1c (POC) 5.1 10/02/2015 09:49 AM    Hemoglobin A1c, External 4.8 01/07/2021 12:00 AM        Lab Results   Component Value Date/Time    Vitamin B12 1,570 (H) 03/30/2022 09:09 AM    Folate 16.1 03/30/2022 09:09 AM       No results found for: LEONEL, ANARX, ANAIGG, XBANA    Lab Results   Component Value Date/Time    Cholesterol, total 175 12/21/2021 11:58 AM    HDL Cholesterol 54 12/21/2021 11:58 AM    LDL, calculated 112 (H) 12/21/2021 11:58 AM    LDL, calculated 125 (H) 02/04/2020 09:07 AM    VLDL, calculated 9 12/21/2021 11:58 AM    VLDL, calculated 11 02/04/2020 09:07 AM    Triglyceride 46 12/21/2021 11:58 AM    CHOL/HDL Ratio 2.4 10/14/2014 03:00 AM         CT Results (recent):  Results from Abstract encounter on 11/05/18    CT ABD PELV W CONT      MRI Results (recent):  Results from Hospital Encounter encounter on 02/03/17    MRI LUMB SPINE WO CONT    Narrative  EXAM:  MRI LUMB SPINE WO CONT    INDICATION:  Lumbar radiculopathy. M 55. 16. Low back and radicular left lower  extremity pain.     COMPARISON: None    TECHNIQUE: MR imaging of the lumbar spine was performed using the following  sequences: sagittal T1, T2, STIR;  axial T1, T2.    CONTRAST:  None. FINDINGS:    Conus position, morphology and signal and normal. There is normal vertebral body  height and bone signal. There is anatomic alignment. No paraspinal soft tissue  mass is shown. Lower thoracic spine: No disc herniation or canal-foraminal stenosis. T12-L1: Normal disc and facets. L1-L2:  Normal disc and facets. L2-L3:  Normal disc height. Minimal diffuse disc bulging. No facet arthropathy  or canal stenosis. Equivocal left foraminal narrowing. L3-L4:  Mild disc space narrowing. Left far lateral annular tear. Minimal  diffuse disc bulging with superimposed broad-based left far lateral disc  protrusion extending into left neural foramen. No facet arthropathy or canal  stenosis. Mild to moderate left foraminal narrowing. L4-L5:  Normal disc height. Minimal diffuse disc bulging and left facet  osteoarthrosis. No canal or substantial foraminal stenosis . L5-S1:  Normal disc height. Minimal-mild diffuse disc bulging, slightly  accentuated in right foraminal region. Mild right facet osteoarthrosis. No canal  stenosis. Minimal right foraminal narrowing. Impression  IMPRESSION:  Mild broad-based left far lateral disc protrusion with annular tear at L3-4  level. IR Results (recent):  No results found for this or any previous visit. VAS/US Results (recent):  Results from Hospital Encounter encounter on 10/22/18    DUPLEX LOWER EXT VENOUS BILAT    Narrative  INDICATION:  Leg swelling, pain, DVT suspected    Duplex venous Doppler examination of the bilateral leg was performed from the  inguinal ligament to the mid-calf. Deep venous structures were well imaged and  appeared compressible throughout. Both wave form and color flow analysis  demonstrated normal flow patterns.   Augmentation was demonstrable. Impression  IMPRESSION:  NORMAL DUPLEX VENOUS DOPPLER EXAMINATION. PHYSICAL EXAM:  General:    Alert, cooperative, no distress, appears stated age. Head:   Normocephalic, without obvious abnormality, atraumatic. Eyes:   Conjunctivae/corneas clear. Nose:  Nares normal.   Throat:    Lips,and tongue normal.    Neck:  Symmetrical,  no adenopathy, thyroid:     no JVD. Back:    Symmetric,    Lungs:   Deferred  Chest wall:   No Accessory muscle use. Heart:   Deferred. Abdomen:    Not distended. Extremities: Extremities normal, atraumatic, No cyanosis. No edema. No clubbing  Skin:      No rashes or lesions. Not Jaundiced  Lymph nodes: Cervical, supraclavicular normal.  Psych:  Good insight. Not depressed. Not anxious or agitated. NEUROLOGICAL EXAM:  Appearance: The patient is well developed, well nourished, provides a coherent history and is in no acute distress. Mental Status: Oriented to time, place and person. Mood and affect appropriate. Cranial Nerves:   Intact visual fields. EOM's full, no nystagmus, no ptosis. Facial movement is symmetric. Hearing is normal bilaterally. . Tongue is midline. Motor:   Moves all extremities. Normal bulk  No fasciculations. Reflexes:   Deferred. Sensory:   Deferred. Gait:  Normal gait. Tremor:   No tremor noted. Cerebellar:  No cerebellar signs present. Assesment  1. Intractable migraine without aura and without status migrainosus  Nurtec    2. Chronic migraine without aura, intractable, without status migrainosus  Emgality    3. Chronic tension-type headache, intractable  Nurtec    4. Migraine with vertigo  Emgality    5. Vitamin D deficiency, unspecified   Replace vitamin D    6. Dizziness  Nurtec  Stable  ___________________________________________________  PLAN: Medication and plan discussed with patient      ICD-10-CM ICD-9-CM    1.  Intractable migraine without aura and without status migrainosus  G43.019 346.11       2. Chronic migraine without aura, intractable, without status migrainosus  G43.719 346.71       3. Chronic tension-type headache, intractable  G44.221 339.12       4. Migraine with vertigo  G43.109 346.80      780.4       5. Vitamin D deficiency, unspecified   E55.9 268.9       6. Dizziness  R42 780.4         Follow-up and Dispositions    Return in about 4 months (around 2/5/2023).              ___________________________________________________    Attending Physician: Ata Hassan MD

## 2022-10-06 DIAGNOSIS — M25.512 LEFT SHOULDER PAIN, UNSPECIFIED CHRONICITY: Primary | ICD-10-CM

## 2022-10-06 DIAGNOSIS — M25.511 RIGHT SHOULDER PAIN, UNSPECIFIED CHRONICITY: ICD-10-CM

## 2022-10-10 ENCOUNTER — TELEPHONE (OUTPATIENT)
Dept: NEUROLOGY | Age: 62
End: 2022-10-10

## 2022-10-10 ENCOUNTER — TELEPHONE (OUTPATIENT)
Dept: INTERNAL MEDICINE CLINIC | Age: 62
End: 2022-10-10

## 2022-10-10 DIAGNOSIS — M25.512 CHRONIC PAIN OF BOTH SHOULDERS: ICD-10-CM

## 2022-10-10 DIAGNOSIS — M25.511 CHRONIC PAIN OF BOTH SHOULDERS: ICD-10-CM

## 2022-10-10 DIAGNOSIS — G89.29 CHRONIC PAIN OF BOTH SHOULDERS: ICD-10-CM

## 2022-10-10 NOTE — TELEPHONE ENCOUNTER
Called pt,verified pt with two pt identifiers, advised pt that  did not cancel the Tizanidine , her PCP d/c the medication. She will have to call that office and see why he did that. [facility] at [facility] and she will work on it. Pt verbalized understanding.

## 2022-10-10 NOTE — TELEPHONE ENCOUNTER
Pt called wanting to know why Dr Torres Bourgeois took her off the Tizanidine 4mg. Pt also stated that she needs PA for her Nurtec. Please advise.    [phone]

## 2022-10-11 ENCOUNTER — TELEPHONE (OUTPATIENT)
Dept: INTERNAL MEDICINE CLINIC | Age: 62
End: 2022-10-11

## 2022-10-11 NOTE — TELEPHONE ENCOUNTER
Spoke with patient - advised that she will have to discuss with Dr Michaele Cranker at her Virtual visit FU with him why the Tizanidine was discontinued by him, and if he will prescribe it for her again if the neurologist wants her to take this medication David Michel LPN  29/86/3588  9:76 PM

## 2022-10-11 NOTE — TELEPHONE ENCOUNTER
----- Message from Tee Jennings sent at 10/10/2022 11:37 AM EDT -----  Subject: Medication Problem    Medication: Other - Tizanidine HCL Tablet  Dosage: 50mg  Ordering Provider: Jose Yepez     Question/Problem: Patient states her Neurologist Reji Palacios asked her to   contact primary care provider in regards to this medication and why she   was taken off this medication. Patient would like to know if provider   could send a script to desired pharmacy.   Additional Information for Provider:     Pharmacy: [facility], [address] [place] [address]    ---------------------------------------------------------------------------  --------------  [address] [facility]  [phone]; OK to leave message on voicemail  ---------------------------------------------------------------------------  --------------    SCRIPT ANSWERS  Relationship to Patient: Self

## 2022-10-11 NOTE — TELEPHONE ENCOUNTER
Spoke with patient and advised her that she can discuss this with DR James Chirinos at her Virtual visit that is already set up for 10/12/2022 and talk about him restarting the Tizanidine as the neurologist wishes  Herb Wren LPN  88/44/7886  7:89 PM

## 2022-10-12 ENCOUNTER — VIRTUAL VISIT (OUTPATIENT)
Dept: INTERNAL MEDICINE CLINIC | Age: 62
End: 2022-10-12
Payer: MEDICARE

## 2022-10-12 DIAGNOSIS — M25.519 SHOULDER PAIN, UNSPECIFIED CHRONICITY, UNSPECIFIED LATERALITY: Primary | ICD-10-CM

## 2022-10-12 DIAGNOSIS — F32.1 MODERATE MAJOR DEPRESSION (HCC): ICD-10-CM

## 2022-10-12 DIAGNOSIS — G43.109 MIGRAINE WITH AURA AND WITHOUT STATUS MIGRAINOSUS, NOT INTRACTABLE: ICD-10-CM

## 2022-10-12 PROCEDURE — 99443 PR PHYS/QHP TELEPHONE EVALUATION 21-30 MIN: CPT | Performed by: FAMILY MEDICINE

## 2022-10-12 RX ORDER — PREDNISONE 20 MG/1
40 TABLET ORAL
Qty: 10 TABLET | Refills: 0 | Status: SHIPPED | OUTPATIENT
Start: 2022-10-12 | End: 2022-10-17

## 2022-10-12 RX ORDER — TIZANIDINE 2 MG/1
2 TABLET ORAL
Qty: 90 TABLET | Refills: 1 | Status: SHIPPED | OUTPATIENT
Start: 2022-10-12

## 2022-10-12 NOTE — PROGRESS NOTES
Chief Complaint   Patient presents with    Depression     FU     Patient has not been out of the country in (14 months), NO diarrhea, NO cough, NO chest conjestion, NO temp. Pt has not been around anyone with these symptoms. Health Maintenance reviewed. I have reviewed the patient's medical history in detail and updated the computerized patient record. 1. Have you been to the ER, urgent care clinic since your last visit? Hospitalized since your last visit? 2. Have you seen or consulted any other health care providers outside of the [address] [place] [address] since your last visit? Include any pap smears or colon screening. Encouraged pt to discuss pt's wishes with spouse/partner/family and bring them in the next appt to follow thru with the Advanced Directive    @  [address], last 12 mths 1/25/2021   Able to walk? Yes   Fall in past 12 months? 0   Do you feel unsteady? 0   Are you worried about falling 0   Number of falls in past 12 months -   Fall with injury?  -       3 most recent PHQ Screens 7/6/2022   Little interest or pleasure in doing things Not at all   Feeling down, depressed, irritable, or hopeless Not at all   Total Score PHQ 2 0   Trouble falling or staying asleep, or sleeping too much Not at all   Feeling tired or having little energy Not at all   Poor appetite, weight loss, or overeating Not at all   Feeling bad about yourself - or that you are a failure or have let yourself or your family down Not at all   Trouble concentrating on things such as school, work, reading, or watching TV Not at all   Moving or speaking so slowly that other people could have noticed; or the opposite being so fidgety that others notice Not at all   Thoughts of being better off dead, or hurting yourself in some way Not at all   PHQ 9 Score 0   How difficult have these problems made it for you to do your work, take care of your home and get along with others Not difficult at all       Abuse Screening Questionnaire 7/6/2022   Do you ever feel afraid of your partner? N   Are you in a relationship with someone who physically or mentally threatens you? N   Is it safe for you to go home?  Y       ADL Assessment 11/9/2021   Feeding yourself No Help Needed   Getting from bed to chair No Help Needed   Getting dressed No Help Needed   Bathing or showering No Help Needed   Walk across the room (includes cane/walker) No Help Needed   Using the telphone No Help Needed   Taking your medications No Help Needed   Preparing meals No Help Needed   Managing money (expenses/bills) No Help Needed   Moderately strenuous housework (laundry) Help Needed   Shopping for personal items (toiletries/medicines) Help Needed   Shopping for groceries No Help Needed   Driving Help Needed   Climbing a flight of stairs Help Needed   Getting to places beyond walking distances Help Needed

## 2022-10-12 NOTE — LETTER
10/13/2022 12:26 PM    Ms. Lozano [place]  [address]. [phone]        Dear Ms. Meneses:    Enclosed you will find your lab orders. Please do not hesitate to contact this office if you have any questions.         Sincerely,      Dewey Jacques MD

## 2022-10-12 NOTE — PROGRESS NOTES
Nellie Peres (: 1960) is a 62 y.o. female, established patient, here for evaluation of the following chief complaint(s):  Depression (FU)       ASSESSMENT/PLAN:  Below is the assessment and plan developed based on review of pertinent history, physical exam, labs, studies, and medications. 1. Shoulder pain, unspecified chronicity, unspecified laterality  -     tiZANidine (ZANAFLEX) 2 mg tablet; Take 1 Tablet by mouth four (4) times daily (with meals). , Normal, Disp-90 Tablet, R-1  -     C REACTIVE PROTEIN, QT; Future  -     SED RATE (ESR); Future  2. Moderate major depression ([facility] [address]  3. Migraine with aura and without status migrainosus, not intractable    Suspect PMR    Orders Placed This Encounter    C REACTIVE PROTEIN, QT     Standing Status:   Future     Standing Expiration Date:   10/12/2023    SED RATE (ESR)     Standing Status:   Future     Standing Expiration Date:   2023    tiZANidine (ZANAFLEX) 2 mg tablet     Sig: Take 1 Tablet by mouth four (4) times daily (with meals). Dispense:  90 Tablet     Refill:  1    predniSONE (DELTASONE) 20 mg tablet     Sig: Take 2 Tablets by mouth daily (with breakfast) for 5 days. Dispense:  10 Tablet     Refill:  0       Discussed possible side affects, precautions, and drug interactions and possible benefits of the medication(s). Return in about 2 weeks (around 10/26/2022) for routine follow up. SUBJECTIVE/OBJECTIVE:  Depression  Associated symptoms include headaches. Seeing psych doing well, seen previous though to be tendonitis. Shoulders cont to hurt, both. No injury to account for pain. Injection did not help much. Going to see ortho, needs to make appt. Tizanidine helps a litlle but both shoulder ache as soon as she moves them. Review of Systems   Constitutional:  Negative for fever and unexpected weight change. Musculoskeletal:  Negative for back pain. Neurological:  Positive for headaches.         Recently put on emgality Psychiatric/Behavioral:  Positive for depression and sleep disturbance.       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    Peripheral vascular disease ([facility]) I73.9    Weight gain status post gastric bypass R63.5, Z98.84    Moderate asthma J45.909    Fibromyalgia affecting lower leg M79.7    BMI 32.0-32.9,adult Z68.32    Other abnormal glucose R73.09     Social History     Socioeconomic History    Marital status:      Spouse name: Not on file    Number of children: 2    Years of education: Not on file    Highest education level: Not on file   Occupational History    Occupation: retired   Tobacco Use    Smoking status: Never    Smokeless tobacco: Never   Vaping Use    Vaping Use: Never used   Substance and Sexual Activity    Alcohol use: No    Drug use: No    Sexual activity: Never   Other Topics Concern    Not on file   Social History Narrative     has brain damage, lives with him     Social Determinants of Health     Financial Resource Strain: Low Risk     Difficulty of Paying Living Expenses: Not hard at all   Food Insecurity: No Food Insecurity    Worried About Running Out of Food in the Last Year: Never true    Ran Out of Food in the Last Year: Never true   Transportation Needs: Not on file   Physical Activity: Not on file   Stress: Not on file   Social Connections: Not on file   Intimate Partner Violence: Not on file   Housing Stability: Not on file       Physical Exam    Virtual  RE X-rays and labs    On this date 10/12/2022 I have spent 24 minutes reviewing previous notes, test results and face to face with the patient discussing the diagnosis and importance of compliance with the treatment plan as well as documenting on the day of the visit. An electronic signature was used to authenticate this note. -- Yuko Ordonez MD     Samantha French is a 62 y.o. female who was phone evaluated on 10/12/2022. Consent:  She and/or her healthcare decision maker is aware that this patient-initiated Telehealth encounter is a billable service, with coverage as determined by her insurance carrier. She is aware that she may receive a bill and has provided verbal consent to proceed: Yes    I was in the office while conducting this encounter. Assessment & Plan:   Diagnoses and all orders for this visit:    1. Shoulder pain, unspecified chronicity, unspecified laterality  -     tiZANidine (ZANAFLEX) 2 mg tablet; Take 1 Tablet by mouth four (4) times daily (with meals). -     C REACTIVE PROTEIN, QT; Future  -     SED RATE (ESR); Future    2. Moderate major depression ([facility] [address]    3. Migraine with aura and without status migrainosus, not intractable    Other orders  -     predniSONE (DELTASONE) 20 mg tablet; Take 2 Tablets by mouth daily (with breakfast) for 5 days. Follow-up and Dispositions    Return in about 1 week (around 10/19/2022) for routine follow up. 712  Subjective:        As above    We discussed the expected course, resolution and complications of the diagnosis(es) in detail. Medication risks, benefits, costs, interactions, and alternatives were discussed as indicated. I advised her to contact the office if her condition worsens, changes or fails to improve as anticipated. She expressed understanding with the diagnosis(es) and plan. Pursuant to the emergency declaration under the [facility] waiver authority and the [employer] and [employer] Act, this Virtual  Visit was conducted, with patient's consent, to reduce the patient's risk of exposure to COVID-19 and provide continuity of care for an established patient. Services were provided through a video synchronous discussion virtually to substitute for in-person clinic visit.     Tad Keyes MD

## 2022-10-21 LAB
CRP SERPL-MCNC: 1 MG/L (ref 0–10)
ERYTHROCYTE [SEDIMENTATION RATE] IN BLOOD BY WESTERGREN METHOD: 7 MM/HR (ref 0–40)

## 2022-10-26 ENCOUNTER — OFFICE VISIT (OUTPATIENT)
Dept: INTERNAL MEDICINE CLINIC | Age: 62
End: 2022-10-26
Payer: MEDICARE

## 2022-10-26 VITALS
SYSTOLIC BLOOD PRESSURE: 91 MMHG | HEIGHT: 57 IN | BODY MASS INDEX: 38.19 KG/M2 | OXYGEN SATURATION: 96 % | WEIGHT: 177 LBS | RESPIRATION RATE: 16 BRPM | DIASTOLIC BLOOD PRESSURE: 64 MMHG | TEMPERATURE: 98.4 F | HEART RATE: 74 BPM

## 2022-10-26 DIAGNOSIS — F31.30 BIPOLAR AFFECTIVE DISORDER, CURRENT EPISODE DEPRESSED, CURRENT EPISODE SEVERITY UNSPECIFIED (HCC): ICD-10-CM

## 2022-10-26 DIAGNOSIS — M79.7 FIBROMYALGIA: ICD-10-CM

## 2022-10-26 DIAGNOSIS — Z01.818 PREOPERATIVE GENERAL PHYSICAL EXAMINATION: Primary | ICD-10-CM

## 2022-10-26 PROCEDURE — 3017F COLORECTAL CA SCREEN DOC REV: CPT | Performed by: FAMILY MEDICINE

## 2022-10-26 PROCEDURE — G8427 DOCREV CUR MEDS BY ELIG CLIN: HCPCS | Performed by: FAMILY MEDICINE

## 2022-10-26 PROCEDURE — G9899 SCRN MAM PERF RSLTS DOC: HCPCS | Performed by: FAMILY MEDICINE

## 2022-10-26 PROCEDURE — G8417 CALC BMI ABV UP PARAM F/U: HCPCS | Performed by: FAMILY MEDICINE

## 2022-10-26 PROCEDURE — G9717 DOC PT DX DEP/BP F/U NT REQ: HCPCS | Performed by: FAMILY MEDICINE

## 2022-10-26 PROCEDURE — 99214 OFFICE O/P EST MOD 30 MIN: CPT | Performed by: FAMILY MEDICINE

## 2022-10-26 NOTE — PROGRESS NOTES
Chief Complaint   Patient presents with    Documentation     Documentation to fill out with [id number]     Patient has not been out of the country in (14 months), NO diarrhea, NO cough, NO chest conjestion, NO temp. Pt has not been around anyone with these symptoms. Health Maintenance reviewed. I have reviewed the patient's medical history in detail and updated the computerized patient record. 1. Have you been to the ER, urgent care clinic since your last visit? No Hospitalized since your last visit?  no    2. Have you seen or consulted any other health care providers outside of the [address] [place] [address] since your last visit? no  Include any pap smears or colon screening. Encouraged pt to discuss pt's wishes with spouse/partner/family and bring them in the next appt to follow thru with the Advanced Directive    @  [facility], last 12 mths 1/25/2021   Able to walk? Yes   Fall in past 12 months? 0   Do you feel unsteady? 0   Are you worried about falling 0   Number of falls in past 12 months -   Fall with injury?  -       3 most recent PHQ Screens 7/6/2022   Little interest or pleasure in doing things Not at all   Feeling down, depressed, irritable, or hopeless Not at all   Total Score PHQ 2 0   Trouble falling or staying asleep, or sleeping too much Not at all   Feeling tired or having little energy Not at all   Poor appetite, weight loss, or overeating Not at all   Feeling bad about yourself - or that you are a failure or have let yourself or your family down Not at all   Trouble concentrating on things such as school, work, reading, or watching TV Not at all   Moving or speaking so slowly that other people could have noticed; or the opposite being so fidgety that others notice Not at all   Thoughts of being better off dead, or hurting yourself in some way Not at all   PHQ 9 Score 0   How difficult have these problems made it for you to do your work, take care of your home and get along with others Not difficult at all       Abuse Screening Questionnaire 7/6/2022   Do you ever feel afraid of your partner? N   Are you in a relationship with someone who physically or mentally threatens you? N   Is it safe for you to go home?  Y       ADL Assessment 11/9/2021   Feeding yourself No Help Needed   Getting from bed to chair No Help Needed   Getting dressed No Help Needed   Bathing or showering No Help Needed   Walk across the room (includes cane/walker) No Help Needed   Using the telphone No Help Needed   Taking your medications No Help Needed   Preparing meals No Help Needed   Managing money (expenses/bills) No Help Needed   Moderately strenuous housework (laundry) Help Needed   Shopping for personal items (toiletries/medicines) Help Needed   Shopping for groceries No Help Needed   Driving Help Needed   Climbing a flight of stairs Help Needed   Getting to places beyond walking distances Help Needed

## 2022-10-26 NOTE — PROGRESS NOTES
Preoperative Evaluation    Date of Exam: 10/26/2022    Cheryl Garcia is a 62 y.o. female (:1960) who presents for preoperative evaluation. Reports taking blood pressure medications without side affects. No complaints of exertional chest pain, excessive shortness of breath or focal weakness. Minimal swelling in lower legs or dizziness with standing. Shoulders cont to hurt w/up was neg    Latex Allergy: no    Problem List:     Patient Active Problem List    Diagnosis Date Noted    Other abnormal glucose 2022    BMI 32.0-32.9,adult 2022    Fibromyalgia affecting lower leg 2021    Moderate asthma 10/18/2020    Peripheral vascular disease ([facility] 10/04/2019    Weight gain status post gastric bypass 10/04/2019    Nesidioblastosis 2019    SSS (sick sinus syndrome) ([facility] 2018    Gastroesophageal reflux disease without esophagitis 2016    Bipolar disorder with current episode depressed ([facility] 2016    Migraine headache with aura 10/24/2014    Hypoglycemia 10/12/2014    History of gastric bypass 10/12/2014     Medical History:     Past Medical History:   Diagnosis Date    Asthma     Chest pain     Depression     Diabetes ([address]     Diarrhea     Essential hypertension     Fainting     Fatigue     Hearing loss     Hypoglycemia     Hypotension     Leg swelling     Memory disorder     Murmur     Nausea and vomiting     Pacemaker     Ringing in ears     SOB (shortness of breath)     Stomach pain     Swallowing difficulty     Syncope and collapse 7/24/15    RTH    Unintentional weight change      Allergies:      Allergies   Allergen Reactions    Benadryl [Diphenhydramine Hcl] Nausea and Vomiting    Ibuprofen Nausea and Vomiting    Sulfamethoxazole-Trimethoprim Nausea Only    Tramadol Anxiety    Diclofenac Itching    Gabapentin Anxiety    Sulfamethoxazole Nausea Only    Zinc Acetate Other (comments)      Medications:     Current Outpatient Medications   Medication Sig tiZANidine (ZANAFLEX) 2 mg tablet Take 1 Tablet by mouth four (4) times daily (with meals). rimegepant (NURTEC) 75 mg disintegrating tablet Take 1 tablet p.o. every other day then 1 tablet p.o. as needed for severe headache not to exceed 1 dose in 24 hours    galcanezumab-gnlm (Emgality Syringe) 120 mg/mL syrg 120 mg by SubCUTAneous route every thirty (30) days. topiramate (TOPAMAX) 50 mg tablet Take 1 Tablet by mouth two (2) times daily (with meals). ergocalciferol (Vitamin D2) 1,250 mcg (50,000 unit) capsule Take 1 Capsule by mouth every seven (7) days. traZODone (DESYREL) 50 mg tablet Take 2 Tablets by mouth nightly. furosemide (LASIX) 20 mg tablet Take 1 Tablet by mouth nightly. As needed    potassium chloride SR (KLOR-CON 10) 10 mEq tablet Take 1 Tablet by mouth daily. sertraline (ZOLOFT) 100 mg tablet Take 1 Tablet by mouth daily. meclizine (ANTIVERT) 25 mg chewable tablet Take 1 Tablet by mouth three (3) times daily as needed for Dizziness. cyanocobalamin ER 1,000 mcg tablet Take 1 Tablet by mouth daily. cholecalciferol (VITAMIN D3) (1000 Units /25 mcg) tablet Take 1 Tablet by mouth daily. omeprazole (PRILOSEC) 40 mg capsule Take 1 Capsule by mouth daily. acarbose (PRECOSE) 50 mg tablet TAKE 1 TABLET BY MOUTH THREE TIMES A DAY 30 MINUTES BEFORE MEALS    polyethylene glycol (MIRALAX) 17 gram packet Take 1 Packet by mouth daily. ARIPiprazole (ABILIFY) 2 mg tablet Take 1 Tablet by mouth daily. albuterol (PROVENTIL HFA, VENTOLIN HFA, PROAIR HFA) 90 mcg/actuation inhaler Take 1 Puff by inhalation every six (6) hours as needed for Wheezing. albuterol (PROVENTIL VENTOLIN) 2.5 mg /3 mL (0.083 %) nebu 3 mL by Nebulization route every four (4) hours as needed for Wheezing. nystatin (MYCOSTATIN) topical cream Apply  to affected area two (2) times a day. aspirin 81 mg chewable tablet CHEW AND SWALLOW 1 TAB BY MOUTH DAILY.     fluticasone (FLONASE) 50 mcg/actuation nasal spray 2 Sprays by Both Nostrils route daily. Nebulizer & Compressor machine Use as directed     No current facility-administered medications for this visit. Surgical History:     Past Surgical History:   Procedure Laterality Date    HX ABDOMINAL LAPAROSCOPY      HX CARPAL TUNNEL RELEASE Bilateral more than 10 years ago    HX  SECTION  7457,8059    HX COLONOSCOPY  2016    HX GASTRIC BYPASS  1997    x2    HX HYSTERECTOMY  1985    HX PACEMAKER      HX TONSIL AND ADENOIDECTOMY  more than 10 years ago    NEUROLOGICAL PROCEDURE UNLISTED      Bi CTS     Social History:     Social History     Socioeconomic History    Marital status:     Number of children: 2   Occupational History    Occupation: retired   Tobacco Use    Smoking status: Never    Smokeless tobacco: Never   Vaping Use    Vaping Use: Never used   Substance and Sexual Activity    Alcohol use: No    Drug use: No    Sexual activity: Never   Social History Narrative     has brain damage, lives with him     Social Determinants of Health     Financial Resource Strain: Low Risk     Difficulty of Paying Living Expenses: Not hard at all   Food Insecurity: No Food Insecurity    Worried About Running Out of Food in the Last Year: Never true    [address] in the Last Year: Never true       Anesthesia Complications: None  History of abnormal bleeding : None  History of Blood Transfusions: yes during previous surgery long time ago  Health Care Directive or Living Will: no    Objective:     Visit Vitals  BP 91/64 (BP 1 Location: Left arm, BP Patient Position: Sitting, BP Cuff Size: Adult)   Pulse 74   Temp 98.4 °F (36.9 °C) (Temporal)   Resp 16   Ht 4' 9\" (1.448 m)   Wt 177 lb (80.3 kg)   SpO2 96%   BMI 38.30 kg/m²     WD WN female NAD  Heart RRR without murmers clicks or rubs  Lungs CTA  Abdo soft nontender  Ext no edema        DIAGNOSTICS:   1. EKG: EKG FINDINGS - NI  2. CXR: NI  3.  Labs:   Lab Results   Component Value Date/Time WBC 8.1 09/28/2022 08:22 AM    HGB 13.5 09/28/2022 08:22 AM    Hemoglobin (POC) 13.2 03/28/2016 03:29 PM    HCT 42.1 09/28/2022 08:22 AM    PLATELET 581 81/43/1556 08:22 AM    MCV 88 09/28/2022 08:22 AM     Lab Results   Component Value Date/Time    GFR est non-AA 99 02/22/2022 10:53 AM    GFR est  02/22/2022 10:53 AM    Creatinine 0.77 09/28/2022 08:22 AM    BUN 20 09/28/2022 08:22 AM    Sodium 140 09/28/2022 08:22 AM    Potassium 4.2 09/28/2022 08:22 AM    Chloride 107 (H) 09/28/2022 08:22 AM    CO2 18 (L) 09/28/2022 08:22 AM    Magnesium 2.4 (H) 03/30/2022 09:09 AM    PTH, Intact 39 03/30/2022 09:09 AM     Lab Results   Component Value Date/Time    Glucose 80 09/28/2022 08:22 AM    Glucose (POC) 162 (H) 12/06/2018 08:32 AM    Glucose POC 70 05/13/2019 10:55 AM        IMPRESSION:   Low risk for planned surgery  No contraindications to planned surgery    Patrick Mark MD   10/26/2022

## 2022-11-01 ENCOUNTER — OFFICE VISIT (OUTPATIENT)
Dept: INTERNAL MEDICINE CLINIC | Age: 62
End: 2022-11-01
Payer: MEDICARE

## 2022-11-01 VITALS
WEIGHT: 178 LBS | HEART RATE: 92 BPM | BODY MASS INDEX: 38.4 KG/M2 | HEIGHT: 57 IN | OXYGEN SATURATION: 96 % | SYSTOLIC BLOOD PRESSURE: 139 MMHG | DIASTOLIC BLOOD PRESSURE: 88 MMHG | RESPIRATION RATE: 16 BRPM | TEMPERATURE: 98.3 F

## 2022-11-01 DIAGNOSIS — M79.603 UPPER EXTREMITY PAIN, INFERIOR, UNSPECIFIED LATERALITY: Primary | ICD-10-CM

## 2022-11-01 PROCEDURE — 3017F COLORECTAL CA SCREEN DOC REV: CPT | Performed by: FAMILY MEDICINE

## 2022-11-01 PROCEDURE — G8417 CALC BMI ABV UP PARAM F/U: HCPCS | Performed by: FAMILY MEDICINE

## 2022-11-01 PROCEDURE — G9717 DOC PT DX DEP/BP F/U NT REQ: HCPCS | Performed by: FAMILY MEDICINE

## 2022-11-01 PROCEDURE — G9899 SCRN MAM PERF RSLTS DOC: HCPCS | Performed by: FAMILY MEDICINE

## 2022-11-01 PROCEDURE — G8427 DOCREV CUR MEDS BY ELIG CLIN: HCPCS | Performed by: FAMILY MEDICINE

## 2022-11-01 PROCEDURE — 99213 OFFICE O/P EST LOW 20 MIN: CPT | Performed by: FAMILY MEDICINE

## 2022-11-01 RX ORDER — DICLOFENAC SODIUM 10 MG/G
GEL TOPICAL 4 TIMES DAILY
Qty: 100 G | Refills: 2 | Status: SHIPPED | OUTPATIENT
Start: 2022-11-01

## 2022-11-01 NOTE — PROGRESS NOTES
HISTORY OF PRESENT Sherrie Mccauley is a 62 y.o. female. Arm Pain   The history is provided by the Patient. This is a new problem. The current episode started more than 1 week ago. The problem occurs constantly. The problem has not changed since onset. The pain is present in the right arm and left arm. The pain is severe. Treatments tried: steroid shot. The treatment provided no relief. Mouth Pain      ROS  No fall  Social History     Socioeconomic History    Marital status:      Spouse name: Not on file    Number of children: 2    Years of education: Not on file    Highest education level: Not on file   Occupational History    Occupation: retired   Tobacco Use    Smoking status: Never    Smokeless tobacco: Never   Vaping Use    Vaping Use: Never used   Substance and Sexual Activity    Alcohol use: No    Drug use: No    Sexual activity: Never   Other Topics Concern    Not on file   Social History Narrative     has brain damage, lives with him     Social Determinants of Health     Financial Resource Strain: Low Risk     Difficulty of Paying Living Expenses: Not hard at all   Food Insecurity: No Food Insecurity    Worried About Running Out of Food in the Last Year: Never true    Ran Out of Food in the Last Year: Never true   Transportation Needs: Not on file   Physical Activity: Not on file   Stress: Not on file   Social Connections: Not on file   Intimate Partner Violence: Not on file   Housing Stability: Not on file       Physical Exam  Visit Vitals  /88 (BP 1 Location: Left arm, BP Patient Position: Sitting, BP Cuff Size: Adult)   Pulse 92   Temp 98.3 °F (36.8 °C) (Temporal)   Resp 16   Ht 4' 9\" (1.448 m)   Wt 178 lb (80.7 kg)   SpO2 96%   BMI 38.52 kg/m²     Reflexes 2+  5/5  ASSESSMENT and PLAN  Encounter Diagnoses   Name Primary?     Upper extremity pain, inferior, unspecified laterality Yes     Orders Placed This Encounter    diclofenac (VOLTAREN) 1 % gel     Go see ortho

## 2022-11-01 NOTE — PROGRESS NOTES
Chief Complaint   Patient presents with    Arm Pain     L/R pain, pt cannot sleep at night      Mouth Pain     Teeth pain     Patient has not been out of the country in (14 months), NO diarrhea, NO cough, NO chest conjestion, NO temp. Pt has not been around anyone with these symptoms. Health Maintenance reviewed. I have reviewed the patient's medical history in detail and updated the computerized patient record. 1. Have you been to the ER, urgent care clinic since your last visit? No  Hospitalized since your last visit? No     2. Have you seen or consulted any other health care providers outside of the [address] [place] [address] since your last visit? No Include any pap smears or colon screening. Encouraged pt to discuss pt's wishes with spouse/partner/family and bring them in the next appt to follow thru with the Advanced Directive    @  [address] [facility] [address], last 12 mths 1/25/2021   Able to walk? Yes   Fall in past 12 months? 0   Do you feel unsteady? 0   Are you worried about falling 0   Number of falls in past 12 months -   Fall with injury?  -       3 most recent PHQ Screens 7/6/2022   Little interest or pleasure in doing things Not at all   Feeling down, depressed, irritable, or hopeless Not at all   Total Score PHQ 2 0   Trouble falling or staying asleep, or sleeping too much Not at all   Feeling tired or having little energy Not at all   Poor appetite, weight loss, or overeating Not at all   Feeling bad about yourself - or that you are a failure or have let yourself or your family down Not at all   Trouble concentrating on things such as school, work, reading, or watching TV Not at all   Moving or speaking so slowly that other people could have noticed; or the opposite being so fidgety that others notice Not at all   Thoughts of being better off dead, or hurting yourself in some way Not at all   PHQ 9 Score 0   How difficult have these problems made it for you to do your work, take care of your home and get along with others Not difficult at all       Abuse Screening Questionnaire 7/6/2022   Do you ever feel afraid of your partner? N   Are you in a relationship with someone who physically or mentally threatens you? N   Is it safe for you to go home?  Y       ADL Assessment 11/9/2021   Feeding yourself No Help Needed   Getting from bed to chair No Help Needed   Getting dressed No Help Needed   Bathing or showering No Help Needed   Walk across the room (includes cane/walker) No Help Needed   Using the telphone No Help Needed   Taking your medications No Help Needed   Preparing meals No Help Needed   Managing money (expenses/bills) No Help Needed   Moderately strenuous housework (laundry) Help Needed   Shopping for personal items (toiletries/medicines) Help Needed   Shopping for groceries No Help Needed   Driving Help Needed   Climbing a flight of stairs Help Needed   Getting to places beyond walking distances Help Needed

## 2022-11-07 DIAGNOSIS — G44.221 CHRONIC TENSION-TYPE HEADACHE, INTRACTABLE: ICD-10-CM

## 2022-11-07 DIAGNOSIS — G43.009 MIGRAINE WITHOUT AURA AND WITHOUT STATUS MIGRAINOSUS, NOT INTRACTABLE: ICD-10-CM

## 2022-11-07 NOTE — TELEPHONE ENCOUNTER
Pt called for refill of:    Requested Prescriptions     Pending Prescriptions Disp Refills    rimegepant (NURTEC) 75 mg disintegrating tablet 16 Tablet 11     Sig: Take 1 tablet p.o. every other day then 1 tablet p.o. as needed for severe headache not to exceed 1 dose in 24 hours    topiramate (TOPAMAX) 50 mg tablet 180 Tablet 3     Sig: Take 1 Tablet by mouth two (2) times daily (with meals). butalbital-acetaminophen-caffeine (FIORICET, ESGIC) -40 mg per tablet 30 Tablet 5     Sig: TAKE 1 TABLET BY MOUTH EVERY SIX HOURS AS NEEDED FOR HEADACHE. Please send to [address].

## 2022-11-08 RX ORDER — TOPIRAMATE 50 MG/1
50 TABLET, FILM COATED ORAL 2 TIMES DAILY WITH MEALS
Qty: 180 TABLET | Refills: 3 | Status: SHIPPED | OUTPATIENT
Start: 2022-11-08

## 2022-11-08 RX ORDER — BUTALBITAL, ACETAMINOPHEN AND CAFFEINE 50; 325; 40 MG/1; MG/1; MG/1
TABLET ORAL
Qty: 30 TABLET | Refills: 5 | Status: SHIPPED | OUTPATIENT
Start: 2022-11-08

## 2022-11-21 NOTE — PROGRESS NOTES
Chief Complaint   Patient presents with    Arm Pain     L/R arm pain     Patient has not been out of the country in (14 months), NO diarrhea, NO cough, NO chest conjestion, NO temp. Pt has not been around anyone with these symptoms. Health Maintenance reviewed. I have reviewed the patient's medical history in detail and updated the computerized patient record. 1. Have you been to the ER, urgent care clinic since your last visit? No Hospitalized since your last visit?  no    2. Have you seen or consulted any other health care providers outside of the [address] [place] [address] since your last visit? No Include any pap smears or colon screening. Encouraged pt to discuss pt's wishes with spouse/partner/family and bring them in the next appt to follow thru with the Advanced Directive    @  [address] [facility] [address], last 12 mths 1/25/2021   Able to walk? Yes   Fall in past 12 months? 0   Do you feel unsteady? 0   Are you worried about falling 0   Number of falls in past 12 months -   Fall with injury?  -       3 most recent PHQ Screens 7/6/2022   Little interest or pleasure in doing things Not at all   Feeling down, depressed, irritable, or hopeless Not at all   Total Score PHQ 2 0   Trouble falling or staying asleep, or sleeping too much Not at all   Feeling tired or having little energy Not at all   Poor appetite, weight loss, or overeating Not at all   Feeling bad about yourself - or that you are a failure or have let yourself or your family down Not at all   Trouble concentrating on things such as school, work, reading, or watching TV Not at all   Moving or speaking so slowly that other people could have noticed; or the opposite being so fidgety that others notice Not at all   Thoughts of being better off dead, or hurting yourself in some way Not at all   PHQ 9 Score 0   How difficult have these problems made it for you to do your work, take care of your home and get along with others Not difficult at all Abuse Screening Questionnaire 7/6/2022   Do you ever feel afraid of your partner? N   Are you in a relationship with someone who physically or mentally threatens you? N   Is it safe for you to go home? Y       ADL Assessment 11/9/2021   Feeding yourself No Help Needed   Getting from bed to chair No Help Needed   Getting dressed No Help Needed   Bathing or showering No Help Needed   Walk across the room (includes cane/walker) No Help Needed   Using the telphone No Help Needed   Taking your medications No Help Needed   Preparing meals No Help Needed   Managing money (expenses/bills) No Help Needed   Moderately strenuous housework (laundry) Help Needed   Shopping for personal items (toiletries/medicines) Help Needed   Shopping for groceries No Help Needed   Driving Help Needed   Climbing a flight of stairs Help Needed   Getting to places beyond walking distances Help Needed       [facility] PRIMARY CARE  OFFICE PROCEDURE PROGRESS NOTE        Chart reviewed for the following:   Jeffery uRiz LPN, have reviewed the History, Physical and updated the Allergic reactions for [employer]     TIME OUT performed immediately prior to start of procedure:   IAb LPN, have performed the following reviews on [employer] prior to the start of the procedure:            * Patient was identified by name and date of birth   * Agreement on procedure being performed was verified  * Risks and Benefits explained to the patient by Luis Magaña. Gaudencio Willoughby MD  * Procedure site verified and marked as necessary  * Patient was positioned for comfort  * Consent was signed and verified     Time: 9:10 AM      Date of procedure: 9/23/2022    Procedure performed by:  Lizet Rollins MD    Provider assisted by:  Sudarshan Espinoza LPN     Patient assisted by: Glory Espinzoa LPN     How tolerated by patient: Well    Post Procedural Pain Scale: 8/10    Comments: None    Dr. Gaudencio Willoughby did the procedure [facility]: I discussed with the patient the risks of risankizumab-rzaa including but not limited to immunosuppression, and serious infections.  The patient understands that monitoring is required including a PPD at baseline and must alert us or the primary physician if symptoms of infection or other concerning signs are noted.

## 2022-12-29 ENCOUNTER — VIRTUAL VISIT (OUTPATIENT)
Dept: INTERNAL MEDICINE CLINIC | Age: 62
End: 2022-12-29
Payer: MEDICARE

## 2022-12-29 DIAGNOSIS — J06.9 VIRAL URI: Primary | ICD-10-CM

## 2022-12-29 NOTE — PROGRESS NOTES
Chief Complaint   Patient presents with    Cough     Tested negative for Covid 3 times - denies fever, no congestion - started 2 days ago

## 2022-12-29 NOTE — PROGRESS NOTES
Subjective:   Hira Givens is a 65 y.o. female who complains of congestion and dry cough for 2 days,   She is in [address] since that time. Had a bad concussion and open left ankle Fx  She denies a history of fevers and shortness of breath. Evaluation to date: COVID neg. Treatment to date: none. Patient does not smoke cigarettes. Relevant PMH: No pertinent additional PMH. Allergies   Allergen Reactions    Benadryl [Diphenhydramine Hcl] Nausea and Vomiting    Ibuprofen Nausea and Vomiting    Sulfamethoxazole-Trimethoprim Nausea Only    Tramadol Anxiety    Diclofenac Itching    Gabapentin Anxiety    Sulfamethoxazole Nausea Only    Zinc Acetate Other (comments)         Patient Active Problem List    Diagnosis Date Noted    Other abnormal glucose 03/22/2022    BMI 32.0-32.9,adult 02/18/2022    Fibromyalgia affecting lower leg 07/12/2021    Moderate asthma 10/18/2020    Peripheral vascular disease ([facility] 10/04/2019    Weight gain status post gastric bypass 10/04/2019    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 every other day then 1 tablet p.o. as needed for severe headache not to exceed 1 dose in 24 hours 16 Tablet 11    topiramate (TOPAMAX) 50 mg tablet Take 1 Tablet by mouth two (2) times daily (with meals). 180 Tablet 3    butalbital-acetaminophen-caffeine (FIORICET, ESGIC) -40 mg per tablet TAKE 1 TABLET BY MOUTH EVERY SIX HOURS AS NEEDED FOR HEADACHE. 30 Tablet 5    diclofenac (VOLTAREN) 1 % gel Apply  to affected area four (4) times daily.  100 g 2    tiZANidine (ZANAFLEX) 2 mg tablet Take 1 Tablet by mouth four (4) times daily (with meals). 90 Tablet 1    galcanezumab-gnlm (Emgality Syringe) 120 mg/mL syrg 120 mg by SubCUTAneous route every thirty (30) days. 1 Each 11    ergocalciferol (Vitamin D2) 1,250 mcg (50,000 unit) capsule Take 1 Capsule by mouth every seven (7) days. 12 Capsule 4    traZODone (DESYREL) 50 mg tablet Take 2 Tablets by mouth nightly. 180 Tablet 3    furosemide (LASIX) 20 mg tablet Take 1 Tablet by mouth nightly. As needed 90 Tablet 1    potassium chloride SR (KLOR-CON 10) 10 mEq tablet Take 1 Tablet by mouth daily. 90 Tablet 1    sertraline (ZOLOFT) 100 mg tablet Take 1 Tablet by mouth daily. 90 Tablet 3    meclizine (ANTIVERT) 25 mg chewable tablet Take 1 Tablet by mouth three (3) times daily as needed for Dizziness. 60 Tablet 1    cyanocobalamin ER 1,000 mcg tablet Take 1 Tablet by mouth daily. 90 Tablet 3    cholecalciferol (VITAMIN D3) (1000 Units /25 mcg) tablet Take 1 Tablet by mouth daily. 90 Tablet 3    omeprazole (PRILOSEC) 40 mg capsule Take 1 Capsule by mouth daily. 90 Capsule 1    acarbose (PRECOSE) 50 mg tablet TAKE 1 TABLET BY MOUTH THREE TIMES A DAY 30 MINUTES BEFORE MEALS 90 Tablet 4    polyethylene glycol (MIRALAX) 17 gram packet Take 1 Packet by mouth daily. 50 Packet 5    ARIPiprazole (ABILIFY) 2 mg tablet Take 1 Tablet by mouth daily. albuterol (PROVENTIL HFA, VENTOLIN HFA, PROAIR HFA) 90 mcg/actuation inhaler Take 1 Puff by inhalation every six (6) hours as needed for Wheezing. 1 Inhaler 5    albuterol (PROVENTIL VENTOLIN) 2.5 mg /3 mL (0.083 %) nebu 3 mL by Nebulization route every four (4) hours as needed for Wheezing. 100 Each 1    nystatin (MYCOSTATIN) topical cream Apply  to affected area two (2) times a day. 15 g 0    aspirin 81 mg chewable tablet CHEW AND SWALLOW 1 TAB BY MOUTH DAILY. 90 Tab 3    fluticasone (FLONASE) 50 mcg/actuation nasal spray 2 Sprays by Both Nostrils route daily.  1 Bottle 5    Nebulizer & Compressor machine Use as directed 1 Each 0     Allergies   Allergen Reactions    Benadryl [Diphenhydramine Hcl] Nausea and Vomiting    Ibuprofen Nausea and Vomiting    Sulfamethoxazole-Trimethoprim Nausea Only    Tramadol Anxiety    Diclofenac Itching    Gabapentin Anxiety    Sulfamethoxazole Nausea Only    Zinc Acetate Other (comments)     Past Medical History:   Diagnosis Date    Asthma     Chest pain     Depression     Diabetes ([address]     Diarrhea     Essential hypertension     Fainting     Fatigue     Hearing loss     Hypoglycemia 2014    Hypotension 2014    Leg swelling     Memory disorder     Murmur     Nausea and vomiting     Pacemaker     Ringing in ears     SOB (shortness of breath)     Stomach pain     Swallowing difficulty     Syncope and collapse 7/24/15    RTH    Unintentional weight change      Social History     Tobacco Use    Smoking status: Never    Smokeless tobacco: Never   Substance Use Topics    Alcohol use: No        Review of Systems  Pertinent items are noted in HPI. Objective: There were no vitals taken for this visit. Assessment/Plan:   viral upper respiratory illness vs bronchitis  Currently admitted to [facility] with the nurse who will need to speak with the [facility] doc      Romana Gagnon is a 65 y.o. female who was phone evaluated on 12/29/2022. Consent:  She and/or her healthcare decision maker is aware that this patient-initiated Telehealth encounter is a billable service, with coverage as determined by her insurance carrier. She is aware that she may receive a bill and has provided verbal consent to proceed: Yes    I was at home while conducting this encounter. Assessment & Plan:       ICD-10-CM ICD-9-CM    1. Viral URI  J06.9 465.9               712  Subjective:      11 min      We discussed the expected course, resolution and complications of the diagnosis(es) in detail. Medication risks, benefits, costs, interactions, and alternatives were discussed as indicated.   I advised her to contact the office if her condition worsens, changes or fails to improve as anticipated. She expressed understanding with the diagnosis(es) and plan. Pursuant to the emergency declaration under the [facility], [facility] waiver authority and the [employer] Act, this Virtual  Visit was conducted, with patient's consent, to reduce the patient's risk of exposure to COVID-19 and provide continuity of care for an established patient. Services were provided through a video synchronous discussion virtually to substitute for in-person clinic visit.     Junito Lorenzo MD

## 2023-01-10 ENCOUNTER — TELEPHONE (OUTPATIENT)
Dept: NEUROLOGY | Age: 63
End: 2023-01-10

## 2023-02-09 ENCOUNTER — TELEPHONE (OUTPATIENT)
Dept: NEUROLOGY | Age: 63
End: 2023-02-09

## 2023-02-09 NOTE — TELEPHONE ENCOUNTER
Sent Nurtec approval 16 per 30 days [facility]

## 2023-03-06 NOTE — PROGRESS NOTES
1. Have you been to the ER, urgent care clinic since your last visit? Hospitalized since your last visit? Yes, 2/15/22, ED, Migraine without Aura    2. Have you seen or consulted any other health care providers outside of the [address] [place] [address] since your last visit? Include any pap smears or colon screening.  No         Chief Complaint   Patient presents with    Heart Problem     C/O  CP, Blacked out, SOB, Headache vm left for pt to call back

## 2023-03-17 ENCOUNTER — OFFICE VISIT (OUTPATIENT)
Dept: INTERNAL MEDICINE CLINIC | Age: 63
End: 2023-03-17
Payer: MEDICARE

## 2023-03-17 VITALS
TEMPERATURE: 98.6 F | OXYGEN SATURATION: 95 % | HEIGHT: 57 IN | RESPIRATION RATE: 18 BRPM | DIASTOLIC BLOOD PRESSURE: 89 MMHG | BODY MASS INDEX: 32.36 KG/M2 | SYSTOLIC BLOOD PRESSURE: 134 MMHG | WEIGHT: 150 LBS | HEART RATE: 97 BPM

## 2023-03-17 DIAGNOSIS — B37.2 CANDIDAL DERMATITIS: Primary | ICD-10-CM

## 2023-03-17 PROCEDURE — G8427 DOCREV CUR MEDS BY ELIG CLIN: HCPCS | Performed by: NURSE PRACTITIONER

## 2023-03-17 PROCEDURE — G8417 CALC BMI ABV UP PARAM F/U: HCPCS | Performed by: NURSE PRACTITIONER

## 2023-03-17 PROCEDURE — G9717 DOC PT DX DEP/BP F/U NT REQ: HCPCS | Performed by: NURSE PRACTITIONER

## 2023-03-17 PROCEDURE — 3017F COLORECTAL CA SCREEN DOC REV: CPT | Performed by: NURSE PRACTITIONER

## 2023-03-17 PROCEDURE — 99213 OFFICE O/P EST LOW 20 MIN: CPT | Performed by: NURSE PRACTITIONER

## 2023-03-17 RX ORDER — NYSTATIN 100000 U/G
CREAM TOPICAL 2 TIMES DAILY
Qty: 15 G | Refills: 0 | Status: SHIPPED | OUTPATIENT
Start: 2023-03-17

## 2023-03-17 NOTE — PROGRESS NOTES
Linda Alvarado (: 1960) is a 65 y.o. female is here for evaluation of the following chief complaint(s): Breast pain (Rash under R/breast x 4 days, broken skin, red, painful)        Subjective/Objective:   Cheryal Hatchet was seen today for Breast pain (Rash under R/breast x 4 days, broken skin, red, painful)      Review of Systems   Constitutional: Negative. HENT: Negative. Eyes: Negative. Respiratory: Negative. Cardiovascular: Negative. Gastrointestinal: Negative. Genitourinary: Negative. Musculoskeletal: Negative. Skin:  Positive for rash (under left breast). Negative for itching. Neurological: Negative. Endo/Heme/Allergies: Negative. Psychiatric/Behavioral: Negative. Physical Exam  Vitals reviewed. Exam conducted with a chaperone present. Constitutional:       General: She is not in acute distress. Appearance: She is obese. She is not ill-appearing. HENT:      Head: Normocephalic and atraumatic. Right Ear: External ear normal.      Left Ear: External ear normal.      Nose: Nose normal.      Mouth/Throat:      Mouth: Mucous membranes are moist.   Cardiovascular:      Rate and Rhythm: Normal rate and regular rhythm. Pulses: Normal pulses. Heart sounds: Normal heart sounds. Pulmonary:      Effort: Pulmonary effort is normal.      Breath sounds: Normal breath sounds. Chest:   Breasts:     Right: Normal.      Left: Normal.          Comments: PT has a red excoriated candidal looking rash under left breast. Nystantin cream prescribed. Abdominal:      Palpations: Abdomen is soft. Musculoskeletal:         General: Normal range of motion. Cervical back: Normal range of motion. Skin:     General: Skin is warm and dry. Capillary Refill: Capillary refill takes less than 2 seconds. Findings: Rash (see chest) present. Neurological:      General: No focal deficit present.       Mental Status: She is alert and oriented to person, place, and time.   Psychiatric:         Mood and Affect: Mood normal.         Behavior: Behavior normal.         Thought Content: Thought content normal.         Judgment: Judgment normal.        /89 (BP 1 Location: Right arm, BP Patient Position: Sitting, BP Cuff Size: Adult)   Pulse 97   Temp 98.6 °F (37 °C) (Temporal)   Resp 18   Ht 4' 9\" (1.448 m)   Wt 150 lb (68 kg) Comment: as per patient/walking boot on  SpO2 95%   BMI 32.46 kg/m²            Past Surgical History:   Procedure Laterality Date    HX ABDOMINAL LAPAROSCOPY      HX CARPAL TUNNEL RELEASE Bilateral more than 10 years ago    HX  SECTION  6218,2729    HX COLONOSCOPY  2016    HX GASTRIC BYPASS  1997    x2    HX HYSTERECTOMY  1985    HX PACEMAKER      HX TONSIL AND ADENOIDECTOMY  more than 10 years ago    NEUROLOGICAL PROCEDURE UNLISTED      Bi CTS        Past Medical History:   Diagnosis Date    Asthma     Chest pain     Depression     Diabetes ([address]     Diarrhea     Essential hypertension     Fainting     Fatigue     Hearing loss     Hypoglycemia     Hypotension     Leg swelling     Memory disorder     Murmur     Nausea and vomiting     Pacemaker     Ringing in ears     SOB (shortness of breath)     Stomach pain     Swallowing difficulty     Syncope and collapse 7/24/15    RTH    Unintentional weight change         Current Outpatient Medications   Medication Instructions    acarbose (PRECOSE) 50 mg tablet TAKE 1 TABLET BY MOUTH THREE TIMES A DAY 30 MINUTES BEFORE MEALS    albuterol (PROVENTIL HFA, VENTOLIN HFA, PROAIR HFA) 90 mcg/actuation inhaler 1 Puff, Inhalation, EVERY 6 HOURS AS NEEDED    albuterol (PROVENTIL VENTOLIN) 2.5 mg, Nebulization, EVERY 4 HOURS AS NEEDED    ARIPiprazole (ABILIFY) 2 mg tablet 1 Tablet, Oral, DAILY    aspirin 81 mg chewable tablet CHEW AND SWALLOW 1 TAB BY MOUTH DAILY.     butalbital-acetaminophen-caffeine (FIORICET, ESGIC) -40 mg per tablet TAKE 1 TABLET BY MOUTH EVERY SIX HOURS AS NEEDED FOR HEADACHE. cholecalciferol (VITAMIN D3) 1,000 Units, Oral, DAILY    cyanocobalamin ER 1,000 mcg, Oral, DAILY    diclofenac (VOLTAREN) 1 % gel Topical, 4 TIMES DAILY    Emgality Syringe 120 mg, SubCUTAneous, EVERY 30 DAYS    ergocalciferol (VITAMIN D2) 50,000 Units, Oral, EVERY 7 DAYS    fluticasone (FLONASE) 50 mcg/actuation nasal spray 2 Sprays, Both Nostrils, DAILY    furosemide (LASIX) 20 mg, Oral, EVERY BEDTIME, As needed    meclizine (ANTIVERT) 25 mg, Oral, 3 TIMES DAILY AS NEEDED    Nebulizer & Compressor machine Use as directed    nystatin (MYCOSTATIN) topical cream Topical, 2 TIMES DAILY    omeprazole (PRILOSEC) 40 mg, Oral, DAILY    polyethylene glycol (MIRALAX) 17 g, Oral, DAILY    potassium chloride SR (KLOR-CON 10) 10 mEq tablet 10 mEq, Oral, DAILY    rimegepant (NURTEC) 75 mg disintegrating tablet Take 1 tablet p.o. every other day then 1 tablet p.o. as needed for severe headache not to exceed 1 dose in 24 hours    sertraline (ZOLOFT) 100 mg, Oral, DAILY    tiZANidine (ZANAFLEX) 2 mg, Oral, 4 TIMES DAILY WITH MEALS    topiramate (TOPAMAX) 50 mg, Oral, 2 TIMES DAILY WITH MEALS    traZODone (DESYREL) 100 mg, Oral, EVERY BEDTIME        Assessment/Plan:     The above diagnosis is a new problem. We discussed expected course, resolution, and complications of diagnosis in detail. I advised her to call back or return to office if symptoms worsen/change/persist.        ICD-10-CM ICD-9-CM    1. Candidal dermatitis  B37.2 112.3          Return if symptoms worsen or fail to improve. An electronic signature was used to authenticate this note.   -- Shira Gonzalez NP

## 2023-03-17 NOTE — PROGRESS NOTES
Chief Complaint   Patient presents with    Breast pain     Rash under R/breast x 4 days, broken skin, red, painful     Chief Complaint   Patient presents with    Breast pain     Rash under R/breast x 4 days, broken skin, red, painful     Patient has not been out of the country in (14 months), NO diarrhea, NO cough, NO chest conjestion, NO temp. Pt has not been around anyone with these symptoms. Health Maintenance reviewed. I have reviewed the patient's medical history in detail and updated the computerized patient record. 1. \"Have you been to the ER, urgent care clinic since your last visit? Hospitalized since your last visit? \" no    2. \"Have you seen or consulted any other health care providers outside of the [address] [place] [address] since your last visit? \" no     3. For patients aged 39-70: Has the patient had a colonoscopy / FIT/ Cologuard? no      If the patient is female:    4. For patients aged 41-77: Has the patient had a mammogram within the past 2 years? no      5. For patients aged 21-65: Has the patient had a pap smear? no     Encouraged pt to discuss pt's wishes with spouse/partner/family and bring them in the next appt to follow thru with the Advanced Directive    @  Fall Risk Assessment, last 12 mths 1/25/2021   Able to walk? Yes   Fall in past 12 months? 0   Do you feel unsteady? 0   Are you worried about falling 0   Number of falls in past 12 months -   Fall with injury?  -       3 most recent PHQ Screens 3/17/2023   Little interest or pleasure in doing things Several days   Feeling down, depressed, irritable, or hopeless Several days   Total Score PHQ 2 2   Trouble falling or staying asleep, or sleeping too much -   Feeling tired or having little energy -   Poor appetite, weight loss, or overeating -   Feeling bad about yourself - or that you are a failure or have let yourself or your family down -   Trouble concentrating on things such as school, work, reading, or watching TV -   Moving or speaking so slowly that other people could have noticed; or the opposite being so fidgety that others notice -   Thoughts of being better off dead, or hurting yourself in some way -   PHQ 9 Score -   How difficult have these problems made it for you to do your work, take care of your home and get along with others -       Abuse Screening Questionnaire 3/17/2023   Do you ever feel afraid of your partner? N   Are you in a relationship with someone who physically or mentally threatens you? N   Is it safe for you to go home?  Y       ADL Assessment 3/17/2023   Feeding yourself No Help Needed   Getting from bed to chair No Help Needed   Getting dressed No Help Needed   Bathing or showering No Help Needed   Walk across the room (includes cane/walker) No Help Needed   Using the telphone No Help Needed   Taking your medications No Help Needed   Preparing meals No Help Needed   Managing money (expenses/bills) No Help Needed   Moderately strenuous housework (laundry) Help Needed   Shopping for personal items (toiletries/medicines) Help Needed   Shopping for groceries Help Needed   Driving -   Climbing a flight of stairs Help Needed   Getting to places beyond walking distances Help Needed

## 2023-03-17 NOTE — PATIENT INSTRUCTIONS
It was a pleasure to meet you. Cream to place under your breast prescribed sent to your pharmacy. Have a great day.     Mick Espinoza NP

## 2023-03-21 ENCOUNTER — OFFICE VISIT (OUTPATIENT)
Dept: INTERNAL MEDICINE CLINIC | Age: 63
End: 2023-03-21

## 2023-03-21 VITALS
HEART RATE: 117 BPM | OXYGEN SATURATION: 95 % | TEMPERATURE: 98.5 F | BODY MASS INDEX: 32.36 KG/M2 | HEIGHT: 57 IN | SYSTOLIC BLOOD PRESSURE: 106 MMHG | DIASTOLIC BLOOD PRESSURE: 75 MMHG | RESPIRATION RATE: 18 BRPM | WEIGHT: 150 LBS

## 2023-03-21 DIAGNOSIS — Z13.39 SCREENING FOR ALCOHOLISM: ICD-10-CM

## 2023-03-21 DIAGNOSIS — S82.892D: ICD-10-CM

## 2023-03-21 DIAGNOSIS — Z13.6 SCREENING FOR ISCHEMIC HEART DISEASE: ICD-10-CM

## 2023-03-21 DIAGNOSIS — Z00.00 MEDICARE ANNUAL WELLNESS VISIT, SUBSEQUENT: Primary | ICD-10-CM

## 2023-03-21 DIAGNOSIS — Z98.84 HISTORY OF GASTRIC BYPASS: Chronic | ICD-10-CM

## 2023-03-21 DIAGNOSIS — Z12.31 BREAST CANCER SCREENING BY MAMMOGRAM: ICD-10-CM

## 2023-03-21 DIAGNOSIS — J45.41 MODERATE PERSISTENT ASTHMA WITH EXACERBATION: ICD-10-CM

## 2023-03-21 DIAGNOSIS — Z13.1 SCREENING FOR DIABETES MELLITUS: ICD-10-CM

## 2023-03-21 RX ORDER — AZITHROMYCIN 250 MG/1
250 TABLET, FILM COATED ORAL SEE ADMIN INSTRUCTIONS
Qty: 6 TABLET | Refills: 0 | Status: SHIPPED | OUTPATIENT
Start: 2023-03-21

## 2023-03-21 NOTE — PROGRESS NOTES
Subjective:   Enrrique Burton is a 65 y.o. female who complains of congestion, sore throat, and productive cough for 14 days, rapidly worsening since that time. She denies a history of nausea and shortness of breath. Says her foot fracture is slowly improving. Evaluation to date: NP Rx her rash better. Treatment to date: inhaler. Patient does not smoke cigarettes. Relevant PMH: Asthma. Allergies   Allergen Reactions    Benadryl [Diphenhydramine Hcl] Nausea and Vomiting    Ibuprofen Nausea and Vomiting    Sulfamethoxazole-Trimethoprim Nausea Only    Tramadol Anxiety    Diclofenac Itching    Gabapentin Anxiety    Sulfamethoxazole Nausea Only    Zinc Acetate Other (comments)     Patient Active Problem List   Diagnosis Code    Hypoglycemia E16.2    History of gastric bypass Z98.84    Migraine headache with aura G43.109    Bipolar disorder with current episode depressed ([facility]) F31.30    Gastroesophageal reflux disease without esophagitis K21.9    SSS (sick sinus syndrome) ([facility]) I49.5    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 Objective:       WD WN female NAD  Heart RRR without murmers clicks or rubs  Lungs some wheezes bilaterally  Abdo soft nontender  Ext no edema  Wears a soft cast on her left foot      Assessment/Plan:         ICD-10-CM ICD-9-CM    1. Medicare annual wellness visit, subsequent  Z00.00 V70.0       2. Moderate persistent asthma with exacerbation  J45.41 493.92 azithromycin (ZITHROMAX) 250 mg tablet      3. History of gastric bypass  Z98.84 V45.86       4. Breast cancer screening by mammogram  Z12.31 V76.12 [facility] MAMMO BI SCREENING INCL CAD      5. Screening for diabetes mellitus  Z13.1 V77.1       6.  Screening for ischemic heart disease  Z13.6 V81.0       7. Screening for alcoholism  Z13.39 V79.1 [facility] ANNUAL ALCOHOL SCREEN 15 MIN      8. Malleolar fracture, left, closed, with routine healing, subsequent encounter  S82.892D V54.19         Orders Placed This Encounter    [facility] MAMMO BI SCREENING INCL CAD     Standing Status:   Future     Standing Expiration Date:   4/21/2024     Scheduling Instructions:      [facility]     Order Specific Question:   Reason for Exam     Answer:   screening    ANNUAL ALCOHOL SCREEN 8-15 MIN    azithromycin (ZITHROMAX) 250 mg tablet     Sig: Take 1 Tablet by mouth See Admin Instructions. Take two tablets today then one tablet daily for next 4 days     Dispense:  6 Tablet     Refill:  0     Continue follow-up with orthopedics  Call me if wheezing does not improve      This is the Subsequent Medicare Annual Wellness Exam, performed 12 months or more after the Initial AWV or the last Subsequent AWV    I have reviewed the patient's medical history in detail and updated the computerized patient record. Assessment/Plan   Education and counseling provided:  Screening Mammography    1. Medicare annual wellness visit, subsequent  2. Moderate persistent asthma with exacerbation  -     azithromycin (ZITHROMAX) 250 mg tablet; Take 1 Tablet by mouth See Admin Instructions. Take two tablets today then one tablet daily for next 4 days, Normal, Disp-6 Tablet, R-0  3. History of gastric bypass  4. Encounter for screening for malignant neoplasm of breast, unspecified screening modality  5. Breast cancer screening by mammogram  -     [facility] MAMMO BI SCREENING INCL CAD; Future  6. Screening for diabetes mellitus  7. Screening for ischemic heart disease  8.  Screening for alcoholism  -     [place] ANNUAL ALCOHOL SCREEN 15 MIN       Depression Risk Factor Screening     3 most recent PHQ Screens 3/21/2023   Little interest or pleasure in doing things Several days   Feeling down, depressed, irritable, or hopeless Several days Total Score PHQ 2 2   Trouble falling or staying asleep, or sleeping too much -   Feeling tired or having little energy -   Poor appetite, weight loss, or overeating -   Feeling bad about yourself - or that you are a failure or have let yourself or your family down -   Trouble concentrating on things such as school, work, reading, or watching TV -   Moving or speaking so slowly that other people could have noticed; or the opposite being so fidgety that others notice -   Thoughts of being better off dead, or hurting yourself in some way -   PHQ 9 Score -   How difficult have these problems made it for you to do your work, take care of your home and get along with others -       Alcohol & Drug Abuse Risk Screen    Do you average more than 1 drink per night or more than 7 drinks a week:  No    On any one occasion in the past three months have you have had more than 3 drinks containing alcohol:  No          Functional Ability and Level of Safety    Hearing: Hearing is good. Activities of Daily Living: The home contains: handrails and grab bars  Patient does total self care      Ambulation: with difficulty, uses a walker     Fall Risk:  Fall Risk Assessment, last 12 mths 1/25/2021   Able to walk? Yes   Fall in past 12 months? 0   Do you feel unsteady? 0   Are you worried about falling 0   Number of falls in past 12 months -   Fall with injury?  -      Abuse Screen:  Patient is not abused       Cognitive Screening    Has your family/caregiver stated any concerns about your memory: no     Cognitive Screening: Normal - Clock Drawing Test    Health Maintenance Due     Health Maintenance Due   Topic Date Due    COVID-19 Vaccine (4 - Booster for Moderna series) 01/01/2022    A1C test (Diabetic or Prediabetic)  01/07/2022    Shingles Vaccine (2 of 2) 03/25/2022    Breast Cancer Screen Mammogram  11/04/2022       Patient Care Team   Patient Care Team:  Ayala Carr MD as PCP - General (Family Medicine)  Ayala Carr MD as PCP - [facility]  Chikis Villarreal MD as Physician (Cardiovascular Disease Physician)  Holden Evans MD (Cardiovascular Disease Physician)  Meryl Santiago NP as Nurse Practitioner (Cardiovascular Disease Physician)  Vivek Chavarria NP (Nurse Practitioner)  [facility]  neurology  History     Patient Active Problem List   Diagnosis Code    Hypoglycemia E16.2    History of gastric bypass Z98.84    Migraine headache with aura G43.109    Bipolar disorder with current episode depressed ([facility]) F31.30    Gastroesophageal reflux disease without esophagitis K21.9    SSS (sick sinus syndrome) ([facility] I49.5    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    Peripheral vascular disease ([facility] I73.9    Weight gain status post gastric bypass R63.5, Z98.84    Moderate asthma J45.909    Fibromyalgia affecting lower leg M79.7    BMI 32.0-32.9,adult Z68.32    Other abnormal glucose R73.09     Past Medical History:   Diagnosis Date    Asthma     Chest pain     Depression     Diabetes ([facility]     Diarrhea     Essential hypertension     Fainting     Fatigue     Hearing loss     Hypoglycemia     Hypotension     Leg swelling     Memory disorder     Murmur     Nausea and vomiting     Pacemaker     Ringing in ears     SOB (shortness of breath)     Stomach pain     Swallowing difficulty     Syncope and collapse 7/24/15    RTH    Unintentional weight change       Past Surgical History:   Procedure Laterality Date    HX ABDOMINAL LAPAROSCOPY      HX CARPAL TUNNEL RELEASE Bilateral more than 10 years ago    HX  SECTION  4830,[id number]    HX COLONOSCOPY  2016    HX GASTRIC BYPASS  1997    x2    HX HYSTERECTOMY  1985    HX PACEMAKER      HX TONSIL AND ADENOIDECTOMY  more than 10 years ago    NEUROLOGICAL PROCEDURE UNLISTED      Bi CTS     Current Outpatient Medications   Medication Sig Dispense Refill    azithromycin (ZITHROMAX) 250 mg tablet Take 1 Tablet by mouth See Admin Instructions.  Take two tablets today then one tablet daily for next 4 days 6 Tablet 0    nystatin (MYCOSTATIN) topical cream Apply  to affected area two (2) times a day. 15 g 0    rimegepant (NURTEC) 75 mg disintegrating tablet Take 1 tablet p.o. every other day then 1 tablet p.o. as needed for severe headache not to exceed 1 dose in 24 hours 16 Tablet 11    topiramate (TOPAMAX) 50 mg tablet Take 1 Tablet by mouth two (2) times daily (with meals). 180 Tablet 3    butalbital-acetaminophen-caffeine (FIORICET, ESGIC) -40 mg per tablet TAKE 1 TABLET BY MOUTH EVERY SIX HOURS AS NEEDED FOR HEADACHE. 30 Tablet 5    diclofenac (VOLTAREN) 1 % gel Apply  to affected area four (4) times daily. 100 g 2    tiZANidine (ZANAFLEX) 2 mg tablet Take 1 Tablet by mouth four (4) times daily (with meals). 90 Tablet 1    galcanezumab-gnlm (Emgality Syringe) 120 mg/mL syrg 120 mg by SubCUTAneous route every thirty (30) days. 1 Each 11    ergocalciferol (Vitamin D2) 1,250 mcg (50,000 unit) capsule Take 1 Capsule by mouth every seven (7) days. 12 Capsule 4    traZODone (DESYREL) 50 mg tablet Take 2 Tablets by mouth nightly. 180 Tablet 3    furosemide (LASIX) 20 mg tablet Take 1 Tablet by mouth nightly. As needed 90 Tablet 1    potassium chloride SR (KLOR-CON 10) 10 mEq tablet Take 1 Tablet by mouth daily. 90 Tablet 1    sertraline (ZOLOFT) 100 mg tablet Take 1 Tablet by mouth daily. 90 Tablet 3    meclizine (ANTIVERT) 25 mg chewable tablet Take 1 Tablet by mouth three (3) times daily as needed for Dizziness. 60 Tablet 1    cyanocobalamin ER 1,000 mcg tablet Take 1 Tablet by mouth daily. 90 Tablet 3    cholecalciferol (VITAMIN D3) (1000 Units /25 mcg) tablet Take 1 Tablet by mouth daily. 90 Tablet 3    omeprazole (PRILOSEC) 40 mg capsule Take 1 Capsule by mouth daily. 90 Capsule 1    acarbose (PRECOSE) 50 mg tablet TAKE 1 TABLET BY MOUTH THREE TIMES A DAY 30 MINUTES BEFORE MEALS 90 Tablet 4    polyethylene glycol (MIRALAX) 17 gram packet Take 1 Packet by mouth daily.  [facility] 5    ARIPiprazole (ABILIFY) 2 mg tablet Take 1 Tablet by mouth daily. albuterol (PROVENTIL HFA, VENTOLIN HFA, PROAIR HFA) 90 mcg/actuation inhaler Take 1 Puff by inhalation every six (6) hours as needed for Wheezing. 1 Inhaler 5    albuterol (PROVENTIL VENTOLIN) 2.5 mg /3 mL (0.083 %) nebu 3 mL by Nebulization route every four (4) hours as needed for Wheezing. 100 Each 1    aspirin 81 mg chewable tablet CHEW AND SWALLOW 1 TAB BY MOUTH DAILY. 90 Tab 3    fluticasone (FLONASE) 50 mcg/actuation nasal spray 2 Sprays by Both Nostrils route daily.  1 Bottle 5    Nebulizer & Compressor machine Use as directed 1 Each 0     Allergies   Allergen Reactions    Benadryl [Diphenhydramine Hcl] Nausea and Vomiting    Ibuprofen Nausea and Vomiting    Sulfamethoxazole-Trimethoprim Nausea Only    Tramadol Anxiety    Diclofenac Itching    Gabapentin Anxiety    Sulfamethoxazole Nausea Only    Zinc Acetate Other (comments)       Family History   Problem Relation Age of Onset    Stroke Mother     Cancer Father     Diabetes Brother     Cancer Maternal Aunt     Cancer Maternal Grandmother     Cancer Brother     Kidney Disease Brother      Social History     Tobacco Use    Smoking status: Never    Smokeless tobacco: Never   Substance Use Topics    Alcohol use: No         Maddie Salmeron MD

## 2023-03-21 NOTE — PROGRESS NOTES
Chief Complaint   Patient presents with    Cough     Coughing all night      Annual Wellness Visit     Patient has not been out of the country in (14 months), NO diarrhea, NO cough, NO chest conjestion, NO temp. Pt has not been around anyone with these symptoms. Health Maintenance reviewed. I have reviewed the patient's medical history in detail and updated the computerized patient record. 1. \"Have you been to the ER, urgent care clinic since your last visit? No  Hospitalized since your last visit? \" no    2. \"Have you seen or consulted any other health care providers outside of the [address] [place] [address] since your last visit? \"  no    3. For patients aged 39-70: Has the patient had a colonoscopy / FIT/ Cologuard? no      If the patient is female:    4. For patients aged 41-77: Has the patient had a mammogram within the past 2 years? no      5. For patients aged 21-65: Has the patient had a pap smear? no     Encouraged pt to discuss pt's wishes with spouse/partner/family and bring them in the next appt to follow thru with the Advanced Directive    @  Fall Risk Assessment, last 12 mths 1/25/2021   Able to walk? Yes   Fall in past 12 months? 0   Do you feel unsteady? 0   Are you worried about falling 0   Number of falls in past 12 months -   Fall with injury?  -       3 most recent PHQ Screens 3/17/2023   Little interest or pleasure in doing things Several days   Feeling down, depressed, irritable, or hopeless Several days   Total Score PHQ 2 2   Trouble falling or staying asleep, or sleeping too much -   Feeling tired or having little energy -   Poor appetite, weight loss, or overeating -   Feeling bad about yourself - or that you are a failure or have let yourself or your family down -   Trouble concentrating on things such as school, work, reading, or watching TV -   Moving or speaking so slowly that other people could have noticed; or the opposite being so fidgety that others notice -   Thoughts of being better off dead, or hurting yourself in some way -   PHQ 9 Score -   How difficult have these problems made it for you to do your work, take care of your home and get along with others -       Abuse Screening Questionnaire 3/17/2023   Do you ever feel afraid of your partner? N   Are you in a relationship with someone who physically or mentally threatens you? N   Is it safe for you to go home?  Y       ADL Assessment 3/17/2023   Feeding yourself No Help Needed   Getting from bed to chair No Help Needed   Getting dressed No Help Needed   Bathing or showering No Help Needed   Walk across the room (includes cane/walker) No Help Needed   Using the telphone No Help Needed   Taking your medications No Help Needed   Preparing meals No Help Needed   Managing money (expenses/bills) No Help Needed   Moderately strenuous housework (laundry) Help Needed   Shopping for personal items (toiletries/medicines) Help Needed   Shopping for groceries Help Needed   Driving -   Climbing a flight of stairs Help Needed   Getting to places beyond walking distances Help Needed

## 2023-03-21 NOTE — PATIENT INSTRUCTIONS
Medicare Wellness Visit, Female     The best way to live healthy is to have a lifestyle where you eat a well-balanced diet, exercise regularly, limit alcohol use, and quit all forms of tobacco/nicotine, if applicable. Regular preventive services are another way to keep healthy. Preventive services (vaccines, screening tests, monitoring & exams) can help personalize your care plan, which helps you manage your own care. Screening tests can find health problems at the earliest stages, when they are easiest to treat. [facility] follows the current, evidence-based guidelines published by the [facility] Keegan Terry ([facility]) when recommending preventive services for our patients. Because we follow these guidelines, sometimes recommendations change over time as research supports it. (For example, mammograms used to be recommended annually. Even though Medicare will still pay for an annual mammogram, the newer guidelines recommend a mammogram every two years for women of average risk). Of course, you and your doctor may decide to screen more often for some diseases, based on your risk and your co-morbidities (chronic disease you are already diagnosed with). Preventive services for you include:  - Medicare offers their members a free annual wellness visit, which is time for you and your primary care provider to discuss and plan for your preventive service needs.  Take advantage of this benefit every year!    -Over the age of 72 should receive the recommended pneumonia vaccines.    -All adults should have a flu vaccine yearly.  -All adults should have a tetanus vaccine every 10 years.   -Over the age 48 should receive the shingles vaccines.        -All adults should be screened once for Hepatitis C.  -All adults age 38-68 who are overweight should have a diabetes screening test once every three years.   -Other screening tests and preventive services for persons with diabetes include: an eye exam to screen for diabetic retinopathy, a kidney function test, a foot exam, and stricter control over your cholesterol.   -Cardiovascular screening for adults with routine risk involves an electrocardiogram (ECG) at intervals determined by your doctor.     -Colorectal cancer screenings should be done for adults age 39-70 with no increased risk factors for colorectal cancer. There are a number of acceptable methods of screening for this type of cancer. Each test has its own benefits and drawbacks. Discuss with your doctor what is most appropriate for you during your annual wellness visit. The different tests include: colonoscopy (considered the best screening method), a fecal occult blood test, a fecal DNA test, and sigmoidoscopy.    -Lung cancer screening is recommended annually with a low dose CT scan for adults between age 54 and 68, who have smoked at least 30 pack years (equivalent of 1 pack per day for 30 days), and who is a current smoker or quit less than 15 years ago.    -A bone mass density test is recommended when a woman turns 65 to screen for osteoporosis. This test is only recommended one time, as a screening. Some providers will use this same test as a disease monitoring tool if you already have osteoporosis. -Breast cancer screenings are recommended every other year for women of normal risk, age 54-69.    -Cervical cancer screenings for women over age 72 are only recommended with certain risk factors.      Here is a list of your current Health Maintenance items (your personalized list of preventive services) with a due date:  Health Maintenance Due   Topic Date Due    COVID-19 Vaccine (4 - Booster for Moderna series) 01/01/2022    Hemoglobin A1C    01/07/2022    Shingles Vaccine (2 of 2) 03/25/2022    Mammogram  11/04/2022

## 2023-03-22 DIAGNOSIS — M25.519 SHOULDER PAIN, UNSPECIFIED CHRONICITY, UNSPECIFIED LATERALITY: ICD-10-CM

## 2023-03-23 RX ORDER — TIZANIDINE 2 MG/1
2 TABLET ORAL
Qty: 90 TABLET | Refills: 0 | Status: SHIPPED | OUTPATIENT
Start: 2023-03-23

## 2023-03-28 ENCOUNTER — TELEPHONE (OUTPATIENT)
Dept: INTERNAL MEDICINE CLINIC | Age: 63
End: 2023-03-28

## 2023-03-28 NOTE — TELEPHONE ENCOUNTER
Pt calling in for refills of \"my pain medicine oxycodone for my foot pain and my stool softener. \" She also says she still has a cold and would like a refill of the antibiotic. She refused visit.

## 2023-03-29 NOTE — PROGRESS NOTES
Subjective:     Consuelo Pederson is a 65 y.o. 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 Called Ortho who referred her to me told her would not refill this especially with the constipation. Cardiac ROS:   Reports taking cardiovascuar medications without side affects. No complaints of exertional chest pain, excessive shortness of breath or focal weakness. Minimal swelling in lower legs or dizziness with standing. Review of Systems, additional:  Pertinent items are noted in HPI.       Patient Active Problem List    Diagnosis Date Noted    Other abnormal glucose 03/22/2022    BMI 32.0-32.9,adult 02/18/2022    Fibromyalgia affecting lower leg 07/12/2021    Moderate asthma 10/18/2020    Peripheral vascular disease ([facility] [address] 10/04/2019    Weight gain status post gastric bypass 10/04/2019    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    SSS (sick sinus syndrome) ([facility] [address] 12/06/2018    Gastroesophageal reflux disease without esophagitis 08/24/2016    Bipolar disorder with current episode depressed ([facility] [address] 02/01/2016    Migraine headache with aura 10/24/2014    Hypoglycemia 10/12/2014    History of gastric bypass 10/12/2014     Allergies   Allergen Reactions    Benadryl [Diphenhydramine Hcl] Nausea and Vomiting    Ibuprofen Nausea and Vomiting Sulfamethoxazole-Trimethoprim Nausea Only    Tramadol Anxiety    Diclofenac Itching    Gabapentin Anxiety    Sulfamethoxazole Nausea Only    Zinc Acetate Other (comments)     Past Medical History:   Diagnosis Date    Asthma     Chest pain     Depression     Diabetes ([address]     Diarrhea     Essential hypertension     Fainting     Fatigue     Hearing loss     Hypoglycemia 2014    Hypotension 2014    Leg swelling     Memory disorder     Murmur     Nausea and vomiting     Pacemaker     Ringing in ears     SOB (shortness of breath)     Stomach pain     Swallowing difficulty     Syncope and collapse 7/24/15    RTH    Unintentional weight change      Social History     Tobacco Use    Smoking status: Never     Passive exposure: Never    Smokeless tobacco: Never   Substance Use Topics    Alcohol use: No                 Objective:     Physical exam significant for the following:     ND  Visit Vitals  /67 (BP 1 Location: Left arm, BP Patient Position: Sitting, BP Cuff Size: Adult)   Pulse 88   Temp 98.3 °F (36.8 °C) (Temporal)   Resp 18   Ht 4' 9\" (1.448 m)   Wt 150 lb (68 kg)   SpO2 96%   BMI 32.46 kg/m²         . Assessment/Plan:     Asthma not well controlled. ICD-10-CM ICD-9-CM    1. Constipation due to opioid therapy  K59.03 564.09 lactulose (CHRONULAC) 10 gram/15 mL solution    T40.2X5A E935.2       2. Moderate persistent asthma without complication  G67.63 759.09 doxycycline (ADOXA) 100 mg tablet      3. Malleolar fracture, left, closed, with routine healing, subsequent encounter  S82.892D V54.19         . Orders Placed This Encounter    lactulose (CHRONULAC) 10 gram/15 mL solution     Sig: Take 15 mL by mouth three (3) times daily. Dispense:  480 mL     Refill:  0    doxycycline (ADOXA) 100 mg tablet     Sig: Take 1 Tablet by mouth two (2) times a day for 7 days.      Dispense:  14 Tablet     Refill:  0         We discussed this pain and the usage of controlled substances for acute but not chronic pain relief. In general these medications are indicated for the acute symptom relief and not for chronic usage, allthough they are frequently used for chronic management  After a certain period of time they should be stopped to avoid dependence and/or addiction. I warned her about the dangers of addiction and other side affects including respiratory depression and death. Discussed how this is a controlled substance and that the prescribing of it is should be monitored very carefully. Drug usage is also monitored by our practise and the [facility], since there has been a lot of abuse and diversion of controlled substances. In general we do not refill this medication over the phone. PLease ask for enough pills to get you to your next appointment and make sure you keep your appointments. Warned not to take other medications like alcohol, other benzodiazapines marijuana or other narcotics when on this medication.     Follow-up 1 month as needed

## 2023-03-30 ENCOUNTER — OFFICE VISIT (OUTPATIENT)
Dept: INTERNAL MEDICINE CLINIC | Age: 63
End: 2023-03-30

## 2023-03-30 VITALS
WEIGHT: 150 LBS | DIASTOLIC BLOOD PRESSURE: 67 MMHG | OXYGEN SATURATION: 96 % | BODY MASS INDEX: 32.36 KG/M2 | TEMPERATURE: 98.3 F | SYSTOLIC BLOOD PRESSURE: 101 MMHG | RESPIRATION RATE: 18 BRPM | HEIGHT: 57 IN | HEART RATE: 88 BPM

## 2023-03-30 DIAGNOSIS — S82.892D: ICD-10-CM

## 2023-03-30 DIAGNOSIS — T40.2X5A CONSTIPATION DUE TO OPIOID THERAPY: Primary | ICD-10-CM

## 2023-03-30 DIAGNOSIS — J45.40 MODERATE PERSISTENT ASTHMA WITHOUT COMPLICATION: ICD-10-CM

## 2023-03-30 DIAGNOSIS — K59.03 CONSTIPATION DUE TO OPIOID THERAPY: Primary | ICD-10-CM

## 2023-03-30 RX ORDER — LACTULOSE 10 G/15ML
10 SOLUTION ORAL; RECTAL 3 TIMES DAILY
Qty: 480 ML | Refills: 0 | Status: SHIPPED | OUTPATIENT
Start: 2023-03-30

## 2023-03-30 RX ORDER — DOXYCYCLINE 100 MG/1
100 TABLET ORAL 2 TIMES DAILY
Qty: 14 TABLET | Refills: 0 | Status: SHIPPED | OUTPATIENT
Start: 2023-03-30 | End: 2023-04-06

## 2023-03-30 NOTE — PROGRESS NOTES
Chief Complaint   Patient presents with    Foot Pain     Patient has not been out of the country in (14 months), NO diarrhea, NO cough, NO chest conjestion, NO temp. Pt has not been around anyone with these symptoms. Health Maintenance reviewed. I have reviewed the patient's medical history in detail and updated the computerized patient record. 1. \"Have you been to the ER, urgent care clinic since your last visit? No  Hospitalized since your last visit? \" no    2. \"Have you seen or consulted any other health care providers outside of the [address] [place] [address] since your last visit? \"  no    3. For patients aged 39-70: Has the patient had a colonoscopy / FIT/ Cologuard? no      If the patient is female:    4. For patients aged 41-77: Has the patient had a mammogram within the past 2 years? no      5. For patients aged 21-65: Has the patient had a pap smear? no     Encouraged pt to discuss pt's wishes with spouse/partner/family and bring them in the next appt to follow thru with the Advanced Directive    @  Fall Risk Assessment, last 12 mths 1/25/2021   Able to walk? Yes   Fall in past 12 months? 0   Do you feel unsteady? 0   Are you worried about falling 0   Number of falls in past 12 months -   Fall with injury?  -       3 most recent PHQ Screens 3/30/2023   Little interest or pleasure in doing things More than half the days   Feeling down, depressed, irritable, or hopeless More than half the days   Total Score PHQ 2 4   Trouble falling or staying asleep, or sleeping too much More than half the days   Feeling tired or having little energy Nearly every day   Poor appetite, weight loss, or overeating Nearly every day   Feeling bad about yourself - or that you are a failure or have let yourself or your family down Not at all   Trouble concentrating on things such as school, work, reading, or watching TV Not at all   Moving or speaking so slowly that other people could have noticed; or the opposite being so fidgety that others notice Not at all   Thoughts of being better off dead, or hurting yourself in some way Not at all   PHQ 9 Score 12   How difficult have these problems made it for you to do your work, take care of your home and get along with others Somewhat difficult       Abuse Screening Questionnaire 3/17/2023   Do you ever feel afraid of your partner? N   Are you in a relationship with someone who physically or mentally threatens you? N   Is it safe for you to go home?  Y       ADL Assessment 3/30/2023   Feeding yourself No Help Needed   Getting from bed to chair No Help Needed   Getting dressed No Help Needed   Bathing or showering No Help Needed   Walk across the room (includes cane/walker) (No Data)   Using the telphone -   Taking your medications -   Preparing meals -   Managing money (expenses/bills) -   Moderately strenuous housework (laundry) -   Shopping for personal items (toiletries/medicines) -   Shopping for groceries -   Driving -   Climbing a flight of stairs -   Getting to places beyond walking distances -

## 2023-03-31 ENCOUNTER — TELEPHONE (OUTPATIENT)
Dept: INTERNAL MEDICINE CLINIC | Age: 63
End: 2023-03-31

## 2023-03-31 NOTE — TELEPHONE ENCOUNTER
Pt and central scheduling calling to get a diagnostic mammogram sent over to [facility].

## 2023-04-03 PROBLEM — E11.9 TYPE 2 DIABETES MELLITUS (HCC): Status: RESOLVED | Noted: 2021-11-24 | Resolved: 2021-11-24

## 2023-04-03 NOTE — TELEPHONE ENCOUNTER
New order for diagnostic mammogram ordered in system per order of Sandi Siemens MD - they will reach out to patient to schedule from [facility], N  4/8/4976  4:65 PM

## 2023-04-21 LAB — HBA1C MFR BLD HPLC: 4.3 %

## 2023-04-24 ENCOUNTER — TRANSCRIBE ORDERS (OUTPATIENT)
Facility: HOSPITAL | Age: 63
End: 2023-04-24

## 2023-04-24 DIAGNOSIS — N64.4 MASTODYNIA: Primary | ICD-10-CM

## 2023-05-09 ENCOUNTER — HOSPITAL ENCOUNTER (OUTPATIENT)
Facility: HOSPITAL | Age: 63
Discharge: HOME | End: 2023-05-12
Payer: MEDICARE

## 2023-05-09 DIAGNOSIS — N64.4 MASTODYNIA: ICD-10-CM

## 2023-05-09 PROCEDURE — 77066 DX MAMMO INCL CAD BI: CPT

## 2023-05-16 RX ORDER — LACTULOSE 10 G/15ML
15 SOLUTION ORAL 3 TIMES DAILY
COMMUNITY
Start: 2023-04-12 | End: 2023-05-30 | Stop reason: SDUPTHER

## 2023-05-23 ENCOUNTER — TELEPHONE (OUTPATIENT)
Facility: CLINIC | Age: 63
End: 2023-05-23

## 2023-05-23 RX ORDER — LACTULOSE 10 G/15ML
15 SOLUTION ORAL 3 TIMES DAILY
Status: CANCELLED | OUTPATIENT
Start: 2023-05-23

## 2023-05-29 NOTE — PROGRESS NOTES
Subjective:     Ramakrishna Orourke is a 65 y.o. female who presents for follow up of     Patient Active Problem List   Diagnosis    History of gastric bypass    Gastroesophageal reflux disease without esophagitis    Hypoglycemia    SSS (sick sinus syndrome) (HCC)    Fibromyalgia affecting lower leg    Peripheral vascular disease ([facility])    Weight gain status post gastric bypass    Nesidioblastosis    BMI 32.0-32.9,adult    Bipolar disorder with current episode depressed (HCC)    Migraine headache with aura    Moderate asthma    Other abnormal glucose       New concerns: seeing foot doctor for foot pain, said she needs to lose weight. Going through a lot early Am wakenings some depression but is not too bad see PHQ-9, the pain from her feet limits her and depresses her somewhat. Psychiatric ROS:   Reports taking psychiatric medications without side affects. No complaints of abnormal movements, suicidal ideation or focal weakness. Had a bit of hypoglycemia episode the other day. Breathing's been okay    Review of Systems, additional:  Pertinent items are noted in HPI.       Patient Active Problem List    Diagnosis Date Noted    Other abnormal glucose 03/22/2022    BMI 32.0-32.9,adult 02/18/2022    Fibromyalgia affecting lower leg 07/12/2021    Moderate asthma 10/18/2020    Peripheral vascular disease ([facility] 10/04/2019    Weight gain status post gastric bypass 10/04/2019    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    SSS (sick sinus syndrome) ([facility] 12/06/2018    Gastroesophageal reflux disease without esophagitis 08/24/2016    Bipolar disorder with current episode depressed ([facility] 02/01/2016    Migraine headache with aura 10/24/2014    History of gastric bypass 10/12/2014    Hypoglycemia 10/12/2014     Allergies   Allergen Reactions    Diphenhydramine Nausea And Vomiting    Ibuprofen Nausea And Vomiting    Sulfamethoxazole-Trimethoprim Nausea Only    Tramadol Anxiety    Diclofenac Itching    Sulfamethoxazole Nausea Only    Zinc

## 2023-05-30 ENCOUNTER — OFFICE VISIT (OUTPATIENT)
Facility: CLINIC | Age: 63
End: 2023-05-30
Payer: MEDICARE

## 2023-05-30 VITALS
BODY MASS INDEX: 34.52 KG/M2 | HEIGHT: 57 IN | SYSTOLIC BLOOD PRESSURE: 138 MMHG | HEART RATE: 72 BPM | OXYGEN SATURATION: 97 % | RESPIRATION RATE: 18 BRPM | WEIGHT: 160 LBS | DIASTOLIC BLOOD PRESSURE: 88 MMHG | TEMPERATURE: 97.7 F

## 2023-05-30 DIAGNOSIS — R73.09 OTHER ABNORMAL GLUCOSE: ICD-10-CM

## 2023-05-30 DIAGNOSIS — F31.32 BIPOLAR AFFECTIVE DISORDER, CURRENTLY DEPRESSED, MODERATE (HCC): Primary | ICD-10-CM

## 2023-05-30 DIAGNOSIS — Z98.84 HISTORY OF GASTRIC BYPASS: ICD-10-CM

## 2023-05-30 DIAGNOSIS — G43.109 MIGRAINE WITH AURA AND WITHOUT STATUS MIGRAINOSUS, NOT INTRACTABLE: ICD-10-CM

## 2023-05-30 PROCEDURE — 99214 OFFICE O/P EST MOD 30 MIN: CPT | Performed by: FAMILY MEDICINE

## 2023-05-30 RX ORDER — BUTALBITAL, ACETAMINOPHEN AND CAFFEINE 50; 325; 40 MG/1; MG/1; MG/1
TABLET ORAL
Qty: 180 TABLET | Status: CANCELLED | OUTPATIENT
Start: 2023-05-30

## 2023-05-30 RX ORDER — SERTRALINE HYDROCHLORIDE 100 MG/1
100 TABLET, FILM COATED ORAL DAILY
Qty: 90 TABLET | Refills: 1 | Status: SHIPPED | OUTPATIENT
Start: 2023-05-30

## 2023-05-30 RX ORDER — TOPIRAMATE 50 MG/1
50 TABLET, FILM COATED ORAL 2 TIMES DAILY
Qty: 180 TABLET | Refills: 1 | Status: SHIPPED | OUTPATIENT
Start: 2023-05-30

## 2023-05-30 RX ORDER — TRAZODONE HYDROCHLORIDE 50 MG/1
150 TABLET ORAL NIGHTLY
Qty: 270 TABLET | Refills: 1 | Status: SHIPPED | OUTPATIENT
Start: 2023-05-30

## 2023-05-30 RX ORDER — ARIPIPRAZOLE 2 MG/1
2 TABLET ORAL DAILY
Qty: 90 TABLET | Refills: 1 | Status: SHIPPED | OUTPATIENT
Start: 2023-05-30

## 2023-05-30 RX ORDER — ERGOCALCIFEROL 1.25 MG/1
CAPSULE ORAL
Qty: 4 CAPSULE | Status: CANCELLED | OUTPATIENT
Start: 2023-05-30

## 2023-05-30 RX ORDER — BUTALBITAL, ACETAMINOPHEN AND CAFFEINE 50; 325; 40 MG/1; MG/1; MG/1
1 TABLET ORAL DAILY PRN
Qty: 40 TABLET | Refills: 1 | Status: SHIPPED | OUTPATIENT
Start: 2023-05-30

## 2023-05-30 RX ORDER — TIZANIDINE 2 MG/1
TABLET ORAL
Status: CANCELLED | OUTPATIENT
Start: 2023-05-30

## 2023-05-30 RX ORDER — POTASSIUM CHLORIDE 750 MG/1
10 TABLET, FILM COATED, EXTENDED RELEASE ORAL DAILY
Qty: 90 TABLET | Refills: 1 | Status: SHIPPED | OUTPATIENT
Start: 2023-05-30

## 2023-05-30 RX ORDER — TOPIRAMATE 50 MG/1
TABLET, FILM COATED ORAL 2 TIMES DAILY WITH MEALS
Qty: 60 TABLET | Status: CANCELLED | OUTPATIENT
Start: 2023-05-30

## 2023-05-30 RX ORDER — MECLIZINE HCL 25MG 25 MG/1
TABLET, CHEWABLE ORAL 3 TIMES DAILY PRN
Qty: 90 TABLET | Status: CANCELLED | OUTPATIENT
Start: 2023-05-30

## 2023-05-30 RX ORDER — LACTULOSE 10 G/15ML
15 SOLUTION ORAL 2 TIMES DAILY
Qty: 473 ML | Refills: 5 | Status: SHIPPED | OUTPATIENT
Start: 2023-05-30

## 2023-05-30 RX ORDER — SERTRALINE HYDROCHLORIDE 100 MG/1
TABLET, FILM COATED ORAL DAILY
Qty: 30 TABLET | Status: CANCELLED | OUTPATIENT
Start: 2023-05-30

## 2023-05-30 RX ORDER — TIZANIDINE 2 MG/1
2 TABLET ORAL EVERY 8 HOURS PRN
Qty: 60 TABLET | Refills: 1 | Status: SHIPPED | OUTPATIENT
Start: 2023-05-30

## 2023-05-30 RX ORDER — LACTULOSE 10 G/15ML
15 SOLUTION ORAL 3 TIMES DAILY
Status: CANCELLED | OUTPATIENT
Start: 2023-05-30

## 2023-05-30 RX ORDER — OMEPRAZOLE 40 MG/1
40 CAPSULE, DELAYED RELEASE ORAL DAILY
Qty: 90 CAPSULE | Refills: 1 | Status: SHIPPED | OUTPATIENT
Start: 2023-05-30

## 2023-05-30 RX ORDER — OMEPRAZOLE 40 MG/1
CAPSULE, DELAYED RELEASE ORAL DAILY
Qty: 30 CAPSULE | Status: CANCELLED | OUTPATIENT
Start: 2023-05-30

## 2023-05-30 RX ORDER — ARIPIPRAZOLE 2 MG/1
2 TABLET ORAL DAILY
Qty: 30 TABLET | Status: CANCELLED | OUTPATIENT
Start: 2023-05-30

## 2023-05-30 RX ORDER — TRAZODONE HYDROCHLORIDE 50 MG/1
TABLET ORAL
Status: CANCELLED | OUTPATIENT
Start: 2023-05-30

## 2023-05-30 ASSESSMENT — PATIENT HEALTH QUESTIONNAIRE - PHQ9
SUM OF ALL RESPONSES TO PHQ QUESTIONS 1-9: 4
6. FEELING BAD ABOUT YOURSELF - OR THAT YOU ARE A FAILURE OR HAVE LET YOURSELF OR YOUR FAMILY DOWN: 1
4. FEELING TIRED OR HAVING LITTLE ENERGY: 1
SUM OF ALL RESPONSES TO PHQ QUESTIONS 1-9: 0
SUM OF ALL RESPONSES TO PHQ9 QUESTIONS 1 & 2: 0
SUM OF ALL RESPONSES TO PHQ QUESTIONS 1-9: 0
SUM OF ALL RESPONSES TO PHQ QUESTIONS 1-9: 0
3. TROUBLE FALLING OR STAYING ASLEEP: 1
2. FEELING DOWN, DEPRESSED OR HOPELESS: 1
5. POOR APPETITE OR OVEREATING: 0
SUM OF ALL RESPONSES TO PHQ QUESTIONS 1-9: 0
1. LITTLE INTEREST OR PLEASURE IN DOING THINGS: 0
9. THOUGHTS THAT YOU WOULD BE BETTER OFF DEAD, OR OF HURTING YOURSELF: 0
SUM OF ALL RESPONSES TO PHQ QUESTIONS 1-9: 4
1. LITTLE INTEREST OR PLEASURE IN DOING THINGS: 0
SUM OF ALL RESPONSES TO PHQ QUESTIONS 1-9: 4
7. TROUBLE CONCENTRATING ON THINGS, SUCH AS READING THE NEWSPAPER OR WATCHING TELEVISION: 0
8. MOVING OR SPEAKING SO SLOWLY THAT OTHER PEOPLE COULD HAVE NOTICED. OR THE OPPOSITE, BEING SO FIGETY OR RESTLESS THAT YOU HAVE BEEN MOVING AROUND A LOT MORE THAN USUAL: 0
SUM OF ALL RESPONSES TO PHQ QUESTIONS 1-9: 4
SUM OF ALL RESPONSES TO PHQ9 QUESTIONS 1 & 2: 1
2. FEELING DOWN, DEPRESSED OR HOPELESS: 0

## 2023-05-30 NOTE — PROGRESS NOTES
Room: 3  I have reviewed all needed documentation in preparation for visit. Verified patient by name and date of birth  Chief Complaint   Patient presents with    Medication Refill    Generalized Body Aches       Vitals:    05/30/23 1511 05/30/23 1522   BP: (!) 140/86 138/88   Site: Left Upper Arm Left Upper Arm   Position: Sitting Sitting   Cuff Size: Large Adult Large Adult   Pulse: 66 72   Resp: 18    Temp: 97.7 °F (36.5 °C)    TempSrc: Temporal    SpO2: 97%    Weight: 160 lb (72.6 kg)    Height: 4' 9\" (1.448 m)         Health Maintenance Due   Topic Date Due    HIV screen  Never done    Hepatitis C screen  Never done    COVID-19 Vaccine (4 - Booster for Moderna series) 01/01/2022    Shingles vaccine (2 of 2) 03/25/2022        1. \"Have you been to the ER, urgent care clinic since your last visit? Hospitalized since your last visit? \" no    2. \"Have you seen or consulted any other health care providers outside of the [address] [place] [address] since your last visit? \" no     3. For patients aged 39-70: Has the patient had a colonoscopy / FIT/ Cologuard? yes      If the patient is female:    4. For patients aged 41-77: Has the patient had a mammogram within the past 2 years? N/a      5. For patients aged 21-65: Has the patient had a pap smear?  N/a

## 2023-06-05 RX ORDER — RIZATRIPTAN BENZOATE 10 MG/1
TABLET, ORALLY DISINTEGRATING ORAL
Qty: 9 TABLET | Refills: 5 | Status: SHIPPED | OUTPATIENT
Start: 2023-06-05 | End: 2023-06-13 | Stop reason: SDUPTHER

## 2023-06-06 ENCOUNTER — TELEPHONE (OUTPATIENT)
Age: 63
End: 2023-06-06

## 2023-06-06 NOTE — TELEPHONE ENCOUNTER
Patient left a message stating that she is all out of her migraine medications and needs this refilled asap as she is struggling with head pain. Pt cannot remember the name of the medication. Pt also mentioned that there has been some new developments in her condition since she was last seen and would like to discuss with Nurse. Please call [phone]    Still using [address]

## 2023-07-06 ENCOUNTER — TELEPHONE (OUTPATIENT)
Facility: CLINIC | Age: 63
End: 2023-07-06

## 2023-07-06 DIAGNOSIS — K04.7 DENTAL ABSCESS: ICD-10-CM

## 2023-07-06 NOTE — TELEPHONE ENCOUNTER
Called patient and unable to leave a voice message for callback.      Galdino Vargas, CCT  2:44 PM 7/6/23

## 2023-07-06 NOTE — TELEPHONE ENCOUNTER
Pt says that her mouth is still painful \"I can still feel this knot\" she would like another round of pain meds or antibiotic for her mouth

## 2023-07-07 ENCOUNTER — OFFICE VISIT (OUTPATIENT)
Facility: CLINIC | Age: 63
End: 2023-07-07
Payer: MEDICARE

## 2023-07-07 VITALS
RESPIRATION RATE: 16 BRPM | OXYGEN SATURATION: 96 % | TEMPERATURE: 98.6 F | SYSTOLIC BLOOD PRESSURE: 133 MMHG | WEIGHT: 154 LBS | DIASTOLIC BLOOD PRESSURE: 88 MMHG | HEIGHT: 57 IN | BODY MASS INDEX: 33.22 KG/M2 | HEART RATE: 69 BPM

## 2023-07-07 DIAGNOSIS — K04.7 DENTAL ABSCESS: Primary | ICD-10-CM

## 2023-07-07 PROCEDURE — 99213 OFFICE O/P EST LOW 20 MIN: CPT | Performed by: NURSE PRACTITIONER

## 2023-07-07 RX ORDER — PENICILLIN V POTASSIUM 500 MG/1
500 TABLET ORAL 4 TIMES DAILY
Qty: 40 TABLET | Refills: 0 | Status: SHIPPED | OUTPATIENT
Start: 2023-07-07 | End: 2023-07-17

## 2023-07-07 RX ORDER — PENICILLIN V POTASSIUM 500 MG/1
500 TABLET ORAL 4 TIMES DAILY
Qty: 40 TABLET | Refills: 0 | OUTPATIENT
Start: 2023-07-07 | End: 2023-07-17

## 2023-07-07 RX ORDER — LIDOCAINE HYDROCHLORIDE 20 MG/ML
5 SOLUTION OROPHARYNGEAL PRN
Qty: 100 ML | Refills: 0 | Status: SHIPPED | OUTPATIENT
Start: 2023-07-07 | End: 2023-07-17

## 2023-07-07 ASSESSMENT — PATIENT HEALTH QUESTIONNAIRE - PHQ9
SUM OF ALL RESPONSES TO PHQ QUESTIONS 1-9: 2
SUM OF ALL RESPONSES TO PHQ9 QUESTIONS 1 & 2: 2
SUM OF ALL RESPONSES TO PHQ QUESTIONS 1-9: 2
2. FEELING DOWN, DEPRESSED OR HOPELESS: 1
SUM OF ALL RESPONSES TO PHQ QUESTIONS 1-9: 2
SUM OF ALL RESPONSES TO PHQ QUESTIONS 1-9: 2
1. LITTLE INTEREST OR PLEASURE IN DOING THINGS: 1

## 2023-07-13 NOTE — PROGRESS NOTES
Subjective:     Bharat Thomas is a 65 y.o. female who presents for follow up of   Patient Active Problem List   Diagnosis    History of gastric bypass    Gastroesophageal reflux disease without esophagitis    Hypoglycemia    SSS (sick sinus syndrome) (HCC)    Fibromyalgia affecting lower leg    Peripheral vascular disease (HCC)    Weight gain status post gastric bypass    Nesidioblastosis    BMI 32.0-32.9,adult    Bipolar disorder with current episode depressed (HCC)    Migraine headache with aura    Moderate asthma    Other abnormal glucose       New concerns: needs pre op clearance for dental work, gen anaethesisia  Not sure when the surgery will be done  Distant blood transfusion    Cardiac ROS:   Reports taking cardiovascuar medications without side affects. No complaints of exertional chest pain, excessive shortness of breath or focal weakness. Minimal swelling in lower legs gets dizziness with standing. Review of Systems, additional:  Pertinent items are noted in HPI.       Patient Active Problem List    Diagnosis Date Noted    Other abnormal glucose 03/22/2022    BMI 32.0-32.9,adult 02/18/2022    Fibromyalgia affecting lower leg 07/12/2021    Moderate asthma 10/18/2020    Peripheral vascular disease ([address]) 10/04/2019    Weight gain status post gastric bypass 10/04/2019    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    SSS (sick sinus syndrome) ([address]) 12/06/2018    Gastroesophageal reflux disease without esophagitis 08/24/2016    Bipolar disorder with current episode depressed ([address]) 02/01/2016    Migraine headache with aura 10/24/2014    History of gastric bypass 10/12/2014    Hypoglycemia 10/12/2014     Current Outpatient Medications   Medication Sig Dispense Refill    Cyanocobalamin ER 1000 MCG TBCR Take 1 tablet by mouth daily 90 tablet 3    ergocalciferol (ERGOCALCIFEROL) 1.25 MG (96964 UT) capsule Take 1 capsule by mouth every 7 days 12 capsule 3    furosemide (LASIX) 20 MG tablet Take 1 tablet by mouth daily 90

## 2023-07-14 ENCOUNTER — OFFICE VISIT (OUTPATIENT)
Facility: CLINIC | Age: 63
End: 2023-07-14
Payer: MEDICARE

## 2023-07-14 ENCOUNTER — TELEPHONE (OUTPATIENT)
Facility: CLINIC | Age: 63
End: 2023-07-14

## 2023-07-14 VITALS
SYSTOLIC BLOOD PRESSURE: 111 MMHG | TEMPERATURE: 98.5 F | DIASTOLIC BLOOD PRESSURE: 75 MMHG | RESPIRATION RATE: 16 BRPM | HEIGHT: 57 IN | WEIGHT: 152 LBS | HEART RATE: 73 BPM | OXYGEN SATURATION: 95 % | BODY MASS INDEX: 32.79 KG/M2

## 2023-07-14 DIAGNOSIS — Z98.84 WEIGHT GAIN STATUS POST GASTRIC BYPASS: ICD-10-CM

## 2023-07-14 DIAGNOSIS — I73.9 PERIPHERAL VASCULAR DISEASE (HCC): ICD-10-CM

## 2023-07-14 DIAGNOSIS — I49.5 SSS (SICK SINUS SYNDROME) (HCC): ICD-10-CM

## 2023-07-14 DIAGNOSIS — R63.5 WEIGHT GAIN STATUS POST GASTRIC BYPASS: ICD-10-CM

## 2023-07-14 DIAGNOSIS — E16.2 HYPOGLYCEMIA: ICD-10-CM

## 2023-07-14 DIAGNOSIS — K21.9 GASTROESOPHAGEAL REFLUX DISEASE WITHOUT ESOPHAGITIS: ICD-10-CM

## 2023-07-14 DIAGNOSIS — F31.32 BIPOLAR AFFECTIVE DISORDER, CURRENTLY DEPRESSED, MODERATE (HCC): ICD-10-CM

## 2023-07-14 DIAGNOSIS — Z01.818 PRE-OP EVALUATION: Primary | ICD-10-CM

## 2023-07-14 DIAGNOSIS — Z98.84 HISTORY OF GASTRIC BYPASS: ICD-10-CM

## 2023-07-14 PROCEDURE — 99214 OFFICE O/P EST MOD 30 MIN: CPT | Performed by: FAMILY MEDICINE

## 2023-07-14 RX ORDER — ERGOCALCIFEROL 1.25 MG/1
50000 CAPSULE ORAL
Qty: 12 CAPSULE | Refills: 3 | Status: SHIPPED | OUTPATIENT
Start: 2023-07-14

## 2023-07-14 RX ORDER — FUROSEMIDE 20 MG/1
20 TABLET ORAL DAILY
Qty: 90 TABLET | Refills: 1 | Status: SHIPPED | OUTPATIENT
Start: 2023-07-14

## 2023-07-14 ASSESSMENT — PATIENT HEALTH QUESTIONNAIRE - PHQ9
SUM OF ALL RESPONSES TO PHQ QUESTIONS 1-9: 2
SUM OF ALL RESPONSES TO PHQ QUESTIONS 1-9: 2
2. FEELING DOWN, DEPRESSED OR HOPELESS: 1
SUM OF ALL RESPONSES TO PHQ9 QUESTIONS 1 & 2: 2
10. IF YOU CHECKED OFF ANY PROBLEMS, HOW DIFFICULT HAVE THESE PROBLEMS MADE IT FOR YOU TO DO YOUR WORK, TAKE CARE OF THINGS AT HOME, OR GET ALONG WITH OTHER PEOPLE: 0
SUM OF ALL RESPONSES TO PHQ QUESTIONS 1-9: 2
1. LITTLE INTEREST OR PLEASURE IN DOING THINGS: 1
SUM OF ALL RESPONSES TO PHQ QUESTIONS 1-9: 2

## 2023-07-14 NOTE — PROGRESS NOTES
Chief Complaint   Patient presents with    Pre-op Exam     Pre op - dental pain     Patient has not been out of the country in (14 months), NO diarrhea, NO cough, NO chest conjestion, NO temp. Pt has not been around anyone with these symptoms. Health Maintenance reviewed. I have reviewed the patient's medical history in detail and updated the computerized patient record. 1. \"Have you been to the ER, urgent care clinic since your last visit? No  Hospitalized since your last visit? \" no    2. \"Have you seen or consulted any other health care providers outside of the [address] [place] [address] since your last visit? \" no     3. For patients aged 43-73: Has the patient had a colonoscopy / FIT/ Cologuard?  no

## 2023-07-16 ASSESSMENT — ENCOUNTER SYMPTOMS
EYES NEGATIVE: 1
SINUS PAIN: 0
VOICE CHANGE: 0
SORE THROAT: 0
GASTROINTESTINAL NEGATIVE: 1
RESPIRATORY NEGATIVE: 1
ALLERGIC/IMMUNOLOGIC NEGATIVE: 1
SINUS PRESSURE: 0
FACIAL SWELLING: 0
RHINORRHEA: 0
TROUBLE SWALLOWING: 0

## 2023-07-25 NOTE — TELEPHONE ENCOUNTER
Pt calling in for vitamin D, meclazine and \"bowel medication\" to [facility] pharmacy.  She can be reached at [phone]

## 2023-07-26 NOTE — TELEPHONE ENCOUNTER
Spoke w/ patient; two patient identifiers confirmed. Patient states that she is in need of her vitamin d, meclizine, and lactulose prescriptions to be sent in the [address] Informed patient that she should have refills available at the pharmacy on her viatmin d and lactulose and simply needs to call and request a refill on these. Patient verbalized understanding.     Requested Prescriptions     Pending Prescriptions Disp Refills    meclizine (ANTIVERT) 25 MG CHEW 90 tablet      Sig: Take by mouth 3 times daily as needed     Last OV 7/14/2023  Next OV 9/19/2023    Burt Siegel LPN

## 2023-07-27 RX ORDER — MECLIZINE HCL 25MG 25 MG/1
25 TABLET, CHEWABLE ORAL 3 TIMES DAILY PRN
Qty: 90 TABLET | Refills: 1 | Status: SHIPPED | OUTPATIENT
Start: 2023-07-27

## 2023-08-07 RX ORDER — BUTALBITAL, ACETAMINOPHEN AND CAFFEINE 50; 325; 40 MG/1; MG/1; MG/1
1 TABLET ORAL DAILY PRN
Qty: 40 TABLET | Refills: 0 | Status: SHIPPED | OUTPATIENT
Start: 2023-08-07

## 2023-08-07 RX ORDER — TOPIRAMATE 50 MG/1
TABLET, FILM COATED ORAL
Qty: 180 TABLET | Refills: 0 | Status: SHIPPED | OUTPATIENT
Start: 2023-08-07

## 2023-08-07 NOTE — TELEPHONE ENCOUNTER
Requested Prescriptions     Pending Prescriptions Disp Refills    topiramate (TOPAMAX) 50 MG tablet [Pharmacy Med Name: TOPIRAMATE 50MG TAB] 180 tablet 0     Sig: TAKE 1 TABLET BY MOUTH TWO TIMES DAILY    butalbital-acetaminophen-caffeine (FIORICET, ESGIC) -40 MG per tablet [Pharmacy Med Name: BUTALBITAL, ACETAMIN -40MG TAB] 40 tablet 0     Sig: TAKE 1 TABLET BY MOUTH DAILY AS NEEDED FOR HEADACHES       Allergies: Allergies   Allergen Reactions    Diphenhydramine Nausea And Vomiting    Ibuprofen Nausea And Vomiting    Sulfamethoxazole-Trimethoprim Nausea Only    Tramadol Anxiety    Diclofenac Itching    Sulfamethoxazole Nausea Only    Zinc Acetate Other (See Comments)    Gabapentin Anxiety       Last visit with ordering provider: 7/14/2023   Next visit with ordering provider: 9/19/2023       Current Outpatient Medications   Medication Instructions    acarbose (PRECOSE) 50 MG tablet TAKE 1 TABLET BY MOUTH THREE TIMES A DAY 30 MINUTES BEFORE MEALS    albuterol (PROVENTIL) (2.5 MG/3ML) 0.083% nebulizer solution Inhalation, EVERY 4 HOURS PRN    albuterol sulfate HFA (PROVENTIL;VENTOLIN;PROAIR) 108 (90 Base) MCG/ACT inhaler 1 puff, Inhalation, EVERY 6 HOURS PRN    ARIPiprazole (ABILIFY) 2 mg, Oral, DAILY    aspirin 81 MG chewable tablet CHEW AND SWALLOW 1 TAB BY MOUTH DAILY.     butalbital-acetaminophen-caffeine (FIORICET, ESGIC) -40 MG per tablet 1 tablet, Oral, DAILY PRN    Cyanocobalamin ER 1,000 mcg, Oral, DAILY    diclofenac sodium (VOLTAREN) 1 % GEL Topical, 4 TIMES DAILY    ergocalciferol (ERGOCALCIFEROL) 50,000 Units, Oral, EVERY 7 DAYS    fluticasone (FLONASE) 50 MCG/ACT nasal spray 2 sprays, Nasal, DAILY    furosemide (LASIX) 20 mg, Oral, DAILY    lactulose (CHRONULAC) 10 g, Oral, 2 TIMES DAILY, As needed    meclizine (ANTIVERT) 25 mg, Oral, 3 TIMES DAILY PRN    nystatin (MYCOSTATIN) 627626 UNIT/GM cream Topical, 2 TIMES DAILY    omeprazole (PRILOSEC) 40 mg, Oral, DAILY    polyethylene glycol

## 2023-08-23 ENCOUNTER — OFFICE VISIT (OUTPATIENT)
Facility: CLINIC | Age: 63
End: 2023-08-23
Payer: MEDICARE

## 2023-08-23 VITALS
HEART RATE: 86 BPM | OXYGEN SATURATION: 94 % | HEIGHT: 57 IN | BODY MASS INDEX: 32.36 KG/M2 | SYSTOLIC BLOOD PRESSURE: 121 MMHG | DIASTOLIC BLOOD PRESSURE: 79 MMHG | TEMPERATURE: 98.6 F | WEIGHT: 150 LBS | RESPIRATION RATE: 16 BRPM

## 2023-08-23 DIAGNOSIS — Z01.818 PRE-OP EXAMINATION: Primary | ICD-10-CM

## 2023-08-23 DIAGNOSIS — K02.9 PAIN DUE TO DENTAL CARIES: ICD-10-CM

## 2023-08-23 PROCEDURE — 99214 OFFICE O/P EST MOD 30 MIN: CPT | Performed by: FAMILY MEDICINE

## 2023-08-23 RX ORDER — HYDROCODONE BITARTRATE AND ACETAMINOPHEN 5; 325 MG/1; MG/1
1 TABLET ORAL EVERY 8 HOURS PRN
Qty: 30 TABLET | Refills: 0 | Status: SHIPPED | OUTPATIENT
Start: 2023-08-23 | End: 2023-09-02

## 2023-08-23 ASSESSMENT — ANXIETY QUESTIONNAIRES
2. NOT BEING ABLE TO STOP OR CONTROL WORRYING: 2
6. BECOMING EASILY ANNOYED OR IRRITABLE: 1
IF YOU CHECKED OFF ANY PROBLEMS ON THIS QUESTIONNAIRE, HOW DIFFICULT HAVE THESE PROBLEMS MADE IT FOR YOU TO DO YOUR WORK, TAKE CARE OF THINGS AT HOME, OR GET ALONG WITH OTHER PEOPLE: VERY DIFFICULT
3. WORRYING TOO MUCH ABOUT DIFFERENT THINGS: 2
5. BEING SO RESTLESS THAT IT IS HARD TO SIT STILL: 2
7. FEELING AFRAID AS IF SOMETHING AWFUL MIGHT HAPPEN: 2
1. FEELING NERVOUS, ANXIOUS, OR ON EDGE: 2
4. TROUBLE RELAXING: 2
GAD7 TOTAL SCORE: 13

## 2023-08-23 ASSESSMENT — COLUMBIA-SUICIDE SEVERITY RATING SCALE - C-SSRS
2. HAVE YOU ACTUALLY HAD ANY THOUGHTS OF KILLING YOURSELF?: NO
1. WITHIN THE PAST MONTH, HAVE YOU WISHED YOU WERE DEAD OR WISHED YOU COULD GO TO SLEEP AND NOT WAKE UP?: YES
6. HAVE YOU EVER DONE ANYTHING, STARTED TO DO ANYTHING, OR PREPARED TO DO ANYTHING TO END YOUR LIFE?: NO

## 2023-08-23 ASSESSMENT — PATIENT HEALTH QUESTIONNAIRE - PHQ9
10. IF YOU CHECKED OFF ANY PROBLEMS, HOW DIFFICULT HAVE THESE PROBLEMS MADE IT FOR YOU TO DO YOUR WORK, TAKE CARE OF THINGS AT HOME, OR GET ALONG WITH OTHER PEOPLE: 2
SUM OF ALL RESPONSES TO PHQ QUESTIONS 1-9: 16
9. THOUGHTS THAT YOU WOULD BE BETTER OFF DEAD, OR OF HURTING YOURSELF: 1
SUM OF ALL RESPONSES TO PHQ QUESTIONS 1-9: 17
3. TROUBLE FALLING OR STAYING ASLEEP: 3
1. LITTLE INTEREST OR PLEASURE IN DOING THINGS: 3
8. MOVING OR SPEAKING SO SLOWLY THAT OTHER PEOPLE COULD HAVE NOTICED. OR THE OPPOSITE, BEING SO FIGETY OR RESTLESS THAT YOU HAVE BEEN MOVING AROUND A LOT MORE THAN USUAL: 1
4. FEELING TIRED OR HAVING LITTLE ENERGY: 3
SUM OF ALL RESPONSES TO PHQ9 QUESTIONS 1 & 2: 6
SUM OF ALL RESPONSES TO PHQ QUESTIONS 1-9: 17
2. FEELING DOWN, DEPRESSED OR HOPELESS: 3
7. TROUBLE CONCENTRATING ON THINGS, SUCH AS READING THE NEWSPAPER OR WATCHING TELEVISION: 1
SUM OF ALL RESPONSES TO PHQ QUESTIONS 1-9: 17
5. POOR APPETITE OR OVEREATING: 2

## 2023-08-23 NOTE — PROGRESS NOTES
Chief Complaint   Patient presents with    Pre-op Exam     Dental surgery rescheduled on September 7th - need pre op done within 30 days of surgery      Headache     Head pain due to teeth infection  Jaw pain       Patient has not been out of the country in (14 months), NO diarrhea, NO cough, NO chest conjestion, NO temp. Pt has not been around anyone with these symptoms. Health Maintenance reviewed. I have reviewed the patient's medical history in detail and updated the computerized patient record. 1. \"Have you been to the ER, urgent care clinic since your last visit? No Hospitalized since your last visit? \" no    2. \"Have you seen or consulted any other health care providers outside of the [address] [place] [address] since your last visit? \" no     3. For patients aged 43-73: Has the patient had a colonoscopy / FIT/ Cologuard?  no
y.o. female with planned surgery as above. Known risk factors for perioperative complications: None    Difficulty with intubation is not anticipated. Diagnosis Orders   1. Pre-op examination        2. Pain due to dental caries  HYDROcodone-acetaminophen (NORCO) 5-325 MG per tablet          Cardiac Risk Estimation: per the Revised Cardiac Risk Index (Circ. 100:1043, 1999), the patient's risk factors for cardiac complications include , putting her in: RCI RISK CLASS I (0 risk factors, risk of major cardiac compl. appr. 0.5%)    Current medications which may produce withdrawal symptoms if withheld perioperatively: none      Plan:      1. Preoperative workup as follows none  2. Change in medication regimen before surgery: none, continue med regimen including morning of surgery, w/sip of waterne  3. Prophylaxis for cardiac events with perioperative beta-blockers: not indicated    Orders Placed This Encounter    HYDROcodone-acetaminophen (NORCO) 5-325 MG per tablet     Sig: Take 1 tablet by mouth every 8 hours as needed for Pain for up to 10 days. Take lowest dose possible to manage pain Max Daily Amount: 3 tablets     Dispense:  30 tablet     Refill:  0     Reduce doses taken as pain becomes manageable       We discussed this pain and the usage of controlled substances for dental oain relief. In general these medications are indicated for the acute symptom relief and not for chronic usage, allthough they are frequently used for chronic management  After a certain period of time they should be stopped to avoid dependence and/or addiction. I warned her about the dangers of addiction and other side affects including respiratory depression and death. Discussed how this is a controlled substance and that the prescribing of it is should be monitored very carefully. Drug usage is also monitored by our practise and the [facility], since there has been a lot of abuse and diversion of controlled substances.   In general we do not

## 2023-08-24 ENCOUNTER — TELEPHONE (OUTPATIENT)
Facility: CLINIC | Age: 63
End: 2023-08-24

## 2023-08-24 ENCOUNTER — OFFICE VISIT (OUTPATIENT)
Age: 63
End: 2023-08-24
Payer: MEDICARE

## 2023-08-24 VITALS
RESPIRATION RATE: 16 BRPM | WEIGHT: 148 LBS | HEART RATE: 67 BPM | SYSTOLIC BLOOD PRESSURE: 128 MMHG | BODY MASS INDEX: 31.93 KG/M2 | HEIGHT: 57 IN | OXYGEN SATURATION: 98 % | DIASTOLIC BLOOD PRESSURE: 90 MMHG

## 2023-08-24 DIAGNOSIS — G89.29 CHRONIC DENTAL PAIN: ICD-10-CM

## 2023-08-24 DIAGNOSIS — K08.9 CHRONIC DENTAL PAIN: ICD-10-CM

## 2023-08-24 DIAGNOSIS — F41.8 ANXIETY WITH DEPRESSION: ICD-10-CM

## 2023-08-24 DIAGNOSIS — G43.119 INTRACTABLE MIGRAINE WITH AURA WITHOUT STATUS MIGRAINOSUS: Primary | ICD-10-CM

## 2023-08-24 PROCEDURE — 99214 OFFICE O/P EST MOD 30 MIN: CPT | Performed by: NURSE PRACTITIONER

## 2023-08-24 RX ORDER — GALCANEZUMAB 120 MG/ML
120 INJECTION, SOLUTION SUBCUTANEOUS
Qty: 1 ADJUSTABLE DOSE PRE-FILLED PEN SYRINGE | Refills: 2 | Status: SHIPPED | OUTPATIENT
Start: 2023-08-24

## 2023-08-24 ASSESSMENT — PATIENT HEALTH QUESTIONNAIRE - PHQ9
1. LITTLE INTEREST OR PLEASURE IN DOING THINGS: 0
SUM OF ALL RESPONSES TO PHQ QUESTIONS 1-9: 1
SUM OF ALL RESPONSES TO PHQ QUESTIONS 1-9: 1
SUM OF ALL RESPONSES TO PHQ9 QUESTIONS 1 & 2: 1
2. FEELING DOWN, DEPRESSED OR HOPELESS: 1
SUM OF ALL RESPONSES TO PHQ QUESTIONS 1-9: 1
SUM OF ALL RESPONSES TO PHQ QUESTIONS 1-9: 1

## 2023-08-24 NOTE — PROGRESS NOTES
1. Have you been to the ER, urgent care clinic since your last visit? Hospitalized since your last visit? Seen on 8/16/23    2. Have you seen or consulted any other health care providers outside of the [address] [place] [address] Avenue since your last visit? Include any pap smears or colon screening. Surgery on September 7, 23.       Chief Complaint   Patient presents with    Migraine     Same symptoms- dental surgery soon for infected teeth- headaches with this from back to front
notes the Emgality previously was very effective for her therefore we will resume patient's Emgality, a loading dose sample was administered today in the office, then she is to continue monthly injections. Patient also has Nurtec she may take as needed, Fioricet as needed, she has Topamax to take regularly. Patient is hopeful that with her upcoming dental procedures her headaches will become much more manageable, if no improvement with her headaches she is contact the office. Healthy lifestyle encouraged. -     Galcanezumab-gnlm (EMGALITY) 120 MG/ML SOAJ; Inject 120 mg into the skin every 30 days, Disp-1 Adjustable Dose Pre-filled Pen Syringe, R-2Normal  -     Galcanezumab-gnlm SOAJ 240 mL; 240 mL, SubCUTAneous, ONCE, On u 8/24/23 at 0930, For 1 dose  2. Anxiety with depression: Patient is to continue medications as prescribed, continue close monitoring of follow-up with her primary care as well as psychiatrist.  Healthy lifestyle encouraged, discussed different ways to help lessen stressors and anxiety. 3. Chronic dental pain: Patient notes she has upcoming dental surgery scheduled for September, she is hopeful that this will take care of some of the discomfort she has been experiencing for quite a while now. Patient and/or family verbalized understanding of all instructions. Safety/side effects of medications discussed. Patient remains a complex patient secondary to polypharmacy, significant comorbid conditions, and use of high-risk medications which complicate the decision making process related to patient's neurologic diagnosis. The patient is to follow up in 6 months, sooner if needed.      Heather Zee, LAURA-BC

## 2023-09-08 ENCOUNTER — TELEPHONE (OUTPATIENT)
Facility: CLINIC | Age: 63
End: 2023-09-08

## 2023-09-08 NOTE — TELEPHONE ENCOUNTER
Pt requesting to speak to nguyen. She says that her tooth surgery was postponed because of pace maker issues. She was crying and says she is in a lot of pain and needs pain meds.  Please call her at [phone]

## 2023-09-11 ENCOUNTER — OFFICE VISIT (OUTPATIENT)
Facility: CLINIC | Age: 63
End: 2023-09-11

## 2023-09-11 VITALS
OXYGEN SATURATION: 94 % | BODY MASS INDEX: 31.5 KG/M2 | HEIGHT: 57 IN | SYSTOLIC BLOOD PRESSURE: 138 MMHG | DIASTOLIC BLOOD PRESSURE: 77 MMHG | TEMPERATURE: 98.6 F | HEART RATE: 67 BPM | RESPIRATION RATE: 16 BRPM | WEIGHT: 146 LBS

## 2023-09-11 DIAGNOSIS — K08.89 PAIN, DENTAL: Primary | ICD-10-CM

## 2023-09-11 RX ORDER — CLINDAMYCIN HYDROCHLORIDE 300 MG/1
300 CAPSULE ORAL 3 TIMES DAILY
Qty: 21 CAPSULE | Refills: 0 | Status: SHIPPED | OUTPATIENT
Start: 2023-09-11 | End: 2023-09-18

## 2023-09-11 NOTE — PROGRESS NOTES
Chief Complaint   Patient presents with    Dental Pain     ER FU - dental pain, dental surgery cancelled due to pt's pacemaker. Patient has not been out of the country in (14 months), NO diarrhea, NO cough, NO chest conjestion, NO temp. Pt has not been around anyone with these symptoms. Health Maintenance reviewed. I have reviewed the patient's medical history in detail and updated the computerized patient record. 1. \"Have you been to the ER, urgent care clinic since your last visit? Yes Hospitalized since your last visit? \" no    2. \"Have you seen or consulted any other health care providers outside of the [address] [place] [address] since your last visit? \" no     3. For patients aged 43-73: Has the patient had a colonoscopy / FIT/ Cologuard?  no

## 2023-09-14 NOTE — PROGRESS NOTES
Chet Ibarra (:  1960) is a 65 y.o. female,Established patient, here for evaluation of the following chief complaint(s):  Dental Pain (ER FU - dental pain, dental surgery cancelled due to pt's pacemaker.  )         ASSESSMENT/PLAN:  1. Pain, dental    Orders Placed This Encounter    clindamycin (CLEOCIN) 300 MG capsule     Sig: Take 1 capsule by mouth 3 times daily for 7 days     Dispense:  21 capsule     Refill:  0     Continue hydrocodone  Must call her dentist get the surgery rescheduled. Subjective   SUBJECTIVE/OBJECTIVE:  HPI  Left jaw pain for weeks but worse lately. Tried calling her dentist referred here. Has been to the emergency room given prescription for pain medications. Review of Systems     Not done    Social History     Socioeconomic History    Marital status:      Spouse name: Not on file    Number of children: Not on file    Years of education: Not on file    Highest education level: Not on file   Occupational History    Not on file   Tobacco Use    Smoking status: Never    Smokeless tobacco: Never   Vaping Use    Vaping Use: Never used   Substance and Sexual Activity    Alcohol use: No    Drug use: No    Sexual activity: Not on file   Other Topics Concern    Not on file   Social History Narrative     has brain damage, lives with him     Social Determinants of Health     Financial Resource Strain: Low Risk  (2022)    Overall Financial Resource Strain (CARDIA)     Difficulty of Paying Living Expenses: Not hard at all   Food Insecurity: No Food Insecurity (2022)    Hunger Vital Sign     Worried About Running Out of Food in the Last Year: Never true     [address] in the Last Year: Never true   Transportation Needs: Not on file   Physical Activity: Not on file   Stress: No Stress Concern Present (3/17/2023)    [facility]     Feeling of Stress :  Only a little   Social Connections:

## 2023-10-05 RX ORDER — TRAZODONE HYDROCHLORIDE 50 MG/1
TABLET ORAL
Qty: 270 TABLET | Refills: 0 | Status: SHIPPED | OUTPATIENT
Start: 2023-10-05 | End: 2023-11-30 | Stop reason: SDUPTHER

## 2023-10-05 RX ORDER — TIZANIDINE 2 MG/1
TABLET ORAL
Qty: 60 TABLET | Refills: 1 | Status: SHIPPED | OUTPATIENT
Start: 2023-10-05 | End: 2023-12-08 | Stop reason: SDUPTHER

## 2023-11-30 ENCOUNTER — OFFICE VISIT (OUTPATIENT)
Facility: CLINIC | Age: 63
End: 2023-11-30
Payer: MEDICARE

## 2023-11-30 VITALS
OXYGEN SATURATION: 96 % | DIASTOLIC BLOOD PRESSURE: 88 MMHG | HEIGHT: 57 IN | BODY MASS INDEX: 32.79 KG/M2 | HEART RATE: 76 BPM | WEIGHT: 152 LBS | SYSTOLIC BLOOD PRESSURE: 132 MMHG | RESPIRATION RATE: 16 BRPM | TEMPERATURE: 98.5 F

## 2023-11-30 DIAGNOSIS — M17.0 PRIMARY OSTEOARTHRITIS OF BOTH KNEES: Primary | ICD-10-CM

## 2023-11-30 DIAGNOSIS — G43.109 MIGRAINE WITH AURA AND WITHOUT STATUS MIGRAINOSUS, NOT INTRACTABLE: ICD-10-CM

## 2023-11-30 PROCEDURE — 99214 OFFICE O/P EST MOD 30 MIN: CPT | Performed by: FAMILY MEDICINE

## 2023-11-30 RX ORDER — TOPIRAMATE 50 MG/1
50 TABLET, FILM COATED ORAL 2 TIMES DAILY
Qty: 180 TABLET | Refills: 1 | Status: SHIPPED | OUTPATIENT
Start: 2023-11-30

## 2023-11-30 RX ORDER — LACTULOSE 10 G/15ML
15 SOLUTION ORAL 2 TIMES DAILY
Qty: 473 ML | Refills: 5 | Status: SHIPPED | OUTPATIENT
Start: 2023-11-30

## 2023-11-30 RX ORDER — FUROSEMIDE 20 MG/1
20 TABLET ORAL DAILY
Qty: 90 TABLET | Refills: 1 | Status: SHIPPED | OUTPATIENT
Start: 2023-11-30

## 2023-11-30 RX ORDER — POTASSIUM CHLORIDE 750 MG/1
10 TABLET, FILM COATED, EXTENDED RELEASE ORAL DAILY
Qty: 90 TABLET | Refills: 1 | Status: SHIPPED | OUTPATIENT
Start: 2023-11-30

## 2023-11-30 RX ORDER — ARIPIPRAZOLE 2 MG/1
2 TABLET ORAL DAILY
Qty: 90 TABLET | Refills: 1 | Status: SHIPPED | OUTPATIENT
Start: 2023-11-30

## 2023-11-30 RX ORDER — SERTRALINE HYDROCHLORIDE 100 MG/1
100 TABLET, FILM COATED ORAL DAILY
Qty: 90 TABLET | Refills: 1 | Status: SHIPPED | OUTPATIENT
Start: 2023-11-30

## 2023-11-30 RX ORDER — RIZATRIPTAN BENZOATE 10 MG/1
10 TABLET, ORALLY DISINTEGRATING ORAL DAILY PRN
Qty: 9 TABLET | Refills: 5 | Status: SHIPPED | OUTPATIENT
Start: 2023-11-30

## 2023-11-30 RX ORDER — OMEPRAZOLE 40 MG/1
40 CAPSULE, DELAYED RELEASE ORAL DAILY
Qty: 90 CAPSULE | Refills: 1 | Status: SHIPPED | OUTPATIENT
Start: 2023-11-30

## 2023-11-30 RX ORDER — TRAZODONE HYDROCHLORIDE 50 MG/1
TABLET ORAL
Qty: 270 TABLET | Refills: 1 | Status: SHIPPED | OUTPATIENT
Start: 2023-11-30

## 2023-11-30 ASSESSMENT — PATIENT HEALTH QUESTIONNAIRE - PHQ9
SUM OF ALL RESPONSES TO PHQ QUESTIONS 1-9: 2
SUM OF ALL RESPONSES TO PHQ9 QUESTIONS 1 & 2: 2
1. LITTLE INTEREST OR PLEASURE IN DOING THINGS: 1
SUM OF ALL RESPONSES TO PHQ QUESTIONS 1-9: 2
2. FEELING DOWN, DEPRESSED OR HOPELESS: 1

## 2023-11-30 NOTE — PROGRESS NOTES
PROGRESS NOTE        SUBJECTIVE:  Diagnosis/Chief Complaint: No chief complaint on file. Bharat Thomas is a 65 y.o. female who complains of pain from hips to ankle on both sides for 6 days, stable since that time. Doing well with pain no  Pain levels 10/10   Usage of narcotic or benzodiazapine or other sedatives flexeril called in by ER but not picked up. Activities: Able to do activities of daily living.  yes - ok  Side affects: no  States taking medications per medicine list.yes - as RX   queried no  Urine drug screen done no  Opiod Rx exceeds 50 MME/dayno  Hx of depression yes - re AD  Hx of drug addiction: no    Patient Active Problem List    Diagnosis Date Noted    Other abnormal glucose 03/22/2022    BMI 32.0-32.9,adult 02/18/2022    Fibromyalgia affecting lower leg 07/12/2021    Moderate asthma 10/18/2020    Peripheral vascular disease ([address]) 10/04/2019    Weight gain status post gastric bypass 10/04/2019    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    SSS (sick sinus syndrome) ([address]) 12/06/2018    Gastroesophageal reflux disease without esophagitis 08/24/2016    Bipolar disorder with current episode depressed ([address]) 02/01/2016    Migraine headache with aura 10/24/2014    History of gastric bypass 10/12/2014    Hypoglycemia 10/12/2014     Allergies   Allergen Reactions    Diphenhydramine Nausea And Vomiting    Ibuprofen Nausea And Vomiting    Sulfamethoxazole-Trimethoprim Nausea Only    Tramadol Anxiety    Diclofenac Itching    Sulfamethoxazole Nausea Only    Zinc Acetate Other (See Comments)    Gabapentin Anxiety     Past Medical History:   Diagnosis Date    Asthma     Chest pain     Depression     Diabetes ([address])     Diarrhea     Essential hypertension     Fainting     Fatigue     Hearing loss     Hypoglycemia 2014    Hypotension 2014    Leg swelling     Memory disorder     Murmur     Nausea and vomiting     Pacemaker     Ringing in ears     SOB (shortness of breath)     Stomach pain     Swallowing difficulty

## 2023-12-01 ENCOUNTER — TELEPHONE (OUTPATIENT)
Facility: CLINIC | Age: 63
End: 2023-12-01

## 2023-12-01 NOTE — TELEPHONE ENCOUNTER
Patient refill request; [facility]        meclizine (ANTIVERT) 25 MG CHEW    butalbital-acetaminophen-caffeine (FIORICET, ESGIC) -40 MG per tablet

## 2023-12-05 RX ORDER — MECLIZINE HCL 25MG 25 MG/1
25 TABLET, CHEWABLE ORAL 3 TIMES DAILY PRN
Qty: 90 TABLET | Refills: 1 | Status: SHIPPED | OUTPATIENT
Start: 2023-12-05 | End: 2023-12-08 | Stop reason: SDUPTHER

## 2023-12-07 ENCOUNTER — TELEPHONE (OUTPATIENT)
Facility: CLINIC | Age: 63
End: 2023-12-07

## 2023-12-07 NOTE — TELEPHONE ENCOUNTER
----- Message from Jen Drummond sent at 11/27/2023  2:41 PM EST -----  Subject: Message to Provider    QUESTIONS  Information for Provider? Patient stated she needs all of her medications   other than Trazadone refilled. She is requesting they be sent to [facility]   in [place].  ---------------------------------------------------------------------------  --------------  CALL BACK INFO  [phone]; OK to leave message on voicemail  ---------------------------------------------------------------------------  --------------  SCRIPT ANSWERS  Relationship to Patient? Self

## 2023-12-07 NOTE — TELEPHONE ENCOUNTER
----- Message from Estrella Carolina sent at 12/4/2023  2:08 PM EST -----  Subject: Message to Provider    QUESTIONS  Information for Provider? Pt called stating that Mail order is requesting   for ALL of her prescriptions to be faxed to [phone]. Pt stated that   her BRUCE # also needs to be included? [id number]. Thank you  ---------------------------------------------------------------------------  --------------  CALL BACK INFO  [phone]; OK to leave message on voicemail  ---------------------------------------------------------------------------  --------------  SCRIPT ANSWERS  Relationship to Patient? Self

## 2023-12-08 DIAGNOSIS — M17.0 PRIMARY OSTEOARTHRITIS OF BOTH KNEES: ICD-10-CM

## 2023-12-08 DIAGNOSIS — G43.109 MIGRAINE WITH AURA AND WITHOUT STATUS MIGRAINOSUS, NOT INTRACTABLE: ICD-10-CM

## 2023-12-08 RX ORDER — FUROSEMIDE 20 MG/1
20 TABLET ORAL DAILY
Qty: 90 TABLET | Refills: 1 | Status: SHIPPED | OUTPATIENT
Start: 2023-12-08

## 2023-12-08 RX ORDER — RIZATRIPTAN BENZOATE 10 MG/1
10 TABLET, ORALLY DISINTEGRATING ORAL DAILY PRN
Qty: 9 TABLET | Refills: 5 | Status: SHIPPED | OUTPATIENT
Start: 2023-12-08

## 2023-12-08 RX ORDER — TIZANIDINE 2 MG/1
TABLET ORAL
Qty: 60 TABLET | Refills: 1 | Status: SHIPPED | OUTPATIENT
Start: 2023-12-08

## 2023-12-08 RX ORDER — BUTALBITAL, ACETAMINOPHEN AND CAFFEINE 50; 325; 40 MG/1; MG/1; MG/1
1 TABLET ORAL DAILY PRN
Qty: 30 TABLET | Refills: 0 | Status: SHIPPED | OUTPATIENT
Start: 2023-12-08

## 2023-12-08 RX ORDER — OMEPRAZOLE 40 MG/1
40 CAPSULE, DELAYED RELEASE ORAL DAILY
Qty: 90 CAPSULE | Refills: 1 | Status: SHIPPED | OUTPATIENT
Start: 2023-12-08

## 2023-12-08 RX ORDER — POTASSIUM CHLORIDE 750 MG/1
10 TABLET, FILM COATED, EXTENDED RELEASE ORAL DAILY
Qty: 90 TABLET | Refills: 1 | Status: SHIPPED | OUTPATIENT
Start: 2023-12-08

## 2023-12-08 RX ORDER — ERGOCALCIFEROL 1.25 MG/1
50000 CAPSULE ORAL
Qty: 12 CAPSULE | Refills: 3 | Status: SHIPPED | OUTPATIENT
Start: 2023-12-08

## 2023-12-08 RX ORDER — ARIPIPRAZOLE 2 MG/1
2 TABLET ORAL DAILY
Qty: 90 TABLET | Refills: 1 | Status: SHIPPED | OUTPATIENT
Start: 2023-12-08

## 2023-12-08 RX ORDER — LACTULOSE 10 G/15ML
15 SOLUTION ORAL 2 TIMES DAILY
Qty: 473 ML | Refills: 5 | Status: SHIPPED | OUTPATIENT
Start: 2023-12-08

## 2023-12-08 RX ORDER — SERTRALINE HYDROCHLORIDE 100 MG/1
100 TABLET, FILM COATED ORAL DAILY
Qty: 90 TABLET | Refills: 1 | Status: SHIPPED | OUTPATIENT
Start: 2023-12-08

## 2023-12-08 RX ORDER — TOPIRAMATE 50 MG/1
50 TABLET, FILM COATED ORAL 2 TIMES DAILY
Qty: 180 TABLET | Refills: 1 | Status: SHIPPED | OUTPATIENT
Start: 2023-12-08

## 2023-12-08 RX ORDER — MECLIZINE HCL 25MG 25 MG/1
25 TABLET, CHEWABLE ORAL 3 TIMES DAILY PRN
Qty: 90 TABLET | Refills: 1 | Status: SHIPPED | OUTPATIENT
Start: 2023-12-08

## 2023-12-26 ENCOUNTER — TELEPHONE (OUTPATIENT)
Facility: CLINIC | Age: 63
End: 2023-12-26

## 2023-12-26 DIAGNOSIS — G43.109 MIGRAINE WITH AURA AND WITHOUT STATUS MIGRAINOSUS, NOT INTRACTABLE: ICD-10-CM

## 2023-12-26 RX ORDER — RIZATRIPTAN BENZOATE 10 MG/1
TABLET, ORALLY DISINTEGRATING ORAL
Qty: 9 TABLET | Refills: 3 | Status: SHIPPED | OUTPATIENT
Start: 2023-12-26

## 2023-12-26 RX ORDER — MECLIZINE HYDROCHLORIDE 25 MG/1
25 TABLET ORAL 3 TIMES DAILY PRN
Qty: 90 TABLET | Refills: 1 | Status: SHIPPED | OUTPATIENT
Start: 2023-12-26

## 2023-12-26 NOTE — TELEPHONE ENCOUNTER
Pt thinks she may have covid. She has flu like symptoms and is having a lot of congestion. Please call pt asap to triage

## 2024-03-05 ENCOUNTER — TELEPHONE (OUTPATIENT)
Age: 64
End: 2024-03-05

## 2024-03-05 NOTE — TELEPHONE ENCOUNTER
Patient asking for medication for headaches. She stated her needle prescription is not working. Please send to [facility] in [place]. Thank you.

## 2024-03-05 NOTE — TELEPHONE ENCOUNTER
Verified patient with 2 identifiers   Advised that Trista Arellano NP does prescribe the emgality, however per [facility], her PCP Dr Tucker prescribes all the other other oral headache medications. Patient verified understanding and will contact PCP

## 2024-03-13 ENCOUNTER — OFFICE VISIT (OUTPATIENT)
Facility: CLINIC | Age: 64
End: 2024-03-13

## 2024-03-13 VITALS
WEIGHT: 162 LBS | DIASTOLIC BLOOD PRESSURE: 76 MMHG | OXYGEN SATURATION: 98 % | BODY MASS INDEX: 34.95 KG/M2 | HEIGHT: 57 IN | RESPIRATION RATE: 16 BRPM | HEART RATE: 82 BPM | SYSTOLIC BLOOD PRESSURE: 137 MMHG | TEMPERATURE: 97.8 F

## 2024-03-13 DIAGNOSIS — J45.40 MODERATE PERSISTENT ASTHMA WITHOUT COMPLICATION: ICD-10-CM

## 2024-03-13 DIAGNOSIS — M17.0 PRIMARY OSTEOARTHRITIS OF BOTH KNEES: ICD-10-CM

## 2024-03-13 DIAGNOSIS — E16.2 HYPOGLYCEMIA: ICD-10-CM

## 2024-03-13 DIAGNOSIS — M79.7 FIBROMYALGIA AFFECTING LOWER LEG: ICD-10-CM

## 2024-03-13 DIAGNOSIS — Z11.59 ENCOUNTER FOR HEPATITIS C SCREENING TEST FOR LOW RISK PATIENT: ICD-10-CM

## 2024-03-13 DIAGNOSIS — Z98.84 HISTORY OF GASTRIC BYPASS: ICD-10-CM
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
## 2024-03-13 DIAGNOSIS — F31.30 BIPOLAR AFFECTIVE DISORDER, CURRENT EPISODE DEPRESSED, CURRENT EPISODE SEVERITY UNSPECIFIED (HCC): ICD-10-CM

## 2024-03-13 DIAGNOSIS — Z23 NEED FOR VACCINATION: ICD-10-CM

## 2024-03-13 DIAGNOSIS — Z00.00 MEDICARE ANNUAL WELLNESS VISIT, SUBSEQUENT: Primary | ICD-10-CM

## 2024-03-13 DIAGNOSIS — D50.8 ANEMIA, DUE TO INADEQUATE IRON INTAKE: ICD-10-CM
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
## 2024-03-13 RX ORDER — TOPIRAMATE 50 MG/1
50 TABLET, FILM COATED ORAL 2 TIMES DAILY
Qty: 180 TABLET | Refills: 3 | Status: SHIPPED | OUTPATIENT
Start: 2024-03-13

## 2024-03-13 RX ORDER — MECLIZINE HCL 25MG 25 MG/1
25 TABLET, CHEWABLE ORAL 3 TIMES DAILY PRN
Qty: 90 TABLET | Refills: 3 | Status: SHIPPED | OUTPATIENT
Start: 2024-03-13

## 2024-03-13 RX ORDER — POTASSIUM CHLORIDE 750 MG/1
10 TABLET, FILM COATED, EXTENDED RELEASE ORAL DAILY
Qty: 90 TABLET | Refills: 3 | Status: SHIPPED | OUTPATIENT
Start: 2024-03-13

## 2024-03-13 RX ORDER — OMEPRAZOLE 40 MG/1
40 CAPSULE, DELAYED RELEASE ORAL DAILY
Qty: 90 CAPSULE | Refills: 3 | Status: SHIPPED | OUTPATIENT
Start: 2024-03-13

## 2024-03-13 RX ORDER — LACTULOSE 10 G/15ML
15 SOLUTION ORAL 2 TIMES DAILY
Qty: 473 ML | Refills: 5 | Status: SHIPPED | OUTPATIENT
Start: 2024-03-13

## 2024-03-13 RX ORDER — ERGOCALCIFEROL 1.25 MG/1
50000 CAPSULE ORAL
Qty: 12 CAPSULE | Refills: 3 | Status: SHIPPED | OUTPATIENT
Start: 2024-03-13

## 2024-03-13 RX ORDER — TRAZODONE HYDROCHLORIDE 50 MG/1
TABLET ORAL
Qty: 270 TABLET | Refills: 3 | Status: SHIPPED | OUTPATIENT
Start: 2024-03-13

## 2024-03-13 RX ORDER — ARIPIPRAZOLE 2 MG/1
2 TABLET ORAL DAILY
Qty: 90 TABLET | Refills: 3 | Status: SHIPPED | OUTPATIENT
Start: 2024-03-13

## 2024-03-13 RX ORDER — FUROSEMIDE 20 MG/1
20 TABLET ORAL DAILY
Qty: 90 TABLET | Refills: 3 | Status: SHIPPED | OUTPATIENT
Start: 2024-03-13

## 2024-03-13 RX ORDER — TIZANIDINE 2 MG/1
TABLET ORAL
Qty: 60 TABLET | Refills: 5 | Status: SHIPPED | OUTPATIENT
Start: 2024-03-13

## 2024-03-13 RX ORDER — SERTRALINE HYDROCHLORIDE 100 MG/1
100 TABLET, FILM COATED ORAL DAILY
Qty: 90 TABLET | Refills: 3 | Status: SHIPPED | OUTPATIENT
Start: 2024-03-13

## 2024-03-13 SDOH — ECONOMIC STABILITY: FOOD INSECURITY: WITHIN THE PAST 12 MONTHS, THE FOOD YOU BOUGHT JUST DIDN'T LAST AND YOU DIDN'T HAVE MONEY TO GET MORE.: NEVER TRUE

## 2024-03-13 SDOH — ECONOMIC STABILITY: HOUSING INSECURITY
IN THE LAST 12 MONTHS, WAS THERE A TIME WHEN YOU DID NOT HAVE A STEADY PLACE TO SLEEP OR SLEPT IN A SHELTER (INCLUDING NOW)?: NO

## 2024-03-13 SDOH — ECONOMIC STABILITY: FOOD INSECURITY: WITHIN THE PAST 12 MONTHS, YOU WORRIED THAT YOUR FOOD WOULD RUN OUT BEFORE YOU GOT MONEY TO BUY MORE.: NEVER TRUE

## 2024-03-13 SDOH — ECONOMIC STABILITY: INCOME INSECURITY: HOW HARD IS IT FOR YOU TO PAY FOR THE VERY BASICS LIKE FOOD, HOUSING, MEDICAL CARE, AND HEATING?: NOT HARD AT ALL

## 2024-03-13 ASSESSMENT — ANXIETY QUESTIONNAIRES
1. FEELING NERVOUS, ANXIOUS, OR ON EDGE: 1
2. NOT BEING ABLE TO STOP OR CONTROL WORRYING: 1
IF YOU CHECKED OFF ANY PROBLEMS ON THIS QUESTIONNAIRE, HOW DIFFICULT HAVE THESE PROBLEMS MADE IT FOR YOU TO DO YOUR WORK, TAKE CARE OF THINGS AT HOME, OR GET ALONG WITH OTHER PEOPLE: NOT DIFFICULT AT ALL

## 2024-03-13 ASSESSMENT — PATIENT HEALTH QUESTIONNAIRE - PHQ9
2. FEELING DOWN, DEPRESSED OR HOPELESS: 1
SUM OF ALL RESPONSES TO PHQ9 QUESTIONS 1 & 2: 2
SUM OF ALL RESPONSES TO PHQ QUESTIONS 1-9: 2
1. LITTLE INTEREST OR PLEASURE IN DOING THINGS: 1

## 2024-03-13 NOTE — PROGRESS NOTES
Chief Complaint   Patient presents with    Medicare AWV    Medication Refill     Clock Test was done     Patient has not been out of the country in (14 months), NO diarrhea, NO cough, NO chest conjestion, NO temp.  Pt has not been around anyone with these symptoms.     Health Maintenance reviewed.    I have reviewed the patient's medical history in detail and updated the computerized patient record.    \"Have you been to the ER, urgent care clinic since your last visit?  Yes Hospitalized since your last visit?\" no       “Have you seen or consulted any other health care providers outside of [facility] since your last visit?”    no                                           
headache not to exceed 1 dose in 24 hours       No current facility-administered medications for this visit.         Francis Tucker MD FAAFP    3 months

## 2024-04-10 LAB
25(OH)D3+25(OH)D2 SERPL-MCNC: 39 NG/ML (ref 30–100)
ALBUMIN SERPL-MCNC: 3.9 G/DL (ref 3.9–4.9)
ALBUMIN/GLOB SERPL: 2.3 {RATIO} (ref 1.2–2.2)
ALP SERPL-CCNC: 109 IU/L (ref 44–121)
ALT SERPL-CCNC: 11 IU/L (ref 0–32)
AST SERPL-CCNC: 20 IU/L (ref 0–40)
BASOPHILS # BLD AUTO: 0 X10E3/UL (ref 0–0.2)
BASOPHILS NFR BLD AUTO: 1 %
BILIRUB SERPL-MCNC: <0.2 MG/DL (ref 0–1.2)
BUN SERPL-MCNC: 23 MG/DL (ref 8–27)
BUN/CREAT SERPL: 32 (ref 12–28)
CALCIUM SERPL-MCNC: 8.6 MG/DL (ref 8.7–10.3)
CERULOPLASMIN SERPL-MCNC: 15.2 MG/DL (ref 19–39)
CHLORIDE SERPL-SCNC: 110 MMOL/L (ref 96–106)
CO2 SERPL-SCNC: 16 MMOL/L (ref 20–29)
CREAT SERPL-MCNC: 0.71 MG/DL (ref 0.57–1)
EGFRCR SERPLBLD CKD-EPI 2021: 96 ML/MIN/1.73
EOSINOPHIL # BLD AUTO: 0 X10E3/UL (ref 0–0.4)
EOSINOPHIL NFR BLD AUTO: 1 %
ERYTHROCYTE [DISTWIDTH] IN BLOOD BY AUTOMATED COUNT: 14.2 % (ref 11.7–15.4)
FERRITIN SERPL-MCNC: 16 NG/ML (ref 15–150)
FOLATE SERPL-MCNC: 17.8 NG/ML
GLOBULIN SER CALC-MCNC: 1.7 G/DL (ref 1.5–4.5)
GLUCOSE SERPL-MCNC: 77 MG/DL (ref 70–99)
HCT VFR BLD AUTO: 39.3 % (ref 34–46.6)
HCV AB SERPL QL IA: NORMAL
HCV IGG SERPL QL IA: NON REACTIVE
HGB BLD-MCNC: 12.5 G/DL (ref 11.1–15.9)
HIV 1+2 AB+HIV1 P24 AG SERPL QL IA: NON REACTIVE
IMM GRANULOCYTES # BLD AUTO: 0 X10E3/UL (ref 0–0.1)
IMM GRANULOCYTES NFR BLD AUTO: 0 %
IRON SATN MFR SERPL: 12 % (ref 15–55)
IRON SERPL-MCNC: 45 UG/DL (ref 27–139)
LYMPHOCYTES # BLD AUTO: 1.4 X10E3/UL (ref 0.7–3.1)
LYMPHOCYTES NFR BLD AUTO: 29 %
MCH RBC QN AUTO: 29.1 PG (ref 26.6–33)
MCHC RBC AUTO-ENTMCNC: 31.8 G/DL (ref 31.5–35.7)
MCV RBC AUTO: 92 FL (ref 79–97)
MONOCYTES # BLD AUTO: 0.5 X10E3/UL (ref 0.1–0.9)
MONOCYTES NFR BLD AUTO: 11 %
NEUTROPHILS # BLD AUTO: 2.7 X10E3/UL (ref 1.4–7)
NEUTROPHILS NFR BLD AUTO: 58 %
PLATELET # BLD AUTO: 343 X10E3/UL (ref 150–450)
POTASSIUM SERPL-SCNC: 4.2 MMOL/L (ref 3.5–5.2)
PROT SERPL-MCNC: 5.6 G/DL (ref 6–8.5)
PTH-INTACT SERPL-MCNC: 55 PG/ML (ref 15–65)
RBC # BLD AUTO: 4.29 X10E6/UL (ref 3.77–5.28)
SODIUM SERPL-SCNC: 141 MMOL/L (ref 134–144)
TIBC SERPL-MCNC: 365 UG/DL (ref 250–450)
UIBC SERPL-MCNC: 320 UG/DL (ref 118–369)
VIT B12 SERPL-MCNC: 394 PG/ML (ref 232–1245)
WBC # BLD AUTO: 4.7 X10E3/UL (ref 3.4–10.8)

## 2024-04-11 LAB — ZINC SERPL-MCNC: 80 UG/DL (ref 44–115)

## 2024-04-13 ENCOUNTER — TELEPHONE (OUTPATIENT)
Facility: CLINIC | Age: 64
End: 2024-04-13

## 2024-05-03 ENCOUNTER — TELEPHONE (OUTPATIENT)
Facility: CLINIC | Age: 64
End: 2024-05-03

## 2024-05-20 DIAGNOSIS — G43.109 MIGRAINE WITH AURA AND WITHOUT STATUS MIGRAINOSUS, NOT INTRACTABLE: ICD-10-CM

## 2024-05-20 RX ORDER — RIZATRIPTAN BENZOATE 10 MG/1
TABLET, ORALLY DISINTEGRATING ORAL
Qty: 9 TABLET | Refills: 3 | Status: CANCELLED | OUTPATIENT
Start: 2024-05-20

## 2024-05-20 NOTE — TELEPHONE ENCOUNTER
----- Message from Yane Khan sent at 5/20/2024 10:56 AM EDT -----  Subject: Refill Request    QUESTIONS  Name of Medication? rizatriptan (MAXALT-MLT) 10 MG disintegrating tablet  Patient-reported dosage and instructions? 10 MG; every 2 hours; 90 day   supply  How many days do you have left? 0  Preferred Pharmacy? [id number]  Pharmacy phone number (if available)? [phone]  Additional Information for Provider? Patient has an appointment upcoming   on 6/19. Patient also wants to know if office can send her her upcoming   appointments to [facility].   ---------------------------------------------------------------------------  --------------  CALL BACK INFO  What is the best way for the office to contact you? OK to leave message on   voicemail  Preferred Call Back Phone Number? [phone]  ---------------------------------------------------------------------------  --------------  SCRIPT ANSWERS  Relationship to Patient? Self   yes

## 2024-06-21 ENCOUNTER — OFFICE VISIT (OUTPATIENT)
Facility: CLINIC | Age: 64
End: 2024-06-21

## 2024-06-21 VITALS
TEMPERATURE: 97.7 F | HEIGHT: 57 IN | DIASTOLIC BLOOD PRESSURE: 86 MMHG | RESPIRATION RATE: 16 BRPM | BODY MASS INDEX: 37.54 KG/M2 | SYSTOLIC BLOOD PRESSURE: 125 MMHG | OXYGEN SATURATION: 96 % | WEIGHT: 174 LBS | HEART RATE: 82 BPM

## 2024-06-21 DIAGNOSIS — M79.7 FIBROMYALGIA AFFECTING LOWER LEG: Primary | ICD-10-CM

## 2024-06-21 DIAGNOSIS — R73.9 ELEVATED BLOOD SUGAR: ICD-10-CM

## 2024-06-21 DIAGNOSIS — I89.0 LYMPHEDEMA: ICD-10-CM

## 2024-06-21 DIAGNOSIS — G43.109 MIGRAINE WITH AURA AND WITHOUT STATUS MIGRAINOSUS, NOT INTRACTABLE: ICD-10-CM

## 2024-06-21 RX ORDER — FUROSEMIDE 20 MG/1
40 TABLET ORAL DAILY
Qty: 180 TABLET | Refills: 1 | Status: SHIPPED | OUTPATIENT
Start: 2024-06-21 | End: 2024-06-28

## 2024-06-21 RX ORDER — POTASSIUM CHLORIDE 750 MG/1
20 TABLET, FILM COATED, EXTENDED RELEASE ORAL DAILY
Qty: 180 TABLET | Refills: 1 | Status: SHIPPED | OUTPATIENT
Start: 2024-06-21 | End: 2024-06-28

## 2024-06-21 RX ORDER — TIZANIDINE 4 MG/1
TABLET ORAL
Qty: 60 TABLET | Refills: 2 | Status: SHIPPED | OUTPATIENT
Start: 2024-06-21

## 2024-06-21 RX ORDER — RIZATRIPTAN BENZOATE 10 MG/1
10 TABLET, ORALLY DISINTEGRATING ORAL DAILY PRN
Qty: 9 TABLET | Refills: 3 | Status: SHIPPED | OUTPATIENT
Start: 2024-06-21

## 2024-06-21 ASSESSMENT — PATIENT HEALTH QUESTIONNAIRE - PHQ9
SUM OF ALL RESPONSES TO PHQ9 QUESTIONS 1 & 2: 2
SUM OF ALL RESPONSES TO PHQ QUESTIONS 1-9: 2
SUM OF ALL RESPONSES TO PHQ QUESTIONS 1-9: 2
1. LITTLE INTEREST OR PLEASURE IN DOING THINGS: SEVERAL DAYS
SUM OF ALL RESPONSES TO PHQ QUESTIONS 1-9: 2
SUM OF ALL RESPONSES TO PHQ QUESTIONS 1-9: 2
2. FEELING DOWN, DEPRESSED OR HOPELESS: SEVERAL DAYS

## 2024-06-21 NOTE — PROGRESS NOTES
Chief Complaint   Patient presents with    Neck Pain     Neck muscle pain - painful to turn head     Leg Pain     L/leg pain at night    Edema     Lower legs edema     Patient has not been out of the country in (14 months), NO diarrhea, NO cough, NO chest conjestion, NO temp.  Pt has not been around anyone with these symptoms.     Health Maintenance reviewed.    I have reviewed the patient's medical history in detail and updated the computerized patient record.    \"Have you been to the ER, urgent care clinic since your last visit?  No Hospitalized since your last visit?\"    no    “Have you seen or consulted any other health care providers outside of [facility] since your last visit?”    no                                         
repeat in 2 hours if needed, Disp-9 tablet, R-3Normal  4. Elevated blood sugar  -     Hemoglobin A1C; Future    Return in about 3 months (around 2024).       Subjective   History of Present Illness  The patient is a 63-year-old female who presents for evaluation of multiple medical concerns. She has had some mood issues and fibromyalgia.    The patient reports experiencing neck stiffness and leg pain, which she quantifies as an 8 on a scale of 10 on a daily basis. She has recently begun to experience bilateral neck stiffness, reminiscent of muscle spasms, which she had previously experienced. She has been utilizing a muscle relaxer as part of her treatment regimen.    The patient reports fluid retention in her legs, which she believes is exacerbated during the summer months. She denies any prior injuries. She is currently on a regimen of furosemide and a blood pressure medication.    The patient is scheduled to see her cardiologist in 2024 for a pacemaker check. She has experienced two episodes of chest pain and is currently on aspirin. She experienced chest pain yesterday, which was somewhat alleviated by an antispasmodic and lying down. She denies current chest pain.    Supplemental Information  She is scheduled for a mammogram on 2023. Her mood has not been okay. She is still using the liquid for constipation. She uses her headache medicine. She would like to be checked for diabetes because her mouth stays so dry.  Tends to be hypoglycemic from her bypass from which she is gaining weight again.   The patient denies smoking.    Social History     Social History Narrative     has brain damage, lives with him      Past Surgical History:   Procedure Laterality Date    CARPAL TUNNEL RELEASE Bilateral more than 10 years ago     SECTION  ,    COLONOSCOPY  2016    GASTRIC BYPASS SURGERY  1997    x2    HYSTERECTOMY (CERVIX STATUS UNKNOWN)      LAPAROSCOPY      NEUROLOGICAL

## 2024-07-30 RX ORDER — MECLIZINE HCL 25MG 25 MG/1
TABLET, CHEWABLE ORAL
Qty: 90 TABLET | Refills: 2 | Status: SHIPPED | OUTPATIENT
Start: 2024-07-30

## 2024-07-30 NOTE — TELEPHONE ENCOUNTER
Requested Prescriptions     Pending Prescriptions Disp Refills    meclizine (ANTIVERT) 25 MG CHEW [Pharmacy Med Name: Meclizine HCl 25 MG Oral Tablet Chewable] 90 tablet 0     Sig: CHEW AND SWALLOW 1 TABLET THREE TIMES DAILY AS NEEDED FOR DIZZINESS     Last fill 3/13/2024 for #90 w/ 3 addtnl  Last OV 6/21/2024  Next OV 9/23/2024    Renetta Major LPN

## 2024-09-03 ENCOUNTER — TELEPHONE (OUTPATIENT)
Age: 64
End: 2024-09-03

## 2024-09-03 NOTE — TELEPHONE ENCOUNTER
Spoke with pt this morning when attempting to change appt over to virtual, pt is not able to do virtual and appt was rescheduled to Oct 8, but she wanted to ask Trista if she could increase the dosage on her medication for her headaches, she says that she has been getting headaches frequently. She also wants to know if it can be changed to a 90 day supply, and sent to [facility].

## 2024-09-25 ENCOUNTER — OFFICE VISIT (OUTPATIENT)
Facility: CLINIC | Age: 64
End: 2024-09-25

## 2024-09-25 VITALS
TEMPERATURE: 97.8 F | WEIGHT: 170 LBS | BODY MASS INDEX: 36.68 KG/M2 | OXYGEN SATURATION: 96 % | SYSTOLIC BLOOD PRESSURE: 95 MMHG | HEART RATE: 80 BPM | RESPIRATION RATE: 16 BRPM | DIASTOLIC BLOOD PRESSURE: 67 MMHG | HEIGHT: 57 IN

## 2024-09-25 DIAGNOSIS — Z98.84 HISTORY OF GASTRIC BYPASS: ICD-10-CM

## 2024-09-25 DIAGNOSIS — J45.40 MODERATE PERSISTENT ASTHMA WITHOUT COMPLICATION: ICD-10-CM

## 2024-09-25 DIAGNOSIS — I73.9 PERIPHERAL VASCULAR DISEASE (HCC): ICD-10-CM

## 2024-09-25 DIAGNOSIS — E66.01 SEVERE OBESITY (BMI 35.0-39.9) WITH COMORBIDITY: ICD-10-CM

## 2024-09-25 DIAGNOSIS — M79.7 FIBROMYALGIA AFFECTING LOWER LEG: ICD-10-CM

## 2024-09-25 DIAGNOSIS — I89.0 LYMPHEDEMA: ICD-10-CM

## 2024-09-25 DIAGNOSIS — G43.109 MIGRAINE WITH AURA AND WITHOUT STATUS MIGRAINOSUS, NOT INTRACTABLE: ICD-10-CM

## 2024-09-25 DIAGNOSIS — Z23 NEED FOR IMMUNIZATION AGAINST INFLUENZA: ICD-10-CM

## 2024-09-25 DIAGNOSIS — K59.00 CONSTIPATION, UNSPECIFIED CONSTIPATION TYPE: ICD-10-CM

## 2024-09-25 DIAGNOSIS — I49.5 SSS (SICK SINUS SYNDROME) (HCC): Primary | ICD-10-CM

## 2024-09-25 DIAGNOSIS — F31.30 BIPOLAR AFFECTIVE DISORDER, CURRENT EPISODE DEPRESSED, CURRENT EPISODE SEVERITY UNSPECIFIED (HCC): ICD-10-CM

## 2024-09-25 RX ORDER — FUROSEMIDE 20 MG
20 TABLET ORAL DAILY PRN
Qty: 90 TABLET | Refills: 1 | Status: SHIPPED | OUTPATIENT
Start: 2024-09-25

## 2024-09-25 RX ORDER — LACTULOSE 10 G/15ML
15 SOLUTION ORAL 2 TIMES DAILY
Qty: 473 ML | Refills: 5 | Status: SHIPPED | OUTPATIENT
Start: 2024-09-25

## 2024-09-25 RX ORDER — POTASSIUM CHLORIDE 750 MG/1
10 TABLET, EXTENDED RELEASE ORAL DAILY
Qty: 90 TABLET | Refills: 1 | Status: SHIPPED | OUTPATIENT
Start: 2024-09-25

## 2024-09-25 RX ORDER — RIZATRIPTAN BENZOATE 10 MG/1
10 TABLET, ORALLY DISINTEGRATING ORAL DAILY PRN
Qty: 9 TABLET | Refills: 3 | Status: SHIPPED | OUTPATIENT
Start: 2024-09-25

## 2024-09-25 SDOH — ECONOMIC STABILITY: FOOD INSECURITY: WITHIN THE PAST 12 MONTHS, YOU WORRIED THAT YOUR FOOD WOULD RUN OUT BEFORE YOU GOT MONEY TO BUY MORE.: NEVER TRUE

## 2024-09-25 SDOH — ECONOMIC STABILITY: FOOD INSECURITY: WITHIN THE PAST 12 MONTHS, THE FOOD YOU BOUGHT JUST DIDN'T LAST AND YOU DIDN'T HAVE MONEY TO GET MORE.: NEVER TRUE

## 2024-09-25 SDOH — ECONOMIC STABILITY: INCOME INSECURITY: HOW HARD IS IT FOR YOU TO PAY FOR THE VERY BASICS LIKE FOOD, HOUSING, MEDICAL CARE, AND HEATING?: NOT HARD AT ALL

## 2024-09-25 ASSESSMENT — ANXIETY QUESTIONNAIRES
1. FEELING NERVOUS, ANXIOUS, OR ON EDGE: SEVERAL DAYS
2. NOT BEING ABLE TO STOP OR CONTROL WORRYING: SEVERAL DAYS

## 2024-09-25 ASSESSMENT — PATIENT HEALTH QUESTIONNAIRE - PHQ9
SUM OF ALL RESPONSES TO PHQ QUESTIONS 1-9: 2
3. TROUBLE FALLING OR STAYING ASLEEP: SEVERAL DAYS
SUM OF ALL RESPONSES TO PHQ QUESTIONS 1-9: 2
2. FEELING DOWN, DEPRESSED OR HOPELESS: SEVERAL DAYS

## 2024-10-07 DIAGNOSIS — M79.7 FIBROMYALGIA AFFECTING LOWER LEG: ICD-10-CM

## 2024-10-16 RX ORDER — ELETRIPTAN HYDROBROMIDE 40 MG/1
40 TABLET, FILM COATED ORAL DAILY PRN
Qty: 6 TABLET | Refills: 3 | Status: SHIPPED | OUTPATIENT
Start: 2024-10-16

## 2024-10-25 ENCOUNTER — OFFICE VISIT (OUTPATIENT)
Facility: CLINIC | Age: 64
End: 2024-10-25

## 2024-10-25 VITALS
HEIGHT: 57 IN | SYSTOLIC BLOOD PRESSURE: 102 MMHG | RESPIRATION RATE: 16 BRPM | DIASTOLIC BLOOD PRESSURE: 68 MMHG | WEIGHT: 174 LBS | TEMPERATURE: 98.1 F | BODY MASS INDEX: 37.54 KG/M2 | HEART RATE: 77 BPM | OXYGEN SATURATION: 93 %

## 2024-10-25 DIAGNOSIS — Z98.84 WEIGHT GAIN STATUS POST GASTRIC BYPASS: ICD-10-CM

## 2024-10-25 DIAGNOSIS — J45.40 MODERATE PERSISTENT ASTHMA WITHOUT COMPLICATION: ICD-10-CM

## 2024-10-25 DIAGNOSIS — R63.5 WEIGHT GAIN STATUS POST GASTRIC BYPASS: ICD-10-CM

## 2024-10-25 DIAGNOSIS — E83.00 LOW CERULOPLASMIN LEVEL: ICD-10-CM

## 2024-10-25 DIAGNOSIS — M79.7 FIBROMYALGIA AFFECTING LOWER LEG: ICD-10-CM

## 2024-10-25 DIAGNOSIS — F31.30 BIPOLAR AFFECTIVE DISORDER, CURRENT EPISODE DEPRESSED, CURRENT EPISODE SEVERITY UNSPECIFIED (HCC): ICD-10-CM

## 2024-10-25 DIAGNOSIS — M70.72 BURSITIS OF LEFT HIP, UNSPECIFIED BURSA: Primary | ICD-10-CM

## 2024-10-25 RX ORDER — TRIAMCINOLONE ACETONIDE 40 MG/ML
40 INJECTION, SUSPENSION INTRA-ARTICULAR; INTRAMUSCULAR ONCE
Status: COMPLETED | OUTPATIENT
Start: 2024-10-25 | End: 2024-10-25

## 2024-10-25 RX ADMIN — TRIAMCINOLONE ACETONIDE 80 MG: 40 INJECTION, SUSPENSION INTRA-ARTICULAR; INTRAMUSCULAR at 11:23

## 2024-10-25 ASSESSMENT — PATIENT HEALTH QUESTIONNAIRE - PHQ9
SUM OF ALL RESPONSES TO PHQ QUESTIONS 1-9: 2
2. FEELING DOWN, DEPRESSED OR HOPELESS: SEVERAL DAYS
1. LITTLE INTEREST OR PLEASURE IN DOING THINGS: SEVERAL DAYS
SUM OF ALL RESPONSES TO PHQ9 QUESTIONS 1 & 2: 2
SUM OF ALL RESPONSES TO PHQ QUESTIONS 1-9: 2

## 2024-10-25 NOTE — PROGRESS NOTES
Kelli Blanchard (:  1960) is a 63 y.o. female, Established patient, here for evaluation of the following chief complaint(s): As below  Hip Pain (Side and hip pain)         Assessment & Plan  1. Hip pain.  The hip pain has been persistent for about a month and is likely related to a previous accident. The area of maximal tenderness was identified and was quite tender upon palpation. An injection of 8 cc of lidocaine with 2 cc of Kenalog was administered into the lateral bursa of the hip. She is allergic to diphenhydramine, ibuprofen, Bactrim, tramadol, diclofenac, sulfamethoxazole, zinc, and gabapentin. If the pain does not improve, further evaluation or treatment will be considered.    Options discussed. Discussed possible side affects and benefits of the procedure and/or medications. The patient agrees to undergo the procedure. Consent obtained. Sterile procedure followed. There were no complications and the procedure was well tolerated. Instructed patient to call me if it is ineffective or if there are any complications like increasing pain, redness or swelling.          2. Glaucoma.  She has an upcoming appointment in November for a glaucoma test. She reports that her eye doctor mentioned a little bit of cataract. She will follow up with her eye doctor as scheduled.    Follow-up  Return in 3 to 4 months for follow-up.    PROCEDURE  I injected 2 cc of Kenalog and 8 cc of lidocaine into the lateral bursa of the hip area of maximal tenderness which was quite tender when I palpated it.    Results    1. Fibromyalgia affecting lower leg  2. Moderate persistent asthma without complication  3. Weight gain status post gastric bypass  4. Bipolar affective disorder, current episode depressed, current episode severity unspecified ([facility])  5. Bursitis of left hip, unspecified bursa  -     triamcinolone acetonide (KENALOG-40) injection 40 mg; 40 mg, IntraMUSCular, ONCE, 1 dose, On Fri 10/25/24 at 1130

## 2024-10-25 NOTE — PROGRESS NOTES
Chief Complaint   Patient presents with    Hip Pain     Side and hip pain     Patient has not been out of the country in (14 months), NO diarrhea, NO cough, NO chest conjestion, NO temp.  Pt has not been around anyone with these symptoms.     Health Maintenance reviewed.    I have reviewed the patient's medical history in detail and updated the computerized patient record.    \"Have you been to the ER, urgent care clinic since your last visit? No  Hospitalized since your last visit?\"    no    “Have you seen or consulted any other health care providers outside of [facility] since your last visit?”    no    [unfilled]  OFFICE PROCEDURE PROGRESS NOTE        Chart reviewed for the following:   Alix MANUEL LPN, have reviewed the History, Physical and updated the Allergic reactions for Kelli A Blanchard     TIME OUT performed immediately prior to start of procedure:   Francis MANUEL MD have performed the following reviews on Kelli A Blanchard prior to the start of the procedure:            * Patient was identified by name and date of birth   * Agreement on procedure being performed was verified  * Risks and Benefits explained to the patient by Francis Tucker MD  * Procedure site verified and marked as necessary  * Patient was positioned for comfort  * Consent was signed and verified     Time: 11:10 am    Date of procedure:  - 10-25-14    Procedure performed by:  Francis Tucker MD    Provider assisted by: Alix Serrano LPN    Patient assisted by:  Alix Serrano LPN    How tolerated by patient: Well    Post Procedural Pain [id number]     Comments: None    Dr. Tucker did the procedure

## 2025-01-16 PROBLEM — E83.00 LOW CERULOPLASMIN LEVEL: Status: ACTIVE | Noted: 2025-01-16

## 2025-02-03 ENCOUNTER — HOSPITAL ENCOUNTER (EMERGENCY)
Facility: HOSPITAL | Age: 65
Discharge: HOME | End: 2025-02-03
Attending: EMERGENCY MEDICINE
Payer: MEDICARE

## 2025-02-03 VITALS
DIASTOLIC BLOOD PRESSURE: 97 MMHG | BODY MASS INDEX: 37.11 KG/M2 | OXYGEN SATURATION: 98 % | TEMPERATURE: 97.8 F | HEIGHT: 57 IN | WEIGHT: 172 LBS | RESPIRATION RATE: 18 BRPM | HEART RATE: 67 BPM | SYSTOLIC BLOOD PRESSURE: 179 MMHG

## 2025-02-03 DIAGNOSIS — I10 ASYMPTOMATIC HYPERTENSION: Primary | ICD-10-CM

## 2025-02-03 PROCEDURE — 99283 EMERGENCY DEPT VISIT LOW MDM: CPT

## 2025-02-03 RX ORDER — AMLODIPINE BESYLATE 5 MG/1
5 TABLET ORAL DAILY
Qty: 30 TABLET | Refills: 0 | Status: SHIPPED | OUTPATIENT
Start: 2025-02-03

## 2025-02-03 ASSESSMENT — PAIN DESCRIPTION - DESCRIPTORS: DESCRIPTORS: ACHING

## 2025-02-03 ASSESSMENT — PAIN SCALES - GENERAL: PAINLEVEL_OUTOF10: 8

## 2025-02-03 ASSESSMENT — PAIN DESCRIPTION - PAIN TYPE: TYPE: ACUTE PAIN

## 2025-02-03 ASSESSMENT — PAIN - FUNCTIONAL ASSESSMENT: PAIN_FUNCTIONAL_ASSESSMENT: 0-10

## 2025-02-03 ASSESSMENT — PAIN DESCRIPTION - FREQUENCY: FREQUENCY: INTERMITTENT

## 2025-02-03 ASSESSMENT — PAIN DESCRIPTION - LOCATION: LOCATION: HEAD

## 2025-02-03 NOTE — ED TRIAGE NOTES
Pt was brought in by EMS with complaints of elevated blood pressure. Pt stated she was at her psychiatric doctor appointment earlier and been checking her blood pressure because it was running high. Ems reports they took her blood pressure and it was 180/100. PT reports headache, pt denies chest pain and shortness of breath.P had history of depression, hypertension and pacemaker insertion.

## 2025-02-03 NOTE — ED PROVIDER NOTES
[facility] EMERGENCY DEPARTMENT  EMERGENCY DEPARTMENT ENCOUNTER       Pt Name: Kelli Blanchard  MRN: [id number]  Birthdate 1960  Date of evaluation: 2/3/2025  Provider: Savannah Whiting MD   PCP: Francis Tucker MD  Note Started: 3:31 PM EST 2/3/25     CHIEF COMPLAINT       Chief Complaint   Patient presents with    Hypertension        HISTORY OF PRESENT ILLNESS: 1 or more elements      History From: patient, History limited by: none     Kelli Blanchard is a 64 y.o. female who presents with a cc of HTN       Please See MDM for Additional Details of the HPI/PMH  Nursing Notes were all reviewed and agreed with or any disagreements were addressed in the HPI.     REVIEW OF SYSTEMS        Positives and Pertinent negatives as per HPI.    PAST HISTORY     Past Medical History:  Past Medical History:   Diagnosis Date    Asthma     Chest pain     Depression     Diabetes (HCC)     Diarrhea     Essential hypertension     Fainting     Fatigue     Hearing loss     Hypoglycemia     Hypotension     Leg swelling     Memory disorder     Murmur     Nausea and vomiting     Pacemaker     Ringing in ears     SOB (shortness of breath)     Stomach pain     Swallowing difficulty     Syncope and collapse 7/24/15    RTH    Unintentional weight change        Past Surgical History:  Past Surgical History:   Procedure Laterality Date    CARPAL TUNNEL RELEASE Bilateral more than 10 years ago     SECTION  ,    COLONOSCOPY  2016    GASTRIC BYPASS SURGERY  1997    x2    HYSTERECTOMY (CERVIX STATUS UNKNOWN)      LAPAROSCOPY      NEUROLOGICAL SURGERY      Bi [facility]    PACEMAKER      TONSILLECTOMY AND ADENOIDECTOMY  more than 10 years ago       Family History:  Family History   Problem Relation Age of Onset    Stroke Mother     Cancer Father     Diabetes Brother     Cancer Brother     Kidney Disease Brother     Cancer Maternal Grandmother     Breast Cancer Paternal Grandfather     Breast Cancer Maternal Aunt

## 2025-02-03 NOTE — ED NOTES
Discharge instructions were given to the patient by CHRISTIANO Love.     The patient left the Emergency Department ambulatory, alert and oriented and in no acute distress with 1 prescriptions. The patient was encouraged to call or return to the ED for worsening issues or problems and was encouraged to schedule a follow up appointment for continuing care. Patient discharged home ambulatory with a steady gait.    The patient verbalized understanding of discharge instructions and prescriptions, all questions were answered. The patient has no further concerns at this time.

## 2025-02-03 NOTE — ED NOTES
Pt presents ambulatory to ED via EMS after complaining of elevated BP. Pt reports she was at her psychiatrist appointment and was informed her BP was elevated. Pt denies hx of HTN or being prescribed medication. She denies chest pain, SOB or dizziness. Pt is alert and oriented x 4, RR even and unlabored, skin is warm and dry. Assessment completed and pt updated on plan of care.        Emergency Department Nursing Plan of Care        The Nursing Plan of Care is developed from the Nursing assessment and Emergency Department Attending provider initial evaluation.  The plan of care may be reviewed in the “ED Provider note”.

## 2025-02-21 DIAGNOSIS — M79.7 FIBROMYALGIA AFFECTING LOWER LEG: ICD-10-CM

## 2025-02-24 NOTE — PROGRESS NOTES
(WIXELA INHUB) 100-50 MCG/ACT AEPB diskus inhaler      10. Bipolar affective disorder, currently depressed, moderate (HCC)        11. Essential hypertension  amLODIPine (NORVASC) 5 MG tablet        Orders Placed This Encounter    amLODIPine (NORVASC) 5 MG tablet     Sig: Take 1 tablet by mouth daily     Dispense:  100 tablet     Refill:  2    furosemide (LASIX) 20 MG tablet     Sig: Take 1 tablet by mouth daily as needed (swelling)     Dispense:  100 tablet     Refill:  2    tiZANidine (ZANAFLEX) 4 MG tablet     Sig: TAKE 1 TABLET BY MOUTH EVERY 8 HOURS AS NEEDED FOR MUSCLE SPASM     Dispense:  60 tablet     Refill:  2    albuterol sulfate HFA (PROVENTIL;VENTOLIN;PROAIR) 108 (90 Base) MCG/ACT inhaler     Sig: Inhale 1 puff into the lungs every 6 hours as needed for Wheezing     Dispense:  18 g     Refill:  5    ARIPiprazole (ABILIFY) 2 MG tablet     Sig: Take 1 tablet by mouth daily     Dispense:  100 tablet     Refill:  2    lactulose (CHRONULAC) 10 GM/15ML solution     Sig: Take 15 mLs by mouth 2 times daily As needed     Dispense:  473 mL     Refill:  5    sertraline (ZOLOFT) 100 MG tablet     Sig: Take 1 tablet by mouth daily     Dispense:  100 tablet     Refill:  2    eletriptan (RELPAX) 40 MG tablet     Sig: Take 1 tablet by mouth daily as needed (headache) may repeat in 2 hours if necessary     Dispense:  20 tablet     Refill:  2    fluticasone-salmeterol (WIXELA INHUB) 100-50 MCG/ACT AEPB diskus inhaler     Sig: Inhale 1 puff into the lungs in the morning and 1 puff in the evening.     Dispense:  1 each     Refill:  5     Current Outpatient Medications   Medication Sig Dispense Refill    amLODIPine (NORVASC) 5 MG tablet Take 1 tablet by mouth daily 100 tablet 2    furosemide (LASIX) 20 MG tablet Take 1 tablet by mouth daily as needed (swelling) 100 tablet 2    tiZANidine (ZANAFLEX) 4 MG tablet TAKE 1 TABLET BY MOUTH EVERY 8 HOURS AS NEEDED FOR MUSCLE SPASM 60 tablet 2    albuterol sulfate HFA

## 2025-02-25 ENCOUNTER — OFFICE VISIT (OUTPATIENT)
Facility: CLINIC | Age: 65
End: 2025-02-25
Payer: MEDICARE

## 2025-02-25 VITALS
BODY MASS INDEX: 36.31 KG/M2 | OXYGEN SATURATION: 97 % | RESPIRATION RATE: 12 BRPM | HEIGHT: 57 IN | TEMPERATURE: 97.9 F | HEART RATE: 65 BPM | DIASTOLIC BLOOD PRESSURE: 88 MMHG | SYSTOLIC BLOOD PRESSURE: 132 MMHG | WEIGHT: 168.3 LBS

## 2025-02-25 DIAGNOSIS — F31.32 BIPOLAR AFFECTIVE DISORDER, CURRENTLY DEPRESSED, MODERATE (HCC): ICD-10-CM

## 2025-02-25 DIAGNOSIS — M79.7 FIBROMYALGIA AFFECTING LOWER LEG: ICD-10-CM

## 2025-02-25 DIAGNOSIS — J45.40 MODERATE PERSISTENT ASTHMA WITHOUT COMPLICATION: ICD-10-CM

## 2025-02-25 DIAGNOSIS — G43.119 INTRACTABLE MIGRAINE WITH AURA WITHOUT STATUS MIGRAINOSUS: ICD-10-CM

## 2025-02-25 DIAGNOSIS — I49.5 SSS (SICK SINUS SYNDROME) (HCC): ICD-10-CM

## 2025-02-25 DIAGNOSIS — K57.32 DIVERTICULITIS OF LARGE INTESTINE WITHOUT BLEEDING, UNSPECIFIED COMPLICATION STATUS: Primary | ICD-10-CM

## 2025-02-25 DIAGNOSIS — I89.0 LYMPHEDEMA: ICD-10-CM

## 2025-02-25 DIAGNOSIS — K59.00 CONSTIPATION, UNSPECIFIED CONSTIPATION TYPE: ICD-10-CM

## 2025-02-25 DIAGNOSIS — I10 ESSENTIAL HYPERTENSION: ICD-10-CM

## 2025-02-25 DIAGNOSIS — E83.00 LOW CERULOPLASMIN LEVEL (HCC): ICD-10-CM

## 2025-02-25 DIAGNOSIS — F31.30 BIPOLAR AFFECTIVE DISORDER, CURRENT EPISODE DEPRESSED, CURRENT EPISODE SEVERITY UNSPECIFIED (HCC): ICD-10-CM

## 2025-02-25 PROCEDURE — 1036F TOBACCO NON-USER: CPT | Performed by: FAMILY MEDICINE

## 2025-02-25 PROCEDURE — 3079F DIAST BP 80-89 MM HG: CPT | Performed by: FAMILY MEDICINE

## 2025-02-25 PROCEDURE — 3075F SYST BP GE 130 - 139MM HG: CPT | Performed by: FAMILY MEDICINE

## 2025-02-25 PROCEDURE — 3017F COLORECTAL CA SCREEN DOC REV: CPT | Performed by: FAMILY MEDICINE

## 2025-02-25 PROCEDURE — G8417 CALC BMI ABV UP PARAM F/U: HCPCS | Performed by: FAMILY MEDICINE

## 2025-02-25 PROCEDURE — G8427 DOCREV CUR MEDS BY ELIG CLIN: HCPCS | Performed by: FAMILY MEDICINE

## 2025-02-25 PROCEDURE — 99214 OFFICE O/P EST MOD 30 MIN: CPT | Performed by: FAMILY MEDICINE

## 2025-02-25 RX ORDER — ALBUTEROL SULFATE 90 UG/1
1 INHALANT RESPIRATORY (INHALATION) EVERY 6 HOURS PRN
Qty: 18 G | Refills: 5 | Status: SHIPPED | OUTPATIENT
Start: 2025-02-25

## 2025-02-25 RX ORDER — AMLODIPINE BESYLATE 5 MG/1
5 TABLET ORAL DAILY
Qty: 100 TABLET | Refills: 2 | Status: SHIPPED | OUTPATIENT
Start: 2025-02-25

## 2025-02-25 RX ORDER — LACTULOSE 10 G/15ML
15 SOLUTION ORAL 2 TIMES DAILY
Qty: 473 ML | Refills: 5 | Status: SHIPPED | OUTPATIENT
Start: 2025-02-25

## 2025-02-25 RX ORDER — ELETRIPTAN HYDROBROMIDE 40 MG/1
40 TABLET, FILM COATED ORAL DAILY PRN
Qty: 20 TABLET | Refills: 2 | Status: SHIPPED | OUTPATIENT
Start: 2025-02-25

## 2025-02-25 RX ORDER — ARIPIPRAZOLE 2 MG/1
2 TABLET ORAL DAILY
Qty: 100 TABLET | Refills: 2 | Status: SHIPPED | OUTPATIENT
Start: 2025-02-25

## 2025-02-25 RX ORDER — FUROSEMIDE 20 MG/1
20 TABLET ORAL DAILY PRN
Qty: 100 TABLET | Refills: 2 | Status: SHIPPED | OUTPATIENT
Start: 2025-02-25

## 2025-02-25 RX ORDER — SERTRALINE HYDROCHLORIDE 100 MG/1
100 TABLET, FILM COATED ORAL DAILY
Qty: 100 TABLET | Refills: 2 | Status: SHIPPED | OUTPATIENT
Start: 2025-02-25

## 2025-02-25 RX ORDER — FLUTICASONE PROPIONATE AND SALMETEROL 100; 50 UG/1; UG/1
1 POWDER RESPIRATORY (INHALATION) EVERY 12 HOURS
Qty: 1 EACH | Refills: 5 | Status: SHIPPED | OUTPATIENT
Start: 2025-02-25

## 2025-02-25 ASSESSMENT — PATIENT HEALTH QUESTIONNAIRE - PHQ9
2. FEELING DOWN, DEPRESSED OR HOPELESS: NOT AT ALL
SUM OF ALL RESPONSES TO PHQ9 QUESTIONS 1 & 2: 0
3. TROUBLE FALLING OR STAYING ASLEEP: NOT AT ALL
8. MOVING OR SPEAKING SO SLOWLY THAT OTHER PEOPLE COULD HAVE NOTICED. OR THE OPPOSITE, BEING SO FIGETY OR RESTLESS THAT YOU HAVE BEEN MOVING AROUND A LOT MORE THAN USUAL: NOT AT ALL
9. THOUGHTS THAT YOU WOULD BE BETTER OFF DEAD, OR OF HURTING YOURSELF: NOT AT ALL
SUM OF ALL RESPONSES TO PHQ QUESTIONS 1-9: 0
4. FEELING TIRED OR HAVING LITTLE ENERGY: NOT AT ALL
SUM OF ALL RESPONSES TO PHQ QUESTIONS 1-9: 0
1. LITTLE INTEREST OR PLEASURE IN DOING THINGS: NOT AT ALL
6. FEELING BAD ABOUT YOURSELF - OR THAT YOU ARE A FAILURE OR HAVE LET YOURSELF OR YOUR FAMILY DOWN: NOT AT ALL
7. TROUBLE CONCENTRATING ON THINGS, SUCH AS READING THE NEWSPAPER OR WATCHING TELEVISION: NOT AT ALL
10. IF YOU CHECKED OFF ANY PROBLEMS, HOW DIFFICULT HAVE THESE PROBLEMS MADE IT FOR YOU TO DO YOUR WORK, TAKE CARE OF THINGS AT HOME, OR GET ALONG WITH OTHER PEOPLE: NOT DIFFICULT AT ALL
SUM OF ALL RESPONSES TO PHQ QUESTIONS 1-9: 0
5. POOR APPETITE OR OVEREATING: NOT AT ALL
SUM OF ALL RESPONSES TO PHQ QUESTIONS 1-9: 0

## 2025-02-26 PROBLEM — Z87.19 HISTORY OF DIVERTICULITIS OF COLON: Status: ACTIVE | Noted: 2025-02-26

## 2025-03-03 ENCOUNTER — TELEPHONE (OUTPATIENT)
Facility: CLINIC | Age: 65
End: 2025-03-03

## 2025-03-03 NOTE — TELEPHONE ENCOUNTER
Pt says she needs \"the stomach pills I got at [facility]\" called in. Pt could not remember what it is called

## 2025-04-09 DIAGNOSIS — G43.109 MIGRAINE WITH AURA AND WITHOUT STATUS MIGRAINOSUS, NOT INTRACTABLE: ICD-10-CM

## 2025-04-10 RX ORDER — RIZATRIPTAN BENZOATE 10 MG/1
TABLET, ORALLY DISINTEGRATING ORAL
Qty: 9 TABLET | Refills: 3 | Status: SHIPPED | OUTPATIENT
Start: 2025-04-10

## 2025-05-06 DIAGNOSIS — G43.109 MIGRAINE WITH AURA AND WITHOUT STATUS MIGRAINOSUS, NOT INTRACTABLE: ICD-10-CM

## 2025-05-06 RX ORDER — RIZATRIPTAN BENZOATE 10 MG/1
TABLET, ORALLY DISINTEGRATING ORAL
Qty: 9 TABLET | Refills: 4 | Status: SHIPPED | OUTPATIENT
Start: 2025-05-06

## 2025-06-03 ENCOUNTER — OFFICE VISIT (OUTPATIENT)
Facility: CLINIC | Age: 65
End: 2025-06-03

## 2025-06-03 VITALS
WEIGHT: 165.4 LBS | OXYGEN SATURATION: 95 % | SYSTOLIC BLOOD PRESSURE: 138 MMHG | BODY MASS INDEX: 35.68 KG/M2 | RESPIRATION RATE: 14 BRPM | DIASTOLIC BLOOD PRESSURE: 86 MMHG | HEIGHT: 57 IN | HEART RATE: 61 BPM | TEMPERATURE: 98.2 F

## 2025-06-03 DIAGNOSIS — Z09 HOSPITAL DISCHARGE FOLLOW-UP: ICD-10-CM

## 2025-06-03 DIAGNOSIS — I49.5 SSS (SICK SINUS SYNDROME) (HCC): ICD-10-CM

## 2025-06-03 DIAGNOSIS — F31.30 BIPOLAR AFFECTIVE DISORDER, CURRENT EPISODE DEPRESSED, CURRENT EPISODE SEVERITY UNSPECIFIED (HCC): ICD-10-CM

## 2025-06-03 DIAGNOSIS — J45.40 MODERATE PERSISTENT ASTHMA WITHOUT COMPLICATION: Primary | ICD-10-CM

## 2025-06-03 DIAGNOSIS — M79.7 FIBROMYALGIA AFFECTING LOWER LEG: ICD-10-CM

## 2025-06-03 RX ORDER — ALBUTEROL SULFATE 0.83 MG/ML
2.5 SOLUTION RESPIRATORY (INHALATION) EVERY 6 HOURS PRN
Qty: 120 EACH | Refills: 2 | Status: SHIPPED | OUTPATIENT
Start: 2025-06-03

## 2025-06-03 RX ORDER — FLUTICASONE FUROATE AND VILANTEROL 100; 25 UG/1; UG/1
2 POWDER RESPIRATORY (INHALATION) DAILY
COMMUNITY
Start: 2025-06-01 | End: 2026-06-01

## 2025-06-03 RX ORDER — PREDNISONE 20 MG/1
TABLET ORAL
Qty: 15 TABLET | Refills: 0 | Status: SHIPPED | OUTPATIENT
Start: 2025-06-03

## 2025-06-03 RX ORDER — CEFUROXIME AXETIL 500 MG/1
500 TABLET ORAL 2 TIMES DAILY
Qty: 14 TABLET | Refills: 0 | Status: SHIPPED | OUTPATIENT
Start: 2025-06-03 | End: 2025-06-10

## 2025-06-03 ASSESSMENT — PATIENT HEALTH QUESTIONNAIRE - PHQ9
SUM OF ALL RESPONSES TO PHQ QUESTIONS 1-9: 0
6. FEELING BAD ABOUT YOURSELF - OR THAT YOU ARE A FAILURE OR HAVE LET YOURSELF OR YOUR FAMILY DOWN: NOT AT ALL
1. LITTLE INTEREST OR PLEASURE IN DOING THINGS: NOT AT ALL
9. THOUGHTS THAT YOU WOULD BE BETTER OFF DEAD, OR OF HURTING YOURSELF: NOT AT ALL
5. POOR APPETITE OR OVEREATING: NOT AT ALL
8. MOVING OR SPEAKING SO SLOWLY THAT OTHER PEOPLE COULD HAVE NOTICED. OR THE OPPOSITE, BEING SO FIGETY OR RESTLESS THAT YOU HAVE BEEN MOVING AROUND A LOT MORE THAN USUAL: NOT AT ALL
SUM OF ALL RESPONSES TO PHQ QUESTIONS 1-9: 0
7. TROUBLE CONCENTRATING ON THINGS, SUCH AS READING THE NEWSPAPER OR WATCHING TELEVISION: NOT AT ALL
SUM OF ALL RESPONSES TO PHQ QUESTIONS 1-9: 0
10. IF YOU CHECKED OFF ANY PROBLEMS, HOW DIFFICULT HAVE THESE PROBLEMS MADE IT FOR YOU TO DO YOUR WORK, TAKE CARE OF THINGS AT HOME, OR GET ALONG WITH OTHER PEOPLE: NOT DIFFICULT AT ALL
3. TROUBLE FALLING OR STAYING ASLEEP: NOT AT ALL
2. FEELING DOWN, DEPRESSED OR HOPELESS: NOT AT ALL
4. FEELING TIRED OR HAVING LITTLE ENERGY: NOT AT ALL
SUM OF ALL RESPONSES TO PHQ QUESTIONS 1-9: 0

## 2025-06-03 ASSESSMENT — LIFESTYLE VARIABLES
HOW OFTEN DO YOU HAVE A DRINK CONTAINING ALCOHOL: NEVER
HOW MANY STANDARD DRINKS CONTAINING ALCOHOL DO YOU HAVE ON A TYPICAL DAY: PATIENT DOES NOT DRINK

## 2025-06-04 NOTE — PROGRESS NOTES
Kelli Blanchard (:  1960) is a 64 y.o. female, Established patient, here for evaluation of the following chief complaint(s): Follow-up hospitalization for asthma  Medicare AWV (Patient was in the hosp. She states she is still congested and still having some wheezing)         Assessment & Plan  1. Post-hospitalization follow-up.  - Admitted on 2025 and discharged on 2025 with a diagnosis of exacerbation of asthma, shortness of breath, and left lower lobe pneumonia.  - Bilateral wheezes and rhonchi throughout both lung fields noted on physical examination.  - Reports persistent cough and slight improvement in breathing since discharge. No changes made to current medications.  - Prescription for an additional course of antibiotics will be provided to prevent potential readmission.    Results    1. Moderate persistent asthma without complication  -     predniSONE (DELTASONE) 20 MG tablet; 2 daily for five days, then one daily for five days, then stop., Disp-15 tablet, R-0Normal  -     cefUROXime (CEFTIN) 500 MG tablet; Take 1 tablet by mouth 2 times daily for 7 days, Disp-14 tablet, R-0Normal  -     albuterol (PROVENTIL) (2.5 MG/3ML) 0.083% nebulizer solution; Take 3 mLs by nebulization every 6 hours as needed for Wheezing, Disp-120 each, R-2Normal  -     DME Order for (Specify) as OP  2. Bipolar affective disorder, current episode depressed, current episode severity unspecified (HCC)  3. SSS (sick sinus syndrome) ([facility])  4. Adult BMI 35.0-35.9 kg/sq m  5. Fibromyalgia affecting lower leg  6. Hospital discharge follow-up  -     SC DISCHARGE MEDS RECONCILED W/ CURRENT OUTPATIENT MED LIST          Diagnosis Orders   1. Moderate persistent asthma without complication  fluticasone furoate-vilanterol (BREO ELLIPTA) 100-25 MCG/ACT inhaler    predniSONE (DELTASONE) 20 MG tablet    cefUROXime (CEFTIN) 500 MG tablet    albuterol (PROVENTIL) (2.5 MG/3ML) 0.083% nebulizer solution    DME Order for (Specify) as 
Have you been to the ER, urgent care clinic since your last visit?  Hospitalized since your last visit?   YES - When: approximately 1  weeks ago.  Where and Why: Notes in chart.    Have you seen or consulted any other health care providers outside our system since your last visit?   NO               Chief Complaint   Patient presents with    Medicare AWV     Patient was in the hosp. She states she is still congested and still having some wheezing       
Post-Discharge Transitional Care Follow Up    Kelli Blanchard   YOB: 1960    Date of Office Visit:  6/3/2025  Date of Hospital Admission: May 25, 2025  Date of Hospital Discharge: May 31, 2025    Care management risk score Rising risk (score 2-5) and Complex Care (Scores >=6): No Risk Score On File     Non face to face  following discharge, date last encounter closed (first attempt may have been earlier): June 2, 2025 Ms. Fuentes and Mr. Durham he    Call initiated 2 business days of discharge: As above    ASSESSMENT/PLAN:   Moderate persistent asthma without complication  -     predniSONE (DELTASONE) 20 MG tablet; 2 daily for five days, then one daily for five days, then stop., Disp-15 tablet, R-0Normal  -     cefUROXime (CEFTIN) 500 MG tablet; Take 1 tablet by mouth 2 times daily for 7 days, Disp-14 tablet, R-0Normal  -     albuterol (PROVENTIL) (2.5 MG/3ML) 0.083% nebulizer solution; Take 3 mLs by nebulization every 6 hours as needed for Wheezing, Disp-120 each, R-2Normal  -     DME Order for (Specify) as OP  Bipolar affective disorder, current episode depressed, current episode severity unspecified (HCC)  SSS (sick sinus syndrome) (HCC)  Adult BMI 35.0-35.9 kg/sq m  Fibromyalgia affecting lower leg  Hospital discharge follow-up  -     OR DISCHARGE MEDS RECONCILED W/ CURRENT OUTPATIENT MED LIST      Medical Decision Making: moderate complexity  Return in 1 week (on 6/10/2025).           Subjective:   HPI    Inpatient course: Discharge summary reviewed- see chart.    Interval history/Current status: See below    Patient Active Problem List   Diagnosis    History of gastric bypass    Gastroesophageal reflux disease without esophagitis    Hypoglycemia    SSS (sick sinus syndrome) (HCC)    Fibromyalgia affecting lower leg    Peripheral vascular disease    Weight gain status post gastric bypass    Nesidioblastosis    Adult BMI 35.0-35.9 kg/sq m    Bipolar disorder with current episode depressed (HCC)    
1.448 m (4' 9\")   Wt 75 kg (165 lb 6.4 oz)   SpO2 95%   BMI 35.79 kg/m²   {GENERAL PHYSICAL EXAM OPTIONAL (Optional):[id number]}    An electronic signature was used to authenticate this note.  --Francis Tucker MD

## 2025-06-10 ENCOUNTER — OFFICE VISIT (OUTPATIENT)
Facility: CLINIC | Age: 65
End: 2025-06-10
Payer: MEDICARE

## 2025-06-10 VITALS
HEIGHT: 57 IN | SYSTOLIC BLOOD PRESSURE: 132 MMHG | HEART RATE: 79 BPM | BODY MASS INDEX: 36.72 KG/M2 | RESPIRATION RATE: 14 BRPM | OXYGEN SATURATION: 96 % | WEIGHT: 170.2 LBS | DIASTOLIC BLOOD PRESSURE: 85 MMHG | TEMPERATURE: 97.9 F

## 2025-06-10 DIAGNOSIS — F31.30 BIPOLAR AFFECTIVE DISORDER, CURRENT EPISODE DEPRESSED, CURRENT EPISODE SEVERITY UNSPECIFIED (HCC): ICD-10-CM

## 2025-06-10 DIAGNOSIS — R63.5 WEIGHT GAIN STATUS POST GASTRIC BYPASS: ICD-10-CM

## 2025-06-10 DIAGNOSIS — F31.31 BIPOLAR AFFECTIVE DISORDER, CURRENTLY DEPRESSED, MILD (HCC): ICD-10-CM

## 2025-06-10 DIAGNOSIS — G43.119 INTRACTABLE MIGRAINE WITH AURA WITHOUT STATUS MIGRAINOSUS: ICD-10-CM

## 2025-06-10 DIAGNOSIS — J45.40 MODERATE PERSISTENT ASTHMA WITHOUT COMPLICATION: ICD-10-CM

## 2025-06-10 DIAGNOSIS — I89.0 LYMPHEDEMA: ICD-10-CM

## 2025-06-10 DIAGNOSIS — B35.1 FUNGAL INFECTION OF NAIL: ICD-10-CM

## 2025-06-10 DIAGNOSIS — Z98.84 HISTORY OF GASTRIC BYPASS: ICD-10-CM

## 2025-06-10 DIAGNOSIS — I10 ESSENTIAL HYPERTENSION: ICD-10-CM

## 2025-06-10 DIAGNOSIS — K21.9 GASTROESOPHAGEAL REFLUX DISEASE WITHOUT ESOPHAGITIS: ICD-10-CM

## 2025-06-10 DIAGNOSIS — E16.2 HYPOGLYCEMIA: ICD-10-CM

## 2025-06-10 DIAGNOSIS — Z00.00 MEDICARE ANNUAL WELLNESS VISIT, SUBSEQUENT: Primary | ICD-10-CM

## 2025-06-10 DIAGNOSIS — Z98.84 WEIGHT GAIN STATUS POST GASTRIC BYPASS: ICD-10-CM

## 2025-06-10 PROBLEM — E83.00: Status: RESOLVED | Noted: 2025-01-16 | Resolved: 2025-06-10

## 2025-06-10 PROCEDURE — G8417 CALC BMI ABV UP PARAM F/U: HCPCS | Performed by: FAMILY MEDICINE

## 2025-06-10 PROCEDURE — G8427 DOCREV CUR MEDS BY ELIG CLIN: HCPCS | Performed by: FAMILY MEDICINE

## 2025-06-10 PROCEDURE — 1036F TOBACCO NON-USER: CPT | Performed by: FAMILY MEDICINE

## 2025-06-10 PROCEDURE — G0439 PPPS, SUBSEQ VISIT: HCPCS | Performed by: FAMILY MEDICINE

## 2025-06-10 PROCEDURE — 3017F COLORECTAL CA SCREEN DOC REV: CPT | Performed by: FAMILY MEDICINE

## 2025-06-10 PROCEDURE — 3075F SYST BP GE 130 - 139MM HG: CPT | Performed by: FAMILY MEDICINE

## 2025-06-10 PROCEDURE — 99214 OFFICE O/P EST MOD 30 MIN: CPT | Performed by: FAMILY MEDICINE

## 2025-06-10 PROCEDURE — 3079F DIAST BP 80-89 MM HG: CPT | Performed by: FAMILY MEDICINE

## 2025-06-10 RX ORDER — ERGOCALCIFEROL 1.25 MG/1
50000 CAPSULE ORAL
Qty: 12 CAPSULE | Refills: 3 | Status: SHIPPED | OUTPATIENT
Start: 2025-06-10

## 2025-06-10 RX ORDER — ALBUTEROL SULFATE 90 UG/1
1 INHALANT RESPIRATORY (INHALATION) EVERY 6 HOURS PRN
Qty: 18 G | Refills: 5 | Status: SHIPPED | OUTPATIENT
Start: 2025-06-10

## 2025-06-10 RX ORDER — TOPIRAMATE 50 MG/1
50 TABLET, FILM COATED ORAL 2 TIMES DAILY
Qty: 200 TABLET | Refills: 2 | Status: SHIPPED | OUTPATIENT
Start: 2025-06-10

## 2025-06-10 RX ORDER — CICLOPIROX 8 %
KIT TOPICAL
Qty: 1 EACH | Refills: 5 | Status: SHIPPED | OUTPATIENT
Start: 2025-06-10

## 2025-06-10 RX ORDER — ARIPIPRAZOLE 2 MG/1
2 TABLET ORAL DAILY
Qty: 100 TABLET | Refills: 2 | Status: SHIPPED | OUTPATIENT
Start: 2025-06-10

## 2025-06-10 RX ORDER — SERTRALINE HYDROCHLORIDE 100 MG/1
100 TABLET, FILM COATED ORAL DAILY
Qty: 100 TABLET | Refills: 2 | Status: SHIPPED | OUTPATIENT
Start: 2025-06-10

## 2025-06-10 RX ORDER — AMLODIPINE BESYLATE 5 MG/1
5 TABLET ORAL DAILY
Qty: 100 TABLET | Refills: 2 | Status: SHIPPED | OUTPATIENT
Start: 2025-06-10

## 2025-06-10 RX ORDER — POTASSIUM CHLORIDE 750 MG/1
10 TABLET, EXTENDED RELEASE ORAL DAILY
Qty: 100 TABLET | Refills: 2 | Status: SHIPPED | OUTPATIENT
Start: 2025-06-10

## 2025-06-10 RX ORDER — CEFUROXIME AXETIL 500 MG/1
500 TABLET ORAL 2 TIMES DAILY
Qty: 14 TABLET | Refills: 0 | Status: CANCELLED | OUTPATIENT
Start: 2025-06-10 | End: 2025-06-17

## 2025-06-10 RX ORDER — BUTALBITAL, ACETAMINOPHEN AND CAFFEINE 50; 325; 40 MG/1; MG/1; MG/1
1 TABLET ORAL DAILY PRN
Qty: 30 TABLET | Refills: 0 | Status: SHIPPED | OUTPATIENT
Start: 2025-06-10

## 2025-06-10 RX ORDER — TRAZODONE HYDROCHLORIDE 50 MG/1
TABLET ORAL
Qty: 300 TABLET | Refills: 2 | Status: SHIPPED | OUTPATIENT
Start: 2025-06-10

## 2025-06-10 RX ORDER — FUROSEMIDE 20 MG/1
20 TABLET ORAL DAILY PRN
Qty: 100 TABLET | Refills: 2 | Status: SHIPPED | OUTPATIENT
Start: 2025-06-10

## 2025-06-10 NOTE — PROGRESS NOTES
Medicare Annual Wellness Visit    Kelli Blanchard is here for Medicare AWV (Would like refill on cefuroxime 500mg)    Assessment & Plan   Medicare annual wellness visit, subsequent  Bipolar affective disorder, currently depressed, mild (HCC)  Gastroesophageal reflux disease without esophagitis  -     CBC; Future  Moderate persistent asthma without complication  -     albuterol sulfate HFA (PROVENTIL;VENTOLIN;PROAIR) 108 (90 Base) MCG/ACT inhaler; Inhale 1 puff into the lungs every 6 hours as needed for Wheezing, Disp-18 g, R-5Normal  History of gastric bypass  -     Iron and TIBC  -     Zinc; Future  -     Vitamin B12 & Folate; Future  -     Copper, Serum; Future  -     Ferritin; Future  -     PTH, Intact; Future  -     Cyanocobalamin ER 1000 MCG TBCR; Take 1 tablet by mouth daily, Disp-90 tablet, R-3Normal  -     ergocalciferol (ERGOCALCIFEROL) 1.25 MG (19490 UT) capsule; Take 1 capsule by mouth every 7 days, Disp-12 capsule, R-3Normal  Hypoglycemia  -     Comprehensive Metabolic Panel; Future  Weight gain status post gastric bypass  Intractable migraine with aura without status migrainosus  -     butalbital-acetaminophen-caffeine (FIORICET, ESGIC) -40 MG per tablet; Take 1 tablet by mouth daily as needed for Headaches, Disp-30 tablet, R-0Normal  -     topiramate (TOPAMAX) 50 MG tablet; Take 1 tablet by mouth 2 times daily, Disp-200 tablet, R-2Normal  -     traZODone (DESYREL) 50 MG tablet; TAKE THREE TABLETS BY MOUTH NIGHTLY, Disp-300 tablet, R-2Normal  Fungal infection of nail  -     Ciclopirox (CICLOPIROX TREATMENT) 8 % KIT; Apply daily, Disp-1 each, R-5Normal  Essential hypertension  -     amLODIPine (NORVASC) 5 MG tablet; Take 1 tablet by mouth daily, Disp-100 tablet, R-2Normal  Bipolar affective disorder, current episode depressed, current episode severity unspecified (HCC)  -     ARIPiprazole (ABILIFY) 2 MG tablet; Take 1 tablet by mouth daily, Disp-100 tablet, R-2Normal  -     sertraline (ZOLOFT) 100 MG

## 2025-06-10 NOTE — PROGRESS NOTES
Have you been to the ER, urgent care clinic since your last visit?  Hospitalized since your last visit?   NO    Have you seen or consulted any other health care providers outside our system since your last visit?   NO             Chief Complaint   Patient presents with    Medicare AWV     Would like refill on cefuroxime 500mg

## 2025-06-12 DIAGNOSIS — M79.7 FIBROMYALGIA AFFECTING LOWER LEG: ICD-10-CM

## 2025-06-13 NOTE — TELEPHONE ENCOUNTER
PCP: Francis Tucker MD    LAST APPT:6/10/2025    Future Appointments   Date Time Provider Department Center   9/10/2025 10:30 AM Francis Tucker MD [facility] DEP       Requested Prescriptions     Pending Prescriptions Disp Refills    tiZANidine (ZANAFLEX) 4 MG tablet [Pharmacy Med Name: tiZANidine HCl 4 MG Oral Tablet] 60 tablet 0     Sig: TAKE 1 TABLET BY MOUTH EVERY 8 HOURS AS NEEDED FOR MUSCLE SPASM

## 2025-06-16 ENCOUNTER — TELEPHONE (OUTPATIENT)
Facility: CLINIC | Age: 65
End: 2025-06-16

## 2025-06-18 NOTE — PROGRESS NOTES
Chief Complaint   Patient presents with    Depression     follow up     I have reviewed the patient's medical history in detail and updated the computerized patient record. Health Maintenance reviewed. 1. Have you been to the ER, urgent care clinic since your last visit? Hospitalized since your last visit?no    2. Have you seen or consulted any other health care providers outside of the [address] [place] [address] [place] since your last visit? Include any pap smears or colon screening. No      Encouraged pt to discuss pt's wishes with spouse/partner/family and bring them in the next appt to follow thru with the Advanced Directive    Fall Risk Assessment, last 12 mths 2/5/2019   Able to walk? Yes   Fall in past 12 months? No   Fall with injury? -   Number of falls in past 12 months -   Fall Risk Score -       3 most recent PHQ Screens 2/5/2019   Little interest or pleasure in doing things Several days   Feeling down, depressed, irritable, or hopeless Several days   Total Score PHQ 2 2       Abuse Screening Questionnaire 2/5/2019   Do you ever feel afraid of your partner? N   Are you in a relationship with someone who physically or mentally threatens you? N   Is it safe for you to go home?  Y       ADL Assessment 2/5/2019   Feeding yourself No Help Needed   Getting from bed to chair No Help Needed   Getting dressed No Help Needed   Bathing or showering No Help Needed   Walk across the room (includes cane/walker) No Help Needed   Using the telphone No Help Needed   Taking your medications No Help Needed   Preparing meals No Help Needed   Managing money (expenses/bills) No Help Needed   Moderately strenuous housework (laundry) No Help Needed   Shopping for personal items (toiletries/medicines) No Help Needed   Shopping for groceries No Help Needed   Driving No Help Needed   Climbing a flight of stairs No Help Needed   Getting to places beyond walking distances No Help Needed Initiate Treatment: CeraVe cream daily Detail Level: Zone

## 2025-06-27 ENCOUNTER — OFFICE VISIT (OUTPATIENT)
Facility: CLINIC | Age: 65
End: 2025-06-27
Payer: MEDICARE

## 2025-06-27 VITALS
HEART RATE: 65 BPM | OXYGEN SATURATION: 96 % | HEIGHT: 57 IN | BODY MASS INDEX: 35.38 KG/M2 | RESPIRATION RATE: 20 BRPM | DIASTOLIC BLOOD PRESSURE: 96 MMHG | WEIGHT: 164 LBS | TEMPERATURE: 99.1 F | SYSTOLIC BLOOD PRESSURE: 147 MMHG

## 2025-06-27 DIAGNOSIS — M54.50 ACUTE MIDLINE LOW BACK PAIN WITHOUT SCIATICA: Primary | ICD-10-CM

## 2025-06-27 PROCEDURE — G8417 CALC BMI ABV UP PARAM F/U: HCPCS | Performed by: NURSE PRACTITIONER

## 2025-06-27 PROCEDURE — 99213 OFFICE O/P EST LOW 20 MIN: CPT | Performed by: NURSE PRACTITIONER

## 2025-06-27 PROCEDURE — 3017F COLORECTAL CA SCREEN DOC REV: CPT | Performed by: NURSE PRACTITIONER

## 2025-06-27 PROCEDURE — G8427 DOCREV CUR MEDS BY ELIG CLIN: HCPCS | Performed by: NURSE PRACTITIONER

## 2025-06-27 PROCEDURE — 1036F TOBACCO NON-USER: CPT | Performed by: NURSE PRACTITIONER

## 2025-06-27 RX ORDER — ACETAMINOPHEN 500 MG
500 TABLET ORAL 4 TIMES DAILY PRN
Qty: 120 TABLET | Refills: 0 | Status: SHIPPED | OUTPATIENT
Start: 2025-06-27

## 2025-06-27 RX ORDER — LIDOCAINE 50 MG/G
1 PATCH TOPICAL DAILY
Qty: 30 PATCH | Refills: 0 | Status: SHIPPED | OUTPATIENT
Start: 2025-06-27 | End: 2025-07-27

## 2025-06-27 ASSESSMENT — ENCOUNTER SYMPTOMS
SHORTNESS OF BREATH: 0
BACK PAIN: 1
ABDOMINAL PAIN: 0
CHEST TIGHTNESS: 0
DIARRHEA: 0
NAUSEA: 0
CONSTIPATION: 0
COUGH: 0
WHEEZING: 0

## 2025-06-27 NOTE — PROGRESS NOTES
Chief Complaint   Patient presents with    Lower Back Pain     Radiating down right leg           
TIMES DAILY AS NEEDED FOR DIZZINESS 90 tablet 2   • diclofenac sodium (VOLTAREN) 1 % GEL Apply 4 g topically 4 times daily 100 g 3   • omeprazole (PRILOSEC) 40 MG delayed release capsule Take 1 capsule by mouth daily 90 capsule 3   • Galcanezumab-gnlm (EMGALITY) 120 MG/ML SOAJ Inject 120 mg into the skin every 30 days 1 Adjustable Dose Pre-filled Pen Syringe 2   • acarbose (PRECOSE) 50 MG tablet TAKE 1 TABLET BY MOUTH THREE TIMES A DAY 30 MINUTES BEFORE MEALS     • aspirin 81 MG chewable tablet CHEW AND SWALLOW 1 TAB BY MOUTH DAILY.     • fluticasone (FLONASE) 50 MCG/ACT nasal spray 2 sprays by Nasal route daily     • nystatin (MYCOSTATIN) 228641 UNIT/GM cream Apply topically 2 times daily     • polyethylene glycol (GLYCOLAX) 17 GM/SCOOP powder Take by mouth daily     • Rimegepant Sulfate 75 MG TBDP Take 1 tablet p.o. every other day then 1 tablet p.o. as needed for severe headache not to exceed 1 dose in 24 hours       No current facility-administered medications for this visit.        Review of Systems   Constitutional:  Negative for activity change, fatigue, fever and unexpected weight change.   HENT:  Negative for nosebleeds.    Eyes:  Negative for visual disturbance.   Respiratory:  Negative for cough, chest tightness, shortness of breath and wheezing.    Cardiovascular:  Negative for chest pain, palpitations and leg swelling.   Gastrointestinal:  Negative for abdominal pain, constipation, diarrhea and nausea.   Endocrine: Negative for polydipsia and polyuria.   Genitourinary:  Negative for flank pain.   Musculoskeletal:  Positive for back pain. Negative for joint swelling and myalgias.   Skin:  Negative for rash.   Neurological:  Negative for dizziness, syncope, weakness, light-headedness, numbness and headaches.   Psychiatric/Behavioral:  The patient is not nervous/anxious.           Objective   Physical Exam  Vitals reviewed.   Constitutional:       General: She is not in acute distress.     Appearance:

## 2025-06-27 NOTE — PATIENT INSTRUCTIONS
It was a pleasure to see you today.    1. Acute midline low back pain without sciatica         Return if symptoms worsen or fail to improve.     Thank you for choosing [facility].    ROB Patton NP

## 2025-06-30 ENCOUNTER — TELEPHONE (OUTPATIENT)
Facility: CLINIC | Age: 65
End: 2025-06-30

## 2025-07-03 ENCOUNTER — OFFICE VISIT (OUTPATIENT)
Facility: CLINIC | Age: 65
End: 2025-07-03
Payer: MEDICARE

## 2025-07-03 VITALS
RESPIRATION RATE: 14 BRPM | SYSTOLIC BLOOD PRESSURE: 133 MMHG | DIASTOLIC BLOOD PRESSURE: 86 MMHG | BODY MASS INDEX: 37.11 KG/M2 | TEMPERATURE: 99.2 F | OXYGEN SATURATION: 97 % | HEART RATE: 63 BPM | WEIGHT: 172 LBS | HEIGHT: 57 IN

## 2025-07-03 DIAGNOSIS — M54.31 SCIATICA, RIGHT SIDE: Primary | ICD-10-CM

## 2025-07-03 PROCEDURE — 99213 OFFICE O/P EST LOW 20 MIN: CPT | Performed by: FAMILY MEDICINE

## 2025-07-03 PROCEDURE — G8417 CALC BMI ABV UP PARAM F/U: HCPCS | Performed by: FAMILY MEDICINE

## 2025-07-03 PROCEDURE — G8427 DOCREV CUR MEDS BY ELIG CLIN: HCPCS | Performed by: FAMILY MEDICINE

## 2025-07-03 PROCEDURE — 3017F COLORECTAL CA SCREEN DOC REV: CPT | Performed by: FAMILY MEDICINE

## 2025-07-03 PROCEDURE — 1036F TOBACCO NON-USER: CPT | Performed by: FAMILY MEDICINE

## 2025-07-03 RX ORDER — BACLOFEN 10 MG/1
10 TABLET ORAL 3 TIMES DAILY PRN
Qty: 40 TABLET | Refills: 0 | Status: SHIPPED | OUTPATIENT
Start: 2025-07-03 | End: 2025-07-17

## 2025-07-03 ASSESSMENT — PATIENT HEALTH QUESTIONNAIRE - PHQ9
SUM OF ALL RESPONSES TO PHQ QUESTIONS 1-9: 0
4. FEELING TIRED OR HAVING LITTLE ENERGY: NOT AT ALL
7. TROUBLE CONCENTRATING ON THINGS, SUCH AS READING THE NEWSPAPER OR WATCHING TELEVISION: NOT AT ALL
9. THOUGHTS THAT YOU WOULD BE BETTER OFF DEAD, OR OF HURTING YOURSELF: NOT AT ALL
1. LITTLE INTEREST OR PLEASURE IN DOING THINGS: NOT AT ALL
8. MOVING OR SPEAKING SO SLOWLY THAT OTHER PEOPLE COULD HAVE NOTICED. OR THE OPPOSITE, BEING SO FIGETY OR RESTLESS THAT YOU HAVE BEEN MOVING AROUND A LOT MORE THAN USUAL: NOT AT ALL
SUM OF ALL RESPONSES TO PHQ QUESTIONS 1-9: 0
5. POOR APPETITE OR OVEREATING: NOT AT ALL
SUM OF ALL RESPONSES TO PHQ QUESTIONS 1-9: 0
6. FEELING BAD ABOUT YOURSELF - OR THAT YOU ARE A FAILURE OR HAVE LET YOURSELF OR YOUR FAMILY DOWN: NOT AT ALL
SUM OF ALL RESPONSES TO PHQ QUESTIONS 1-9: 0
2. FEELING DOWN, DEPRESSED OR HOPELESS: NOT AT ALL
3. TROUBLE FALLING OR STAYING ASLEEP: NOT AT ALL
10. IF YOU CHECKED OFF ANY PROBLEMS, HOW DIFFICULT HAVE THESE PROBLEMS MADE IT FOR YOU TO DO YOUR WORK, TAKE CARE OF THINGS AT HOME, OR GET ALONG WITH OTHER PEOPLE: NOT DIFFICULT AT ALL

## 2025-07-03 NOTE — PROGRESS NOTES
Kelli Blanchard (:  1960) is a 64 y.o. female, Established patient, here for evaluation of the following chief complaint(s): pain back  Back Pain         Assessment & Plan  1. Back pain.  - The back pain has been present for 2 weeks and is likely due to a muscle pull.  - No fever or significant weight loss reported.  - Discussed that most back pain resolves on its own; prednisone or muscle relaxers can be considered.  - Prescription for muscle relaxers will be provided; advised to discontinue tizanidine.Rx baclofen    2. Ankle pain.  - Persistent ankle pain noted.  - Physical exam indicates tenderness and need for support shoes.  - Referral to a podiatrist for potential orthotics discussed.  - Patient advised to follow up with the foot doctor for orthotics prescription.    3. Headaches.  - Headaches are well-controlled with current medication.  - Patient reports good response to treatment.  - Refill for headache medication will be provided.  - No new symptoms or concerns reported.    Results    1. Sciatica, right side          Diagnosis Orders   1. Sciatica, right side          Orders Placed This Encounter    baclofen (LIORESAL) 10 MG tablet     Sig: Take 1 tablet by mouth 3 times daily as needed (pain)     Dispense:  40 tablet     Refill:  0     Current Outpatient Medications   Medication Sig Dispense Refill    baclofen (LIORESAL) 10 MG tablet Take 1 tablet by mouth 3 times daily as needed (pain) 40 tablet 0    acetaminophen (TYLENOL) 500 MG tablet Take 1 tablet by mouth 4 times daily as needed for Pain 120 tablet 0    Ciclopirox (CICLOPIROX TREATMENT) 8 % KIT Apply daily 1 each 5    albuterol sulfate HFA (PROVENTIL;VENTOLIN;PROAIR) 108 (90 Base) MCG/ACT inhaler Inhale 1 puff into the lungs every 6 hours as needed for Wheezing 18 g 5    amLODIPine (NORVASC) 5 MG tablet Take 1 tablet by mouth daily 100 tablet 2    ARIPiprazole (ABILIFY) 2 MG tablet Take 1 tablet by mouth daily 100 tablet 2

## 2025-07-03 NOTE — PROGRESS NOTES
Have you been to the ER, urgent care clinic since your last visit?  Hospitalized since your last visit?   NO    Have you seen or consulted any other health care providers outside our system since your last visit?   NO                Chief Complaint   Patient presents with    Back Pain

## 2025-07-14 DIAGNOSIS — G43.119 INTRACTABLE MIGRAINE WITH AURA WITHOUT STATUS MIGRAINOSUS: ICD-10-CM

## 2025-07-14 RX ORDER — BUTALBITAL, ACETAMINOPHEN AND CAFFEINE 50; 325; 40 MG/1; MG/1; MG/1
1 TABLET ORAL DAILY PRN
Qty: 30 TABLET | Refills: 0 | Status: SHIPPED | OUTPATIENT
Start: 2025-07-14

## 2025-07-25 DIAGNOSIS — R25.2 CRAMPS OF LOWER EXTREMITY: Primary | ICD-10-CM

## 2025-07-25 RX ORDER — TIZANIDINE 2 MG/1
2 TABLET ORAL 4 TIMES DAILY PRN
Qty: 40 TABLET | Refills: 1 | Status: SHIPPED | OUTPATIENT
Start: 2025-07-25

## 2025-07-28 ENCOUNTER — TELEPHONE (OUTPATIENT)
Facility: CLINIC | Age: 65
End: 2025-07-28

## 2025-07-29 ENCOUNTER — TELEPHONE (OUTPATIENT)
Facility: CLINIC | Age: 65
End: 2025-07-29

## 2025-08-05 DIAGNOSIS — G43.119 INTRACTABLE MIGRAINE WITH AURA WITHOUT STATUS MIGRAINOSUS: ICD-10-CM

## 2025-08-13 ENCOUNTER — OFFICE VISIT (OUTPATIENT)
Facility: CLINIC | Age: 65
End: 2025-08-13

## 2025-08-13 VITALS
WEIGHT: 174 LBS | HEIGHT: 57 IN | TEMPERATURE: 98.2 F | SYSTOLIC BLOOD PRESSURE: 139 MMHG | OXYGEN SATURATION: 96 % | HEART RATE: 67 BPM | DIASTOLIC BLOOD PRESSURE: 89 MMHG | RESPIRATION RATE: 16 BRPM | BODY MASS INDEX: 37.54 KG/M2

## 2025-08-13 DIAGNOSIS — S89.91XA INJURY OF RIGHT KNEE, INITIAL ENCOUNTER: Primary | ICD-10-CM

## 2025-08-13 RX ORDER — BUTALBITAL, ACETAMINOPHEN AND CAFFEINE 50; 325; 40 MG/1; MG/1; MG/1
1 TABLET ORAL DAILY PRN
Qty: 30 TABLET | Refills: 0 | Status: SHIPPED | OUTPATIENT
Start: 2025-08-13

## 2025-08-13 ASSESSMENT — ENCOUNTER SYMPTOMS
WHEEZING: 0
DIARRHEA: 0
NAUSEA: 0
SHORTNESS OF BREATH: 0
CONSTIPATION: 0
CHEST TIGHTNESS: 0
ABDOMINAL PAIN: 0
COUGH: 0

## 2025-09-03 DIAGNOSIS — M79.7 FIBROMYALGIA AFFECTING LOWER LEG: ICD-10-CM

## 2025-09-03 DIAGNOSIS — G43.119 INTRACTABLE MIGRAINE WITH AURA WITHOUT STATUS MIGRAINOSUS: ICD-10-CM

## 2025-09-04 DIAGNOSIS — G43.119 INTRACTABLE MIGRAINE WITH AURA WITHOUT STATUS MIGRAINOSUS: ICD-10-CM

## 2025-09-04 RX ORDER — BUTALBITAL, ACETAMINOPHEN AND CAFFEINE 50; 325; 40 MG/1; MG/1; MG/1
1 TABLET ORAL DAILY PRN
Qty: 5 TABLET | Refills: 0 | Status: SHIPPED | OUTPATIENT
Start: 2025-09-04

## 2025-09-04 RX ORDER — BUTALBITAL, ACETAMINOPHEN AND CAFFEINE 50; 325; 40 MG/1; MG/1; MG/1
TABLET ORAL
Qty: 30 TABLET | Refills: 0 | OUTPATIENT
Start: 2025-09-04